# Patient Record
Sex: MALE | Race: WHITE | Employment: FULL TIME | URBAN - METROPOLITAN AREA
[De-identification: names, ages, dates, MRNs, and addresses within clinical notes are randomized per-mention and may not be internally consistent; named-entity substitution may affect disease eponyms.]

---

## 2022-07-27 ENCOUNTER — APPOINTMENT (OUTPATIENT)
Dept: CT IMAGING | Age: 32
DRG: 392 | End: 2022-07-27
Payer: COMMERCIAL

## 2022-07-27 ENCOUNTER — HOSPITAL ENCOUNTER (INPATIENT)
Age: 32
LOS: 5 days | Discharge: HOME | DRG: 392 | End: 2022-08-02
Attending: HOSPITALIST | Admitting: HOSPITALIST
Payer: COMMERCIAL

## 2022-07-27 DIAGNOSIS — K57.20 DIVERTICULITIS OF LARGE INTESTINE WITH PERFORATION WITHOUT BLEEDING: Primary | ICD-10-CM

## 2022-07-27 DIAGNOSIS — I10 ESSENTIAL HYPERTENSION: ICD-10-CM

## 2022-07-27 LAB
A/G RATIO: 1.2 (ref 1.1–2.2)
ALBUMIN SERPL-MCNC: 4.2 G/DL (ref 3.4–5)
ALP BLD-CCNC: 71 U/L (ref 40–129)
ALT SERPL-CCNC: 16 U/L (ref 10–40)
ANION GAP SERPL CALCULATED.3IONS-SCNC: 11 MMOL/L (ref 3–16)
AST SERPL-CCNC: 14 U/L (ref 15–37)
BACTERIA: ABNORMAL /HPF
BASOPHILS ABSOLUTE: 0.1 K/UL (ref 0–0.2)
BASOPHILS RELATIVE PERCENT: 0.5 %
BILIRUB SERPL-MCNC: 0.8 MG/DL (ref 0–1)
BILIRUBIN URINE: NEGATIVE
BLOOD, URINE: ABNORMAL
BUN BLDV-MCNC: 9 MG/DL (ref 7–20)
CALCIUM SERPL-MCNC: 9.5 MG/DL (ref 8.3–10.6)
CHLORIDE BLD-SCNC: 99 MMOL/L (ref 99–110)
CLARITY: ABNORMAL
CO2: 27 MMOL/L (ref 21–32)
COLOR: YELLOW
CREAT SERPL-MCNC: 0.8 MG/DL (ref 0.9–1.3)
EOSINOPHILS ABSOLUTE: 0 K/UL (ref 0–0.6)
EOSINOPHILS RELATIVE PERCENT: 0.1 %
EPITHELIAL CELLS, UA: 4 /HPF (ref 0–5)
GFR AFRICAN AMERICAN: >60
GFR NON-AFRICAN AMERICAN: >60
GLUCOSE BLD-MCNC: 116 MG/DL (ref 70–99)
GLUCOSE URINE: NEGATIVE MG/DL
HCT VFR BLD CALC: 43.8 % (ref 40.5–52.5)
HEMOGLOBIN: 14.6 G/DL (ref 13.5–17.5)
HYALINE CASTS: 1 /LPF (ref 0–8)
KETONES, URINE: NEGATIVE MG/DL
LEUKOCYTE ESTERASE, URINE: ABNORMAL
LIPASE: 13 U/L (ref 13–60)
LYMPHOCYTES ABSOLUTE: 1.6 K/UL (ref 1–5.1)
LYMPHOCYTES RELATIVE PERCENT: 8.3 %
MCH RBC QN AUTO: 31.5 PG (ref 26–34)
MCHC RBC AUTO-ENTMCNC: 33.3 G/DL (ref 31–36)
MCV RBC AUTO: 94.8 FL (ref 80–100)
MICROSCOPIC EXAMINATION: YES
MONOCYTES ABSOLUTE: 1.5 K/UL (ref 0–1.3)
MONOCYTES RELATIVE PERCENT: 8 %
MUCUS: PRESENT
NEUTROPHILS ABSOLUTE: 15.7 K/UL (ref 1.7–7.7)
NEUTROPHILS RELATIVE PERCENT: 83.1 %
NITRITE, URINE: NEGATIVE
PDW BLD-RTO: 14.2 % (ref 12.4–15.4)
PH UA: 6.5 (ref 5–8)
PLATELET # BLD: 239 K/UL (ref 135–450)
PMV BLD AUTO: 9.1 FL (ref 5–10.5)
POTASSIUM SERPL-SCNC: 4.4 MMOL/L (ref 3.5–5.1)
PROTEIN UA: ABNORMAL MG/DL
RBC # BLD: 4.62 M/UL (ref 4.2–5.9)
RBC UA: 14 /HPF (ref 0–4)
SODIUM BLD-SCNC: 137 MMOL/L (ref 136–145)
SPECIFIC GRAVITY UA: 1.02 (ref 1–1.03)
TOTAL PROTEIN: 7.8 G/DL (ref 6.4–8.2)
URINE REFLEX TO CULTURE: YES
URINE TYPE: ABNORMAL
UROBILINOGEN, URINE: 1 E.U./DL
WBC # BLD: 18.9 K/UL (ref 4–11)
WBC UA: 154 /HPF (ref 0–5)

## 2022-07-27 PROCEDURE — 87086 URINE CULTURE/COLONY COUNT: CPT

## 2022-07-27 PROCEDURE — 96375 TX/PRO/DX INJ NEW DRUG ADDON: CPT

## 2022-07-27 PROCEDURE — 83690 ASSAY OF LIPASE: CPT

## 2022-07-27 PROCEDURE — 96374 THER/PROPH/DIAG INJ IV PUSH: CPT

## 2022-07-27 PROCEDURE — 80053 COMPREHEN METABOLIC PANEL: CPT

## 2022-07-27 PROCEDURE — 6360000004 HC RX CONTRAST MEDICATION: Performed by: PHYSICIAN ASSISTANT

## 2022-07-27 PROCEDURE — 81001 URINALYSIS AUTO W/SCOPE: CPT

## 2022-07-27 PROCEDURE — 74177 CT ABD & PELVIS W/CONTRAST: CPT

## 2022-07-27 PROCEDURE — 99285 EMERGENCY DEPT VISIT HI MDM: CPT

## 2022-07-27 PROCEDURE — 36415 COLL VENOUS BLD VENIPUNCTURE: CPT

## 2022-07-27 PROCEDURE — 6360000002 HC RX W HCPCS: Performed by: PHYSICIAN ASSISTANT

## 2022-07-27 PROCEDURE — 85025 COMPLETE CBC W/AUTO DIFF WBC: CPT

## 2022-07-27 RX ORDER — MORPHINE SULFATE 4 MG/ML
4 INJECTION, SOLUTION INTRAMUSCULAR; INTRAVENOUS EVERY 30 MIN PRN
Status: DISCONTINUED | OUTPATIENT
Start: 2022-07-27 | End: 2022-07-28

## 2022-07-27 RX ORDER — 0.9 % SODIUM CHLORIDE 0.9 %
1000 INTRAVENOUS SOLUTION INTRAVENOUS ONCE
Status: COMPLETED | OUTPATIENT
Start: 2022-07-27 | End: 2022-07-28

## 2022-07-27 RX ORDER — ONDANSETRON 2 MG/ML
4 INJECTION INTRAMUSCULAR; INTRAVENOUS EVERY 6 HOURS PRN
Status: DISCONTINUED | OUTPATIENT
Start: 2022-07-27 | End: 2022-07-28

## 2022-07-27 RX ORDER — KETOROLAC TROMETHAMINE 30 MG/ML
30 INJECTION, SOLUTION INTRAMUSCULAR; INTRAVENOUS ONCE
Status: COMPLETED | OUTPATIENT
Start: 2022-07-27 | End: 2022-07-27

## 2022-07-27 RX ADMIN — KETOROLAC TROMETHAMINE 30 MG: 30 INJECTION, SOLUTION INTRAMUSCULAR at 22:39

## 2022-07-27 RX ADMIN — IOPAMIDOL 75 ML: 755 INJECTION, SOLUTION INTRAVENOUS at 23:46

## 2022-07-27 RX ADMIN — ONDANSETRON 4 MG: 2 INJECTION INTRAMUSCULAR; INTRAVENOUS at 22:39

## 2022-07-27 ASSESSMENT — ENCOUNTER SYMPTOMS
SHORTNESS OF BREATH: 0
COUGH: 0
ABDOMINAL PAIN: 1
DIARRHEA: 0
RHINORRHEA: 0
VOMITING: 0
NAUSEA: 1

## 2022-07-27 ASSESSMENT — PAIN SCALES - GENERAL: PAINLEVEL_OUTOF10: 7

## 2022-07-27 ASSESSMENT — PAIN - FUNCTIONAL ASSESSMENT: PAIN_FUNCTIONAL_ASSESSMENT: 0-10

## 2022-07-27 NOTE — LETTER
[facility] Emergency Department      [address] [facility] Stephanie Shweta, [address]            PROOF OF PRESENCE      To Whom It May Concern:    Malinda Garcai is present in the Emergency Department at [facility] currently since 7/27/2022. He will be admitted into the hospital under the Surgeon's care. Please excuse from work until date of return given by Surgeon.                                       Sincerely,      [facility] Emergency Dept

## 2022-07-28 PROBLEM — K57.92 DIVERTICULITIS: Status: ACTIVE | Noted: 2022-07-28

## 2022-07-28 PROBLEM — K57.20 DIVERTICULITIS OF LARGE INTESTINE WITH PERFORATION WITHOUT BLEEDING: Status: ACTIVE | Noted: 2022-07-28

## 2022-07-28 LAB
A/G RATIO: 1.2 (ref 1.1–2.2)
ALBUMIN SERPL-MCNC: 3.9 G/DL (ref 3.4–5)
ALP BLD-CCNC: 68 U/L (ref 40–129)
ALT SERPL-CCNC: 14 U/L (ref 10–40)
ANION GAP SERPL CALCULATED.3IONS-SCNC: 11 MMOL/L (ref 3–16)
APTT: 29.3 SEC (ref 23–34.3)
AST SERPL-CCNC: 12 U/L (ref 15–37)
ATYPICAL LYMPHOCYTE RELATIVE PERCENT: 1 % (ref 0–6)
BASOPHILS ABSOLUTE: 0 K/UL (ref 0–0.2)
BASOPHILS RELATIVE PERCENT: 0 %
BILIRUB SERPL-MCNC: 1.2 MG/DL (ref 0–1)
BUN BLDV-MCNC: 9 MG/DL (ref 7–20)
CALCIUM SERPL-MCNC: 8.8 MG/DL (ref 8.3–10.6)
CHLORIDE BLD-SCNC: 101 MMOL/L (ref 99–110)
CO2: 23 MMOL/L (ref 21–32)
CREAT SERPL-MCNC: 0.8 MG/DL (ref 0.9–1.3)
EOSINOPHILS ABSOLUTE: 0 K/UL (ref 0–0.6)
EOSINOPHILS RELATIVE PERCENT: 0 %
GFR AFRICAN AMERICAN: >60
GFR NON-AFRICAN AMERICAN: >60
GLUCOSE BLD-MCNC: 118 MG/DL (ref 70–99)
HCT VFR BLD CALC: 41.2 % (ref 40.5–52.5)
HEMOGLOBIN: 13.9 G/DL (ref 13.5–17.5)
INR BLD: 1.13 (ref 0.87–1.14)
LACTIC ACID, SEPSIS: 1.1 MMOL/L (ref 0.4–1.9)
LACTIC ACID: 1.3 MMOL/L (ref 0.4–2)
LYMPHOCYTES ABSOLUTE: 1.4 K/UL (ref 1–5.1)
LYMPHOCYTES RELATIVE PERCENT: 7 %
MAGNESIUM: 1.7 MG/DL (ref 1.8–2.4)
MCH RBC QN AUTO: 31.6 PG (ref 26–34)
MCHC RBC AUTO-ENTMCNC: 33.7 G/DL (ref 31–36)
MCV RBC AUTO: 93.6 FL (ref 80–100)
MONOCYTES ABSOLUTE: 1.1 K/UL (ref 0–1.3)
MONOCYTES RELATIVE PERCENT: 6 %
NEUTROPHILS ABSOLUTE: 15.2 K/UL (ref 1.7–7.7)
NEUTROPHILS RELATIVE PERCENT: 86 %
PDW BLD-RTO: 13.7 % (ref 12.4–15.4)
PHOSPHORUS: 2.8 MG/DL (ref 2.5–4.9)
PLATELET # BLD: 216 K/UL (ref 135–450)
PLATELET SLIDE REVIEW: ADEQUATE
PMV BLD AUTO: 8.8 FL (ref 5–10.5)
POTASSIUM SERPL-SCNC: 3.7 MMOL/L (ref 3.5–5.1)
PREALBUMIN: 13.4 MG/DL (ref 20–40)
PROTHROMBIN TIME: 14.5 SEC (ref 11.7–14.5)
RBC # BLD: 4.4 M/UL (ref 4.2–5.9)
RBC # BLD: NORMAL 10*6/UL
SLIDE REVIEW: ABNORMAL
SODIUM BLD-SCNC: 135 MMOL/L (ref 136–145)
TOTAL PROTEIN: 7.1 G/DL (ref 6.4–8.2)
TRANSFERRIN: 247 MG/DL (ref 200–360)
WBC # BLD: 17.7 K/UL (ref 4–11)

## 2022-07-28 PROCEDURE — 85730 THROMBOPLASTIN TIME PARTIAL: CPT

## 2022-07-28 PROCEDURE — 85025 COMPLETE CBC W/AUTO DIFF WBC: CPT

## 2022-07-28 PROCEDURE — 2580000003 HC RX 258: Performed by: PHYSICIAN ASSISTANT

## 2022-07-28 PROCEDURE — APPNB30 APP NON BILLABLE TIME 0-30 MINS: Performed by: NURSE PRACTITIONER

## 2022-07-28 PROCEDURE — 6370000000 HC RX 637 (ALT 250 FOR IP): Performed by: INTERNAL MEDICINE

## 2022-07-28 PROCEDURE — 87040 BLOOD CULTURE FOR BACTERIA: CPT

## 2022-07-28 PROCEDURE — 6360000002 HC RX W HCPCS: Performed by: HOSPITALIST

## 2022-07-28 PROCEDURE — 36415 COLL VENOUS BLD VENIPUNCTURE: CPT

## 2022-07-28 PROCEDURE — 6360000002 HC RX W HCPCS: Performed by: PHYSICIAN ASSISTANT

## 2022-07-28 PROCEDURE — 84100 ASSAY OF PHOSPHORUS: CPT

## 2022-07-28 PROCEDURE — 85610 PROTHROMBIN TIME: CPT

## 2022-07-28 PROCEDURE — 83605 ASSAY OF LACTIC ACID: CPT

## 2022-07-28 PROCEDURE — 2060000000 HC ICU INTERMEDIATE R&B

## 2022-07-28 PROCEDURE — 6370000000 HC RX 637 (ALT 250 FOR IP): Performed by: PHYSICIAN ASSISTANT

## 2022-07-28 PROCEDURE — APPSS60 APP SPLIT SHARED TIME 46-60 MINUTES: Performed by: NURSE PRACTITIONER

## 2022-07-28 PROCEDURE — 2500000003 HC RX 250 WO HCPCS: Performed by: HOSPITALIST

## 2022-07-28 PROCEDURE — 80053 COMPREHEN METABOLIC PANEL: CPT

## 2022-07-28 PROCEDURE — 2500000003 HC RX 250 WO HCPCS: Performed by: PHYSICIAN ASSISTANT

## 2022-07-28 PROCEDURE — 84134 ASSAY OF PREALBUMIN: CPT

## 2022-07-28 PROCEDURE — 83735 ASSAY OF MAGNESIUM: CPT

## 2022-07-28 PROCEDURE — 84466 ASSAY OF TRANSFERRIN: CPT

## 2022-07-28 PROCEDURE — 99222 1ST HOSP IP/OBS MODERATE 55: CPT | Performed by: SURGERY

## 2022-07-28 RX ORDER — KETOROLAC TROMETHAMINE 30 MG/ML
30 INJECTION, SOLUTION INTRAMUSCULAR; INTRAVENOUS EVERY 6 HOURS PRN
Status: DISPENSED | OUTPATIENT
Start: 2022-07-28 | End: 2022-08-02

## 2022-07-28 RX ORDER — LACTOBACILLUS RHAMNOSUS GG 10B CELL
1 CAPSULE ORAL 2 TIMES DAILY WITH MEALS
Status: DISCONTINUED | OUTPATIENT
Start: 2022-07-28 | End: 2022-08-02 | Stop reason: HOSPADM

## 2022-07-28 RX ORDER — LABETALOL HYDROCHLORIDE 5 MG/ML
5 INJECTION, SOLUTION INTRAVENOUS ONCE
Status: COMPLETED | OUTPATIENT
Start: 2022-07-28 | End: 2022-07-28

## 2022-07-28 RX ORDER — LABETALOL HYDROCHLORIDE 5 MG/ML
10 INJECTION, SOLUTION INTRAVENOUS ONCE
Status: COMPLETED | OUTPATIENT
Start: 2022-07-28 | End: 2022-07-28

## 2022-07-28 RX ORDER — CLONIDINE HYDROCHLORIDE 0.1 MG/1
0.1 TABLET ORAL ONCE
Status: COMPLETED | OUTPATIENT
Start: 2022-07-28 | End: 2022-07-28

## 2022-07-28 RX ORDER — ONDANSETRON 2 MG/ML
4 INJECTION INTRAMUSCULAR; INTRAVENOUS EVERY 6 HOURS PRN
Status: DISCONTINUED | OUTPATIENT
Start: 2022-07-28 | End: 2022-08-02 | Stop reason: HOSPADM

## 2022-07-28 RX ORDER — ENOXAPARIN SODIUM 100 MG/ML
30 INJECTION SUBCUTANEOUS 2 TIMES DAILY
Status: DISCONTINUED | OUTPATIENT
Start: 2022-07-28 | End: 2022-08-02 | Stop reason: HOSPADM

## 2022-07-28 RX ORDER — LABETALOL HYDROCHLORIDE 5 MG/ML
10 INJECTION, SOLUTION INTRAVENOUS EVERY 6 HOURS PRN
Status: DISCONTINUED | OUTPATIENT
Start: 2022-07-28 | End: 2022-08-02 | Stop reason: HOSPADM

## 2022-07-28 RX ORDER — LABETALOL HYDROCHLORIDE 5 MG/ML
10 INJECTION, SOLUTION INTRAVENOUS ONCE
Status: DISCONTINUED | OUTPATIENT
Start: 2022-07-28 | End: 2022-07-28

## 2022-07-28 RX ORDER — LISINOPRIL 20 MG/1
20 TABLET ORAL DAILY
Status: DISCONTINUED | OUTPATIENT
Start: 2022-07-28 | End: 2022-08-02 | Stop reason: HOSPADM

## 2022-07-28 RX ORDER — ACETAMINOPHEN 325 MG/1
650 TABLET ORAL EVERY 6 HOURS PRN
Status: DISCONTINUED | OUTPATIENT
Start: 2022-07-28 | End: 2022-08-02 | Stop reason: HOSPADM

## 2022-07-28 RX ORDER — HYDRALAZINE HYDROCHLORIDE 20 MG/ML
10 INJECTION INTRAMUSCULAR; INTRAVENOUS EVERY 6 HOURS PRN
Status: DISCONTINUED | OUTPATIENT
Start: 2022-07-28 | End: 2022-08-02 | Stop reason: HOSPADM

## 2022-07-28 RX ORDER — SODIUM CHLORIDE 0.9 % (FLUSH) 0.9 %
10 SYRINGE (ML) INJECTION PRN
Status: DISCONTINUED | OUTPATIENT
Start: 2022-07-28 | End: 2022-08-02 | Stop reason: HOSPADM

## 2022-07-28 RX ORDER — SODIUM CHLORIDE 9 MG/ML
INJECTION, SOLUTION INTRAVENOUS PRN
Status: DISCONTINUED | OUTPATIENT
Start: 2022-07-28 | End: 2022-08-02 | Stop reason: HOSPADM

## 2022-07-28 RX ORDER — SODIUM CHLORIDE 9 MG/ML
INJECTION, SOLUTION INTRAVENOUS CONTINUOUS
Status: DISCONTINUED | OUTPATIENT
Start: 2022-07-28 | End: 2022-07-29

## 2022-07-28 RX ORDER — MORPHINE SULFATE 2 MG/ML
2 INJECTION, SOLUTION INTRAMUSCULAR; INTRAVENOUS
Status: DISCONTINUED | OUTPATIENT
Start: 2022-07-28 | End: 2022-08-02 | Stop reason: HOSPADM

## 2022-07-28 RX ORDER — SODIUM CHLORIDE 0.9 % (FLUSH) 0.9 %
10 SYRINGE (ML) INJECTION EVERY 12 HOURS SCHEDULED
Status: DISCONTINUED | OUTPATIENT
Start: 2022-07-28 | End: 2022-08-02 | Stop reason: HOSPADM

## 2022-07-28 RX ORDER — METOPROLOL SUCCINATE 50 MG/1
50 TABLET, EXTENDED RELEASE ORAL DAILY
Status: DISCONTINUED | OUTPATIENT
Start: 2022-07-28 | End: 2022-07-30

## 2022-07-28 RX ORDER — ACETAMINOPHEN 650 MG/1
650 SUPPOSITORY RECTAL EVERY 6 HOURS PRN
Status: DISCONTINUED | OUTPATIENT
Start: 2022-07-28 | End: 2022-08-02 | Stop reason: HOSPADM

## 2022-07-28 RX ORDER — MORPHINE SULFATE 4 MG/ML
4 INJECTION, SOLUTION INTRAMUSCULAR; INTRAVENOUS
Status: DISCONTINUED | OUTPATIENT
Start: 2022-07-28 | End: 2022-08-02 | Stop reason: HOSPADM

## 2022-07-28 RX ADMIN — MORPHINE SULFATE 2 MG: 2 INJECTION, SOLUTION INTRAMUSCULAR; INTRAVENOUS at 08:04

## 2022-07-28 RX ADMIN — SODIUM CHLORIDE 1000 ML: 9 INJECTION, SOLUTION INTRAVENOUS at 00:06

## 2022-07-28 RX ADMIN — HYDRALAZINE HYDROCHLORIDE 10 MG: 20 INJECTION INTRAMUSCULAR; INTRAVENOUS at 03:12

## 2022-07-28 RX ADMIN — Medication 10 ML: at 08:04

## 2022-07-28 RX ADMIN — HYDRALAZINE HYDROCHLORIDE 10 MG: 20 INJECTION INTRAMUSCULAR; INTRAVENOUS at 14:39

## 2022-07-28 RX ADMIN — LABETALOL HYDROCHLORIDE 10 MG: 5 INJECTION INTRAVENOUS at 15:15

## 2022-07-28 RX ADMIN — SODIUM CHLORIDE: 9 INJECTION, SOLUTION INTRAVENOUS at 15:12

## 2022-07-28 RX ADMIN — Medication 1 CAPSULE: at 08:03

## 2022-07-28 RX ADMIN — PIPERACILLIN AND TAZOBACTAM 3375 MG: 3; .375 INJECTION, POWDER, FOR SOLUTION INTRAVENOUS at 11:38

## 2022-07-28 RX ADMIN — Medication 10 ML: at 19:24

## 2022-07-28 RX ADMIN — PIPERACILLIN AND TAZOBACTAM 3375 MG: 3; .375 INJECTION, POWDER, FOR SOLUTION INTRAVENOUS at 18:15

## 2022-07-28 RX ADMIN — ENOXAPARIN SODIUM 30 MG: 100 INJECTION SUBCUTANEOUS at 22:17

## 2022-07-28 RX ADMIN — CLONIDINE HYDROCHLORIDE 0.1 MG: 0.1 TABLET ORAL at 16:30

## 2022-07-28 RX ADMIN — SODIUM CHLORIDE: 9 INJECTION, SOLUTION INTRAVENOUS at 04:29

## 2022-07-28 RX ADMIN — ENOXAPARIN SODIUM 30 MG: 100 INJECTION SUBCUTANEOUS at 13:19

## 2022-07-28 RX ADMIN — MORPHINE SULFATE 4 MG: 4 INJECTION INTRAVENOUS at 03:12

## 2022-07-28 RX ADMIN — ACETAMINOPHEN 650 MG: 325 TABLET ORAL at 08:03

## 2022-07-28 RX ADMIN — LABETALOL HYDROCHLORIDE 10 MG: 5 INJECTION INTRAVENOUS at 04:21

## 2022-07-28 RX ADMIN — HYDRALAZINE HYDROCHLORIDE 10 MG: 20 INJECTION INTRAMUSCULAR; INTRAVENOUS at 23:37

## 2022-07-28 RX ADMIN — MORPHINE SULFATE 4 MG: 4 INJECTION INTRAVENOUS at 00:07

## 2022-07-28 RX ADMIN — METOPROLOL SUCCINATE 50 MG: 50 TABLET, EXTENDED RELEASE ORAL at 18:12

## 2022-07-28 RX ADMIN — PIPERACILLIN AND TAZOBACTAM 3375 MG: 3; .375 INJECTION, POWDER, FOR SOLUTION INTRAVENOUS at 03:15

## 2022-07-28 RX ADMIN — MORPHINE SULFATE 4 MG: 4 INJECTION, SOLUTION INTRAMUSCULAR; INTRAVENOUS at 13:18

## 2022-07-28 RX ADMIN — LISINOPRIL 20 MG: 20 TABLET ORAL at 16:31

## 2022-07-28 RX ADMIN — MORPHINE SULFATE 2 MG: 2 INJECTION, SOLUTION INTRAMUSCULAR; INTRAVENOUS at 19:24

## 2022-07-28 RX ADMIN — LABETALOL HYDROCHLORIDE 5 MG: 5 INJECTION INTRAVENOUS at 01:06

## 2022-07-28 RX ADMIN — Medication 1 CAPSULE: at 16:31

## 2022-07-28 ASSESSMENT — PAIN DESCRIPTION - LOCATION
LOCATION: ABDOMEN

## 2022-07-28 ASSESSMENT — PAIN SCALES - GENERAL
PAINLEVEL_OUTOF10: 8
PAINLEVEL_OUTOF10: 7
PAINLEVEL_OUTOF10: 6
PAINLEVEL_OUTOF10: 5
PAINLEVEL_OUTOF10: 5
PAINLEVEL_OUTOF10: 6
PAINLEVEL_OUTOF10: 4
PAINLEVEL_OUTOF10: 8
PAINLEVEL_OUTOF10: 7
PAINLEVEL_OUTOF10: 6
PAINLEVEL_OUTOF10: 5

## 2022-07-28 ASSESSMENT — PAIN DESCRIPTION - ORIENTATION: ORIENTATION: MID

## 2022-07-28 NOTE — PLAN OF CARE
[facility]  and Laparoscopic Surgery        Assessment & Plan of Care    History of Present Illness: Mr. Rivera Sosa is a 33 y.o. male who presented to the ED yesterday with lower abdominal pain that he initially thought was related to gas and tried OTC gas medications without relief. Pain began 2 days ago and has been constant and progressive to severe. He describes it as a bloating/cramping sensation that does not radiate. No alleviating factors; pain was worse with passing stool. Associated chills, sweats, and nausea. He denies fevers, vomiting, diarrhea, constipation, urinary symptoms, chest pain, SOB, or similar symptoms in the past.  Last BM was yesterday. Medical history includes hypertension and morbid obesity, BMI 42.22. No prior abdominal surgeries. No prior colonoscopy. No family history of colon cancer or IBD. No blood thinners. Physical Exam:  CONSTITUTIONAL:  alert, no apparent distress and morbidly obese, diaphoretic  NEUROLOGIC:  Mental Status Exam:  Level of Alertness:   awake  Orientation:   person, place, time  EYES:  sclera clear  ENT:  normocepalic, without obvious abnormality  NECK:  supple, symmetrical, trachea midline  LUNGS:  clear to auscultation  CARDIOVASCULAR:  regular rate and rhythm and no murmur noted  ABDOMEN: soft, non-distended, obese, tenderness noted in the lower abdomen but most focal in the left lower quadrant, voluntary guarding present, no masses palpated, normal bowel sounds,  no scars, and hernia absent  Extremities: no edema  SKIN:  no bruising or bleeding and normal skin color, texture, turgor    Assessment:  Acute sigmoid diverticulitis with contained perforation  Hypertension  Morbid obesity, BMI 42.22    Plan:  1. No plans for surgical intervention at this time; continued supportive care and monitoring  2. NPO; monitor bowel function  3. IV hydration; monitor and correct electrolytes  4. Antibiotics  5. Activity as tolerated  6.  PRN analgesics and antiemetics--minimizing narcotics as tolerated  7. DVT prophylaxis with  Lovenox  8. Management of medical comorbid etiologies per primary team and consulting services    EDUCATION:  Educated patient on plan of care and disease process--all questions answered. Plans discussed with patient and nursing. Reviewed and discussed with Dr. Sara Meek consult to follow.       Signed:  JESSICA Esposito - CNP  7/28/2022 8:27 AM

## 2022-07-28 NOTE — H&P
Hospital Medicine History & Physical      Patient Name: Jen Longoria    : 1990    PCP: No primary care provider on file. Date of Service:  Patient seen and examined on 2022     Chief Complaint:  Abdominal pain    History Of Present Illness:    Jen Longoria is a 33 y.o. male who presented to ED for evaluation of cramping, lower abdominal pain which began 2 days ago. Patient reports pain initially waxed and waned but has become more constant over the last day. He reports associated nausea but denies vomiting, diarrhea, constipation. He reports a normal bowel movement yesterday. He denies fever, chest pain, shortness of breath, cough, urinary symptoms. Patient's BP noted to be elevated. He reports he did previously take blood pressure medications but he has not taken anything for several years. He does not regularly follow with a PCP. Past Medical History:    Patient has a past medical history of HTN (hypertension). Past Surgical History:    Patient denies any significant past surgical history. Medications Prior to Admission:      Prior to Admission medications    None       Allergies:  Patient has no known allergies. Social History:      TOBACCO:   reports that he has been smoking cigarettes. He has been smoking an average of 1 pack per day. He has never used smokeless tobacco.  ETOH:   reports no history of alcohol use. DRUGS:  reports current drug use. Drug: Marijuana [place]ardo). Family History:      Reviewed in detail positive as follows:    History reviewed. No pertinent family history. REVIEW OF SYSTEMS:   Pertinent positives as noted in the HPI. All other systems reviewed and negative.     PHYSICAL EXAM PERFORMED:    BP (!) 173/111   Pulse 79   Temp 98.9 °F (37.2 °C)   Resp (!) 32   Ht 6' 2\" (1.88 m)   Wt 300 lb (136.1 kg)   SpO2 98%   BMI 38.52 kg/m²     General appearance:  Awake, alert, no apparent distress  HEENT:  Normocephalic, atraumatic without obvious deformity. PERRL. EOM intact. Conjunctivae/corneas clear. Neck: Supple, with full range of motion. No JVD. Trachea midline. Respiratory:  Clear to auscultation bilaterally without rales, wheezes, or rhonchi. Normal respiratory effort. Cardiovascular:  Regular rate and rhythm without murmurs, rubs or gallops. Abdomen: Lower abdomen tender to palpation. Soft, ND, without rebound or guarding. Normal bowel sounds. Extremities:  No clubbing, cyanosis, or edema bilaterally. Full range of motion without deformity. +2 palpable pulses, equal bilaterally. Capillary refill brisk,< 3 seconds   Skin: No rashes or lesions. Warm/dry. Neurologic:  Neurovascularly intact without any focal sensory/motor deficits. Cranial nerves: II-XII intact, grossly non-focal. Alert and oriented x 3. Normal speech. Psychiatric:  Thought content appropriate, normal insight. Labs:   CBC   Recent Labs     07/27/22 2235   WBC 18.9*   HGB 14.6   HCT 43.8           RENAL  Recent Labs     07/27/22 2235      K 4.4   CL 99   CO2 27   BUN 9   CREATININE 0.8*       LFTS  Recent Labs     07/27/22 2235   AST 14*   ALT 16   BILITOT 0.8   ALKPHOS 71       COAG  No results for input(s): INR in the last 72 hours. CARDIAC ENZYMES  No results for input(s): TROPONINI in the last 72 hours. LIPIDS  No results found for: CHOL, TRIG, HDL, LDLCALC      Radiology:     CT ABDOMEN PELVIS W IV CONTRAST Additional Contrast? None   Final Result   1. Acute sigmoid diverticulitis with small focus of contained perforation. No abscess, obstruction or free air in the upper abdomen. 2.  Normal appendix. ASSESSMENT/PLAN:    Acute sigmoid diverticulitis with perforation without abscess  CT notable for acute sigmoid diverticulitis with small focus of contained perforation; no abscess noted. General surgery consulted in ED and recommended antibiotics and will consult in the morning  Zosyn initiated in ED.   We will continue   NPO, IVF  Pain control    HTN  Patient was previously on lisinopril and atenolol but has not taken these for several years  Will start IV labetalol PRN and transition to oral medications once patient is no longer NPO    Obesity due to excess calories (Body mass index is 89.86 kg/m².)   Complicating assessment and treatment. Obesity places the patient at risk for multiple co-morbidities as well as early death and may be contributing to the patient's presentation. Supportive care. Encourage therapeutic lifestyle changes. DVT prophylaxis: Lovenox  Probiotic if on abx: Yes    Diet: Diet NPO Exceptions are: Sips of Water with Meds  Code Status: Full Code    Consults:  IP CONSULT TO GENERAL SURGERY    Disposition: Admit to Inpatient   ELOS: Greater than two midnights due to medical therapy     Viji Nielsen PA-C    Thank you for the opportunity to be involved in this patient's care. If you have any questions or concerns please feel free to contact me at [phone].

## 2022-07-28 NOTE — CONSULTS
[facility] [place] and Laparoscopic Surgery     Consult                                                     Patient Name: Ten Layton  MRN: [id number]  [place]: 1990  Admission date: 7/27/2022  9:50 PM   Date of evaluation: 7/28/2022  Primary Care Physician: No primary care provider on file. Reason for consult: Abdominal pain  History of Present Illness:    Mr. Ashlee Hadley is a 33 y.o. male who presents with onset cramping constant lower abdominal pain beginning 2 days ago to the emergency department yesterday. Initially thought was related to gas but without any relief with medication. The pain does not radiate and nothing makes it better but is worse with trying to pass stool. Associated with subjective fever chills sweats nausea but no vomiting and denies chest pain dyspnea emesis change in bowels otherwise dysuria or other complaints. Never happened before. Last bowel movement was yesterday. Medical history includes hypertension and morbid obesity with BMI of 42 this admission. Never had abdominal surgery. Never had colonoscopy. No personal family history of colon malignancy or inflammatory bowel disease. Work-up in emergency department shows acute microperforated sigmoid diverticulitis and admitted. Feels room is warm explaining his current diaphoresis    Past Medical History:        Diagnosis Date    HTN (hypertension)        Past Surgical History:    History reviewed. No pertinent surgical history.     Scheduled Meds:   sodium chloride flush  10 mL IntraVENous 2 times per day    enoxaparin  30 mg SubCUTAneous BID    lactobacillus  1 capsule Oral BID WC    piperacillin-tazobactam  3,375 mg IntraVENous Q8H    lisinopril  20 mg Oral Daily    metoprolol succinate  50 mg Oral Daily     Continuous Infusions:   sodium chloride      sodium chloride 100 mL/hr at 07/28/22 1512     PRN Meds:.sodium chloride flush, sodium chloride, magnesium hydroxide, acetaminophen **OR** acetaminophen, ketorolac, morphine **OR** morphine, ondansetron, labetalol, hydrALAZINE    Prior to Admission medications    Not on File        Allergies:  Patient has no known allergies. Social History:   Social History     Socioeconomic History    Marital status: Single     Spouse name: None    Number of children: None    Years of education: None    Highest education level: None   Tobacco Use    Smoking status: Every Day     Packs/day: 1.00     Types: Cigarettes    Smokeless tobacco: Never   Substance and Sexual Activity    Alcohol use: Never    Drug use: Yes     Types: Marijuana [place])       Family History:    History reviewed. No pertinent family history.     Review of Systems:  CONSTITUTIONAL:  Negative except as above  HEENT:  negative  RESPIRATORY:  negative  CARDIOVASCULAR:  negative  GASTROINTESTINAL:  negative except as above   GENITOURINARY:  negative  HEMATOLOGIC/LYMPHATIC:  negative  NEUROLOGICAL:  Negative      Vital Signs:  Patient Vitals for the past 24 hrs:   BP Temp Temp src Pulse Resp SpO2 Height Weight   07/28/22 1810 (!) 169/100 -- -- 79 -- 98 % -- --   07/28/22 1558 (!) 183/123 98.2 °F (36.8 °C) Oral 80 18 99 % -- --   07/28/22 1513 (!) 175/117 -- -- 82 -- -- -- --   07/28/22 1438 (!) 186/125 -- -- 70 -- -- -- --   07/28/22 1244 (!) 187/105 98.4 °F (36.9 °C) Oral 70 17 99 % -- --   07/28/22 0846 -- -- -- -- -- -- -- (!) 328 lb 12.8 oz (149.1 kg)   07/28/22 0711 (!) 171/104 98.7 °F (37.1 °C) Oral 88 18 96 % -- --   07/28/22 0430 (!) 164/113 -- -- -- -- -- -- --   07/28/22 0404 (!) 187/125 98.6 °F (37 °C) Oral 88 18 99 % -- --   07/28/22 0338 (!) 162/100 -- -- 81 (!) 35 100 % -- --   07/28/22 0305 (!) 165/98 -- -- 76 -- 98 % -- --   07/28/22 0202 -- -- -- 75 20 99 % -- --   07/28/22 0103 (!) 163/115 -- -- 75 21 99 % -- --   07/27/22 2344 (!) 173/111 -- -- -- -- -- -- --   07/27/22 2329 (!) 158/100 -- -- 79 (!) 32 98 % -- --   07/27/22 2314 (!) 180/109 -- -- -- -- -- -- --   07/27/22 2206 -- 98.9 °F (37.2 °C) -- -- -- -- 6' 2\" (1.88 m) 300 lb (136.1 kg)   22 2203 (!) 182/119 -- Oral 86 16 98 % -- --      TEMPERATURE HISTORY 24H: Temp (24hrs), Av.6 °F (37 °C), Min:98.2 °F (36.8 °C), Max:98.9 °F (37.2 °C)    BLOOD PRESSURE HISTORY: Systolic (52GEE), FTA:020 , Min:158 , TYM:609    Diastolic (84BGQ), LRH:389, Min:98, Max:125    Admission Weight: 300 lb (136.1 kg)     No intake or output data in the 24 hours ending 22 1846  Drain/tube Output:         Physical Exam:  Constitutional:  well-developed, well-nourished,   Neurologic: awake, Orientation:  Oriented to person, place, time, follows commands, clear speech, cranial nerves grossly intact  Psychiatric: normal affect, no hallucinations  Eyes:  sclera clear, no visible discharge  Head, ears, nose, mouth, throat: normocepalic, without obvious abnormality, supple, symmetrical, trachea midline, no JVD, mucous membranes moist  Cardiovascular: regular rate and rhythm   Respiratory: clear to auscultation  GI: soft, non-distended, moderate lower abdominal tenderness without peritonitis  Lymph: no palpable lymphadenopathy  Skin: no bruising or bleeding, normal skin color, texture, turgor, and no redness, warmth, or swelling    Labs:    CBC:    Recent Labs     22  041   WBC 18.9* 17.7*   HGB 14.6 13.9   HCT 43.8 41.2    216     BMP:    Recent Labs     22  0415    135*   K 4.4 3.7   CL 99 101   CO2 27 23   BUN 9 9   CREATININE 0.8* 0.8*   GLUCOSE 116* 118*     Hepatic:   Recent Labs     22  0415   AST 14* 12*   ALT 16 14   BILITOT 0.8 1.2*   ALKPHOS 71 68     Amylase: No results for input(s): AMYLASE in the last 72 hours.   Lipase:   Recent Labs     22   LIPASE 13.0     Mag:      Recent Labs     22   MG 1.70*      Phos:     Recent Labs     22   PHOS 2.8      Coags:   Recent Labs     22   INR 1.13   APTT 29.3       Imaging:  I have personally reviewed the following films:  CT ABDOMEN PELVIS W IV CONTRAST Additional Contrast? None    Result Date: 7/28/2022  EXAMINATION: CT OF THE ABDOMEN AND PELVIS WITH CONTRAST 7/27/2022 11:45 pm TECHNIQUE: CT of the abdomen and pelvis was performed with the administration of intravenous contrast. Multiplanar reformatted images are provided for review. Automated exposure control, iterative reconstruction, and/or weight based adjustment of the mA/kV was utilized to reduce the radiation dose to as low as reasonably achievable. COMPARISON: None. HISTORY: ORDERING SYSTEM PROVIDED HISTORY: RLQ pain TECHNOLOGIST PROVIDED HISTORY: Reason for exam:->RLQ pain Additional Contrast?->None Decision Support Exception - unselect if not a suspected or confirmed emergency medical condition->Emergency Medical Condition (MA) Reason for Exam: RLQ pain Relevant Medical/Surgical History: Abdominal Pain (Pt states started having sharp abdominal pain since yesterday, pt states no constipation or diarrhea. Pt states nausea no vomiting, chills on and off. Pt states only ate this morning hasn't eaten since. ) FINDINGS: Lower Chest: No acute findings. Organs: Findings compatible with hepatic steatosis. The gallbladder, pancreas, spleen, adrenals and kidneys reveal no acute findings. GI/Bowel: Wall thickening with inflammatory change involving the sigmoid colon is present with associated diverticula. Small focus of contained perforation along the posterior margin. No abscess or obstruction. Normal appendix. High-density intraluminal material within the distal small bowel may represent previously administered barium. Pelvis: No acute findings. Peritoneum/Retroperitoneum: No free air or free fluid. The aorta is normal in caliber. The visceral branches are patent. No lymphadenopathy. Bones/Soft Tissues: No abnormality identified. 1.  Acute sigmoid diverticulitis with small focus of contained perforation.  No abscess, obstruction or free air in the upper abdomen. 2.  Normal appendix. Cultures:  Relevant cultures documented under results section     Assessment:  Patient Active Problem List   Diagnosis    Diverticulitis    Diverticulitis of large intestine with perforation without bleeding     Acute sigmoid diverticulitis with microperforation  Morbid obesity, BMI 42.2    Plan:  1. Abdominal exam benign, vital signs stable, no signs of toxicity. Does not need surgical exploration unless condition deteriorates   2. Bowel rest for today   3. Monitor bowel function  4. IVF, monitor and replace electrolytes as needed  5. Antibiotics, will switch to PO at discharge  6. Pain medication and antiemetics as needed with caution as may mask worsening exam  7. Will need elective colonoscopy 6-8 weeks after discharge to screen for other etiologies that may be mimicking diverticulitis  8. Will discuss benefit of sigmoidectomy electively after discharge. If necessary, best chances of successful minimally invasive resection without need for temporary ostomy are with elective procedure. Doubtful will be needed  9. Defer management of remainder of other medical conditions to primary and consulting teams    This plan was discussed at length with the patient. He was understanding and in agreement with the plan  Thank you for the consult and allowing me to participate in the care of this patient. I look forward to following him this admission    Darell Davalos MD, FACS  7/28/2022  6:46 PM

## 2022-07-28 NOTE — PLAN OF CARE
Problem: Pain  Goal: Verbalizes/displays adequate comfort level or baseline comfort level  Flowsheets (Taken 7/28/2022 4706)  Verbalizes/displays adequate comfort level or baseline comfort level:   Encourage patient to monitor pain and request assistance   Assess pain using appropriate pain scale  Note: Pt reports pain being controlled with PRN pain medication.

## 2022-07-28 NOTE — ED PROVIDER NOTES
[address] [facility]        Pt Name: Jerod Rosales  MRN: [id number]  [place] 1990  Date of evaluation: 7/27/2022  Provider: Gilda Rolon PA-C  PCP: No primary care provider on file. Note Started: 11:00 PM EDT       JAMILA. I have evaluated this patient. My supervising physician was available for consultation. CHIEF COMPLAINT       Chief Complaint   Patient presents with    Abdominal Pain     Pt states started having sharp abdominal pain since yesterday, pt states no constipation or diarrhea. Pt states nausea no vomiting, chills on and off. Pt states only ate this morning hasn't eaten since. HISTORY OF PRESENT ILLNESS   (Location, Timing/Onset, Context/Setting, Quality, Duration, Modifying Factors, Severity, Associated Signs and Symptoms)  Note limiting factors. Chief Complaint: Abdominal pain    Jerod Rosales is a 33 y.o. male who presents to the emergency department today for evaluation for abdominal pain. The patient states that he started with abdominal pain yesterday, initially was waxing and waning, however has been more constant over the past 24 hours. He states that his pain is cramping, is to his lower abdomen only, otherwise does not radiate. Patient is currently rating his discomfort as a 7/10, he otherwise denies any known alleviating or aggravating factors. Patient is nauseous, although has not had any vomiting. No diarrhea. Reports actually had a normal bowel movement earlier today. Patient has not had any fever or chills. No cough or congestion. No chest pain or shortness of breath. He denies any dysuria hematuria. No testicular pain or swelling. He is not concerned for STDs. No past surgical history to the abdomen. No other complaints    Nursing Notes were all reviewed and agreed with or any disagreements were addressed in the HPI.     REVIEW OF SYSTEMS    (2-9 systems for level 4, 10 or more for level 5)     Review of Systems   Constitutional:  Negative for activity change, appetite change, chills and fever. HENT:  Negative for congestion and rhinorrhea. Respiratory:  Negative for cough and shortness of breath. Cardiovascular:  Negative for chest pain. Gastrointestinal:  Positive for abdominal pain and nausea. Negative for diarrhea and vomiting. Genitourinary:  Negative for difficulty urinating, dysuria and hematuria. Positives and Pertinent negatives as per HPI. Except as noted above in the ROS, all other systems were reviewed and negative. PAST MEDICAL HISTORY   History reviewed. No pertinent past medical history. SURGICAL HISTORY   History reviewed. No pertinent surgical history. CURRENTMEDICATIONS       Previous Medications    No medications on file         ALLERGIES     Patient has no known allergies. FAMILYHISTORY     History reviewed. No pertinent family history. SOCIAL HISTORY       Social History     Tobacco Use    Smoking status: Every Day     Packs/day: 1.00     Types: Cigarettes    Smokeless tobacco: Never   Substance Use Topics    Alcohol use: Never    Drug use: Yes     Types: Marijuana (Weed)       SCREENINGS    [place] Coma Scale  Eye Opening: Spontaneous  Best Verbal Response: Oriented  Best Motor Response: Obeys commands  [place] Coma Scale Score: 15        PHYSICAL EXAM    (up to 7 for level 4, 8 or more for level 5)     ED Triage Vitals   BP Temp Temp Source Heart Rate Resp SpO2 Height Weight   07/27/22 2203 07/27/22 2206 07/27/22 2203 07/27/22 2203 07/27/22 2203 07/27/22 2203 07/27/22 2206 07/27/22 2206   (!) 182/119 98.9 °F (37.2 °C) Oral 86 16 98 % 6' 2\" (1.88 m) 300 lb (136.1 kg)       Physical Exam  Vitals and nursing note reviewed. Constitutional:       Appearance: He is well-developed. He is not diaphoretic. HENT:      Head: Normocephalic and atraumatic.       Right Ear: External ear normal.      Left Ear: External ear normal.      Nose: Nose normal.   Eyes:      General:         Right eye: No discharge. Left eye: No discharge. Neck:      Trachea: No tracheal deviation. Cardiovascular:      Rate and Rhythm: Normal rate and regular rhythm. Heart sounds: No murmur heard. Pulmonary:      Effort: Pulmonary effort is normal. No respiratory distress. Breath sounds: Normal breath sounds. No wheezing or rales. Abdominal:      General: Bowel sounds are normal. There is no distension. Palpations: Abdomen is soft. Tenderness: There is abdominal tenderness in the right lower quadrant, periumbilical area and suprapubic area. There is no guarding or rebound. Musculoskeletal:         General: Normal range of motion. Cervical back: Normal range of motion and neck supple. Skin:     General: Skin is warm and dry. Neurological:      Mental Status: He is alert and oriented to person, place, and time.    Psychiatric:         Behavior: Behavior normal.       DIAGNOSTIC RESULTS   LABS:    Labs Reviewed   CBC WITH AUTO DIFFERENTIAL - Abnormal; Notable for the following components:       Result Value    WBC 18.9 (*)     Neutrophils Absolute 15.7 (*)     Monocytes Absolute 1.5 (*)     All other components within normal limits   COMPREHENSIVE METABOLIC PANEL - Abnormal; Notable for the following components:    Glucose 116 (*)     Creatinine 0.8 (*)     AST 14 (*)     All other components within normal limits   URINALYSIS WITH REFLEX TO CULTURE - Abnormal; Notable for the following components:    Clarity, UA CLOUDY (*)     Blood, Urine MODERATE (*)     Protein, UA TRACE (*)     Leukocyte Esterase, Urine MODERATE (*)     All other components within normal limits   MICROSCOPIC URINALYSIS - Abnormal; Notable for the following components:    Bacteria, UA 1+ (*)     WBC,  (*)     RBC, UA 14 (*)     Mucus, UA Present (*)     All other components within normal limits   CULTURE, URINE   CULTURE, BLOOD 1   CULTURE, BLOOD 2   LIPASE LACTATE, SEPSIS   LACTATE, SEPSIS       When ordered only abnormal lab results are displayed. All other labs were within normal range or not returned as of this dictation. EKG: When ordered, EKG's are interpreted by the Emergency Department Physician in the absence of a cardiologist.  Please see their note for interpretation of EKG. RADIOLOGY:   Non-plain film images such as CT, Ultrasound and MRI are read by the radiologist. Plain radiographic images are visualized and preliminarily interpreted by the ED Provider with the below findings:        Interpretation per the Radiologist below, if available at the time of this note:    CT ABDOMEN PELVIS W IV CONTRAST Additional Contrast? None   Final Result   1. Acute sigmoid diverticulitis with small focus of contained perforation. No abscess, obstruction or free air in the upper abdomen. 2.  Normal appendix. No results found.         PROCEDURES   Unless otherwise noted below, none     Procedures    CRITICAL CARE TIME       CONSULTS:  IP CONSULT TO GENERAL SURGERY      EMERGENCY DEPARTMENT COURSE and DIFFERENTIAL DIAGNOSIS/MDM:   Vitals:    Vitals:    07/27/22 2314 07/27/22 2329 07/27/22 2344 07/28/22 0103   BP: (!) 180/109 (!) 158/100 (!) 173/111 (!) 163/115   Pulse:  79  75   Resp:  (!) 32  21   Temp:       TempSrc:       SpO2:  98%  99%   Weight:       Height:           Patient was given the following medications:  Medications   ondansetron (ZOFRAN) injection 4 mg (4 mg IntraVENous Given 7/27/22 2239)   morphine injection 4 mg (4 mg IntraVENous Given 7/28/22 0007)   piperacillin-tazobactam (ZOSYN) 3,375 mg in dextrose 5 % 50 mL IVPB (mini-bag) (has no administration in time range)   ketorolac (TORADOL) injection 30 mg (30 mg IntraVENous Given 7/27/22 2239)   0.9 % sodium chloride bolus (1,000 mLs IntraVENous New Bag 7/28/22 0006)   iopamidol (ISOVUE-370) 76 % injection 75 mL (75 mLs IntraVENous Given 7/27/22 2346)   labetalol

## 2022-07-29 LAB
ANION GAP SERPL CALCULATED.3IONS-SCNC: 10 MMOL/L (ref 3–16)
BASOPHILS ABSOLUTE: 0 K/UL (ref 0–0.2)
BASOPHILS RELATIVE PERCENT: 0.3 %
BUN BLDV-MCNC: 8 MG/DL (ref 7–20)
CALCIUM SERPL-MCNC: 8.3 MG/DL (ref 8.3–10.6)
CHLORIDE BLD-SCNC: 104 MMOL/L (ref 99–110)
CO2: 23 MMOL/L (ref 21–32)
CREAT SERPL-MCNC: 0.7 MG/DL (ref 0.9–1.3)
EOSINOPHILS ABSOLUTE: 0 K/UL (ref 0–0.6)
EOSINOPHILS RELATIVE PERCENT: 0.4 %
GFR AFRICAN AMERICAN: >60
GFR NON-AFRICAN AMERICAN: >60
GLUCOSE BLD-MCNC: 136 MG/DL (ref 70–99)
HCT VFR BLD CALC: 38.4 % (ref 40.5–52.5)
HEMOGLOBIN: 12.7 G/DL (ref 13.5–17.5)
LYMPHOCYTES ABSOLUTE: 1.4 K/UL (ref 1–5.1)
LYMPHOCYTES RELATIVE PERCENT: 12.6 %
MCH RBC QN AUTO: 31.2 PG (ref 26–34)
MCHC RBC AUTO-ENTMCNC: 33.1 G/DL (ref 31–36)
MCV RBC AUTO: 94.1 FL (ref 80–100)
MONOCYTES ABSOLUTE: 1 K/UL (ref 0–1.3)
MONOCYTES RELATIVE PERCENT: 8.8 %
NEUTROPHILS ABSOLUTE: 9 K/UL (ref 1.7–7.7)
NEUTROPHILS RELATIVE PERCENT: 77.9 %
PDW BLD-RTO: 13.8 % (ref 12.4–15.4)
PLATELET # BLD: 188 K/UL (ref 135–450)
PMV BLD AUTO: 8.9 FL (ref 5–10.5)
POTASSIUM REFLEX MAGNESIUM: 3.7 MMOL/L (ref 3.5–5.1)
RBC # BLD: 4.08 M/UL (ref 4.2–5.9)
SODIUM BLD-SCNC: 137 MMOL/L (ref 136–145)
URINE CULTURE, ROUTINE: NORMAL
WBC # BLD: 11.5 K/UL (ref 4–11)

## 2022-07-29 PROCEDURE — 2580000003 HC RX 258: Performed by: PHYSICIAN ASSISTANT

## 2022-07-29 PROCEDURE — 6360000002 HC RX W HCPCS: Performed by: PHYSICIAN ASSISTANT

## 2022-07-29 PROCEDURE — 2060000000 HC ICU INTERMEDIATE R&B

## 2022-07-29 PROCEDURE — APPNB30 APP NON BILLABLE TIME 0-30 MINS: Performed by: NURSE PRACTITIONER

## 2022-07-29 PROCEDURE — 6360000002 HC RX W HCPCS: Performed by: HOSPITALIST

## 2022-07-29 PROCEDURE — 80048 BASIC METABOLIC PNL TOTAL CA: CPT

## 2022-07-29 PROCEDURE — APPSS15 APP SPLIT SHARED TIME 0-15 MINUTES: Performed by: NURSE PRACTITIONER

## 2022-07-29 PROCEDURE — 99232 SBSQ HOSP IP/OBS MODERATE 35: CPT | Performed by: SURGERY

## 2022-07-29 PROCEDURE — 6370000000 HC RX 637 (ALT 250 FOR IP): Performed by: PHYSICIAN ASSISTANT

## 2022-07-29 PROCEDURE — 36415 COLL VENOUS BLD VENIPUNCTURE: CPT

## 2022-07-29 PROCEDURE — 6370000000 HC RX 637 (ALT 250 FOR IP): Performed by: INTERNAL MEDICINE

## 2022-07-29 PROCEDURE — 85025 COMPLETE CBC W/AUTO DIFF WBC: CPT

## 2022-07-29 RX ADMIN — Medication 10 ML: at 20:08

## 2022-07-29 RX ADMIN — PIPERACILLIN AND TAZOBACTAM 3375 MG: 3; .375 INJECTION, POWDER, FOR SOLUTION INTRAVENOUS at 03:10

## 2022-07-29 RX ADMIN — PIPERACILLIN AND TAZOBACTAM 3375 MG: 3; .375 INJECTION, POWDER, FOR SOLUTION INTRAVENOUS at 12:27

## 2022-07-29 RX ADMIN — METOPROLOL SUCCINATE 50 MG: 50 TABLET, EXTENDED RELEASE ORAL at 10:17

## 2022-07-29 RX ADMIN — LISINOPRIL 20 MG: 20 TABLET ORAL at 10:17

## 2022-07-29 RX ADMIN — MORPHINE SULFATE 4 MG: 4 INJECTION, SOLUTION INTRAMUSCULAR; INTRAVENOUS at 10:17

## 2022-07-29 RX ADMIN — MORPHINE SULFATE 2 MG: 2 INJECTION, SOLUTION INTRAMUSCULAR; INTRAVENOUS at 01:28

## 2022-07-29 RX ADMIN — SODIUM CHLORIDE: 9 INJECTION, SOLUTION INTRAVENOUS at 01:30

## 2022-07-29 RX ADMIN — KETOROLAC TROMETHAMINE 30 MG: 30 INJECTION, SOLUTION INTRAMUSCULAR at 20:26

## 2022-07-29 RX ADMIN — SODIUM CHLORIDE: 9 INJECTION, SOLUTION INTRAVENOUS at 10:25

## 2022-07-29 RX ADMIN — ENOXAPARIN SODIUM 30 MG: 100 INJECTION SUBCUTANEOUS at 10:17

## 2022-07-29 RX ADMIN — Medication 1 CAPSULE: at 10:17

## 2022-07-29 RX ADMIN — ENOXAPARIN SODIUM 30 MG: 100 INJECTION SUBCUTANEOUS at 20:27

## 2022-07-29 RX ADMIN — HYDRALAZINE HYDROCHLORIDE 10 MG: 20 INJECTION INTRAMUSCULAR; INTRAVENOUS at 20:08

## 2022-07-29 RX ADMIN — Medication 1 CAPSULE: at 20:18

## 2022-07-29 RX ADMIN — Medication 10 ML: at 10:17

## 2022-07-29 RX ADMIN — PIPERACILLIN AND TAZOBACTAM 3375 MG: 3; .375 INJECTION, POWDER, FOR SOLUTION INTRAVENOUS at 20:16

## 2022-07-29 ASSESSMENT — PAIN DESCRIPTION - DESCRIPTORS: DESCRIPTORS: SHARP

## 2022-07-29 ASSESSMENT — PAIN DESCRIPTION - ORIENTATION: ORIENTATION: MID

## 2022-07-29 ASSESSMENT — PAIN SCALES - GENERAL
PAINLEVEL_OUTOF10: 0
PAINLEVEL_OUTOF10: 6
PAINLEVEL_OUTOF10: 5
PAINLEVEL_OUTOF10: 7

## 2022-07-29 ASSESSMENT — PAIN DESCRIPTION - LOCATION: LOCATION: ABDOMEN

## 2022-07-29 NOTE — PROGRESS NOTES
Hospitalist Progress Note      PCP: No primary care provider on file. Date of Admission: 7/27/2022    Chief Complaint: Abdominal pain    Hospital Course: 77-year-old male with a history of hypertension-has not been taking medications for years, obesity came in with abdominal pain. Diagnosed with diverticulitis. Started on IV Zosyn. Has contained perforation. General surgery consulted. conservative management advised this time. Subjective: Feeling better. Has mild left lower quadrant discomfort. Tolerating liquids okay. Medications:  Reviewed    Infusion Medications    sodium chloride       Scheduled Medications    sodium chloride flush  10 mL IntraVENous 2 times per day    enoxaparin  30 mg SubCUTAneous BID    lactobacillus  1 capsule Oral BID WC    piperacillin-tazobactam  3,375 mg IntraVENous Q8H    lisinopril  20 mg Oral Daily    metoprolol succinate  50 mg Oral Daily     PRN Meds: sodium chloride flush, sodium chloride, magnesium hydroxide, acetaminophen **OR** acetaminophen, ketorolac, morphine **OR** morphine, ondansetron, labetalol, hydrALAZINE    No intake or output data in the 24 hours ending 07/29/22 1550    Physical Exam Performed:    BP (!) 146/102   Pulse 74   Temp 98.7 °F (37.1 °C) (Oral)   Resp 16   Ht 6' 2\" (1.88 m)   Wt (!) 330 lb 8 oz (149.9 kg)   SpO2 97%   BMI 42.43 kg/m²     General appearance: No apparent distress, appears stated age and cooperative. HEENT: Pupils equal, round, and reactive to light. Conjunctivae/corneas clear. Neck: Supple, with full range of motion. No jugular venous distention. Trachea midline. Respiratory:  Normal respiratory effort. Clear to auscultation, bilaterally without Rales/Wheezes/Rhonchi. Cardiovascular: Regular rate and rhythm with normal S1/S2 without murmurs, rubs or gallops. Abdomen: Soft, non-tender, non-distended with normal bowel sounds. Musculoskeletal: No clubbing, cyanosis or edema bilaterally.   Full range of motion without deformity. Skin: Skin color, texture, turgor normal.  No rashes or lesions. Neurologic:  Neurovascularly intact without any focal sensory/motor deficits. Cranial nerves: II-XII intact, grossly non-focal.  Psychiatric: Alert and oriented, thought content appropriate, normal insight  Capillary Refill: Brisk,3 seconds, normal   Peripheral Pulses: +2 palpable, equal bilaterally       Labs:   Recent Labs     07/27/22 2235 07/28/22 0415 07/29/22  0547   WBC 18.9* 17.7* 11.5*   HGB 14.6 13.9 12.7*   HCT 43.8 41.2 38.4*    216 188     Recent Labs     07/27/22 2235 07/28/22 0415 07/29/22  0547    135* 137   K 4.4 3.7 3.7   CL 99 101 104   CO2 27 23 23   BUN 9 9 8   CREATININE 0.8* 0.8* 0.7*   CALCIUM 9.5 8.8 8.3   PHOS  --  2.8  --      Recent Labs     07/27/22 2235 07/28/22  0415   AST 14* 12*   ALT 16 14   BILITOT 0.8 1.2*   ALKPHOS 71 68     Recent Labs     07/28/22  0415   INR 1.13     No results for input(s): CKTOTAL, TROPONINI in the last 72 hours. Urinalysis:      Lab Results   Component Value Date/Time    NITRU Negative 07/27/2022 10:34 PM    WBCUA 154 07/27/2022 10:34 PM    BACTERIA 1+ 07/27/2022 10:34 PM    RBCUA 14 07/27/2022 10:34 PM    BLOODU MODERATE 07/27/2022 10:34 PM    SPECGRAV 1.020 07/27/2022 10:34 PM    GLUCOSEU Negative 07/27/2022 10:34 PM       Radiology:  CT ABDOMEN PELVIS W IV CONTRAST Additional Contrast? None   Final Result   1. Acute sigmoid diverticulitis with small focus of contained perforation. No abscess, obstruction or free air in the upper abdomen. 2.  Normal appendix. Assessment/Plan:    Active Hospital Problems    Diagnosis     Diverticulitis [K57.92]      Priority: Medium    Diverticulitis of large intestine with perforation without bleeding [K57.20]      Priority: Medium     Acute sigmoid colitis/contained perforation: General surgery consulted. Nonsurgical management at this time. Started on IV fluids and Zosyn.   Leukocytosis improving. Patient clinically improving. Stop IV fluids. Start on p.o. clears. Continue pain meds as needed. Needs colonoscopy in 8 weeks. Uncontrolled hypertension: Secondary to medication noncompliance. Started back on lisinopril atenolol. Stop IV fluids. IV as needed medications. Obesity: BMI 42.43    DVT Prophylaxis: Lovenox  Diet: ADULT DIET; Clear Liquid  Code Status: Full Code    PT/OT Eval Status: Not needed    Dispo -inpatient 1 more day.     Terrence Burnett MD

## 2022-07-29 NOTE — PROGRESS NOTES
[facility]        Progress Note    Patient Name: Kiara Arvizu  MRN: [id number]  [place]: 1990  Date of Evaluation: 2022    Chief Complaint: Abdominal pain    Subjective:  No acute events overnight  Pain improved  No nausea or vomiting  Passing flatus, no BM  Resting in bed at this time      Vital Signs:  Patient Vitals for the past 24 hrs:   BP Temp Temp src Pulse Resp SpO2 Weight   22 0757 -- -- -- -- -- -- (!) 330 lb 8 oz (149.9 kg)   22 0714 (!) 146/102 98.7 °F (37.1 °C) Oral 74 16 97 % --   22 0503 (!) 148/91 98.4 °F (36.9 °C) Oral 82 18 99 % --   22 2337 (!) 165/113 98 °F (36.7 °C) Oral 77 18 98 % --   22 1919 (!) 159/102 98.2 °F (36.8 °C) Oral 83 -- 98 % --   22 1810 (!) 169/100 -- -- 79 -- 98 % --   22 1558 (!) 183/123 98.2 °F (36.8 °C) Oral 80 18 99 % --   22 1513 (!) 175/117 -- -- 82 -- -- --   22 1438 (!) 186/125 -- -- 70 -- -- --      TEMPERATURE HISTORY 24H: Temp (24hrs), Av.3 °F (36.8 °C), Min:98 °F (36.7 °C), Max:98.7 °F (37.1 °C)    BLOOD PRESSURE HISTORY: Systolic (39HSV), WZI:179 , Min:146 , FK    Diastolic (49RKW), WNN:355, Min:91, Max:125      Intake/Output:  No intake/output data recorded. No intake/output data recorded.   Drain/tube Output:       Physical Exam:  General: awake, alert, oriented to  person, place, time  Cardiovascular:  regular rate and rhythm and no murmur noted  Lungs: clear to auscultation  Abdomen: soft, non-distended, obese, mild left lower quadrant tenderness only, bowel sounds present     Labs:  CBC:    Recent Labs     225 22  0547   WBC 18.9* 17.7* 11.5*   HGB 14.6 13.9 12.7*   HCT 43.8 41.2 38.4*    216 188     BMP:    Recent Labs     22  0415 22  0547    135* 137   K 4.4 3.7 3.7   CL 99 101 104   CO2 27 23 23   BUN 9 9 8   CREATININE 0.8* 0.8* 0.7*   GLUCOSE 116* 118* 136* Hepatic:    Recent Labs     07/27/22  2235 07/28/22  0415   AST 14* 12*   ALT 16 14   BILITOT 0.8 1.2*   ALKPHOS 71 68     Amylase:  No results found for: AMYLASE  Lipase:    Lab Results   Component Value Date/Time    LIPASE 13.0 07/27/2022 10:35 PM      Mag:    Lab Results   Component Value Date/Time    MG 1.70 07/28/2022 04:15 AM     Phos:     Lab Results   Component Value Date/Time    PHOS 2.8 07/28/2022 04:15 AM      Coags:   Lab Results   Component Value Date/Time    PROTIME 14.5 07/28/2022 04:15 AM    INR 1.13 07/28/2022 04:15 AM    APTT 29.3 07/28/2022 04:15 AM       Cultures:  Anaerobic culture  No results found for: LABANAE  Fungus stain  No results found for requested labs within last 30 days. Gram stain  No results found for requested labs within last 30 days. Organism  No results found for: [facility]  Surgical culture  No results found for: CXSURG  Blood culture 1  Results in Past 30 Days  Result Component Current Result Ref Range Previous Result Ref Range   Blood Culture, Routine No Growth to date. Any change in status will be called. (7/28/2022)  Not in Time Range      Blood culture 2  Results in Past 30 Days  Result Component Current Result Ref Range Previous Result Ref Range   Culture, Blood 2 No Growth to date. Any change in status will be called. (7/28/2022)  Not in Time Range      Fecal occult  No results found for requested labs within last 30 days. GI bacterial pathogens by PCR  No results found for requested labs within last 30 days. C. difficile  No results found for requested labs within last 30 days. Urine culture  Lab Results   Component Value Date/Time    LABURIN  07/27/2022 10:34 PM     >50,000 CFU/ml mixed skin/urogenital myriam.  No further workup       Pathology:  No relevant pathology     Imaging:  I have personally reviewed the following films:    CT ABDOMEN PELVIS W IV CONTRAST Additional Contrast? None    Result Date: 7/28/2022  EXAMINATION: CT OF THE ABDOMEN AND PELVIS WITH CONTRAST 7/27/2022 11:45 pm TECHNIQUE: CT of the abdomen and pelvis was performed with the administration of intravenous contrast. Multiplanar reformatted images are provided for review. Automated exposure control, iterative reconstruction, and/or weight based adjustment of the mA/kV was utilized to reduce the radiation dose to as low as reasonably achievable. COMPARISON: None. HISTORY: ORDERING SYSTEM PROVIDED HISTORY: RLQ pain TECHNOLOGIST PROVIDED HISTORY: Reason for exam:->RLQ pain Additional Contrast?->None Decision Support Exception - unselect if not a suspected or confirmed emergency medical condition->Emergency Medical Condition (MA) Reason for Exam: RLQ pain Relevant Medical/Surgical History: Abdominal Pain (Pt states started having sharp abdominal pain since yesterday, pt states no constipation or diarrhea. Pt states nausea no vomiting, chills on and off. Pt states only ate this morning hasn't eaten since. ) FINDINGS: Lower Chest: No acute findings. Organs: Findings compatible with hepatic steatosis. The gallbladder, pancreas, spleen, adrenals and kidneys reveal no acute findings. GI/Bowel: Wall thickening with inflammatory change involving the sigmoid colon is present with associated diverticula. Small focus of contained perforation along the posterior margin. No abscess or obstruction. Normal appendix. High-density intraluminal material within the distal small bowel may represent previously administered barium. Pelvis: No acute findings. Peritoneum/Retroperitoneum: No free air or free fluid. The aorta is normal in caliber. The visceral branches are patent. No lymphadenopathy. Bones/Soft Tissues: No abnormality identified. 1.  Acute sigmoid diverticulitis with small focus of contained perforation. No abscess, obstruction or free air in the upper abdomen. 2.  Normal appendix.        Scheduled Meds:   sodium chloride flush  10 mL IntraVENous 2 times per day    enoxaparin  30 mg future care as well as documentation in the EHR. This encompassed more than 50% of the total encounter time. In summary, my findings and plan are the following:   Mr. Flavia Reynoso is a 33 y.o. male who presents with   Acute sigmoid diverticulitis with microperforation  Morbid obesity, BMI 42.2     Plan:  1. Abdominal exam benign, vital signs stable, no signs of toxicity. Does not need surgical exploration unless condition deteriorates  2. Advance to clear liquids  3. Monitor bowel function, passing flatus but no stool yet  4. IVF, monitor and replace electrolytes as needed  5. Antibiotics, will switch to PO at discharge  6. Pain medication and antiemetics as needed with caution as may mask worsening exam  7. Will need elective colonoscopy 6-8 weeks after discharge to screen for other etiologies that may be mimicking diverticulitis  8. Will discuss benefit of sigmoidectomy electively after discharge. If necessary, best chances of successful minimally invasive resection without need for temporary ostomy are with elective procedure. Doubtful will be needed  9. Defer management of remainder of other medical conditions to primary and consulting teams    Darell Cortez MD, FACS  7/29/2022  2:07 PM

## 2022-07-30 LAB
ANION GAP SERPL CALCULATED.3IONS-SCNC: 13 MMOL/L (ref 3–16)
BASOPHILS ABSOLUTE: 0.1 K/UL (ref 0–0.2)
BASOPHILS RELATIVE PERCENT: 0.6 %
BUN BLDV-MCNC: 12 MG/DL (ref 7–20)
CALCIUM SERPL-MCNC: 9.6 MG/DL (ref 8.3–10.6)
CHLORIDE BLD-SCNC: 100 MMOL/L (ref 99–110)
CO2: 21 MMOL/L (ref 21–32)
CREAT SERPL-MCNC: 0.8 MG/DL (ref 0.9–1.3)
EOSINOPHILS ABSOLUTE: 0.1 K/UL (ref 0–0.6)
EOSINOPHILS RELATIVE PERCENT: 0.8 %
GFR AFRICAN AMERICAN: >60
GFR NON-AFRICAN AMERICAN: >60
GLUCOSE BLD-MCNC: 95 MG/DL (ref 70–99)
HCT VFR BLD CALC: 41.6 % (ref 40.5–52.5)
HEMOGLOBIN: 14.3 G/DL (ref 13.5–17.5)
LYMPHOCYTES ABSOLUTE: 1.4 K/UL (ref 1–5.1)
LYMPHOCYTES RELATIVE PERCENT: 13.9 %
MCH RBC QN AUTO: 32.2 PG (ref 26–34)
MCHC RBC AUTO-ENTMCNC: 34.4 G/DL (ref 31–36)
MCV RBC AUTO: 93.5 FL (ref 80–100)
MONOCYTES ABSOLUTE: 0.9 K/UL (ref 0–1.3)
MONOCYTES RELATIVE PERCENT: 8.3 %
NEUTROPHILS ABSOLUTE: 8 K/UL (ref 1.7–7.7)
NEUTROPHILS RELATIVE PERCENT: 76.4 %
PDW BLD-RTO: 13.5 % (ref 12.4–15.4)
PLATELET # BLD: 219 K/UL (ref 135–450)
PMV BLD AUTO: 9 FL (ref 5–10.5)
POTASSIUM REFLEX MAGNESIUM: 3.8 MMOL/L (ref 3.5–5.1)
RBC # BLD: 4.44 M/UL (ref 4.2–5.9)
SODIUM BLD-SCNC: 134 MMOL/L (ref 136–145)
WBC # BLD: 10.4 K/UL (ref 4–11)

## 2022-07-30 PROCEDURE — 85025 COMPLETE CBC W/AUTO DIFF WBC: CPT

## 2022-07-30 PROCEDURE — 6360000002 HC RX W HCPCS: Performed by: HOSPITALIST

## 2022-07-30 PROCEDURE — 6370000000 HC RX 637 (ALT 250 FOR IP): Performed by: INTERNAL MEDICINE

## 2022-07-30 PROCEDURE — 99232 SBSQ HOSP IP/OBS MODERATE 35: CPT | Performed by: SURGERY

## 2022-07-30 PROCEDURE — 2060000000 HC ICU INTERMEDIATE R&B

## 2022-07-30 PROCEDURE — 36415 COLL VENOUS BLD VENIPUNCTURE: CPT

## 2022-07-30 PROCEDURE — 6370000000 HC RX 637 (ALT 250 FOR IP): Performed by: PHYSICIAN ASSISTANT

## 2022-07-30 PROCEDURE — 80048 BASIC METABOLIC PNL TOTAL CA: CPT

## 2022-07-30 PROCEDURE — 2580000003 HC RX 258: Performed by: PHYSICIAN ASSISTANT

## 2022-07-30 PROCEDURE — 2500000003 HC RX 250 WO HCPCS: Performed by: PHYSICIAN ASSISTANT

## 2022-07-30 PROCEDURE — 6360000002 HC RX W HCPCS: Performed by: PHYSICIAN ASSISTANT

## 2022-07-30 RX ORDER — CARVEDILOL 6.25 MG/1
6.25 TABLET ORAL 2 TIMES DAILY WITH MEALS
Status: DISCONTINUED | OUTPATIENT
Start: 2022-07-30 | End: 2022-07-30

## 2022-07-30 RX ORDER — NICOTINE 21 MG/24HR
1 PATCH, TRANSDERMAL 24 HOURS TRANSDERMAL DAILY
Status: DISCONTINUED | OUTPATIENT
Start: 2022-07-30 | End: 2022-08-02 | Stop reason: HOSPADM

## 2022-07-30 RX ORDER — CARVEDILOL 6.25 MG/1
6.25 TABLET ORAL 2 TIMES DAILY WITH MEALS
Status: DISCONTINUED | OUTPATIENT
Start: 2022-07-30 | End: 2022-07-31

## 2022-07-30 RX ORDER — AMLODIPINE BESYLATE 5 MG/1
5 TABLET ORAL DAILY
Status: DISCONTINUED | OUTPATIENT
Start: 2022-07-30 | End: 2022-07-31

## 2022-07-30 RX ORDER — HYDROCHLOROTHIAZIDE 25 MG/1
25 TABLET ORAL DAILY
Status: DISCONTINUED | OUTPATIENT
Start: 2022-07-30 | End: 2022-08-02 | Stop reason: HOSPADM

## 2022-07-30 RX ADMIN — HYDRALAZINE HYDROCHLORIDE 10 MG: 20 INJECTION INTRAMUSCULAR; INTRAVENOUS at 03:28

## 2022-07-30 RX ADMIN — HYDROCHLOROTHIAZIDE 25 MG: 25 TABLET ORAL at 11:32

## 2022-07-30 RX ADMIN — LISINOPRIL 20 MG: 20 TABLET ORAL at 07:30

## 2022-07-30 RX ADMIN — Medication 10 ML: at 07:31

## 2022-07-30 RX ADMIN — ENOXAPARIN SODIUM 30 MG: 100 INJECTION SUBCUTANEOUS at 11:31

## 2022-07-30 RX ADMIN — PIPERACILLIN AND TAZOBACTAM 3375 MG: 3; .375 INJECTION, POWDER, FOR SOLUTION INTRAVENOUS at 11:47

## 2022-07-30 RX ADMIN — SODIUM CHLORIDE, PRESERVATIVE FREE 10 ML: 5 INJECTION INTRAVENOUS at 08:55

## 2022-07-30 RX ADMIN — CARVEDILOL 6.25 MG: 6.25 TABLET, FILM COATED ORAL at 18:58

## 2022-07-30 RX ADMIN — METOPROLOL SUCCINATE 50 MG: 50 TABLET, EXTENDED RELEASE ORAL at 07:30

## 2022-07-30 RX ADMIN — ACETAMINOPHEN 650 MG: 325 TABLET ORAL at 13:16

## 2022-07-30 RX ADMIN — HYDRALAZINE HYDROCHLORIDE 10 MG: 20 INJECTION INTRAMUSCULAR; INTRAVENOUS at 11:32

## 2022-07-30 RX ADMIN — Medication 10 ML: at 22:58

## 2022-07-30 RX ADMIN — KETOROLAC TROMETHAMINE 30 MG: 30 INJECTION, SOLUTION INTRAMUSCULAR at 19:46

## 2022-07-30 RX ADMIN — PIPERACILLIN AND TAZOBACTAM 3375 MG: 3; .375 INJECTION, POWDER, FOR SOLUTION INTRAVENOUS at 18:54

## 2022-07-30 RX ADMIN — Medication 1 CAPSULE: at 17:13

## 2022-07-30 RX ADMIN — PIPERACILLIN AND TAZOBACTAM 3375 MG: 3; .375 INJECTION, POWDER, FOR SOLUTION INTRAVENOUS at 03:24

## 2022-07-30 RX ADMIN — LABETALOL HYDROCHLORIDE 10 MG: 5 INJECTION INTRAVENOUS at 08:56

## 2022-07-30 RX ADMIN — Medication 1 CAPSULE: at 11:32

## 2022-07-30 RX ADMIN — ENOXAPARIN SODIUM 30 MG: 100 INJECTION SUBCUTANEOUS at 22:57

## 2022-07-30 RX ADMIN — KETOROLAC TROMETHAMINE 30 MG: 30 INJECTION, SOLUTION INTRAMUSCULAR at 07:31

## 2022-07-30 RX ADMIN — AMLODIPINE BESYLATE 5 MG: 5 TABLET ORAL at 17:13

## 2022-07-30 ASSESSMENT — PAIN SCALES - GENERAL
PAINLEVEL_OUTOF10: 8
PAINLEVEL_OUTOF10: 0
PAINLEVEL_OUTOF10: 3
PAINLEVEL_OUTOF10: 2
PAINLEVEL_OUTOF10: 0
PAINLEVEL_OUTOF10: 2
PAINLEVEL_OUTOF10: 0

## 2022-07-30 ASSESSMENT — PAIN DESCRIPTION - DESCRIPTORS
DESCRIPTORS: SHARP
DESCRIPTORS: DISCOMFORT

## 2022-07-30 ASSESSMENT — PAIN DESCRIPTION - LOCATION
LOCATION: ABDOMEN
LOCATION: HEAD

## 2022-07-30 ASSESSMENT — PAIN DESCRIPTION - ORIENTATION
ORIENTATION: MID
ORIENTATION: MID

## 2022-07-30 ASSESSMENT — PAIN - FUNCTIONAL ASSESSMENT: PAIN_FUNCTIONAL_ASSESSMENT: ACTIVITIES ARE NOT PREVENTED

## 2022-07-30 NOTE — PROGRESS NOTES
Malinda Garcia is a 33 y.o. male patient.     Chief complaint-lower abdominal pain    HPI-33year-old male seen in follow-up for acute sigmoid diverticulitis with microperforation  Current Facility-Administered Medications   Medication Dose Route Frequency Provider Last Rate Last Admin    hydroCHLOROthiazide (HYDRODIURIL) tablet 25 mg  25 mg Oral Daily Nusrat Tse PA-C   25 mg at 07/30/22 1132    sodium chloride flush 0.9 % injection 10 mL  10 mL IntraVENous 2 times per day [place] Le[place], PA-C   10 mL at 07/30/22 0731    sodium chloride flush 0.9 % injection 10 mL  10 mL IntraVENous PRN [place] Le[place], PA-C   10 mL at 07/30/22 0855    0.9 % sodium chloride infusion   IntraVENous PRN [place] Le[place], PA-C        enoxaparin Sodium (LOVENOX) injection 30 mg  30 mg SubCUTAneous BID [place], PA-C   30 mg at 07/30/22 1131    magnesium hydroxide (MILK OF MAGNESIA) 400 MG/5ML suspension 30 mL  30 mL Oral Daily PRN [place], PA-C        acetaminophen (TYLENOL) tablet 650 mg  650 mg Oral Q6H PRN [place], PA-C   650 mg at 07/28/22 7324    Or    acetaminophen (TYLENOL) suppository 650 mg  650 mg Rectal Q6H PRN [place], PA-C        lactobacillus (CULTURELLE) capsule 1 capsule  1 capsule Oral BID WC [place], PA-C   1 capsule at 07/30/22 1132    ketorolac (TORADOL) injection 30 mg  30 mg IntraVENous Q6H PRN [place], PA-C   30 mg at 07/30/22 0731    morphine (PF) injection 2 mg  2 mg IntraVENous Q3H PRN [place], PA-C   2 mg at 07/29/22 0128    Or    morphine injection 4 mg  4 mg IntraVENous Q3H PRN [place], PA-C   4 mg at 07/29/22 1017    ondansetron (ZOFRAN) injection 4 mg  4 mg IntraVENous Q6H PRN [place], PA-C        labetalol (NORMODYNE;TRANDATE) injection 10 mg  10 mg IntraVENous Q6H PRN [place], PA-C   10 mg at 07/30/22 0856    hydrALAZINE (APRESOLINE) injection 10 mg  10 mg IntraVENous Q6H CHARANJITN Amanda Piña MD 10 mg at 07/30/22 1132    piperacillin-tazobactam (ZOSYN) 3,375 mg in dextrose 5 % 50 mL IVPB (mini-bag)  3,375 mg IntraVENous Q8H Mirna Matthews PA-C   Stopped at 07/30/22 9896    lisinopril (PRINIVIL;ZESTRIL) tablet 20 mg  20 mg Oral Daily Shaylee Santizo MD   20 mg at 07/30/22 0730    metoprolol succinate (TOPROL XL) extended release tablet 50 mg  50 mg Oral Daily Shaylee Santizo MD   50 mg at 07/30/22 0730     No Known Allergies  Principal Problem:    Diverticulitis  Active Problems:    Diverticulitis of large intestine with perforation without bleeding  Resolved Problems:    * No resolved hospital problems. *    Blood pressure (!) 195/151, pulse 63, temperature 98.5 °F (36.9 °C), temperature source Oral, resp. rate 16, height 6' 2\" (1.88 m), weight (!) 322 lb 8 oz (146.3 kg), SpO2 99 %. Subjective:  Symptoms:  Improved. Diet:  Dietary issues: Tolerating clear liquid. Pain:  He complains of pain that is mild. Objective:  General Appearance:  Comfortable. Vital signs: (most recent): Blood pressure (!) 195/151, pulse 63, temperature 98.5 °F (36.9 °C), temperature source Oral, resp. rate 16, height 6' 2\" (1.88 m), weight (!) 322 lb 8 oz (146.3 kg), SpO2 99 %. Output: Producing urine and no stool output. Lungs:  Normal effort and normal respiratory rate. Abdomen: Abdomen is soft and non-distended. (Mild suprapubic abdominal tenderness). Neurological: Patient is alert. Skin:  Warm and dry. Assessment & Plan 39-year-old male who was admitted with acute sigmoid diverticulitis with microperforation. Doing better today. Less complaints of abdominal pain. There is only minimal suprapubic abdominal tenderness on physical examination. White blood count is down to normal.  No fevers. Will advance to low residue diet. Continue IV antibiotics. Aiming for discharge home on oral antibiotics tomorrow if doing well.     Nataliia Mackenzie MD  7/30/2022

## 2022-07-30 NOTE — PROGRESS NOTES
Spoke to Ronald Partida NP, aware of blood pressure trend, ordered norvasc, spoke to pharmacy, verified with Jasmyne Pharmacist, will move coreg to 1900.

## 2022-07-30 NOTE — PROGRESS NOTES
Hospitalist Progress Note      PCP: No primary care provider on file. Date of Admission: 7/27/2022    Chief Complaint: Abdominal pain    Hospital Course: 73-year-old male with a history of hypertension-has not been taking medications for years, obesity came in with abdominal pain. Diagnosed with diverticulitis. Started on IV Zosyn. Has contained perforation. General surgery consulted. conservative management advised this time. Subjective: Feeling better. Minimal abdominal pain. No nausea. No BM. Passing flatus. BP remains high. Has not seen PCP since 2014. Works as a manager at [employer]. Medications:  Reviewed    Infusion Medications    sodium chloride       Scheduled Medications    hydroCHLOROthiazide  25 mg Oral Daily    sodium chloride flush  10 mL IntraVENous 2 times per day    enoxaparin  30 mg SubCUTAneous BID    lactobacillus  1 capsule Oral BID WC    piperacillin-tazobactam  3,375 mg IntraVENous Q8H    lisinopril  20 mg Oral Daily    metoprolol succinate  50 mg Oral Daily     PRN Meds: sodium chloride flush, sodium chloride, magnesium hydroxide, acetaminophen **OR** acetaminophen, ketorolac, morphine **OR** morphine, ondansetron, labetalol, hydrALAZINE    No intake or output data in the 24 hours ending 07/30/22 0944    Physical Exam Performed:    BP (!) 176/129   Pulse 72   Temp 98.5 °F (36.9 °C) (Oral)   Resp 16   Ht 6' 2\" (1.88 m)   Wt (!) 322 lb 8 oz (146.3 kg)   SpO2 98%   BMI 41.41 kg/m²     General appearance: No apparent distress, appears stated age and cooperative. HEENT: Pupils equal, round, and reactive to light. Conjunctivae/corneas clear. Neck: Supple, with full range of motion. No jugular venous distention. Trachea midline. Respiratory:  Normal respiratory effort. Clear to auscultation, bilaterally without Rales/Wheezes/Rhonchi. Cardiovascular: Regular rate and rhythm with normal S1/S2 without murmurs, rubs or gallops.   Abdomen: Soft, non-tender, non-distended with normal bowel sounds. Musculoskeletal: No clubbing, cyanosis or edema bilaterally. Full range of motion without deformity. Skin: Skin color, texture, turgor normal.  No rashes or lesions. Neurologic:  Neurovascularly intact without any focal sensory/motor deficits. Cranial nerves: II-XII intact, grossly non-focal.  Psychiatric: Alert and oriented, thought content appropriate, normal insight  Capillary Refill: Brisk,3 seconds, normal   Peripheral Pulses: +2 palpable, equal bilaterally       Labs:   Recent Labs     07/28/22 0415 07/29/22 0547 07/30/22  0523   WBC 17.7* 11.5* 10.4   HGB 13.9 12.7* 14.3   HCT 41.2 38.4* 41.6    188 219       Recent Labs     07/28/22 0415 07/29/22 0547 07/30/22  0523   * 137 134*   K 3.7 3.7 3.8    104 100   CO2 23 23 21   BUN 9 8 12   CREATININE 0.8* 0.7* 0.8*   CALCIUM 8.8 8.3 9.6   PHOS 2.8  --   --        Recent Labs     07/27/22  2235 07/28/22  0415   AST 14* 12*   ALT 16 14   BILITOT 0.8 1.2*   ALKPHOS 71 68       Recent Labs     07/28/22  0415   INR 1.13       No results for input(s): CKTOTAL, TROPONINI in the last 72 hours. Urinalysis:      Lab Results   Component Value Date/Time    NITRU Negative 07/27/2022 10:34 PM    WBCUA 154 07/27/2022 10:34 PM    BACTERIA 1+ 07/27/2022 10:34 PM    RBCUA 14 07/27/2022 10:34 PM    BLOODU MODERATE 07/27/2022 10:34 PM    SPECGRAV 1.020 07/27/2022 10:34 PM    GLUCOSEU Negative 07/27/2022 10:34 PM       Radiology:  CT ABDOMEN PELVIS W IV CONTRAST Additional Contrast? None   Final Result   1. Acute sigmoid diverticulitis with small focus of contained perforation. No abscess, obstruction or free air in the upper abdomen. 2.  Normal appendix.                  Assessment/Plan:    Active Hospital Problems    Diagnosis     Diverticulitis [K57.92]      Priority: Medium    Diverticulitis of large intestine with perforation without bleeding [K57.20]      Priority: Medium     Acute sigmoid colitis/contained perforation: General surgery on board. Nonsurgical management at this time. On Zosyn. Leukocytosis resolved. Patient clinically improving. Start on p.o. clears. Continue pain meds as needed. Needs colonoscopy in 8 weeks. 2.  Uncontrolled hypertension: Secondary to medication noncompliance. Started back on lisinopril atenolol the other day. Add hctz. May change atenolol to coreg. 3. Obesity: BMI 42.43. Counseled    DVT Prophylaxis: Lovenox  Diet: ADULT DIET;  Clear Liquid  Code Status: Full Code    PT/OT Eval Status: Not needed    Otoniel Hawthorne PA-C

## 2022-07-31 LAB
ANION GAP SERPL CALCULATED.3IONS-SCNC: 13 MMOL/L (ref 3–16)
BASOPHILS ABSOLUTE: 0.1 K/UL (ref 0–0.2)
BASOPHILS RELATIVE PERCENT: 0.6 %
BUN BLDV-MCNC: 13 MG/DL (ref 7–20)
CALCIUM SERPL-MCNC: 9.7 MG/DL (ref 8.3–10.6)
CHLORIDE BLD-SCNC: 101 MMOL/L (ref 99–110)
CO2: 23 MMOL/L (ref 21–32)
CREAT SERPL-MCNC: 0.9 MG/DL (ref 0.9–1.3)
EOSINOPHILS ABSOLUTE: 0.1 K/UL (ref 0–0.6)
EOSINOPHILS RELATIVE PERCENT: 1.1 %
GFR AFRICAN AMERICAN: >60
GFR NON-AFRICAN AMERICAN: >60
GLUCOSE BLD-MCNC: 95 MG/DL (ref 70–99)
HCT VFR BLD CALC: 41.9 % (ref 40.5–52.5)
HEMOGLOBIN: 14.5 G/DL (ref 13.5–17.5)
LYMPHOCYTES ABSOLUTE: 1.9 K/UL (ref 1–5.1)
LYMPHOCYTES RELATIVE PERCENT: 19.2 %
MCH RBC QN AUTO: 32.2 PG (ref 26–34)
MCHC RBC AUTO-ENTMCNC: 34.5 G/DL (ref 31–36)
MCV RBC AUTO: 93.4 FL (ref 80–100)
MONOCYTES ABSOLUTE: 0.9 K/UL (ref 0–1.3)
MONOCYTES RELATIVE PERCENT: 8.6 %
NEUTROPHILS ABSOLUTE: 7.1 K/UL (ref 1.7–7.7)
NEUTROPHILS RELATIVE PERCENT: 70.5 %
PDW BLD-RTO: 13.6 % (ref 12.4–15.4)
PLATELET # BLD: 242 K/UL (ref 135–450)
PMV BLD AUTO: 8.7 FL (ref 5–10.5)
POTASSIUM REFLEX MAGNESIUM: 4 MMOL/L (ref 3.5–5.1)
RBC # BLD: 4.49 M/UL (ref 4.2–5.9)
SODIUM BLD-SCNC: 137 MMOL/L (ref 136–145)
WBC # BLD: 10.1 K/UL (ref 4–11)

## 2022-07-31 PROCEDURE — 99232 SBSQ HOSP IP/OBS MODERATE 35: CPT | Performed by: SURGERY

## 2022-07-31 PROCEDURE — 6360000002 HC RX W HCPCS: Performed by: HOSPITALIST

## 2022-07-31 PROCEDURE — 85025 COMPLETE CBC W/AUTO DIFF WBC: CPT

## 2022-07-31 PROCEDURE — 6370000000 HC RX 637 (ALT 250 FOR IP): Performed by: PHYSICIAN ASSISTANT

## 2022-07-31 PROCEDURE — 2060000000 HC ICU INTERMEDIATE R&B

## 2022-07-31 PROCEDURE — 2580000003 HC RX 258: Performed by: PHYSICIAN ASSISTANT

## 2022-07-31 PROCEDURE — 6370000000 HC RX 637 (ALT 250 FOR IP): Performed by: INTERNAL MEDICINE

## 2022-07-31 PROCEDURE — 6360000002 HC RX W HCPCS: Performed by: PHYSICIAN ASSISTANT

## 2022-07-31 PROCEDURE — 80048 BASIC METABOLIC PNL TOTAL CA: CPT

## 2022-07-31 PROCEDURE — 36415 COLL VENOUS BLD VENIPUNCTURE: CPT

## 2022-07-31 RX ORDER — CARVEDILOL 6.25 MG/1
12.5 TABLET ORAL 2 TIMES DAILY WITH MEALS
Status: DISCONTINUED | OUTPATIENT
Start: 2022-07-31 | End: 2022-07-31

## 2022-07-31 RX ORDER — CARVEDILOL 6.25 MG/1
6.25 TABLET ORAL ONCE
Status: COMPLETED | OUTPATIENT
Start: 2022-07-31 | End: 2022-07-31

## 2022-07-31 RX ORDER — CARVEDILOL 25 MG/1
25 TABLET ORAL 2 TIMES DAILY WITH MEALS
Status: DISCONTINUED | OUTPATIENT
Start: 2022-08-01 | End: 2022-08-02 | Stop reason: HOSPADM

## 2022-07-31 RX ORDER — AMLODIPINE BESYLATE 5 MG/1
5 TABLET ORAL ONCE
Status: COMPLETED | OUTPATIENT
Start: 2022-07-31 | End: 2022-07-31

## 2022-07-31 RX ORDER — AMLODIPINE BESYLATE 5 MG/1
10 TABLET ORAL DAILY
Status: DISCONTINUED | OUTPATIENT
Start: 2022-08-01 | End: 2022-08-01

## 2022-07-31 RX ADMIN — PIPERACILLIN AND TAZOBACTAM 3375 MG: 3; .375 INJECTION, POWDER, FOR SOLUTION INTRAVENOUS at 19:14

## 2022-07-31 RX ADMIN — Medication 10 ML: at 07:41

## 2022-07-31 RX ADMIN — HYDROCHLOROTHIAZIDE 25 MG: 25 TABLET ORAL at 07:48

## 2022-07-31 RX ADMIN — ENOXAPARIN SODIUM 30 MG: 100 INJECTION SUBCUTANEOUS at 07:49

## 2022-07-31 RX ADMIN — PIPERACILLIN AND TAZOBACTAM 3375 MG: 3; .375 INJECTION, POWDER, FOR SOLUTION INTRAVENOUS at 04:05

## 2022-07-31 RX ADMIN — CARVEDILOL 6.25 MG: 6.25 TABLET, FILM COATED ORAL at 11:44

## 2022-07-31 RX ADMIN — ENOXAPARIN SODIUM 30 MG: 100 INJECTION SUBCUTANEOUS at 21:41

## 2022-07-31 RX ADMIN — SODIUM CHLORIDE, PRESERVATIVE FREE 10 ML: 5 INJECTION INTRAVENOUS at 11:45

## 2022-07-31 RX ADMIN — Medication 10 ML: at 19:10

## 2022-07-31 RX ADMIN — Medication 1 CAPSULE: at 07:39

## 2022-07-31 RX ADMIN — HYDRALAZINE HYDROCHLORIDE 10 MG: 20 INJECTION INTRAMUSCULAR; INTRAVENOUS at 05:13

## 2022-07-31 RX ADMIN — LISINOPRIL 20 MG: 20 TABLET ORAL at 07:39

## 2022-07-31 RX ADMIN — KETOROLAC TROMETHAMINE 30 MG: 30 INJECTION, SOLUTION INTRAMUSCULAR at 07:41

## 2022-07-31 RX ADMIN — AMLODIPINE BESYLATE 5 MG: 5 TABLET ORAL at 07:39

## 2022-07-31 RX ADMIN — AMLODIPINE BESYLATE 5 MG: 5 TABLET ORAL at 11:45

## 2022-07-31 RX ADMIN — CARVEDILOL 12.5 MG: 6.25 TABLET, FILM COATED ORAL at 16:02

## 2022-07-31 RX ADMIN — PIPERACILLIN AND TAZOBACTAM 3375 MG: 3; .375 INJECTION, POWDER, FOR SOLUTION INTRAVENOUS at 11:47

## 2022-07-31 RX ADMIN — CARVEDILOL 6.25 MG: 6.25 TABLET, FILM COATED ORAL at 07:39

## 2022-07-31 RX ADMIN — Medication 1 CAPSULE: at 16:02

## 2022-07-31 ASSESSMENT — PAIN SCALES - GENERAL
PAINLEVEL_OUTOF10: 0
PAINLEVEL_OUTOF10: 3

## 2022-07-31 NOTE — DISCHARGE INSTRUCTIONS
Follow-up with Dr. Sergei Power in approximately 2 weeks. Call for appointment [phone]. Follow a low fiber diet.

## 2022-07-31 NOTE — PROGRESS NOTES
Hospitalist Progress Note      PCP: No primary care provider on file. Date of Admission: 7/27/2022    Chief Complaint: Abdominal pain    Hospital Course: 42-year-old male with a history of hypertension-has not been taking medications for years, obesity came in with abdominal pain. Diagnosed with diverticulitis. Started on IV Zosyn. Has contained perforation. General surgery consulted. conservative management advised this time. Subjective: Feeling better. No abdominal pain. Having loose stools. Medications:  Reviewed    Infusion Medications    sodium chloride       Scheduled Medications    carvedilol  12.5 mg Oral BID     [START ON 8/1/2022] amLODIPine  10 mg Oral Daily    hydroCHLOROthiazide  25 mg Oral Daily    nicotine  1 patch TransDERmal Daily    sodium chloride flush  10 mL IntraVENous 2 times per day    enoxaparin  30 mg SubCUTAneous BID    lactobacillus  1 capsule Oral BID     piperacillin-tazobactam  3,375 mg IntraVENous Q8H    lisinopril  20 mg Oral Daily     PRN Meds: perflutren lipid microspheres, sodium chloride flush, sodium chloride, magnesium hydroxide, acetaminophen **OR** acetaminophen, ketorolac, morphine **OR** morphine, ondansetron, labetalol, hydrALAZINE      Intake/Output Summary (Last 24 hours) at 7/31/2022 1317  Last data filed at 7/30/2022 1856  Gross per 24 hour   Intake 720 ml   Output --   Net 720 ml       Physical Exam Performed:    BP (!) 156/99   Pulse 77   Temp 98.1 °F (36.7 °C) (Oral)   Resp 16   Ht 6' 2\" (1.88 m)   Wt (!) 320 lb 9.6 oz (145.4 kg)   SpO2 99%   BMI 41.16 kg/m²     General appearance: No apparent distress, appears stated age and cooperative. HEENT: Pupils equal, round, and reactive to light. Conjunctivae/corneas clear. Neck: Supple, with full range of motion. No jugular venous distention. Trachea midline. Respiratory:  Normal respiratory effort. Clear to auscultation, bilaterally without Rales/Wheezes/Rhonchi.   Cardiovascular: Regular rate and rhythm with normal S1/S2 without murmurs, rubs or gallops. Abdomen: Soft, non-tender, non-distended with normal bowel sounds. Musculoskeletal: No clubbing, cyanosis or edema bilaterally. Full range of motion without deformity. Skin: Skin color, texture, turgor normal.  No rashes or lesions. Neurologic:  Neurovascularly intact without any focal sensory/motor deficits. Cranial nerves: II-XII intact, grossly non-focal.  Psychiatric: Alert and oriented, thought content appropriate, normal insight  Capillary Refill: Brisk,3 seconds, normal   Peripheral Pulses: +2 palpable, equal bilaterally       Labs:   Recent Labs     07/29/22  0547 07/30/22  0523 07/31/22  0553   WBC 11.5* 10.4 10.1   HGB 12.7* 14.3 14.5   HCT 38.4* 41.6 41.9    219 242       Recent Labs     07/29/22  0547 07/30/22  0523 07/31/22  0553    134* 137   K 3.7 3.8 4.0    100 101   CO2 23 21 23   BUN 8 12 13   CREATININE 0.7* 0.8* 0.9   CALCIUM 8.3 9.6 9.7       No results for input(s): AST, ALT, BILIDIR, BILITOT, ALKPHOS in the last 72 hours. No results for input(s): INR in the last 72 hours. No results for input(s): Curlie Lat in the last 72 hours. Urinalysis:      Lab Results   Component Value Date/Time    NITRU Negative 07/27/2022 10:34 PM    WBCUA 154 07/27/2022 10:34 PM    BACTERIA 1+ 07/27/2022 10:34 PM    RBCUA 14 07/27/2022 10:34 PM    BLOODU MODERATE 07/27/2022 10:34 PM    SPECGRAV 1.020 07/27/2022 10:34 PM    GLUCOSEU Negative 07/27/2022 10:34 PM       Radiology:  CT ABDOMEN PELVIS W IV CONTRAST Additional Contrast? None   Final Result   1. Acute sigmoid diverticulitis with small focus of contained perforation. No abscess, obstruction or free air in the upper abdomen. 2.  Normal appendix.                  Assessment/Plan:    Active Hospital Problems    Diagnosis     Diverticulitis [K57.92]      Priority: Medium    Diverticulitis of large intestine with perforation without bleeding [K57.20]      Priority: Medium     Acute sigmoid colitis/contained perforation: General surgery on board. Nonsurgical management at this time. On Zosyn. Leukocytosis resolved. Patient clinically improving. Start on p.o. clears. Continue pain meds as needed. Needs colonoscopy in 8 weeks. 2.  Uncontrolled hypertension: Secondary to medication noncompliance. Increase coreg and norvasc. HCTZ added yesterday. Monitor BP today. May need nephrology referral. Mother was on dialysis from hypertension and  In November. 3. Obesity: BMI 42.43. Counseled    DVT Prophylaxis: Lovenox  Diet: ADULT DIET;  Regular; Low Fiber  Code Status: Full Code    PT/OT Eval Status: Not needed    Britany Waddell PA-C

## 2022-07-31 NOTE — PROGRESS NOTES
Update on patients b/p meds and b/p sent to [address] [place] [address] per request.   No new orders at this time.

## 2022-07-31 NOTE — PROGRESS NOTES
Kieran Lamar is a 33 y.o. male patient.     Chief complaint-lower abdominal pain    HPI-33year-old male seen in follow-up for acute sigmoid diverticulitis with microperforation  Current Facility-Administered Medications   Medication Dose Route Frequency Provider Last Rate Last Admin    carvedilol (COREG) tablet 12.5 mg  12.5 mg Oral BID  David Davalos PA-C        [START ON 8/1/2022] amLODIPine (NORVASC) tablet 10 mg  10 mg Oral Daily Helga Thakkar PA-C        hydroCHLOROthiazide (HYDRODIURIL) tablet 25 mg  25 mg Oral Daily David Davalos PA-C   25 mg at 07/31/22 0748    nicotine (NICODERM CQ) 21 MG/24HR 1 patch  1 patch TransDERmal Daily Helga Thakkar PA-C        perflutren lipid microspheres (DEFINITY) injection 1.65 mg  1.5 mL IntraVENous ONCE PRN David Davalos PA-C        sodium chloride flush 0.9 % injection 10 mL  10 mL IntraVENous 2 times per day BENSON Salinas-C   10 mL at 07/31/22 0741    sodium chloride flush 0.9 % injection 10 mL  10 mL IntraVENous PRN Jazmine Kessler, PA-C   10 mL at 07/31/22 1145    0.9 % sodium chloride infusion   IntraVENous PRN Jazmine Kessler PA-C        enoxaparin Sodium (LOVENOX) injection 30 mg  30 mg SubCUTAneous BID Jazmine BENSON Kessler-C   30 mg at 07/31/22 0749    magnesium hydroxide (MILK OF MAGNESIA) 400 MG/5ML suspension 30 mL  30 mL Oral Daily PRN DORETHA SalinasC        acetaminophen (TYLENOL) tablet 650 mg  650 mg Oral Q6H PRN Jazmine Kessler, PA-C   650 mg at 07/30/22 1316    Or    acetaminophen (TYLENOL) suppository 650 mg  650 mg Rectal Q6H PRN Jazmine Kessler PA-C        lactobacillus (CULTURELLE) capsule 1 capsule  1 capsule Oral BID  BENSON Salinas-C   1 capsule at 07/31/22 0739    ketorolac (TORADOL) injection 30 mg  30 mg IntraVENous Q6H PRN Jazmine Pound, PA-C   30 mg at 07/31/22 0741    morphine (PF) injection 2 mg  2 mg IntraVENous Q3H PRN Jazmine Pound, PA-C   2 mg at 07/29/22 0128    Or    morphine injection 4 mg  4 mg IntraVENous Q3H PRN Moris Pozo PA-C   4 mg at 07/29/22 1017    ondansetron (ZOFRAN) injection 4 mg  4 mg IntraVENous Q6H PRN Moris Pozo PA-C        labetalol (NORMODYNE;TRANDATE) injection 10 mg  10 mg IntraVENous Q6H PRN Moris Pozo PA-C   10 mg at 07/30/22 0856    hydrALAZINE (APRESOLINE) injection 10 mg  10 mg IntraVENous Q6H PRN García Casper MD   10 mg at 07/31/22 0513    piperacillin-tazobactam (ZOSYN) 3,375 mg in dextrose 5 % 50 mL IVPB (mini-bag)  3,375 mg IntraVENous Q8H Moris Pozo PA-C 12.5 mL/hr at 07/31/22 1147 3,375 mg at 07/31/22 1147    lisinopril (PRINIVIL;ZESTRIL) tablet 20 mg  20 mg Oral Daily Robbie Muñoz MD   20 mg at 07/31/22 0739     No Known Allergies  Principal Problem:    Diverticulitis  Active Problems:    Diverticulitis of large intestine with perforation without bleeding  Resolved Problems:    * No resolved hospital problems. *    Blood pressure (!) 156/99, pulse 77, temperature 98.1 °F (36.7 °C), temperature source Oral, resp. rate 16, height 6' 2\" (1.88 m), weight (!) 320 lb 9.6 oz (145.4 kg), SpO2 99 %. Subjective:  Symptoms:  Improved. Diet:  Adequate intake. Activity level: Normal.    Pain:  He reports no pain. Objective:  General Appearance:  Comfortable. Vital signs: (most recent): Blood pressure (!) 156/99, pulse 77, temperature 98.1 °F (36.7 °C), temperature source Oral, resp. rate 16, height 6' 2\" (1.88 m), weight (!) 320 lb 9.6 oz (145.4 kg), SpO2 99 %. Output: Producing urine and producing stool. Lungs:  Normal effort and normal respiratory rate. Abdomen: Abdomen is soft and non-distended. There is no abdominal tenderness. Neurological: Patient is alert and oriented to person, place and time. Skin:  Warm and dry. Assessment & Plan 43-year-old male seen in follow-up for acute sigmoid diverticulitis with microperforation.   He reports no abdominal pain today and has no abdominal tenderness on physical examination. White blood count is normal.  No fevers or tachycardia. He is tolerating a general diet and having bowel function. Okay for discharge home on oral antibiotics. Follow-up with Dr. Jennyfer Balderrama in approximately 1 week.     Molly Samuel MD  7/31/2022

## 2022-08-01 PROBLEM — E66.01 CLASS 3 SEVERE OBESITY WITHOUT SERIOUS COMORBIDITY WITH BODY MASS INDEX (BMI) OF 40.0 TO 44.9 IN ADULT (HCC): Status: ACTIVE | Noted: 2022-08-01

## 2022-08-01 PROBLEM — E66.813 CLASS 3 SEVERE OBESITY WITHOUT SERIOUS COMORBIDITY WITH BODY MASS INDEX (BMI) OF 40.0 TO 44.9 IN ADULT: Status: ACTIVE | Noted: 2022-08-01

## 2022-08-01 PROBLEM — I16.0 HYPERTENSIVE URGENCY: Status: ACTIVE | Noted: 2022-08-01

## 2022-08-01 LAB
ANION GAP SERPL CALCULATED.3IONS-SCNC: 14 MMOL/L (ref 3–16)
BASOPHILS ABSOLUTE: 0.1 K/UL (ref 0–0.2)
BASOPHILS RELATIVE PERCENT: 1.1 %
BLOOD CULTURE, ROUTINE: NORMAL
BUN BLDV-MCNC: 18 MG/DL (ref 7–20)
CALCIUM SERPL-MCNC: 9.6 MG/DL (ref 8.3–10.6)
CHLORIDE BLD-SCNC: 99 MMOL/L (ref 99–110)
CO2: 22 MMOL/L (ref 21–32)
CREAT SERPL-MCNC: 1 MG/DL (ref 0.9–1.3)
CULTURE, BLOOD 2: NORMAL
EOSINOPHILS ABSOLUTE: 0.2 K/UL (ref 0–0.6)
EOSINOPHILS RELATIVE PERCENT: 1.7 %
GFR AFRICAN AMERICAN: >60
GFR NON-AFRICAN AMERICAN: >60
GLUCOSE BLD-MCNC: 98 MG/DL (ref 70–99)
HCT VFR BLD CALC: 43.4 % (ref 40.5–52.5)
HEMOGLOBIN: 14.6 G/DL (ref 13.5–17.5)
LV EF: 60 %
LVEF MODALITY: NORMAL
LYMPHOCYTES ABSOLUTE: 2.4 K/UL (ref 1–5.1)
LYMPHOCYTES RELATIVE PERCENT: 25.3 %
MCH RBC QN AUTO: 31.7 PG (ref 26–34)
MCHC RBC AUTO-ENTMCNC: 33.7 G/DL (ref 31–36)
MCV RBC AUTO: 93.8 FL (ref 80–100)
MONOCYTES ABSOLUTE: 0.9 K/UL (ref 0–1.3)
MONOCYTES RELATIVE PERCENT: 9.6 %
NEUTROPHILS ABSOLUTE: 5.9 K/UL (ref 1.7–7.7)
NEUTROPHILS RELATIVE PERCENT: 62.3 %
PDW BLD-RTO: 13.5 % (ref 12.4–15.4)
PLATELET # BLD: 271 K/UL (ref 135–450)
PMV BLD AUTO: 8.5 FL (ref 5–10.5)
POTASSIUM REFLEX MAGNESIUM: 3.6 MMOL/L (ref 3.5–5.1)
RBC # BLD: 4.62 M/UL (ref 4.2–5.9)
SODIUM BLD-SCNC: 135 MMOL/L (ref 136–145)
WBC # BLD: 9.5 K/UL (ref 4–11)

## 2022-08-01 PROCEDURE — 6370000000 HC RX 637 (ALT 250 FOR IP): Performed by: INTERNAL MEDICINE

## 2022-08-01 PROCEDURE — 6370000000 HC RX 637 (ALT 250 FOR IP): Performed by: PHYSICIAN ASSISTANT

## 2022-08-01 PROCEDURE — 83835 ASSAY OF METANEPHRINES: CPT

## 2022-08-01 PROCEDURE — APPNB30 APP NON BILLABLE TIME 0-30 MINS: Performed by: NURSE PRACTITIONER

## 2022-08-01 PROCEDURE — 80048 BASIC METABOLIC PNL TOTAL CA: CPT

## 2022-08-01 PROCEDURE — 99223 1ST HOSP IP/OBS HIGH 75: CPT | Performed by: INTERNAL MEDICINE

## 2022-08-01 PROCEDURE — 2060000000 HC ICU INTERMEDIATE R&B

## 2022-08-01 PROCEDURE — APPSS15 APP SPLIT SHARED TIME 0-15 MINUTES: Performed by: NURSE PRACTITIONER

## 2022-08-01 PROCEDURE — 99232 SBSQ HOSP IP/OBS MODERATE 35: CPT | Performed by: SURGERY

## 2022-08-01 PROCEDURE — 6360000002 HC RX W HCPCS: Performed by: PHYSICIAN ASSISTANT

## 2022-08-01 PROCEDURE — C8929 TTE W OR WO FOL WCON,DOPPLER: HCPCS

## 2022-08-01 PROCEDURE — 36415 COLL VENOUS BLD VENIPUNCTURE: CPT

## 2022-08-01 PROCEDURE — 84244 ASSAY OF RENIN: CPT

## 2022-08-01 PROCEDURE — 6360000004 HC RX CONTRAST MEDICATION: Performed by: PHYSICIAN ASSISTANT

## 2022-08-01 PROCEDURE — 82088 ASSAY OF ALDOSTERONE: CPT

## 2022-08-01 PROCEDURE — 85025 COMPLETE CBC W/AUTO DIFF WBC: CPT

## 2022-08-01 PROCEDURE — 2580000003 HC RX 258: Performed by: PHYSICIAN ASSISTANT

## 2022-08-01 RX ORDER — AMOXICILLIN AND CLAVULANATE POTASSIUM 875; 125 MG/1; MG/1
1 TABLET, FILM COATED ORAL EVERY 12 HOURS SCHEDULED
Status: DISCONTINUED | OUTPATIENT
Start: 2022-08-01 | End: 2022-08-02 | Stop reason: HOSPADM

## 2022-08-01 RX ORDER — SPIRONOLACTONE 25 MG/1
25 TABLET ORAL DAILY
Status: DISCONTINUED | OUTPATIENT
Start: 2022-08-01 | End: 2022-08-01

## 2022-08-01 RX ORDER — SPIRONOLACTONE 25 MG/1
25 TABLET ORAL 2 TIMES DAILY
Status: DISCONTINUED | OUTPATIENT
Start: 2022-08-01 | End: 2022-08-02 | Stop reason: HOSPADM

## 2022-08-01 RX ADMIN — ENOXAPARIN SODIUM 30 MG: 100 INJECTION SUBCUTANEOUS at 21:36

## 2022-08-01 RX ADMIN — Medication 1 CAPSULE: at 09:34

## 2022-08-01 RX ADMIN — PERFLUTREN 1.65 MG: 6.52 INJECTION, SUSPENSION INTRAVENOUS at 15:02

## 2022-08-01 RX ADMIN — HYDROCHLOROTHIAZIDE 25 MG: 25 TABLET ORAL at 09:36

## 2022-08-01 RX ADMIN — CARVEDILOL 25 MG: 25 TABLET, FILM COATED ORAL at 18:16

## 2022-08-01 RX ADMIN — ENOXAPARIN SODIUM 30 MG: 100 INJECTION SUBCUTANEOUS at 13:31

## 2022-08-01 RX ADMIN — Medication 1 CAPSULE: at 18:16

## 2022-08-01 RX ADMIN — Medication 10 ML: at 08:00

## 2022-08-01 RX ADMIN — LISINOPRIL 20 MG: 20 TABLET ORAL at 09:35

## 2022-08-01 RX ADMIN — Medication 10 ML: at 21:37

## 2022-08-01 RX ADMIN — SPIRONOLACTONE 25 MG: 25 TABLET ORAL at 18:16

## 2022-08-01 RX ADMIN — SPIRONOLACTONE 25 MG: 25 TABLET ORAL at 09:34

## 2022-08-01 RX ADMIN — AMOXICILLIN AND CLAVULANATE POTASSIUM 1 TABLET: 875; 125 TABLET, FILM COATED ORAL at 21:37

## 2022-08-01 RX ADMIN — CARVEDILOL 25 MG: 25 TABLET, FILM COATED ORAL at 09:34

## 2022-08-01 RX ADMIN — PIPERACILLIN AND TAZOBACTAM 3375 MG: 3; .375 INJECTION, POWDER, FOR SOLUTION INTRAVENOUS at 03:53

## 2022-08-01 ASSESSMENT — PAIN SCALES - GENERAL
PAINLEVEL_OUTOF10: 0
PAINLEVEL_OUTOF10: 0

## 2022-08-01 NOTE — PROGRESS NOTES
Pt was hoping to go home today but Nephrology wants to observe/treat hypertension overnight. Most likely discharge Tuesday. Pt agrees to plan of care.

## 2022-08-01 NOTE — PROGRESS NOTES
[facility]        Progress Note    Patient Name: Belen Machado  MRN: [id number]  [place]: 1990  Date of Evaluation: 2022    Chief Complaint: Abdominal pain    Subjective:  No acute events overnight  Pain resolved  No nausea or vomiting, tolerating low fiber diet  Passing flatus and BM  Resting in bed at this time      Vital Signs:  Patient Vitals for the past 24 hrs:   BP Temp Temp src Pulse Resp SpO2 Weight   22 0829 -- -- -- -- -- -- (!) 321 lb 8 oz (145.8 kg)   22 0715 (!) 162/105 98 °F (36.7 °C) Oral 67 16 -- --   22 0354 136/80 -- -- 63 -- 96 % --   22 0328 (!) 166/103 97.8 °F (36.6 °C) Oral 72 16 97 % --   22 2339 (!) 175/124 98 °F (36.7 °C) Oral 72 16 98 % --   22 2137 139/89 98.3 °F (36.8 °C) Oral 76 16 98 % --   22 1906 (!) 167/107 98.3 °F (36.8 °C) Oral 64 16 98 % --   22 1727 (!) 154/109 -- -- -- -- -- --   22 1600 (!) 152/99 98 °F (36.7 °C) Oral -- 16 99 % --   22 1300 (!) 156/99 -- -- -- -- -- --   22 1142 (!) 177/98 98.1 °F (36.7 °C) Oral 77 16 99 % --   22 1100 (!) 162/109 98.2 °F (36.8 °C) -- 74 -- 99 % --        TEMPERATURE HISTORY 24H: Temp (24hrs), Av.1 °F (36.7 °C), Min:97.8 °F (36.6 °C), Max:98.3 °F (36.8 °C)    BLOOD PRESSURE HISTORY: Systolic (50JFG), EIO:940 , Min:136 , LXY:202    Diastolic (87GFR), PES:706, Min:80, Max:124      Intake/Output:  No intake/output data recorded. No intake/output data recorded.   Drain/tube Output:       Physical Exam:  General: awake, alert, oriented to person, place, time  Cardiovascular:  regular rate and rhythm and no murmur noted  Lungs: clear to auscultation  Abdomen: soft, non-distended, obese, non-tender, bowel sounds present     Labs:  CBC:    Recent Labs     22  0523 22  0553 22  0515   WBC 10.4 10.1 9.5   HGB 14.3 14.5 14.6   HCT 41.6 41.9 43.4    242 271       BMP:    Recent Labs 07/30/22  0523 07/31/22  0553 08/01/22  0515   * 137 135*   K 3.8 4.0 3.6    101 99   CO2 21 23 22   BUN 12 13 18   CREATININE 0.8* 0.9 1.0   GLUCOSE 95 95 98       Hepatic:    No results for input(s): AST, ALT, ALB, BILITOT, ALKPHOS in the last 72 hours. Amylase:  No results found for: AMYLASE  Lipase:    Lab Results   Component Value Date/Time    LIPASE 13.0 07/27/2022 10:35 PM        Mag:    Lab Results   Component Value Date/Time    MG 1.70 07/28/2022 04:15 AM     Phos:     Lab Results   Component Value Date/Time    PHOS 2.8 07/28/2022 04:15 AM      Coags:   Lab Results   Component Value Date/Time    PROTIME 14.5 07/28/2022 04:15 AM    INR 1.13 07/28/2022 04:15 AM    APTT 29.3 07/28/2022 04:15 AM       Cultures:  Anaerobic culture  No results found for: LABANAE  Fungus stain  No results found for requested labs within last 30 days. Gram stain  No results found for requested labs within last 30 days. Organism  No results found for: [facility]  Surgical culture  No results found for: CXSURG  Blood culture 1  Results in Past 30 Days  Result Component Current Result Ref Range Previous Result Ref Range   Blood Culture, Routine No Growth after 4 days of incubation. (7/28/2022)  Not in Time Range      Blood culture 2  Results in Past 30 Days  Result Component Current Result Ref Range Previous Result Ref Range   Culture, Blood 2 No Growth after 4 days of incubation. (7/28/2022)  Not in Time Range      Fecal occult  No results found for requested labs within last 30 days. GI bacterial pathogens by PCR  No results found for requested labs within last 30 days. C. difficile  No results found for requested labs within last 30 days. Urine culture  Lab Results   Component Value Date/Time    LABURIN  07/27/2022 10:34 PM     >50,000 CFU/ml mixed skin/urogenital myriam.  No further workup       Pathology:  No relevant pathology     Imaging:  I have personally reviewed the following films:    No results found.    Scheduled Meds:   spironolactone  25 mg Oral Daily    carvedilol  25 mg Oral BID     hydroCHLOROthiazide  25 mg Oral Daily    nicotine  1 patch TransDERmal Daily    sodium chloride flush  10 mL IntraVENous 2 times per day    enoxaparin  30 mg SubCUTAneous BID    lactobacillus  1 capsule Oral BID     piperacillin-tazobactam  3,375 mg IntraVENous Q8H    lisinopril  20 mg Oral Daily     Continuous Infusions:   sodium chloride       PRN Meds:.perflutren lipid microspheres, perflutren lipid microspheres, sodium chloride flush, sodium chloride, magnesium hydroxide, acetaminophen **OR** acetaminophen, ketorolac, morphine **OR** morphine, ondansetron, labetalol, hydrALAZINE      Assessment:  33 y.o. male admitted with   1. Diverticulitis of large intestine with perforation without bleeding    2. Essential hypertension        Acute sigmoid diverticulitis with microperforation  Hypertension  Morbid obesity, BMI 42.2       Plan:  1. Pain resolved, vitas/labs--remains hypertensive, tolerating regular diet, passing stool; continued supportive care  2. Low fiber diet as tolerated; monitor bowel function  3. Monitor and correct electrolytes  4. Antibiotics, switch to PO at discharge  5. Activity as tolerated  6. PRN analgesics and antiemetics--minimizing narcotics as tolerated  7. Management of medical comorbid etiologies per primary team and consulting services  8. Disposition: Okay for discharge home from a surgical perspective when medically stable; follow up with Dr. John Escalona in 2 weeks    EDUCATION:  Educated patient on plan of care and disease process--all questions answered. Plans discussed with patient and nursing. Reviewed and discussed with Dr. John Escalona. Signed:  JESSICA Ballesteros CNP  8/1/2022 9:24 AM    I have personally performed a face to face diagnostic evaluation on this patient. I have interviewed and examined the patient and I agree with the assessment above.  Time was spent reviewing patient chart (including but not limited to notes, labs, imaging and other testing), interviewing and counseling patient and present family members, performing physical exam, documentation of my findings and subsequent follow up of ordered medication and testing, placing referrals and communication with patient care providers, coordinating future care as well as documentation in the EHR. This encompassed more than 50% of the total encounter time. In summary, my findings and plan are the following:   Mr. Giovany Mars is a 33 y.o. male who presents with   Acute sigmoid diverticulitis with microperforation  Morbid obesity, BMI 42.2     Plan:  1. Clinically improved, does not need surgical resection   2. Tolerating diet  3. Passing stool  4. Pain resolved  5. Antibiotics, will switch to PO at discharge  6. Will need elective colonoscopy 6-8 weeks after discharge to screen for other etiologies that may be mimicking diverticulitis  7. With first attack, doubt will need sigmoidectomy  8. Defer management of remainder of other medical conditions to primary and consulting teams  9. Discharge planning, may discharge today from surgical standpoint and follow in office several weeks    Darell Whalen MD, FACS  8/1/2022  11:14 AM

## 2022-08-01 NOTE — CONSULTS
Office : [phone]     Fax :[phone]       Nephrology Consult Note      Patient's Name: Kiara Arvizu  9:12 AM  8/1/2022    Reason for Consult:  HTN urgency         Chief Complaint:    Chief Complaint   Patient presents with    Abdominal Pain     Pt states started having sharp abdominal pain since yesterday, pt states no constipation or diarrhea. Pt states nausea no vomiting, chills on and off. Pt states only ate this morning hasn't eaten since. History of Present Ilness:    Kiara Arvizu is a 33 y.o. male with with h/o HTN but was not taking his BP meds for few years. Was diagnosed when he was in his 25s . Now BP elevated at 165/102. Also has BMI of 41. No intake/output data recorded. Past Medical History:   Diagnosis Date    HTN (hypertension)        History reviewed. No pertinent surgical history. History reviewed. No pertinent family history. reports that he has been smoking cigarettes. He has been smoking an average of 1 pack per day. He has never used smokeless tobacco. He reports current drug use. Drug: Marijuana Marito Kos). He reports that he does not drink alcohol. Allergies:  Patient has no known allergies.     Current Medications:    carvedilol (COREG) tablet 25 mg, BID WC  perflutren lipid microspheres (DEFINITY) injection 1.65 mg, ONCE PRN  hydroCHLOROthiazide (HYDRODIURIL) tablet 25 mg, Daily  nicotine (NICODERM CQ) 21 MG/24HR 1 patch, Daily  perflutren lipid microspheres (DEFINITY) injection 1.65 mg, ONCE PRN  sodium chloride flush 0.9 % injection 10 mL, 2 times per day  sodium chloride flush 0.9 % injection 10 mL, PRN  0.9 % sodium chloride infusion, PRN  enoxaparin Sodium (LOVENOX) injection 30 mg, BID  magnesium hydroxide (MILK OF MAGNESIA) 400 MG/5ML suspension 30 mL, Daily PRN  acetaminophen (TYLENOL) tablet 650 mg, Q6H PRN   Or  acetaminophen (TYLENOL) suppository 650 mg, Q6H PRN  lactobacillus (CULTURELLE) capsule 1 capsule, BID WC  ketorolac (TORADOL) injection 30 mg, Q6H PRN  morphine (PF) injection 2 mg, Q3H PRN   Or  morphine injection 4 mg, Q3H PRN  ondansetron (ZOFRAN) injection 4 mg, Q6H PRN  labetalol (NORMODYNE;TRANDATE) injection 10 mg, Q6H PRN  hydrALAZINE (APRESOLINE) injection 10 mg, Q6H PRN  piperacillin-tazobactam (ZOSYN) 3,375 mg in dextrose 5 % 50 mL IVPB (mini-bag), Q8H  lisinopril (PRINIVIL;ZESTRIL) tablet 20 mg, Daily        Review of Systems:   14 point ROS obtained but were negative except mentioned in HPI      Physical exam:     Vitals:  BP (!) 162/105   Pulse 67   Temp 98 °F (36.7 °C) (Oral)   Resp 16   Ht 6' 2\" (1.88 m)   Wt (!) 321 lb 8 oz (145.8 kg)   SpO2 96%   BMI 41.28 kg/m²   Constitutional:  OAA X3 NAD  Skin: no rash, turgor wnl  Heent:  eomi, mmm  Neck: no bruits or jvd noted  Cardiovascular:  S1, S2 without m/r/g  Respiratory: CTA B without w/r/r  Abdomen:  +bs, soft, nt, nd  Ext: no  lower extremity edema  Psychiatric: mood and affect appropriate  Musculoskeletal:  Rom, muscular strength intact    Labs:  CBC:   Recent Labs     07/30/22 0523 07/31/22  0553 08/01/22  0515   WBC 10.4 10.1 9.5   HGB 14.3 14.5 14.6    242 271     BMP:    Recent Labs     07/30/22 0523 07/31/22  0553 08/01/22  0515   * 137 135*   K 3.8 4.0 3.6    101 99   CO2 21 23 22   BUN 12 13 18   CREATININE 0.8* 0.9 1.0   GLUCOSE 95 95 98     Ca/Mg/Phos:   Recent Labs     07/30/22 0523 07/31/22  0553 08/01/22  0515   CALCIUM 9.6 9.7 9.6     Hepatic: No results for input(s): AST, ALT, ALB, BILITOT, ALKPHOS in the last 72 hours. Troponin: No results for input(s): TROPONINI in the last 72 hours. BNP: No results for input(s): BNP in the last 72 hours.   Lipids: No results for input(s): CHOL, TRIG, HDL, LDLCALC, LABVLDL in the last 72 hours. ABGs: No results for input(s): PHART, PO2ART, ADM8ROG in the last 72 hours. INR: No results for input(s): INR in the last 72 hours. UA:No results for input(s): Loraine Solum, GLUCOSEU, BILIRUBINUR, Alejo Safer, BLOODU, PHUR, PROTEINU, UROBILINOGEN, NITRU, LEUKOCYTESUR, LABMICR, URINETYPE in the last 72 hours. Urine Microscopic: No results for input(s): LABCAST, BACTERIA, COMU, HYALCAST, WBCUA, RBCUA, EPIU in the last 72 hours. Urine Culture: No results for input(s): LABURIN in the last 72 hours. Urine Chemistry: No results for input(s): Velna Overlie, PROTEINUR, NAUR in the last 72 hours. IMAGING:  CT ABDOMEN PELVIS W IV CONTRAST Additional Contrast? None   Final Result   1. Acute sigmoid diverticulitis with small focus of contained perforation. No abscess, obstruction or free air in the upper abdomen. 2.  Normal appendix. Assessment/Plan :      1. HTN urgency  Uncontrolled   Non compliance     Will stop amlodipine   ADD ALDACTONE 25 MG PO BID   Continue HCTZ, Lisinopril and coreg         Hypertension education given     ? Lose weight   ? Choose a diet low in fat and rich in fruits, vegetables, and low-fat dairy products  ? Reduce the amount of salt you eat, avoid sodas. ?Do something active for at least 30 minutes a day on most days of the week  ? Cut down on alcohol (if you drink more than 2 alcoholic drinks per day), if you smoke then try to quit. 2. Diverticulitis - improved   On abx     3.  Morbid obesity   Need to loose weight       Monitor BP             D/w primary team      Thank you for allowing us to participate in care of Alcira Hanks         Electronically signed by: Paula Leon MD, 8/1/2022, [address] [facility]      [facility] [address]  Office : [phone]  Fax :[phone]

## 2022-08-01 NOTE — PROGRESS NOTES
Hospitalist Progress Note      PCP: No primary care provider on file. Date of Admission: 7/27/2022    Chief Complaint: Abdominal pain    Hospital Course: 68-year-old male with a history of hypertension-has not been taking medications for years, obesity came in with abdominal pain. Diagnosed with diverticulitis. Started on IV Zosyn. Has contained perforation. General surgery consulted. conservative management advised this time. Subjective: Feeling better. No abdominal pain. Medications:  Reviewed    Infusion Medications    sodium chloride       Scheduled Medications    spironolactone  25 mg Oral BID    amoxicillin-clavulanate  1 tablet Oral 2 times per day    carvedilol  25 mg Oral BID WC    hydroCHLOROthiazide  25 mg Oral Daily    nicotine  1 patch TransDERmal Daily    sodium chloride flush  10 mL IntraVENous 2 times per day    enoxaparin  30 mg SubCUTAneous BID    lactobacillus  1 capsule Oral BID WC    lisinopril  20 mg Oral Daily     PRN Meds: perflutren lipid microspheres, perflutren lipid microspheres, sodium chloride flush, sodium chloride, magnesium hydroxide, acetaminophen **OR** acetaminophen, ketorolac, morphine **OR** morphine, ondansetron, labetalol, hydrALAZINE    No intake or output data in the 24 hours ending 08/01/22 1501      Physical Exam Performed:    BP (!) 137/94   Pulse 68   Temp 98.3 °F (36.8 °C) (Oral)   Resp 18   Ht 6' 2\" (1.88 m)   Wt (!) 321 lb 8 oz (145.8 kg)   SpO2 98%   BMI 41.28 kg/m²     General appearance: No apparent distress, appears stated age and cooperative. HEENT: Pupils equal, round, and reactive to light. Conjunctivae/corneas clear. Neck: Supple, with full range of motion. No jugular venous distention. Trachea midline. Respiratory:  Normal respiratory effort. Clear to auscultation, bilaterally without Rales/Wheezes/Rhonchi. Cardiovascular: Regular rate and rhythm with normal S1/S2 without murmurs, rubs or gallops.   Abdomen: Soft, non-tender, non-distended with normal bowel sounds. Musculoskeletal: No clubbing, cyanosis or edema bilaterally. Full range of motion without deformity. Skin: Skin color, texture, turgor normal.  No rashes or lesions. Neurologic:  Neurovascularly intact without any focal sensory/motor deficits. Cranial nerves: II-XII intact, grossly non-focal.  Psychiatric: Alert and oriented, thought content appropriate, normal insight  Capillary Refill: Brisk,3 seconds, normal   Peripheral Pulses: +2 palpable, equal bilaterally       Labs:   Recent Labs     07/30/22 0523 07/31/22 0553 08/01/22 0515   WBC 10.4 10.1 9.5   HGB 14.3 14.5 14.6   HCT 41.6 41.9 43.4    242 271       Recent Labs     07/30/22 0523 07/31/22 0553 08/01/22 0515   * 137 135*   K 3.8 4.0 3.6    101 99   CO2 21 23 22   BUN 12 13 18   CREATININE 0.8* 0.9 1.0   CALCIUM 9.6 9.7 9.6       No results for input(s): AST, ALT, BILIDIR, BILITOT, ALKPHOS in the last 72 hours. No results for input(s): INR in the last 72 hours. No results for input(s): Jazmín Height in the last 72 hours. Urinalysis:      Lab Results   Component Value Date/Time    NITRU Negative 07/27/2022 10:34 PM    WBCUA 154 07/27/2022 10:34 PM    BACTERIA 1+ 07/27/2022 10:34 PM    RBCUA 14 07/27/2022 10:34 PM    BLOODU MODERATE 07/27/2022 10:34 PM    SPECGRAV 1.020 07/27/2022 10:34 PM    GLUCOSEU Negative 07/27/2022 10:34 PM       Radiology:  CT ABDOMEN PELVIS W IV CONTRAST Additional Contrast? None   Final Result   1. Acute sigmoid diverticulitis with small focus of contained perforation. No abscess, obstruction or free air in the upper abdomen. 2.  Normal appendix.                  Assessment/Plan:    Active Hospital Problems    Diagnosis     Hypertensive urgency [I16.0]      Priority: Medium    Class 3 severe obesity without serious comorbidity with body mass index (BMI) of 40.0 to 44.9 in adult ([facility] [E66.01, Z68.41]      Priority: Medium Diverticulitis [K57.92]      Priority: Medium    Diverticulitis of large intestine with perforation without bleeding [K57.20]      Priority: Medium     Acute sigmoid colitis/contained perforation: General surgery on board. Nonsurgical management at this time. On Zosyn-will switch to augmentin. Tolerating general diet. Continue pain meds as needed. Needs colonoscopy in 8 weeks. 2.  Uncontrolled hypertension: BP remains elevated. Consult nephrology. Check ECHO. Currently on coreg, lisinopril, norvasc, and HCTZ. 3. Obesity: BMI 42.43. Counseled    DVT Prophylaxis: Lovenox  Diet: ADULT DIET;  Regular; Low Fiber  Code Status: Full Code    PT/OT Eval Status: Not needed    Brina Rose PA-C

## 2022-08-01 NOTE — PROGRESS NOTES
Pt resting in bed w/ eyes closed, easily awakens to voice. /80, parameters not met for PRN BP meds at this time.

## 2022-08-02 VITALS
DIASTOLIC BLOOD PRESSURE: 97 MMHG | WEIGHT: 315 LBS | RESPIRATION RATE: 16 BRPM | TEMPERATURE: 98.1 F | SYSTOLIC BLOOD PRESSURE: 161 MMHG | HEIGHT: 74 IN | OXYGEN SATURATION: 98 % | BODY MASS INDEX: 40.43 KG/M2 | HEART RATE: 71 BPM

## 2022-08-02 LAB
ANION GAP SERPL CALCULATED.3IONS-SCNC: 12 MMOL/L (ref 3–16)
BASOPHILS ABSOLUTE: 0.1 K/UL (ref 0–0.2)
BASOPHILS RELATIVE PERCENT: 0.8 %
BUN BLDV-MCNC: 20 MG/DL (ref 7–20)
CALCIUM SERPL-MCNC: 9.8 MG/DL (ref 8.3–10.6)
CHLORIDE BLD-SCNC: 97 MMOL/L (ref 99–110)
CO2: 23 MMOL/L (ref 21–32)
CREAT SERPL-MCNC: 1 MG/DL (ref 0.9–1.3)
EOSINOPHILS ABSOLUTE: 0.1 K/UL (ref 0–0.6)
EOSINOPHILS RELATIVE PERCENT: 1.2 %
GFR AFRICAN AMERICAN: >60
GFR NON-AFRICAN AMERICAN: >60
GLUCOSE BLD-MCNC: 93 MG/DL (ref 70–99)
HCT VFR BLD CALC: 44.4 % (ref 40.5–52.5)
HEMOGLOBIN: 14.9 G/DL (ref 13.5–17.5)
LYMPHOCYTES ABSOLUTE: 2.2 K/UL (ref 1–5.1)
LYMPHOCYTES RELATIVE PERCENT: 22.4 %
MCH RBC QN AUTO: 31.5 PG (ref 26–34)
MCHC RBC AUTO-ENTMCNC: 33.5 G/DL (ref 31–36)
MCV RBC AUTO: 94 FL (ref 80–100)
MONOCYTES ABSOLUTE: 0.8 K/UL (ref 0–1.3)
MONOCYTES RELATIVE PERCENT: 8.4 %
NEUTROPHILS ABSOLUTE: 6.5 K/UL (ref 1.7–7.7)
NEUTROPHILS RELATIVE PERCENT: 67.2 %
PDW BLD-RTO: 13.5 % (ref 12.4–15.4)
PLATELET # BLD: 288 K/UL (ref 135–450)
PMV BLD AUTO: 7.9 FL (ref 5–10.5)
POTASSIUM REFLEX MAGNESIUM: 4.1 MMOL/L (ref 3.5–5.1)
RBC # BLD: 4.72 M/UL (ref 4.2–5.9)
SODIUM BLD-SCNC: 132 MMOL/L (ref 136–145)
WBC # BLD: 9.7 K/UL (ref 4–11)

## 2022-08-02 PROCEDURE — 6370000000 HC RX 637 (ALT 250 FOR IP): Performed by: INTERNAL MEDICINE

## 2022-08-02 PROCEDURE — APPSS15 APP SPLIT SHARED TIME 0-15 MINUTES: Performed by: NURSE PRACTITIONER

## 2022-08-02 PROCEDURE — APPNB30 APP NON BILLABLE TIME 0-30 MINS: Performed by: NURSE PRACTITIONER

## 2022-08-02 PROCEDURE — 2580000003 HC RX 258: Performed by: PHYSICIAN ASSISTANT

## 2022-08-02 PROCEDURE — 6360000002 HC RX W HCPCS: Performed by: PHYSICIAN ASSISTANT

## 2022-08-02 PROCEDURE — 6370000000 HC RX 637 (ALT 250 FOR IP): Performed by: PHYSICIAN ASSISTANT

## 2022-08-02 PROCEDURE — 85025 COMPLETE CBC W/AUTO DIFF WBC: CPT

## 2022-08-02 PROCEDURE — 36415 COLL VENOUS BLD VENIPUNCTURE: CPT

## 2022-08-02 PROCEDURE — 99232 SBSQ HOSP IP/OBS MODERATE 35: CPT | Performed by: SURGERY

## 2022-08-02 PROCEDURE — 99233 SBSQ HOSP IP/OBS HIGH 50: CPT | Performed by: INTERNAL MEDICINE

## 2022-08-02 PROCEDURE — 80048 BASIC METABOLIC PNL TOTAL CA: CPT

## 2022-08-02 RX ORDER — LACTOBACILLUS RHAMNOSUS GG 10B CELL
1 CAPSULE ORAL 2 TIMES DAILY WITH MEALS
Qty: 20 CAPSULE | Refills: 0 | Status: SHIPPED | OUTPATIENT
Start: 2022-08-02 | End: 2022-08-19

## 2022-08-02 RX ORDER — LISINOPRIL 20 MG/1
20 TABLET ORAL DAILY
Qty: 30 TABLET | Refills: 3 | Status: SHIPPED | OUTPATIENT
Start: 2022-08-03 | End: 2022-08-23 | Stop reason: DRUGHIGH

## 2022-08-02 RX ORDER — SPIRONOLACTONE 25 MG/1
25 TABLET ORAL 2 TIMES DAILY
Qty: 60 TABLET | Refills: 3 | Status: SHIPPED | OUTPATIENT
Start: 2022-08-02 | End: 2022-08-23 | Stop reason: DRUGHIGH

## 2022-08-02 RX ORDER — AMOXICILLIN AND CLAVULANATE POTASSIUM 875; 125 MG/1; MG/1
1 TABLET, FILM COATED ORAL EVERY 12 HOURS SCHEDULED
Qty: 20 TABLET | Refills: 0 | Status: SHIPPED | OUTPATIENT
Start: 2022-08-02 | End: 2022-08-12

## 2022-08-02 RX ORDER — HYDROCHLOROTHIAZIDE 25 MG/1
25 TABLET ORAL DAILY
Qty: 30 TABLET | Refills: 3 | Status: SHIPPED | OUTPATIENT
Start: 2022-08-03 | End: 2022-08-23 | Stop reason: DRUGHIGH

## 2022-08-02 RX ORDER — CARVEDILOL 25 MG/1
25 TABLET ORAL 2 TIMES DAILY WITH MEALS
Qty: 60 TABLET | Refills: 3 | Status: SHIPPED | OUTPATIENT
Start: 2022-08-02 | End: 2022-08-23

## 2022-08-02 RX ADMIN — HYDROCHLOROTHIAZIDE 25 MG: 25 TABLET ORAL at 08:37

## 2022-08-02 RX ADMIN — LISINOPRIL 20 MG: 20 TABLET ORAL at 08:38

## 2022-08-02 RX ADMIN — ENOXAPARIN SODIUM 30 MG: 100 INJECTION SUBCUTANEOUS at 08:37

## 2022-08-02 RX ADMIN — Medication 10 ML: at 08:44

## 2022-08-02 RX ADMIN — SPIRONOLACTONE 25 MG: 25 TABLET ORAL at 08:38

## 2022-08-02 RX ADMIN — CARVEDILOL 25 MG: 25 TABLET, FILM COATED ORAL at 08:38

## 2022-08-02 RX ADMIN — AMOXICILLIN AND CLAVULANATE POTASSIUM 1 TABLET: 875; 125 TABLET, FILM COATED ORAL at 08:38

## 2022-08-02 RX ADMIN — Medication 1 CAPSULE: at 08:38

## 2022-08-02 NOTE — DISCHARGE SUMMARY
[facility]    Patient Demographics    Patient. Rajwinder Parikh  Date of Birth. 1990  MRN. [id number]     Primary care provider. No primary care provider on file. (Tel: None)    Admit date: 7/27/2022    Discharge date  8/2/2022  Note Date: 8/2/2022     Reason for Hospitalization. Chief Complaint   Patient presents with    Abdominal Pain     Pt states started having sharp abdominal pain since yesterday, pt states no constipation or diarrhea. Pt states nausea no vomiting, chills on and off. Pt states only ate this morning hasn't eaten since. Significant Findings  Principal Problem:    Diverticulitis  Active Problems:    Diverticulitis of large intestine with perforation without bleeding    Hypertensive urgency    Class 3 severe obesity without serious comorbidity with body mass index (BMI) of 40.0 to 44.9 in [facility])  Resolved Problems:    * No resolved hospital problems. *       Problems and results from this hospitalization that need follow up. Diverticulitis:  follow up with surgery in 1 - 2 weeks   HTN:  follow up with nephrology, Dr Bernard Diehl in 2 weeks   Morbid obesity:  work on weight reduction     Significant test results and incidental findings. Echo :  Summary   Technically difficult study due to body habitus. Definity was administered. Left ventricular cavity size is normal with mild concentric left ventricular   hypertrophy. Ejection fraction is visually estimated to be 60%. Indeterminate diastolic function. E/e' = 11. .0. CT abd 7/28/22:  Acute sigmoid diverticulitis with small focus of contained perforation. No abscess, obstruction or free air in the upper abdomen. 2.  Normal appendix. Invasive procedures and treatments. None     [facility] Course. The patient sought care due to abdominal pain.   He was admitted and followed by general surgery and nephrology. He was treated for the following:    Acute diverticulitis with contained perforation:  he was treated with bowel rest, IV hydration and IV zosyn. His pain markedly improved and he was d/c home on an additional 10 days of augmentin. He understands the importance of follow up and the need for follow up C scope in 8 weeks. He was tolerating a diet and walking in the halls. HTN:  he was not taking any meds prior to admission, despite previous hx of HTN. He was followed by nephrology and BP was improving at the time of d/c.   Scripts were sent to his [facility] pharmacy. He does understand the importance of medication compliance and the need for weight reduction. Consults. IP CONSULT TO GENERAL SURGERY  IP CONSULT TO NEPHROLOGY    Physical examination on discharge day. BP (!) 161/97   Pulse 71   Temp 98.1 °F (36.7 °C) (Oral)   Resp 16   Ht 6' 2\" (1.88 m)   Wt (!) 320 lb 3.2 oz (145.2 kg)   SpO2 98%   BMI 41.11 kg/m²   General appearance. Alert. Looks comfortable. Obese, alert and pleasant   HEENT. Sclera clear. Moist mucus membranes. Cardiovascular. Regular rate and rhythm, normal S1, S2. No murmur. Respiratory. Not using accessory muscles. Clear to auscultation bilaterally, no wheeze. Gastrointestinal. Abdomen soft, non-tender, not distended, normal bowel sounds  Neurology. Facial symmetry. No speech deficits. Moving all extremities equally. Extremities. No edema in lower extremities. Skin. Warm, dry, normal turgor    Condition at time of discharge stable    Medication instructions provided to patient at discharge. Medication List        START taking these medications      amoxicillin-clavulanate 875-125 MG per tablet  Commonly known as: AUGMENTIN  Take 1 tablet by mouth in the morning and 1 tablet before bedtime. Do all this for 10 days.   Notes to patient: Use: antibiotic for infection  Side effects: nausea, vomiting, headache, diarrhea, gas     carvedilol 25 MG tablet  Commonly known as: COREG  Take 1 tablet by mouth in the morning and 1 tablet in the evening. Take with meals. Notes to patient: Beta Blockers   Use: treat heart failure, prevent future heart attacks, lower blood pressure & heart rate, treat chest pain  Side Effects: dizziness, fatigue & diarrhea     hydroCHLOROthiazide 25 MG tablet  Commonly known as: HYDRODIURIL  Take 1 tablet by mouth in the morning. Start taking on: August 3, 2022  Notes to patient: Use: a thiazide diuretic (water pill) to prevent fluid retention  Side effects: abdominal discomfort, stomach upset, leg or back pain, tarry stools, bleeding gums     lactobacillus capsule  Take 1 capsule by mouth in the morning and 1 capsule in the evening. Take with meals. Notes to patient: Use: Keep the normal balance of bacteria in the GI tract. Side effects:  None     lisinopril 20 MG tablet  Commonly known as: PRINIVIL;ZESTRIL  Take 1 tablet by mouth in the morning. Start taking on: August 3, 2022  Notes to patient: Lisinopril is used to treat high blood pressure, help heart function after a heart attack, help a weak heart. Side effects: dizziness, headache, cough. spironolactone 25 MG tablet  Commonly known as: ALDACTONE  Take 1 tablet by mouth in the morning and 1 tablet in the evening.   Notes to patient: USE: \"water pill\" reduces swelling, fluid accumulation, blood pressure but holds potassium     Side Effects: lightheadedness, dehydration, lethargy               Where to Get Your Medications        These medications were sent to [facility] [id number] [facility], [address]      Phone: [phone]   amoxicillin-clavulanate 875-125 MG per tablet  carvedilol 25 MG tablet  hydroCHLOROthiazide 25 MG tablet  lactobacillus capsule  lisinopril 20 MG tablet  spironolactone 25 MG tablet         Discharge recommendations given to patient. Follow Up. in 1 week   Disposition. home  Activity. activity as tolerated  Diet: ADULT DIET; Regular; Low Fiber      Spent 35 minutes in discharge process.     Signed:  JESSICA Mayberry CNP     8/2/2022 12:02 PM

## 2022-08-02 NOTE — PROGRESS NOTES
Office : [phone]     Fax :[phone]       Nephrology  progress Note      Patient's Name: Jerod Rosales  10:37 AM  8/2/2022    Reason for Consult:  HTN urgency         Chief Complaint:    Chief Complaint   Patient presents with    Abdominal Pain     Pt states started having sharp abdominal pain since yesterday, pt states no constipation or diarrhea. Pt states nausea no vomiting, chills on and off. Pt states only ate this morning hasn't eaten since. History of Present Ilness:    Jerod Rosales is a 33 y.o. male with with h/o HTN but was not taking his BP meds for few years. Was diagnosed when he was in his 25s . Now BP elevated at 165/102. Also has BMI of 41. Interval hx     BP better controlled   No SOB   No edema         No intake/output data recorded.     Past Medical History:   Diagnosis Date    HTN (hypertension)            Current Medications:    spironolactone (ALDACTONE) tablet 25 mg, BID  amoxicillin-clavulanate (AUGMENTIN) 875-125 MG per tablet 1 tablet, 2 times per day  carvedilol (COREG) tablet 25 mg, BID WC  perflutren lipid microspheres (DEFINITY) injection 1.65 mg, ONCE PRN  hydroCHLOROthiazide (HYDRODIURIL) tablet 25 mg, Daily  nicotine (NICODERM CQ) 21 MG/24HR 1 patch, Daily  sodium chloride flush 0.9 % injection 10 mL, 2 times per day  sodium chloride flush 0.9 % injection 10 mL, PRN  0.9 % sodium chloride infusion, PRN  enoxaparin Sodium (LOVENOX) injection 30 mg, BID  magnesium hydroxide (MILK OF MAGNESIA) 400 MG/5ML suspension 30 mL, Daily PRN  acetaminophen (TYLENOL) tablet 650 mg, Q6H PRN   Or  acetaminophen (TYLENOL) suppository 650 mg, Q6H PRN  lactobacillus (CULTURELLE) capsule 1 capsule, BID WC  morphine (PF) injection 2 mg, Q3H PRN No results for input(s): CLUR, LABCREA, PROTEINUR, NAUR in the last 72 hours. IMAGING:  CT ABDOMEN PELVIS W IV CONTRAST Additional Contrast? None   Final Result   1. Acute sigmoid diverticulitis with small focus of contained perforation. No abscess, obstruction or free air in the upper abdomen. 2.  Normal appendix. Assessment/Plan :      1. HTN urgency  Uncontrolled   Non compliance     Will stop amlodipine   Continue  ALDACTONE 25 MG PO BID   Continue HCTZ, Lisinopril and coreg         Hypertension education given     ? Lose weight   ? Choose a diet low in fat and rich in fruits, vegetables, and low-fat dairy products  ? Reduce the amount of salt you eat, avoid sodas. ?Do something active for at least 30 minutes a day on most days of the week  ? Cut down on alcohol (if you drink more than 2 alcoholic drinks per day), if you smoke then try to quit. 2. Diverticulitis - improved   On abx     3.  Morbid obesity   Need to loose weight       F/u 8/23/2022 in office             D/w primary team      Thank you for allowing us to participate in care of Belen Carter         Electronically signed by: Mohini Crouch MD, 8/2/2022, 10:37 AM      [facility] [address]  Office : [phone]  Fax :[phone]

## 2022-08-02 NOTE — PROGRESS NOTES
[facility]        Progress Note    Patient Name: Kamilah Braun  MRN: [id number]  [place]: 1990  Date of Evaluation: 2022    Chief Complaint: Abdominal pain    Subjective:  No acute events overnight  Pain resolved  No nausea or vomiting, tolerating low fiber diet  Passing flatus and BM  Ambulates around room without difficulty      Vital Signs:  Patient Vitals for the past 24 hrs:   BP Temp Temp src Pulse Resp SpO2 Weight   22 0841 -- -- -- -- -- -- (!) 320 lb 3.2 oz (145.2 kg)   22 0838 (!) 161/97 -- -- 71 -- -- --   22 0800 (!) 161/97 98.1 °F (36.7 °C) Oral 71 16 98 % --   22 0418 125/80 97.9 °F (36.6 °C) Oral 62 16 98 % --   22 1916 (!) 144/96 97.9 °F (36.6 °C) Oral 60 16 97 % --   22 1615 (!) 137/97 97.9 °F (36.6 °C) Oral 59 16 99 % --   22 1132 (!) 137/94 98.3 °F (36.8 °C) Oral 68 18 98 % --        TEMPERATURE HISTORY 24H: Temp (24hrs), Av °F (36.7 °C), Min:97.9 °F (36.6 °C), Max:98.3 °F (36.8 °C)    BLOOD PRESSURE HISTORY: Systolic (34DGI), CV , Min:125 , EWX:780    Diastolic (09PWP), LCY:75, Min:80, Max:124      Intake/Output:  No intake/output data recorded. I/O this shift:  In: 250 [P.O.:240;  I.V.:10]  Out: -   Drain/tube Output:       Physical Exam:  General: awake, alert, oriented to person, place, time  Cardiovascular:  regular rate and rhythm and no murmur noted  Lungs: clear to auscultation  Abdomen: soft, non-distended, obese, non-tender, bowel sounds present     Labs:  CBC:    Recent Labs     22  0553 22  0515 22  0533   WBC 10.1 9.5 9.7   HGB 14.5 14.6 14.9   HCT 41.9 43.4 44.4    271 288       BMP:    Recent Labs     22  0553 22  0515 22  0533    135* 132*   K 4.0 3.6 4.1    99 97*   CO2 23 22 23   BUN 13 18 20   CREATININE 0.9 1.0 1.0   GLUCOSE 95 98 93       Hepatic:    No results for input(s): AST, ALT, ALB, BILITOT, ALKPHOS in the last 72 hours. Amylase:  No results found for: AMYLASE  Lipase:    Lab Results   Component Value Date/Time    LIPASE 13.0 07/27/2022 10:35 PM        Mag:    Lab Results   Component Value Date/Time    MG 1.70 07/28/2022 04:15 AM     Phos:     Lab Results   Component Value Date/Time    PHOS 2.8 07/28/2022 04:15 AM      Coags:   Lab Results   Component Value Date/Time    PROTIME 14.5 07/28/2022 04:15 AM    INR 1.13 07/28/2022 04:15 AM    APTT 29.3 07/28/2022 04:15 AM       Cultures:  Anaerobic culture  No results found for: LABANAE  Fungus stain  No results found for requested labs within last 30 days. Gram stain  No results found for requested labs within last 30 days. Organism  No results found for: [facility]  Surgical culture  No results found for: CXSURG  Blood culture 1  Results in Past 30 Days  Result Component Current Result Ref Range Previous Result Ref Range   Blood Culture, Routine No Growth after 4 days of incubation. (7/28/2022)  Not in Time Range      Blood culture 2  Results in Past 30 Days  Result Component Current Result Ref Range Previous Result Ref Range   Culture, Blood 2 No Growth after 4 days of incubation. (7/28/2022)  Not in Time Range      Fecal occult  No results found for requested labs within last 30 days. GI bacterial pathogens by PCR  No results found for requested labs within last 30 days. C. difficile  No results found for requested labs within last 30 days. Urine culture  Lab Results   Component Value Date/Time    LABURIN  07/27/2022 10:34 PM     >50,000 CFU/ml mixed skin/urogenital myriam. No further workup       Pathology:  No relevant pathology     Imaging:  I have personally reviewed the following films:    No results found.     Scheduled Meds:   spironolactone  25 mg Oral BID    amoxicillin-clavulanate  1 tablet Oral 2 times per day    carvedilol  25 mg Oral BID WC    hydroCHLOROthiazide  25 mg Oral Daily    nicotine  1 patch TransDERmal Daily    sodium chloride flush 10 mL IntraVENous 2 times per day    enoxaparin  30 mg SubCUTAneous BID    lactobacillus  1 capsule Oral BID WC    lisinopril  20 mg Oral Daily     Continuous Infusions:   sodium chloride       PRN Meds:.perflutren lipid microspheres, sodium chloride flush, sodium chloride, magnesium hydroxide, acetaminophen **OR** acetaminophen, morphine **OR** morphine, ondansetron, labetalol, hydrALAZINE      Assessment:  33 y.o. male admitted with   1. Diverticulitis of large intestine with perforation without bleeding    2. Essential hypertension        Acute sigmoid diverticulitis with microperforation  Hypertension  Morbid obesity, BMI 42.2       Plan:  1. Pain resolved, vitas/labs stable--hypertensive improved, tolerating regular diet, passing stool; continued supportive care  2. Low fiber diet as tolerated; monitor bowel function  3. Monitor and correct electrolytes  4. Antibiotics, switch to PO at discharge  5. Activity as tolerated  6. PRN analgesics and antiemetics--minimizing narcotics as tolerated  7. Management of medical comorbid etiologies per primary team and consulting services  8. Disposition: Okay for discharge home from a surgical perspective when medically stable; follow up with Dr. Scott Hugo in 2 weeks    EDUCATION:  Educated patient on plan of care and disease process--all questions answered. Plans discussed with patient and nursing. Reviewed and discussed with Dr. Scott Hugo. Signed:  JESSICA Duarte CNP  8/2/2022 10:27 AM    I have personally performed a face to face diagnostic evaluation on this patient. I have interviewed and examined the patient and I agree with the assessment above.  Time was spent reviewing patient chart (including but not limited to notes, labs, imaging and other testing), interviewing and counseling patient and present family members, performing physical exam, documentation of my findings and subsequent follow up of ordered medication and testing, placing referrals and

## 2022-08-03 LAB — ALDOSTERONE: <3 NG/DL

## 2022-08-04 LAB
METANEPH/PLASMA INTERP: ABNORMAL
METANEPHRINE FREE PLASMA: 0.18 NMOL/L (ref 0–0.49)
NORMETANEPHRINE FREE PLASMA: 2.4 NMOL/L (ref 0–0.89)
REASON FOR REJECTION: NORMAL
REJECTED TEST: NORMAL

## 2022-08-12 ENCOUNTER — OFFICE VISIT (OUTPATIENT)
Dept: SURGERY | Age: 32
End: 2022-08-12
Payer: COMMERCIAL

## 2022-08-12 VITALS
HEIGHT: 74 IN | DIASTOLIC BLOOD PRESSURE: 70 MMHG | BODY MASS INDEX: 40.43 KG/M2 | SYSTOLIC BLOOD PRESSURE: 124 MMHG | WEIGHT: 315 LBS

## 2022-08-12 DIAGNOSIS — K57.20 DIVERTICULITIS OF LARGE INTESTINE WITH PERFORATION WITHOUT BLEEDING: Primary | ICD-10-CM

## 2022-08-12 DIAGNOSIS — I16.0 HYPERTENSIVE URGENCY: ICD-10-CM

## 2022-08-12 PROCEDURE — 99213 OFFICE O/P EST LOW 20 MIN: CPT | Performed by: SURGERY

## 2022-08-12 NOTE — PATIENT INSTRUCTIONS
1.  Clinically improved, does not need additional antibiotics, continue current course  2. Diet as tolerated, may resume regular diet  3. Will refer to gastroenterology for colonoscopy  4. We will refer to primary care for establishment  5.   Return to office in several months for a follow-up, do not anticipate will need elective sigmoidectomy as this is his first attack

## 2022-08-19 ENCOUNTER — OFFICE VISIT (OUTPATIENT)
Dept: PRIMARY CARE CLINIC | Age: 32
End: 2022-08-19
Payer: COMMERCIAL

## 2022-08-19 VITALS
HEART RATE: 68 BPM | SYSTOLIC BLOOD PRESSURE: 124 MMHG | HEIGHT: 74 IN | WEIGHT: 315 LBS | DIASTOLIC BLOOD PRESSURE: 78 MMHG | BODY MASS INDEX: 40.43 KG/M2 | OXYGEN SATURATION: 98 %

## 2022-08-19 DIAGNOSIS — I10 PRIMARY HYPERTENSION: Primary | ICD-10-CM

## 2022-08-19 DIAGNOSIS — Z13.29 THYROID DISORDER SCREENING: ICD-10-CM

## 2022-08-19 DIAGNOSIS — K57.90 DIVERTICULOSIS: ICD-10-CM

## 2022-08-19 DIAGNOSIS — Z13.220 LIPID SCREENING: ICD-10-CM

## 2022-08-19 DIAGNOSIS — Z13.1 DIABETES MELLITUS SCREENING: ICD-10-CM

## 2022-08-19 DIAGNOSIS — Z13.21 ENCOUNTER FOR VITAMIN DEFICIENCY SCREENING: ICD-10-CM

## 2022-08-19 DIAGNOSIS — E66.01 CLASS 3 SEVERE OBESITY DUE TO EXCESS CALORIES WITH SERIOUS COMORBIDITY AND BODY MASS INDEX (BMI) OF 40.0 TO 44.9 IN ADULT (HCC): ICD-10-CM

## 2022-08-19 LAB
CHOLESTEROL, FASTING: 173 MG/DL (ref 0–199)
HDLC SERPL-MCNC: 47 MG/DL (ref 40–60)
LDL CHOLESTEROL CALCULATED: 112 MG/DL
TRIGLYCERIDE, FASTING: 71 MG/DL (ref 0–150)
TSH REFLEX FT4: 2.72 UIU/ML (ref 0.27–4.2)
VITAMIN D 25-HYDROXY: 31 NG/ML
VLDLC SERPL CALC-MCNC: 14 MG/DL

## 2022-08-19 PROCEDURE — 99204 OFFICE O/P NEW MOD 45 MIN: CPT | Performed by: NURSE PRACTITIONER

## 2022-08-19 RX ORDER — WEIGH SCALE
MISCELLANEOUS MISCELLANEOUS
Qty: 1 EACH | Refills: 0 | Status: SHIPPED | OUTPATIENT
Start: 2022-08-19

## 2022-08-19 ASSESSMENT — PATIENT HEALTH QUESTIONNAIRE - PHQ9
2. FEELING DOWN, DEPRESSED OR HOPELESS: 0
1. LITTLE INTEREST OR PLEASURE IN DOING THINGS: 0
SUM OF ALL RESPONSES TO PHQ QUESTIONS 1-9: 0
SUM OF ALL RESPONSES TO PHQ9 QUESTIONS 1 & 2: 0

## 2022-08-19 ASSESSMENT — ENCOUNTER SYMPTOMS
BLURRED VISION: 0
SHORTNESS OF BREATH: 0
ABDOMINAL DISTENTION: 0
ABDOMINAL PAIN: 0
COUGH: 0
ORTHOPNEA: 0
VOMITING: 0
CHEST TIGHTNESS: 0
CONSTIPATION: 0
DIARRHEA: 0
NAUSEA: 0

## 2022-08-19 NOTE — PROGRESS NOTES
8/19/2022    Chief Complaint   Patient presents with    New Patient     To establish    Hypertension     Diagnosed on 8/2/2022- Hospitalized for hypertension crisis       Tru Xiao is a 33 y.o. male, presents today to establish care and for follow up of hypertension. 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 Hypertension  This is a new problem. The problem is controlled (running 120s/80s at [facility]). Pertinent negatives include no anxiety, blurred vision, chest pain, headaches (resolved after blood pressure controlled), malaise/fatigue, neck pain, orthopnea, palpitations, peripheral edema, PND, shortness of breath or sweats. Risk factors for coronary artery disease include obesity, male gender, sedentary lifestyle and family history (Plans to start exercising in the near future). Treatments tried: Lisinopril 20 mg, Carvedilol 25 mg, HCTZ 25 mg, Sprinolactone. The current treatment provides significant improvement. Compliance problems include exercise and diet. There is no history of angina, kidney disease, CAD/MI, CVA, heart failure, left ventricular hypertrophy, PVD or retinopathy. Patient was hospitalized for 6 days for the treatment of hypertension crisis and diverticulitis (without perforation, no surgery). He was discharged on 8/2/22. Blood pressure the morning of discharge was 161/97 and after taking antihypertensives, blood pressure in the afternoon was 125/80 prior to discharge. Patient did not take antihypertensive medications today. We discussed blood pressure is well controlled today without medications this morning- he may not require 4 antihypertensive medications with diet changes, weight loss, and exercise. To start checking BP daily and bring log to next appointment in 1 month. Patient agreed to plan with verbal agreement.      Patient is scheduled with nephrologist from [facility],  Paco Austin MD on 8/23/22. Patient's Mother had history of kidney disease, CHF, DM (with leg amputation)- Hospitalist made Nephrology referral.       Component Ref Range & Units 8/2/22 0533 8/1/22 8221 7/31/22 9598 7/30/22 2983 7/29/22 0547 7/28/22 0415 7/27/22 2235   Sodium 136 - 145 mmol/L 132 Low   135 Low   137  134 Low   137  135 Low   137    Potassium reflex Magnesium 3.5 - 5.1 mmol/L 4.1  3.6  4.0  3.8  3.7  3.7  4.4    Chloride 99 - 110 mmol/L 97 Low   99  101  100  104  101  99    CO2 21 - 32 mmol/L 23  22  23  21  23  23  27    Anion Gap 3 - 16 12  14  13  13  10  11  11    Glucose 70 - 99 mg/dL 93  98  95  95  136 High   118 High   116 High     BUN 7 - 20 mg/dL 20  18  13  12  8  9  9    Creatinine 0.9 - 1.3 mg/dL 1.0  1.0  0.9  0.8 Low   0.7 Low   0.8 Low   0.8 Low     GFR Non- >60 >60  >60 CM  >60 CM  >60 CM  >60 CM  >60 CM  >60 CM    Comment: >60 mL/min/1.73m2 EGFR, calc. for ages 25 and older using the   MDRD formula (not corrected for weight), is valid for stable   renal function. GFR  >60 >60  >60 CM  >60 CM  >60 CM  >60 CM  >60 CM  >60 CM    Comment: Chronic Kidney Disease: less than 60 ml/min/1.73 sq.m. Kidney Failure: less than 15 ml/min/1.73 sq.m. Results valid for patients 18 years and older. Calcium 8.3 - 10.6 mg/dL 9.8  9.6  9.7  9.6  8.3  8.8  9.5      Obesity  Patient reports he has completely change his diet, and has stopped drinking soda and stopped smoking since discharge. He plans to start exercising in the near future. 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 He continue to struggle with diet, stating quitting smoking during hospitalization was easier than losing weight. Patient is motivated to lose weight and  is open to referral to [facility]. History of Diverticulitis  Hospitalized for 6 days. No surgery required.  Has GI appointment on 9/13/22 to establish prior to colonoscopy. Follow up scheduled for Diverticulitis with Dr. Jennyfer Balderrama (general surgery) on 22. Review of Systems   Constitutional:  Negative for activity change, appetite change, diaphoresis, fatigue, malaise/fatigue and unexpected weight change. Eyes:  Negative for blurred vision and visual disturbance. Respiratory:  Negative for cough, chest tightness and shortness of breath. Cardiovascular:  Negative for chest pain, palpitations, orthopnea, leg swelling and PND. Gastrointestinal:  Negative for abdominal distention, abdominal pain, constipation, diarrhea, nausea and vomiting. Musculoskeletal:  Negative for gait problem and neck pain. Neurological:  Negative for dizziness, syncope, weakness, light-headedness and headaches (resolved after blood pressure controlled). Psychiatric/Behavioral: Negative. Current Outpatient Medications on File Prior to Visit   Medication Sig Dispense Refill    Multiple Vitamin (MULTI-VITAMIN PO) Take by mouth daily      lisinopril (PRINIVIL;ZESTRIL) 20 MG tablet Take 1 tablet by mouth in the morning. 30 tablet 3    carvedilol (COREG) 25 MG tablet Take 1 tablet by mouth in the morning and 1 tablet in the evening. Take with meals. 60 tablet 3    hydroCHLOROthiazide (HYDRODIURIL) 25 MG tablet Take 1 tablet by mouth in the morning. 30 tablet 3    spironolactone (ALDACTONE) 25 MG tablet Take 1 tablet by mouth in the morning and 1 tablet in the evening. 60 tablet 3     No current facility-administered medications on file prior to visit. No Known Allergies  Past Medical History:   Diagnosis Date    HTN (hypertension)      History reviewed. No pertinent surgical history.    Social History     Tobacco Use    Smoking status: Former     Packs/day: 1.00     Types: Cigarettes     Quit date: 2022     Years since quittin.0    Smokeless tobacco: Never   Substance Use Topics    Alcohol use: Never      Family History   Problem Relation Age of Onset    Diabetes Mother with body mass index (BMI) of 40.0 to 44.9 in adult [facility])  - Active  - Weight: 323 lbs, BMI: 41.47  - [facility]    3. Diverticulosis  - Stable  - Follow up scheduled 9/12/ with Dr. Darleen Shah (gen surgery)  - GI appointment on 9/13/22 with Colonoscopy to follow at later date    3. Lipid screening  - Lipid, Fasting; Future    5. Diabetes mellitus screening  - Hemoglobin A1C; Future    6. Encounter for vitamin deficiency screening  - Vitamin D 25 Hydroxy; Future    7. Thyroid disorder screening  - TSH with Reflex to FT4; Future    Return in about 4 weeks (around 9/16/2022) for 1 month follow up of hypertension, obesity (bring BP log to appt). Discussed use, benefit, and side effects of prescribed medications. Patient's questions answered and concerns addressed. Patient agrees to plan of care. My scheduled days in the office reviewed with patient, and same day appointments available. Encouraged to use [employer] for communication as needed. Electronically signed by JESSICA Tipton CNP on 8/19/2022 at 12:48 PM       This dictation was generated by voice recognition computer software. Although all attempts are made to edit the dictation for accuracy, there may be errors in the transcription that are not intended.

## 2022-08-20 LAB
ESTIMATED AVERAGE GLUCOSE: 116.9 MG/DL
HBA1C MFR BLD: 5.7 %

## 2022-09-12 ENCOUNTER — OFFICE VISIT (OUTPATIENT)
Dept: SURGERY | Age: 32
End: 2022-09-12
Payer: COMMERCIAL

## 2022-09-12 VITALS — DIASTOLIC BLOOD PRESSURE: 62 MMHG | WEIGHT: 315 LBS | BODY MASS INDEX: 41.47 KG/M2 | SYSTOLIC BLOOD PRESSURE: 118 MMHG

## 2022-09-12 DIAGNOSIS — K57.20 DIVERTICULITIS OF LARGE INTESTINE WITH PERFORATION WITHOUT BLEEDING: Primary | ICD-10-CM

## 2022-09-12 PROCEDURE — 99213 OFFICE O/P EST LOW 20 MIN: CPT | Performed by: SURGERY

## 2022-09-12 NOTE — PROGRESS NOTES
[facility]  SUBJECTIVE:    Chief Complaint: abdominal pain    Kamilah Braun   1990   33 y.o. male presents for followup after recent hospital admission for acute sigmoid diverticulitis with microperforation. Denies abdominal pain. Bowels normal.  Tolerated diet. No complaints. Not yet followed with gastroenterology for colonoscopy. This is his first episode of diverticulitis. No personal or family history of colorectal malignancy or inflammatory bowel disease. Significant medical conditions include hypertension. Never had abdominal surgery     Past Medical History:   Diagnosis Date    HTN (hypertension)      No past surgical history on file. Social History     Socioeconomic History    Marital status: Single     Spouse name: Not on file    Number of children: Not on file    Years of education: Not on file    Highest education level: Not on file   Occupational History    Not on file   Tobacco Use    Smoking status: Former     Packs/day: 1.00     Types: Cigarettes     Quit date: 2022     Years since quittin.1    Smokeless tobacco: Never   Substance and Sexual Activity    Alcohol use: Never    Drug use: Yes     Types: Marijuana Candiss Littler)    Sexual activity: Not on file   Other Topics Concern    Not on file   Social History Narrative    Not on file     Social Determinants of Health     Financial Resource Strain: Not on file   Food Insecurity: Not on file   Transportation Needs: Not on file   Physical Activity: Not on file   Stress: Not on file   Social Connections: Not on file   Intimate Partner Violence: Not on file   Housing Stability: Not on file      Family History   Problem Relation Age of Onset    Diabetes Mother     Kidney Disease Mother      Current Outpatient Medications   Medication Sig Dispense Refill    spironolactone (ALDACTONE) 25 MG tablet Take 1 tablet by mouth in the morning and 1 tablet in the evening.  60 tablet 3 hydroCHLOROthiazide (HYDRODIURIL) 25 MG tablet Take 1 tablet by mouth daily 30 tablet 3    carvedilol (COREG) 25 MG tablet Take 1 tablet by mouth 2 times daily (with meals) 60 tablet 3    lisinopril (PRINIVIL;ZESTRIL) 20 MG tablet Take 1 tablet by mouth daily 30 tablet 3    Multiple Vitamin (MULTI-VITAMIN PO) Take by mouth daily      Blood Pressure Monitoring (BLOOD PRESSURE MONITOR/L CUFF) MISC Check blood pressure daily for hypertension 1 each 0     No current facility-administered medications for this visit. No Known Allergies     Review of Systems:  Review of systems performed and negative with the exception of the above findings    OBJECTIVE:  /62   Wt (!) 323 lb (146.5 kg)   BMI 41.47 kg/m²      Physical Exam:  General appearance: alert, appears stated age, cooperative, and no distress  Head: Normocephalic, without obvious abnormality, atraumatic  Lungs: clear to auscultation bilaterally  Heart: regular rate and rhythm, S1, S2 normal, no murmur, click, rub or gallop  Abdomen: soft, non-distended, non-tender    Orders Only on 08/19/2022   Component Date Value Ref Range Status    TSH Reflex FT4 08/19/2022 2.72  0.27 - 4.20 uIU/mL Final    Vit D, 25-Hydroxy 08/19/2022 31.0  >=30 ng/mL Final    Comment: <=20 ng/mL. ........... Algis Broaden Deficient  21-29 ng/mL. ......... Algis Broaden Insufficient  >=30 ng/mL. ........ Algis Broaden Sufficient      Cholesterol, Fasting 08/19/2022 173  0 - 199 mg/dL Final    Triglyceride, Fasting 08/19/2022 71  0 - 150 mg/dL Final    HDL 08/19/2022 47  40 - 60 mg/dL Final    LDL Calculated 08/19/2022 112 (A) <100 mg/dL Final    VLDL Cholesterol Calculated 08/19/2022 14  Not Established mg/dL Final    Hemoglobin A1C 08/19/2022 5.7  See comment % Final    Comment: Comment:  Diagnosis of Diabetes: > or = 6.5%  Increased risk of diabetes (Prediabetes): 5.7-6.4%  Glycemic Control: Nonpregnant Adults: <7.0%                    Pregnant: <6.0%        eAG 08/19/2022 116.9  mg/dL Final   No results displayed because visit has over 200 results. No results found. ASSESSMENT:  Acute sigmoid diverticulitis with microperforation  Morbid obesity, BMI 42.2     PLAN:  1. No acute symptoms, reassuring  2. Diet as tolerated, return to high fiber diet  3. Continue followup with GI for colonoscopy  4. Continue followup with PCP for medical comorbidities  5. Return to office in several months for a follow-up, do not anticipate will need elective sigmoidectomy as this is his first attack    Darell Rodrigues MD, FACS  9/12/2022  10:41 AM

## 2022-09-12 NOTE — PATIENT INSTRUCTIONS
1. No acute symptoms, reassuring  2. Diet as tolerated, return to high fiber diet  3. Continue followup with GI for colonoscopy  4. Continue followup with PCP for medical comorbidities  5.  Return to office in several months for a follow-up, do not anticipate will need elective sigmoidectomy as this is his first attack

## 2022-09-19 ENCOUNTER — OFFICE VISIT (OUTPATIENT)
Dept: PRIMARY CARE CLINIC | Age: 32
End: 2022-09-19
Payer: COMMERCIAL

## 2022-09-19 VITALS
HEIGHT: 74 IN | OXYGEN SATURATION: 97 % | BODY MASS INDEX: 40.43 KG/M2 | HEART RATE: 82 BPM | SYSTOLIC BLOOD PRESSURE: 124 MMHG | TEMPERATURE: 97.6 F | WEIGHT: 315 LBS | DIASTOLIC BLOOD PRESSURE: 80 MMHG

## 2022-09-19 DIAGNOSIS — R73.03 PREDIABETES: Primary | ICD-10-CM

## 2022-09-19 DIAGNOSIS — I10 PRIMARY HYPERTENSION: ICD-10-CM

## 2022-09-19 DIAGNOSIS — E66.01 CLASS 3 SEVERE OBESITY DUE TO EXCESS CALORIES WITH SERIOUS COMORBIDITY AND BODY MASS INDEX (BMI) OF 40.0 TO 44.9 IN ADULT (HCC): ICD-10-CM

## 2022-09-19 PROCEDURE — 99214 OFFICE O/P EST MOD 30 MIN: CPT | Performed by: NURSE PRACTITIONER

## 2022-09-19 ASSESSMENT — ENCOUNTER SYMPTOMS
COUGH: 0
CONSTIPATION: 0
ABDOMINAL PAIN: 0
SHORTNESS OF BREATH: 0
ORTHOPNEA: 0
CHEST TIGHTNESS: 0
DIARRHEA: 0
ABDOMINAL DISTENTION: 0
BLURRED VISION: 0
NAUSEA: 0
VOMITING: 0

## 2022-09-19 NOTE — PROGRESS NOTES
9/19/2022    Chief Complaint   Patient presents with    Follow-up     1 month for hypertension, obesity. Oriana Beauchamp is a 33 y.o. 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 He started exercising 150 minutes of cardio per week since his last visit 4 weeks ago. He has significantly decreased carbs (bread, pasta, rice, potatoes) and sweets/deserts from his diet. Hypertension  This is a new problem. The problem is controlled (running 120s/80s at [address]). Pertinent negatives include no anxiety, blurred vision, chest pain, headaches (resolved after blood pressure controlled), malaise/fatigue, neck pain, orthopnea, palpitations, peripheral edema, PND, shortness of breath or sweats. Risk factors for coronary artery disease include obesity, male gender, sedentary lifestyle and family history (Plans to start exercising in the near future). Treatments tried: Lisinopril 20 mg, Carvedilol 25 mg, HCTZ 25 mg, Sprinolactone. The current treatment provides significant improvement. There are no compliance problems. There is no history of angina, kidney disease, CAD/MI, CVA, heart failure, left ventricular hypertrophy, PVD or retinopathy. Patient was hospitalized for 6 days for the treatment of hypertension crisis and diverticulitis (without perforation, no surgery). He was discharged on 8/2/22. Blood pressure the morning of discharge was 161/97 and after taking antihypertensives, blood pressure has been well controlled since discharge. Patient established care with [facility], Dr. Aria Collins MD on 8/23/22.  Patient's Mother had history of kidney disease, CHF, DM (with leg amputation)- Hospitalist made Nephrology referral.       Component Ref Range & Units 8/2/22 0533 8/1/22 0515 7/31/22 5788 7/30/22 0523 7/27/22 2235   Sodium 136 - 145 mmol/L 132 Low   135 Low   137  134 Low   137    Potassium reflex Magnesium 3.5 - 5.1 mmol/L 4.1  3.6  4.0  3.8  4.4    Chloride 99 - 110 mmol/L 97 Low   99  101  100  99    CO2 21 - 32 mmol/L 23  22  23  21  27    Anion Gap 3 - 16 12  14  13  13  11    Glucose 70 - 99 mg/dL 93  98  95  95  116 High     BUN 7 - 20 mg/dL 20  18  13  12  9    Creatinine 0.9 - 1.3 mg/dL 1.0  1.0  0.9  0.8 Low   0.8 Low     GFR Non- >60 >60  >60 CM  >60 CM  >60 CM  >60 CM    Comment: >60 mL/min/1.73m2 EGFR, calc. for ages 25 and older using the   MDRD formula (not corrected for weight), is valid for stable   renal function. GFR  >60 >60  >60 CM  >60 CM  >60 CM  >60 CM    Comment: Chronic Kidney Disease: less than 60 ml/min/1.73 sq.m. Kidney Failure: less than 15 ml/min/1.73 sq.m. Results valid for patients 18 years and older. Calcium 8.3 - 10.6 mg/dL 9.8  9.6  9.7  9.6  9.5        Impaired glucose regulation  I had a lengthy discussion with the patient about prediabetes. We discussed progression of prediabetes in detail and increased risk for diabetes if blood sugar continues to be high Patient was advised that lifestyle modification, including weight loss, cardiovascular exercise 150 minutes week. We discussed carbohydrate restriction in detail and to avoid potatoes, rice, pasta, bread. Patient denied having any additional questions or concerns regarding new diagnosis at this time. Component Ref Range & Units 8/19/22 0947    Hemoglobin A1C See comment % 5.7    Comment: Comment:   Diagnosis of Diabetes: > or = 6.5%   Increased risk of diabetes (Prediabetes): 5.7-6.4%      Obesity  Patient reports he has completely change his diet, and has stopped drinking soda and stopped smoking since discharge. He plans to start exercising in the near future. Patient is active at work as a manager at [employer] (near the office).  He continue to struggle with diet, stating quitting smoking during hospitalization was easier than losing weight. Patient is motivated to lose weight. He has an appointment scheduled with [facility] in Oct 2022. He is interested calorie restricted diet for weight loss- Not interested in surgery at this time. History of Diverticulitis  Hospitalized for 6 days. No surgery required. Diverticulitis is managed by GI. Colonoscopy scheduled in Nov 2022. Review of Systems   Constitutional:  Negative for activity change, appetite change, diaphoresis, fatigue, malaise/fatigue and unexpected weight change. Eyes:  Negative for blurred vision and visual disturbance. Respiratory:  Negative for cough, chest tightness and shortness of breath. Cardiovascular:  Negative for chest pain, palpitations, orthopnea, leg swelling and PND. Gastrointestinal:  Negative for abdominal distention, abdominal pain, constipation, diarrhea, nausea and vomiting. Musculoskeletal:  Negative for gait problem and neck pain. Neurological:  Negative for dizziness, syncope, weakness, light-headedness and headaches (resolved after blood pressure controlled). Psychiatric/Behavioral: Negative. Current Outpatient Medications on File Prior to Visit   Medication Sig Dispense Refill    spironolactone (ALDACTONE) 25 MG tablet Take 1 tablet by mouth in the morning and 1 tablet in the evening. 60 tablet 3    hydroCHLOROthiazide (HYDRODIURIL) 25 MG tablet Take 1 tablet by mouth daily 30 tablet 3    carvedilol (COREG) 25 MG tablet Take 1 tablet by mouth 2 times daily (with meals) 60 tablet 3    lisinopril (PRINIVIL;ZESTRIL) 20 MG tablet Take 1 tablet by mouth daily 30 tablet 3    Multiple Vitamin (MULTI-VITAMIN PO) Take by mouth daily      Blood Pressure Monitoring (BLOOD PRESSURE MONITOR/L CUFF) MISC Check blood pressure daily for hypertension 1 each 0     No current facility-administered medications on file prior to visit.          No Known Allergies  Past Medical History: Diagnosis Date    HTN (hypertension)      History reviewed. No pertinent surgical history. Social History     Tobacco Use    Smoking status: Former     Packs/day: 1.00     Types: Cigarettes     Quit date: 2022     Years since quittin.1    Smokeless tobacco: Never   Substance Use Topics    Alcohol use: Never      Family History   Problem Relation Age of Onset    Diabetes Mother     Kidney Disease Mother         Vitals:    22 1330   BP: 124/80   Site: Left Upper Arm   Position: Sitting   Cuff Size: Large Adult   Pulse: 82   Temp: 97.6 °F (36.4 °C)   SpO2: 97%   Weight: (!) 321 lb 3.2 oz (145.7 kg)   Height: 6' 2\" (1.88 m)       Estimated body mass index is 41.24 kg/m² as calculated from the following:    Height as of this encounter: 6' 2\" (1.88 m). Weight as of this encounter: 321 lb 3.2 oz (145.7 kg). Physical Exam  Vitals and nursing note reviewed. Constitutional:       General: He is not in acute distress. Appearance: Normal appearance. He is obese. Neck:      Vascular: No carotid bruit. Cardiovascular:      Rate and Rhythm: Normal rate and regular rhythm. Pulses: Normal pulses. Heart sounds: Normal heart sounds, S1 normal and S2 normal.   Pulmonary:      Effort: Pulmonary effort is normal.      Breath sounds: Normal breath sounds. Musculoskeletal:         General: Normal range of motion. Cervical back: Normal range of motion and neck supple. Right lower leg: No edema. Left lower leg: No edema. Lymphadenopathy:      Cervical: No cervical adenopathy. Skin:     General: Skin is warm. Neurological:      General: No focal deficit present. Mental Status: He is alert and oriented to person, place, and time. Psychiatric:         Mood and Affect: Mood normal.         Behavior: Behavior normal.         Thought Content: Thought content normal.        ASSESSMENT/PLAN:  1.  Primary hypertension  - Controlled  - Continue Spironolactone 25 mg twice daily  - Continue HCTZ 25 mg daily  - Continue Lisinopril 20 mg daily  - Continue Carvedilol 25 mg twice daily. - Continue low-sodium diet    2. Prediabetes  - New diagnosis  - Discussed at length    3. Class 3 severe obesity due to excess calories with serious comorbidity and body mass index (BMI) of 40.0 to 44.9 in adult [facility])  - Active  - Appt scheduled 10/14/22 with [facility]. - Continue to work towards weight loss. - Continue regular exercise; 150 minutes + of cardio/week    Return in about 3 months (around 12/19/2022) for 3-month follow up of hypertension, obesity, prediabetes. Discussed use, benefit, and side effects of prescribed medications. Patient's questions answered and concerns addressed. Patient agrees to plan of care. My scheduled days in the office reviewed with patient, and same day appointments available. Encouraged to use [facility] for communication as needed. Electronically signed by JESSICA Begum CNP on 9/19/2022 at 2:34 PM       This dictation was generated by voice recognition computer software. Although all attempts are made to edit the dictation for accuracy, there may be errors in the transcription that are not intended.

## 2022-09-22 ENCOUNTER — TELEPHONE (OUTPATIENT)
Dept: SURGERY | Age: 32
End: 2022-09-22

## 2022-09-22 DIAGNOSIS — K57.20 DIVERTICULITIS OF LARGE INTESTINE WITH PERFORATION WITHOUT BLEEDING: Primary | ICD-10-CM

## 2022-09-22 RX ORDER — CIPROFLOXACIN 500 MG/1
500 TABLET, FILM COATED ORAL 2 TIMES DAILY
Qty: 20 TABLET | Refills: 0 | Status: SHIPPED | OUTPATIENT
Start: 2022-09-22 | End: 2022-10-02

## 2022-09-22 RX ORDER — METRONIDAZOLE 500 MG/1
500 TABLET ORAL 3 TIMES DAILY
Qty: 30 TABLET | Refills: 0 | Status: SHIPPED | OUTPATIENT
Start: 2022-09-22 | End: 2022-10-02

## 2022-09-22 NOTE — TELEPHONE ENCOUNTER
Pt talked to Dr Sean Hyatt last night with a Diverticulitis flare up. Dr Sean Hyatt instructed him to make an appointment for tomorrow (which I did schedule him for Friday) but Dr Sean Hyatt said he might call him in some antibiotics today.   Please Advise  Concepcion Mccormick   [phone]

## 2022-09-23 ENCOUNTER — OFFICE VISIT (OUTPATIENT)
Dept: SURGERY | Age: 32
End: 2022-09-23
Payer: COMMERCIAL

## 2022-09-23 VITALS — SYSTOLIC BLOOD PRESSURE: 100 MMHG | WEIGHT: 315 LBS | BODY MASS INDEX: 40.44 KG/M2 | DIASTOLIC BLOOD PRESSURE: 70 MMHG

## 2022-09-23 DIAGNOSIS — K57.20 DIVERTICULITIS OF LARGE INTESTINE WITH PERFORATION WITHOUT BLEEDING: Primary | ICD-10-CM

## 2022-09-23 PROCEDURE — 99213 OFFICE O/P EST LOW 20 MIN: CPT | Performed by: SURGERY

## 2022-09-23 NOTE — PROGRESS NOTES
[facility]  SUBJECTIVE:    Chief Complaint: abdominal pain    Ann Zaragoza   1990   33 y.o. male presents after acute onset sharp left lower quadrant pain for several days. Known to me previously after recent hospital admission for acute sigmoid diverticulitis with microperforation. Responded to medical management and was well for several weeks until recurrent but lesser sharp left lower quadrant pain. Started on oral antibiotics and feels better. Subjective fever and chills but this sensation also improved. Notices blood in stool earlier today. Pain much better. Denies fevers chills nausea vomiting or other symptoms. This entire episode has been the patient's first attack. No personal or family history of colorectal malignancy or inflammatory bowel disease. Significant medical conditions include hypertension. Never had abdominal surgery      Past Medical History:   Diagnosis Date    HTN (hypertension)      No past surgical history on file.   Social History     Socioeconomic History    Marital status: Single     Spouse name: Not on file    Number of children: Not on file    Years of education: Not on file    Highest education level: Not on file   Occupational History    Not on file   Tobacco Use    Smoking status: Former     Packs/day: 1.00     Types: Cigarettes     Quit date: 2022     Years since quittin.1    Smokeless tobacco: Never   Substance and Sexual Activity    Alcohol use: Never    Drug use: Yes     Types: Marijuana [place])    Sexual activity: Not on file   Other Topics Concern    Not on file   Social History Narrative    Not on file     Social Determinants of Health     Financial Resource Strain: Not on file   Food Insecurity: Not on file   Transportation Needs: Not on file   Physical Activity: Not on file   Stress: Not on file   Social Connections: Not on file   Intimate Partner Violence: Not on file   Housing Stability: Not on file Family History   Problem Relation Age of Onset    Diabetes Mother     Kidney Disease Mother      Current Outpatient Medications   Medication Sig Dispense Refill    ciprofloxacin (CIPRO) 500 MG tablet Take 1 tablet by mouth 2 times daily for 10 days 20 tablet 0    metroNIDAZOLE (FLAGYL) 500 MG tablet Take 1 tablet by mouth 3 times daily for 10 days 30 tablet 0    spironolactone (ALDACTONE) 25 MG tablet Take 1 tablet by mouth in the morning and 1 tablet in the evening. 60 tablet 3    hydroCHLOROthiazide (HYDRODIURIL) 25 MG tablet Take 1 tablet by mouth daily 30 tablet 3    carvedilol (COREG) 25 MG tablet Take 1 tablet by mouth 2 times daily (with meals) 60 tablet 3    lisinopril (PRINIVIL;ZESTRIL) 20 MG tablet Take 1 tablet by mouth daily 30 tablet 3    Multiple Vitamin (MULTI-VITAMIN PO) Take by mouth daily      Blood Pressure Monitoring (BLOOD PRESSURE MONITOR/L CUFF) MISC Check blood pressure daily for hypertension 1 each 0     No current facility-administered medications for this visit. No Known Allergies     Review of Systems:  Review of systems performed and negative with the exception of the above findings    OBJECTIVE:  /70   Wt (!) 315 lb (142.9 kg)   BMI 40.44 kg/m²      Physical Exam:  General appearance: alert, appears stated age, cooperative, and no distress  Head: Normocephalic, without obvious abnormality, atraumatic  Lungs: clear to auscultation bilaterally  Heart: regular rate and rhythm, S1, S2 normal, no murmur, click, rub or gallop  Abdomen: soft, non-distended, minimal left lower quadrant pain to deep palpation only    Orders Only on 08/19/2022   Component Date Value Ref Range Status    TSH Reflex FT4 08/19/2022 2.72  0.27 - 4.20 uIU/mL Final    Vit D, 25-Hydroxy 08/19/2022 31.0  >=30 ng/mL Final    Comment: <=20 ng/mL. ........... [place] Deficient  21-29 ng/mL. ......... [place] Insufficient  >=30 ng/mL. ........ [place] Sufficient      Cholesterol, Fasting 08/19/2022 173  0 - 199 mg/dL Final Triglyceride, Fasting 08/19/2022 71  0 - 150 mg/dL Final    HDL 08/19/2022 47  40 - 60 mg/dL Final    LDL Calculated 08/19/2022 112 (A) <100 mg/dL Final    VLDL Cholesterol Calculated 08/19/2022 14  Not Established mg/dL Final    Hemoglobin A1C 08/19/2022 5.7  See comment % Final    Comment: Comment:  Diagnosis of Diabetes: > or = 6.5%  Increased risk of diabetes (Prediabetes): 5.7-6.4%  Glycemic Control: Nonpregnant Adults: <7.0%                    Pregnant: <6.0%        eAG 08/19/2022 116.9  mg/dL Final       No results found. ASSESSMENT:  Acute sigmoid diverticulitis with microperforation  Morbid obesity, BMI 42.2    PLAN:  1. Clinically improving, does not need intervention or imaging  2. Continue current antibiotics  3. Follow-up with GI for colonoscopy as scheduled in November  4. Follow with primary care regarding other medical comorbidities  5. Return to office after colonoscopy or sooner if symptoms do not improve    Darell Viveros MD, FACS  9/23/2022  2:30 PM

## 2022-09-23 NOTE — PATIENT INSTRUCTIONS
1.  Clinically improving, does not need intervention or imaging  2. Continue current antibiotics  3. Follow-up with GI for colonoscopy as scheduled in November  4. Follow with primary care regarding other medical comorbidities  5.   Return to office after colonoscopy or sooner if symptoms do not improve

## 2022-11-16 ENCOUNTER — TELEPHONE (OUTPATIENT)
Dept: PRIMARY CARE CLINIC | Age: 32
End: 2022-11-16

## 2022-11-16 DIAGNOSIS — R73.03 PREDIABETES: Primary | ICD-10-CM

## 2022-11-16 DIAGNOSIS — I10 PRIMARY HYPERTENSION: ICD-10-CM

## 2022-11-16 NOTE — PROGRESS NOTES
1. Prediabetes  - Hemoglobin A1C; Future    2. Primary hypertension  - Comprehensive Metabolic Panel, Fasting; Future     - Needs to complete labs ordered by nephrologist as well.

## 2022-11-16 NOTE — TELEPHONE ENCOUNTER
----- Message from JESSICA Anna - CNP sent at 11/16/2022  8:49 AM EST -----  Regarding: Labs needed  Please call patient to notify I ordered labs that to be done in the next couple days. Also remind to have labs done that were ordered by nephrologist.     - He does not need to be fasting. Thank you.

## 2022-11-16 NOTE — TELEPHONE ENCOUNTER
Left a voice msg informing pt that Ney Reed his provider ordered labs and they are to be done in the next couple days also to have labs done that were ordered by nephrologist and that he does not need to be fasting.

## 2022-11-16 NOTE — TELEPHONE ENCOUNTER
Patient returning call. I relayed Pooja's message to him about the ordered labs and the labs that were ordered by the nephrologist. I also explained that he needs to be fasting and can come to our office and have the blood work done here at our lab.

## 2022-11-30 DIAGNOSIS — I10 PRIMARY HYPERTENSION: Primary | ICD-10-CM

## 2022-11-30 RX ORDER — SPIRONOLACTONE 25 MG/1
25 TABLET ORAL 2 TIMES DAILY
Qty: 180 TABLET | Refills: 0 | Status: SHIPPED | OUTPATIENT
Start: 2022-11-30 | End: 2023-02-28

## 2022-11-30 RX ORDER — LISINOPRIL 20 MG/1
20 TABLET ORAL DAILY
Qty: 90 TABLET | Refills: 0 | Status: SHIPPED | OUTPATIENT
Start: 2022-11-30 | End: 2023-02-28

## 2022-11-30 RX ORDER — HYDROCHLOROTHIAZIDE 25 MG/1
25 TABLET ORAL DAILY
Qty: 90 TABLET | Refills: 0 | Status: SHIPPED | OUTPATIENT
Start: 2022-11-30 | End: 2023-02-28

## 2022-11-30 RX ORDER — CARVEDILOL 25 MG/1
25 TABLET ORAL 2 TIMES DAILY WITH MEALS
Qty: 180 TABLET | Refills: 0 | Status: SHIPPED | OUTPATIENT
Start: 2022-11-30 | End: 2023-02-28

## 2022-11-30 NOTE — TELEPHONE ENCOUNTER
Patient needs refills on his lisinopril (PRINIVIL;ZESTRIL) 20 MG tablet, carvedilol (COREG) 25 MG tablet, hydroCHLOROthiazide (HYDRODIURIL) 25 MG tablet, and spironolactone (ALDACTONE) 25 MG tablet. He said his kidney doctor was the last one to fill these for him.

## 2022-11-30 NOTE — TELEPHONE ENCOUNTER
1. Primary hypertension  - spironolactone (ALDACTONE) 25 MG tablet; Take 1 tablet by mouth in the morning and 1 tablet in the evening. Dispense: 180 tablet; Refill: 0  - carvedilol (COREG) 25 MG tablet; Take 1 tablet by mouth 2 times daily (with meals)  Dispense: 180 tablet; Refill: 0  - hydroCHLOROthiazide (HYDRODIURIL) 25 MG tablet; Take 1 tablet by mouth daily  Dispense: 90 tablet; Refill: 0  - lisinopril (PRINIVIL;ZESTRIL) 20 MG tablet; Take 1 tablet by mouth daily  Dispense: 90 tablet;  Refill: 0

## 2022-11-30 NOTE — TELEPHONE ENCOUNTER
Requested Prescriptions     Pending Prescriptions Disp Refills    spironolactone (ALDACTONE) 25 MG tablet 60 tablet 3     Sig: Take 1 tablet by mouth in the morning and 1 tablet in the evening. carvedilol (COREG) 25 MG tablet 60 tablet 3     Sig: Take 1 tablet by mouth 2 times daily (with meals)    hydroCHLOROthiazide (HYDRODIURIL) 25 MG tablet 30 tablet 3     Sig: Take 1 tablet by mouth daily    lisinopril (PRINIVIL;ZESTRIL) 20 MG tablet 30 tablet 3     Sig: Take 1 tablet by mouth daily       Last OV - 9/19/22. Next OV - 8/19/22. Last refill - 8/23/22. Last labs - 8 /19/22.

## 2022-12-06 DIAGNOSIS — R73.03 PREDIABETES: ICD-10-CM

## 2022-12-06 DIAGNOSIS — I10 PRIMARY HYPERTENSION: ICD-10-CM

## 2022-12-07 LAB
A/G RATIO: 1.4 (ref 1.1–2.2)
ALBUMIN SERPL-MCNC: 4.3 G/DL (ref 3.4–5)
ALP BLD-CCNC: 55 U/L (ref 40–129)
ALT SERPL-CCNC: 23 U/L (ref 10–40)
ANION GAP SERPL CALCULATED.3IONS-SCNC: 14 MMOL/L (ref 3–16)
AST SERPL-CCNC: 16 U/L (ref 15–37)
BILIRUB SERPL-MCNC: <0.2 MG/DL (ref 0–1)
BUN BLDV-MCNC: 26 MG/DL (ref 7–20)
CALCIUM SERPL-MCNC: 10 MG/DL (ref 8.3–10.6)
CHLORIDE BLD-SCNC: 98 MMOL/L (ref 99–110)
CO2: 24 MMOL/L (ref 21–32)
CREAT SERPL-MCNC: 1.1 MG/DL (ref 0.9–1.3)
ESTIMATED AVERAGE GLUCOSE: 105.4 MG/DL
GFR SERPL CREATININE-BSD FRML MDRD: >60 ML/MIN/{1.73_M2}
GLUCOSE FASTING: 105 MG/DL (ref 70–99)
HBA1C MFR BLD: 5.3 %
POTASSIUM SERPL-SCNC: 5.1 MMOL/L (ref 3.5–5.1)
SODIUM BLD-SCNC: 136 MMOL/L (ref 136–145)
TOTAL PROTEIN: 7.4 G/DL (ref 6.4–8.2)

## 2022-12-19 ENCOUNTER — OFFICE VISIT (OUTPATIENT)
Dept: PRIMARY CARE CLINIC | Age: 32
End: 2022-12-19
Payer: COMMERCIAL

## 2022-12-19 VITALS
HEART RATE: 81 BPM | WEIGHT: 315 LBS | TEMPERATURE: 96.9 F | OXYGEN SATURATION: 96 % | SYSTOLIC BLOOD PRESSURE: 118 MMHG | BODY MASS INDEX: 40.43 KG/M2 | HEIGHT: 74 IN | DIASTOLIC BLOOD PRESSURE: 64 MMHG

## 2022-12-19 DIAGNOSIS — E66.01 CLASS 3 SEVERE OBESITY DUE TO EXCESS CALORIES WITH SERIOUS COMORBIDITY AND BODY MASS INDEX (BMI) OF 40.0 TO 44.9 IN ADULT (HCC): ICD-10-CM

## 2022-12-19 DIAGNOSIS — Z23 FLU VACCINE NEED: ICD-10-CM

## 2022-12-19 DIAGNOSIS — I10 PRIMARY HYPERTENSION: Primary | ICD-10-CM

## 2022-12-19 DIAGNOSIS — R73.09 IMPAIRED GLUCOSE REGULATION: ICD-10-CM

## 2022-12-19 PROCEDURE — 90674 CCIIV4 VAC NO PRSV 0.5 ML IM: CPT | Performed by: NURSE PRACTITIONER

## 2022-12-19 PROCEDURE — 99214 OFFICE O/P EST MOD 30 MIN: CPT | Performed by: NURSE PRACTITIONER

## 2022-12-19 PROCEDURE — 3078F DIAST BP <80 MM HG: CPT | Performed by: NURSE PRACTITIONER

## 2022-12-19 PROCEDURE — 3074F SYST BP LT 130 MM HG: CPT | Performed by: NURSE PRACTITIONER

## 2022-12-19 PROCEDURE — 90471 IMMUNIZATION ADMIN: CPT | Performed by: NURSE PRACTITIONER

## 2022-12-19 RX ORDER — CARVEDILOL 25 MG/1
25 TABLET ORAL 2 TIMES DAILY WITH MEALS
Qty: 180 TABLET | Refills: 0 | Status: SHIPPED | OUTPATIENT
Start: 2022-12-19 | End: 2023-03-19

## 2022-12-19 RX ORDER — LISINOPRIL 20 MG/1
20 TABLET ORAL DAILY
Qty: 90 TABLET | Refills: 0 | Status: SHIPPED | OUTPATIENT
Start: 2022-12-19 | End: 2023-03-19

## 2022-12-19 RX ORDER — HYDROCHLOROTHIAZIDE 25 MG/1
25 TABLET ORAL DAILY
Qty: 90 TABLET | Refills: 0 | Status: SHIPPED | OUTPATIENT
Start: 2022-12-19 | End: 2023-03-19

## 2022-12-19 RX ORDER — SPIRONOLACTONE 25 MG/1
25 TABLET ORAL 2 TIMES DAILY
Qty: 180 TABLET | Refills: 0 | Status: SHIPPED | OUTPATIENT
Start: 2022-12-19 | End: 2023-03-19

## 2022-12-19 ASSESSMENT — PATIENT HEALTH QUESTIONNAIRE - PHQ9
SUM OF ALL RESPONSES TO PHQ QUESTIONS 1-9: 0
SUM OF ALL RESPONSES TO PHQ9 QUESTIONS 1 & 2: 0
SUM OF ALL RESPONSES TO PHQ QUESTIONS 1-9: 0
2. FEELING DOWN, DEPRESSED OR HOPELESS: 0
1. LITTLE INTEREST OR PLEASURE IN DOING THINGS: 0

## 2022-12-19 ASSESSMENT — ENCOUNTER SYMPTOMS
COUGH: 0
CHEST TIGHTNESS: 0
CONSTIPATION: 0
SHORTNESS OF BREATH: 0
NAUSEA: 0
ORTHOPNEA: 0
DIARRHEA: 0
BLURRED VISION: 0
VOMITING: 0
ABDOMINAL PAIN: 0
ABDOMINAL DISTENTION: 0
APNEA: 0
WHEEZING: 0

## 2022-12-19 NOTE — PROGRESS NOTES
12/19/2022    Chief Complaint   Patient presents with    Follow-up     3 months for hypertension, obesity, prediabetes. Gene Alvarado is a 27 y.o. 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 He started exercising 150 minutes of cardio per week. He has significantly decreased carbs (bread, pasta, rice, potatoes) and sweets/deserts from his diet. Hypertension  This is a new problem. The problem is controlled (running low 120s/80s). Pertinent negatives include no anxiety, blurred vision, chest pain, headaches, malaise/fatigue, orthopnea, palpitations, peripheral edema, PND, shortness of breath or sweats. Risk factors for coronary artery disease include obesity, male gender, sedentary lifestyle and family history (Plans to start exercising in the near future). Treatments tried: Lisinopril 20 mg, Carvedilol 25 mg, HCTZ 25 mg, Sprinolactone. The current treatment provides significant improvement. Compliance problems include diet (Taking all medication as prescribed). There is no history of angina, kidney disease, CAD/MI, CVA, heart failure, left ventricular hypertrophy, PVD or retinopathy. Patient was hospitalized for 6 days for the treatment of hypertension crisis and diverticulitis (without perforation, no surgery). He was discharged on 8/2/22. Blood pressure the morning of discharge was 161/97 and after taking antihypertensives, blood pressure has been well controlled since discharge. Patient established care with [facility], Dr. Gayle Thornton MD on 8/23/22.  Patient's Mother had history of kidney disease, CHF, DM (with leg amputation)- Hospitalist made Nephrology referral. Patient would like to stop seeing Nephrologist due to out of pocket cost-- I instructed patient to do 1 more follow up and will we discuss in 3 months; patient agreed to plan with verbal agreement. The available lab results below were reviewed, analyzed with independent interpretation and explained to patient at length. Component Ref Range & Units 12/6/22 0941 8/2/22 0533 8/1/22 0515 7/31/22 0553 7/30/22 0523 7/29/22 0547 7/28/22 0415   Sodium 136 - 145 mmol/L 136  132 Low   135 Low   137  134 Low   137  135 Low     Potassium 3.5 - 5.1 mmol/L 5.1  4.1  3.6  4.0  3.8  3.7  3.7    Chloride 99 - 110 mmol/L 98 Low   97 Low   99  101  100  104  101    CO2 21 - 32 mmol/L 24  23  22  23  21  23  23    Anion Gap 3 - 16 14  12  14  13  13  10  11    Glucose, Fasting 70 - 99 mg/dL 105 High           BUN 7 - 20 mg/dL 26 High   20  18  13  12  8  9    Creatinine 0.9 - 1.3 mg/dL 1.1  1.0  1.0  0.9  0.8 Low   0.7 Low   0.8 Low     Est, Glom Filt Rate >60 >60          Comment: Pediatric calculator link   [email or web address].at. org/professionals/kdoqi/gfr_calculatorped   Effective Oct 3, 2022   These results are not intended for use in patients   <25years of age. eGFR results are calculated without   a race factor using the 2021 CKD-EPI equation. Careful   clinical correlation is recommended, particularly when   comparing to results calculated using previous equations. The CKD-EPI equation is less accurate in patients with   extremes of muscle mass, extra-renal metabolism of   creatinine, excessive creatinine ingestion, or following   therapy that affects renal tubular secretion. Calcium 8.3 - 10.6 mg/dL 10.0  9.8  9.6  9.7  9.6  8.3  8.8    Total Protein 6.4 - 8.2 g/dL 7.4       7.1    Albumin 3.4 - 5.0 g/dL 4.3       3.9    Albumin/Globulin Ratio 1.1 - 2.2 1.4       1.2    Total Bilirubin 0.0 - 1.0 mg/dL <0.2       1.2 High     Alkaline Phosphatase 40 - 129 U/L 55       68    ALT 10 - 40 U/L 23       14    AST 15 - 37 U/L 16       12 Low          History of Impaired glucose regulation  He limits potatoes, rice, pasta, bread and watches carb intake.  Patient is exercising 150 minutes week. The available lab results below were reviewed, analyzed with independent interpretation and explained to patient at length. Component Ref Range & Units 12/6/22 0941 8/19/22 0947   Hemoglobin A1C See comment % 5.3  5.7 CM        Obesity  He has gained 20 lbs since last visit 3 months ago. Patient reports he has completely change his diet, and has stopped drinking soda and stopped smoking since hospital discharge. Patient is active at work as a manager at [employer] (near the office). He continue to struggle with diet, stating quitting smoking during hospitalization was easier than losing weight. Patient is motivated to lose weight. He was unable to schedule with [facility] in Oct 2022-- requests the number to schedule. He is interested calorie restricted diet for weight loss- Not interested in surgery at this time. History of Diverticulitis  Hospitalized for 6 days. No surgery required. Diverticulitis is managed by GI. Colonoscopy done in Nov 2022- Negative polyp x 1 removed. He is to repeat colonoscopy in 5 years. Review of Systems   Constitutional:  Positive for unexpected weight change (20 lb gain in 3 months). Negative for activity change, appetite change, diaphoresis, fatigue and malaise/fatigue. Eyes:  Negative for blurred vision and visual disturbance. Respiratory:  Negative for apnea, cough, chest tightness, shortness of breath and wheezing. Cardiovascular:  Negative for chest pain, palpitations, orthopnea, leg swelling and PND. Gastrointestinal:  Negative for abdominal distention, abdominal pain, constipation, diarrhea, nausea and vomiting. Musculoskeletal:  Negative for arthralgias, gait problem and myalgias. Neurological:  Negative for dizziness, syncope, weakness, light-headedness and headaches. Psychiatric/Behavioral: Negative.        Current Outpatient Medications on File Prior to Visit   Medication Sig Dispense Refill    spironolactone (ALDACTONE) 25 MG tablet Take 1 tablet by mouth in the morning and 1 tablet in the evening. 60 tablet 3    hydroCHLOROthiazide (HYDRODIURIL) 25 MG tablet Take 1 tablet by mouth daily 30 tablet 3    carvedilol (COREG) 25 MG tablet Take 1 tablet by mouth 2 times daily (with meals) 60 tablet 3    lisinopril (PRINIVIL;ZESTRIL) 20 MG tablet Take 1 tablet by mouth daily 30 tablet 3    Multiple Vitamin (MULTI-VITAMIN PO) Take by mouth daily      Blood Pressure Monitoring (BLOOD PRESSURE MONITOR/L CUFF) MISC Check blood pressure daily for hypertension 1 each 0     No current facility-administered medications on file prior to visit. No Known Allergies  Past Medical History:   Diagnosis Date    HTN (hypertension)      History reviewed. No pertinent surgical history. Social History     Tobacco Use    Smoking status: Former     Packs/day: 1.00     Types: Cigarettes     Quit date: 2022     Years since quittin.3    Smokeless tobacco: Never   Substance Use Topics    Alcohol use: Never      Family History   Problem Relation Age of Onset    Diabetes Mother     Kidney Disease Mother         Vitals:    22 1443   BP: 118/64   Site: Left Upper Arm   Position: Sitting   Cuff Size: Large Adult   Pulse: 81   Temp: 96.9 °F (36.1 °C)   SpO2: 96%   Weight: (!) 335 lb (152 kg)   Height: 6' 2\" (1.88 m)       Estimated body mass index is 43.01 kg/m² as calculated from the following:    Height as of this encounter: 6' 2\" (1.88 m). Weight as of this encounter: 335 lb (152 kg). Physical Exam  Vitals and nursing note reviewed. Constitutional:       General: He is not in acute distress. Appearance: Normal appearance. He is obese. Neck:      Vascular: No carotid bruit. Cardiovascular:      Rate and Rhythm: Normal rate and regular rhythm. Pulses: Normal pulses.       Heart sounds: Normal heart sounds, S1 normal and S2 normal.   Pulmonary:      Effort: Pulmonary effort is normal.      Breath sounds: Normal breath sounds. Musculoskeletal:         General: Normal range of motion. Cervical back: Normal range of motion and neck supple. Right lower leg: No edema. Left lower leg: No edema. Lymphadenopathy:      Cervical: No cervical adenopathy. Skin:     General: Skin is warm. Neurological:      General: No focal deficit present. Mental Status: He is alert and oriented to person, place, and time. Psychiatric:         Mood and Affect: Mood normal.         Behavior: Behavior normal.         Thought Content: Thought content normal.     Immunizations Administered       Name Date Dose Route    Influenza, FLUCELVAX, (age 10 mo+), MDCK, PF, 0.5mL 12/19/2022 0.5 mL Intramuscular    Site: Deltoid- Left    Lot: [id number]    NDC: 18463-824-37           ASSESSMENT/PLAN:  1. Primary hypertension  - Controlled  - Continue carvedilol (COREG) 25 MG tablet; Take 1 tablet by mouth 2 times daily (with meals)  Dispense: 180 tablet; Refill: 0  - Continue hydroCHLOROthiazide (HYDRODIURIL) 25 MG tablet; Take 1 tablet by mouth daily  Dispense: 90 tablet; Refill: 0  - Continue lisinopril (PRINIVIL;ZESTRIL) 20 MG tablet; Take 1 tablet by mouth daily  Dispense: 90 tablet; Refill: 0  - Continue spironolactone (ALDACTONE) 25 MG tablet; Take 1 tablet by mouth in the morning and 1 tablet in the evening. Dispense: 180 tablet; Refill: 0  - Comprehensive Metabolic Panel, Fasting; Future    2. Impaired glucose regulation  - Improved  - A1C: 5.3 (down from 5.7 in Sept 2022)  - Continue to avoid concentrated sweets, and limit rice, potatoes, pasta, bread  - Work towards weight loss. - Hemoglobin A1C; Future    3. Class 3 severe obesity due to excess calories with serious comorbidity and body mass index (BMI) of 40.0 to 44.9 in adult (HCC)  - 20 lb gain in 3 months.    - Weight 335 lbs, BMI 43.01 (up from 315 lbs in Sept 2022)  - Strongly encouraged to scheduled with [facility]. - Continue to work towards weight loss. - Continue regular exercise; 150 minutes + of cardio/week    4. Flu vaccine need  - Influenza, FLUCELVAX, (age 10 mo+), IM, PF, 0.5 mL       Return in 3 months (on 3/19/2023) for 3 month follow up of hypertension, impaired glucose regulation and obesity. Discussed use, benefit, and side effects of prescribed medications. Patient's questions answered and concerns addressed. Patient agrees to plan of care. My scheduled days in the office reviewed with patient, and same day appointments available. Encouraged to use [employer] for communication as needed. Electronically signed by JESSICA Kern CNP on 12/19/2022 at 3:14 PM       This dictation was generated by voice recognition computer software. Although all attempts are made to edit the dictation for accuracy, there may be errors in the transcription that are not intended.

## 2022-12-19 NOTE — PATIENT INSTRUCTIONS
[facility]   [address] [place] [address] [address] [facility]

## 2023-01-28 ENCOUNTER — HOSPITAL ENCOUNTER (INPATIENT)
Age: 33
LOS: 3 days | Discharge: HOME | DRG: 378 | End: 2023-01-31
Attending: FAMILY MEDICINE | Admitting: FAMILY MEDICINE
Payer: COMMERCIAL

## 2023-01-28 ENCOUNTER — APPOINTMENT (OUTPATIENT)
Dept: CT IMAGING | Age: 33
DRG: 378 | End: 2023-01-28
Payer: COMMERCIAL

## 2023-01-28 DIAGNOSIS — K57.21 DIVERTICULITIS OF LARGE INTESTINE WITH PERFORATION AND ABSCESS WITH BLEEDING: Primary | ICD-10-CM

## 2023-01-28 LAB
A/G RATIO: 1.1 (ref 1.1–2.2)
ALBUMIN SERPL-MCNC: 4.2 G/DL (ref 3.4–5)
ALP BLD-CCNC: 61 U/L (ref 40–129)
ALT SERPL-CCNC: 10 U/L (ref 10–40)
ANION GAP SERPL CALCULATED.3IONS-SCNC: 16 MMOL/L (ref 3–16)
AST SERPL-CCNC: 13 U/L (ref 15–37)
BASOPHILS ABSOLUTE: 0.1 K/UL (ref 0–0.2)
BASOPHILS RELATIVE PERCENT: 0.4 %
BILIRUB SERPL-MCNC: 0.6 MG/DL (ref 0–1)
BUN BLDV-MCNC: 15 MG/DL (ref 7–20)
CALCIUM SERPL-MCNC: 9.9 MG/DL (ref 8.3–10.6)
CHLORIDE BLD-SCNC: 98 MMOL/L (ref 99–110)
CO2: 22 MMOL/L (ref 21–32)
CREAT SERPL-MCNC: 1 MG/DL (ref 0.9–1.3)
EOSINOPHILS ABSOLUTE: 0.1 K/UL (ref 0–0.6)
EOSINOPHILS RELATIVE PERCENT: 0.6 %
GFR SERPL CREATININE-BSD FRML MDRD: >60 ML/MIN/{1.73_M2}
GLUCOSE BLD-MCNC: 94 MG/DL (ref 70–99)
HCT VFR BLD CALC: 42.6 % (ref 40.5–52.5)
HEMOGLOBIN: 14 G/DL (ref 13.5–17.5)
LACTIC ACID, SEPSIS: 1 MMOL/L (ref 0.4–1.9)
LYMPHOCYTES ABSOLUTE: 2.7 K/UL (ref 1–5.1)
LYMPHOCYTES RELATIVE PERCENT: 20.3 %
MCH RBC QN AUTO: 31.1 PG (ref 26–34)
MCHC RBC AUTO-ENTMCNC: 32.9 G/DL (ref 31–36)
MCV RBC AUTO: 94.7 FL (ref 80–100)
MONOCYTES ABSOLUTE: 1.2 K/UL (ref 0–1.3)
MONOCYTES RELATIVE PERCENT: 8.7 %
NEUTROPHILS ABSOLUTE: 9.3 K/UL (ref 1.7–7.7)
NEUTROPHILS RELATIVE PERCENT: 70 %
PDW BLD-RTO: 13.1 % (ref 12.4–15.4)
PLATELET # BLD: 279 K/UL (ref 135–450)
PMV BLD AUTO: 7.6 FL (ref 5–10.5)
POTASSIUM SERPL-SCNC: 3.9 MMOL/L (ref 3.5–5.1)
RBC # BLD: 4.5 M/UL (ref 4.2–5.9)
SODIUM BLD-SCNC: 136 MMOL/L (ref 136–145)
TOTAL PROTEIN: 8.2 G/DL (ref 6.4–8.2)
WBC # BLD: 13.3 K/UL (ref 4–11)

## 2023-01-28 PROCEDURE — 83605 ASSAY OF LACTIC ACID: CPT

## 2023-01-28 PROCEDURE — 2580000003 HC RX 258: Performed by: FAMILY MEDICINE

## 2023-01-28 PROCEDURE — 80053 COMPREHEN METABOLIC PANEL: CPT

## 2023-01-28 PROCEDURE — 36415 COLL VENOUS BLD VENIPUNCTURE: CPT

## 2023-01-28 PROCEDURE — 2500000003 HC RX 250 WO HCPCS: Performed by: FAMILY MEDICINE

## 2023-01-28 PROCEDURE — 6360000002 HC RX W HCPCS: Performed by: FAMILY MEDICINE

## 2023-01-28 PROCEDURE — 6370000000 HC RX 637 (ALT 250 FOR IP): Performed by: FAMILY MEDICINE

## 2023-01-28 PROCEDURE — 85025 COMPLETE CBC W/AUTO DIFF WBC: CPT

## 2023-01-28 PROCEDURE — 1200000000 HC SEMI PRIVATE

## 2023-01-28 PROCEDURE — 99285 EMERGENCY DEPT VISIT HI MDM: CPT

## 2023-01-28 PROCEDURE — 74176 CT ABD & PELVIS W/O CONTRAST: CPT

## 2023-01-28 RX ORDER — LISINOPRIL 20 MG/1
20 TABLET ORAL DAILY
Status: DISCONTINUED | OUTPATIENT
Start: 2023-01-29 | End: 2023-01-31 | Stop reason: HOSPADM

## 2023-01-28 RX ORDER — ACETAMINOPHEN 650 MG/1
650 SUPPOSITORY RECTAL EVERY 6 HOURS PRN
Status: DISCONTINUED | OUTPATIENT
Start: 2023-01-28 | End: 2023-01-31 | Stop reason: HOSPADM

## 2023-01-28 RX ORDER — ONDANSETRON 4 MG/1
4 TABLET, ORALLY DISINTEGRATING ORAL EVERY 8 HOURS PRN
Status: DISCONTINUED | OUTPATIENT
Start: 2023-01-28 | End: 2023-01-31 | Stop reason: HOSPADM

## 2023-01-28 RX ORDER — MORPHINE SULFATE 2 MG/ML
2 INJECTION, SOLUTION INTRAMUSCULAR; INTRAVENOUS EVERY 4 HOURS PRN
Status: DISCONTINUED | OUTPATIENT
Start: 2023-01-28 | End: 2023-01-31 | Stop reason: HOSPADM

## 2023-01-28 RX ORDER — FENTANYL CITRATE 50 UG/ML
25 INJECTION, SOLUTION INTRAMUSCULAR; INTRAVENOUS ONCE
Status: DISCONTINUED | OUTPATIENT
Start: 2023-01-28 | End: 2023-01-28 | Stop reason: ALTCHOICE

## 2023-01-28 RX ORDER — CIPROFLOXACIN 2 MG/ML
400 INJECTION, SOLUTION INTRAVENOUS ONCE
Status: DISCONTINUED | OUTPATIENT
Start: 2023-01-28 | End: 2023-01-28 | Stop reason: ALTCHOICE

## 2023-01-28 RX ORDER — ACETAMINOPHEN 325 MG/1
650 TABLET ORAL EVERY 6 HOURS PRN
Status: DISCONTINUED | OUTPATIENT
Start: 2023-01-28 | End: 2023-01-31 | Stop reason: HOSPADM

## 2023-01-28 RX ORDER — SODIUM CHLORIDE 0.9 % (FLUSH) 0.9 %
5-40 SYRINGE (ML) INJECTION PRN
Status: DISCONTINUED | OUTPATIENT
Start: 2023-01-28 | End: 2023-01-31 | Stop reason: HOSPADM

## 2023-01-28 RX ORDER — SODIUM CHLORIDE 9 MG/ML
INJECTION, SOLUTION INTRAVENOUS CONTINUOUS
Status: DISCONTINUED | OUTPATIENT
Start: 2023-01-28 | End: 2023-01-31 | Stop reason: HOSPADM

## 2023-01-28 RX ORDER — 0.9 % SODIUM CHLORIDE 0.9 %
30 INTRAVENOUS SOLUTION INTRAVENOUS ONCE
Status: DISCONTINUED | OUTPATIENT
Start: 2023-01-28 | End: 2023-01-28 | Stop reason: ALTCHOICE

## 2023-01-28 RX ORDER — ONDANSETRON 2 MG/ML
4 INJECTION INTRAMUSCULAR; INTRAVENOUS EVERY 6 HOURS PRN
Status: DISCONTINUED | OUTPATIENT
Start: 2023-01-28 | End: 2023-01-31 | Stop reason: HOSPADM

## 2023-01-28 RX ORDER — CARVEDILOL 25 MG/1
25 TABLET ORAL 2 TIMES DAILY WITH MEALS
Status: DISCONTINUED | OUTPATIENT
Start: 2023-01-28 | End: 2023-01-31 | Stop reason: HOSPADM

## 2023-01-28 RX ORDER — METRONIDAZOLE 500 MG/100ML
500 INJECTION, SOLUTION INTRAVENOUS EVERY 8 HOURS
Status: DISCONTINUED | OUTPATIENT
Start: 2023-01-28 | End: 2023-01-31 | Stop reason: HOSPADM

## 2023-01-28 RX ORDER — SODIUM CHLORIDE 9 MG/ML
INJECTION, SOLUTION INTRAVENOUS PRN
Status: DISCONTINUED | OUTPATIENT
Start: 2023-01-28 | End: 2023-01-31 | Stop reason: HOSPADM

## 2023-01-28 RX ORDER — SODIUM CHLORIDE 0.9 % (FLUSH) 0.9 %
5-40 SYRINGE (ML) INJECTION EVERY 12 HOURS SCHEDULED
Status: DISCONTINUED | OUTPATIENT
Start: 2023-01-28 | End: 2023-01-31 | Stop reason: HOSPADM

## 2023-01-28 RX ORDER — CIPROFLOXACIN 2 MG/ML
400 INJECTION, SOLUTION INTRAVENOUS EVERY 12 HOURS
Status: DISCONTINUED | OUTPATIENT
Start: 2023-01-28 | End: 2023-01-31 | Stop reason: HOSPADM

## 2023-01-28 RX ORDER — METRONIDAZOLE 500 MG/100ML
500 INJECTION, SOLUTION INTRAVENOUS ONCE
Status: DISCONTINUED | OUTPATIENT
Start: 2023-01-28 | End: 2023-01-28 | Stop reason: ALTCHOICE

## 2023-01-28 RX ORDER — ENOXAPARIN SODIUM 100 MG/ML
30 INJECTION SUBCUTANEOUS 2 TIMES DAILY
Status: DISCONTINUED | OUTPATIENT
Start: 2023-01-28 | End: 2023-01-29

## 2023-01-28 RX ORDER — POLYETHYLENE GLYCOL 3350 17 G/17G
17 POWDER, FOR SOLUTION ORAL DAILY PRN
Status: DISCONTINUED | OUTPATIENT
Start: 2023-01-28 | End: 2023-01-31 | Stop reason: HOSPADM

## 2023-01-28 RX ADMIN — SODIUM CHLORIDE, PRESERVATIVE FREE 10 ML: 5 INJECTION INTRAVENOUS at 20:08

## 2023-01-28 RX ADMIN — ENOXAPARIN SODIUM 30 MG: 100 INJECTION SUBCUTANEOUS at 20:06

## 2023-01-28 RX ADMIN — CARVEDILOL 25 MG: 25 TABLET, FILM COATED ORAL at 20:06

## 2023-01-28 RX ADMIN — SODIUM CHLORIDE: 9 INJECTION, SOLUTION INTRAVENOUS at 19:49

## 2023-01-28 RX ADMIN — SODIUM CHLORIDE: 9 INJECTION, SOLUTION INTRAVENOUS at 19:52

## 2023-01-28 RX ADMIN — METRONIDAZOLE 500 MG: 500 INJECTION, SOLUTION INTRAVENOUS at 19:51

## 2023-01-28 RX ADMIN — MORPHINE SULFATE 2 MG: 2 INJECTION, SOLUTION INTRAMUSCULAR; INTRAVENOUS at 20:03

## 2023-01-28 RX ADMIN — CIPROFLOXACIN 400 MG: 2 INJECTION, SOLUTION INTRAVENOUS at 19:54

## 2023-01-28 ASSESSMENT — PAIN SCALES - GENERAL
PAINLEVEL_OUTOF10: 7
PAINLEVEL_OUTOF10: 7
PAINLEVEL_OUTOF10: 5

## 2023-01-28 ASSESSMENT — PAIN DESCRIPTION - ONSET: ONSET: ON-GOING

## 2023-01-28 ASSESSMENT — ENCOUNTER SYMPTOMS
BACK PAIN: 0
ABDOMINAL PAIN: 1
BLOOD IN STOOL: 1
NAUSEA: 0
DIARRHEA: 0
VOMITING: 0
RECTAL PAIN: 0
SHORTNESS OF BREATH: 0
CONSTIPATION: 0
COLOR CHANGE: 0

## 2023-01-28 ASSESSMENT — PAIN DESCRIPTION - FREQUENCY: FREQUENCY: INTERMITTENT

## 2023-01-28 ASSESSMENT — LIFESTYLE VARIABLES: HOW OFTEN DO YOU HAVE A DRINK CONTAINING ALCOHOL: NEVER

## 2023-01-28 ASSESSMENT — PAIN DESCRIPTION - LOCATION: LOCATION: ABDOMEN

## 2023-01-28 ASSESSMENT — PAIN - FUNCTIONAL ASSESSMENT
PAIN_FUNCTIONAL_ASSESSMENT: ACTIVITIES ARE NOT PREVENTED
PAIN_FUNCTIONAL_ASSESSMENT: NONE - DENIES PAIN
PAIN_FUNCTIONAL_ASSESSMENT: 0-10

## 2023-01-28 ASSESSMENT — PAIN DESCRIPTION - PAIN TYPE: TYPE: ACUTE PAIN

## 2023-01-28 ASSESSMENT — PAIN DESCRIPTION - ORIENTATION: ORIENTATION: LOWER

## 2023-01-28 ASSESSMENT — PAIN DESCRIPTION - DESCRIPTORS: DESCRIPTORS: DISCOMFORT;PRESSURE

## 2023-01-28 NOTE — ED NOTES
Gave report to NELL Barnes. RN agreed to come down and get patient, will get patient ready for transport.      Heidi Gunter RN  01/28/23 [id number]

## 2023-01-28 NOTE — PROGRESS NOTES
Patient transported to unit at this time in a wheelchair. Patient is A/O. Independent in room. Admission and shift assessment completed. Reports pain 7/10 to lower abd at this time. The care plan and education has been reviewed and mutually agreed upon with the patient. Standard safety measures in place.

## 2023-01-28 NOTE — ED PROVIDER NOTES
[facility]        Pt Name: Braydon Mckeon  MRN: [id number]  [place] 1990  Date of evaluation: 1/28/2023  Provider: Zehra Ennis PA-C  PCP: JESSICA Carl CNP  Note Started: 3:09 PM EST 1/28/23      JAMILA. I have evaluated this patient. My supervising physician was available for consultation. CHIEF COMPLAINT       Chief Complaint   Patient presents with    Rectal Bleeding     Pt reports HX diverticulitis, states today he started with blood in stool        HISTORY OF PRESENT ILLNESS: 1 or more Elements     History from : Patient    Limitations to history : None    Braydon Mckeon is a 27 y.o. male who presents to the emergency department complaining of low abdominal cramping and bright red blood per rectum starting this morning. He reports bright red blood mixed with stool. Denies rectal pain or burning. He does have history of hemorrhoids but he does not believe that he is bleeding from a hemorrhoid. He rates his pain to be a 5 out of 10. Denies radiation of symptoms, nausea, vomiting, diarrhea, constipation, fever or chills. Nursing Notes were all reviewed and agreed with or any disagreements were addressed in the HPI. REVIEW OF SYSTEMS :      Review of Systems   Constitutional:  Negative for chills and fever. HENT: Negative. Eyes:  Negative for visual disturbance. Respiratory:  Negative for shortness of breath. Cardiovascular:  Negative for chest pain. Gastrointestinal:  Positive for abdominal pain and blood in stool. Negative for constipation, diarrhea, nausea, rectal pain and vomiting. Genitourinary: Negative. Musculoskeletal:  Negative for back pain, neck pain and neck stiffness. Skin:  Negative for color change, pallor, rash and wound. Neurological: Negative. Psychiatric/Behavioral:  Negative for confusion. All other systems reviewed and are negative.     Positives and Pertinent negatives as per HPI. SURGICAL HISTORY   History reviewed. No pertinent surgical history. CURRENTMEDICATIONS       Previous Medications    CARVEDILOL (COREG) 25 MG TABLET    Take 1 tablet by mouth 2 times daily (with meals)    HYDROCHLOROTHIAZIDE (HYDRODIURIL) 25 MG TABLET    Take 1 tablet by mouth daily    LISINOPRIL (PRINIVIL;ZESTRIL) 20 MG TABLET    Take 1 tablet by mouth daily    MULTIPLE VITAMIN (MULTI-VITAMIN PO)    Take by mouth daily    SPIRONOLACTONE (ALDACTONE) 25 MG TABLET    Take 1 tablet by mouth in the morning and 1 tablet in the evening. ALLERGIES     Patient has no known allergies. FAMILYHISTORY       Family History   Problem Relation Age of Onset    Diabetes Mother     Kidney Disease Mother         SOCIAL HISTORY       Social History     Tobacco Use    Smoking status: Former     Packs/day: 1.00     Types: Cigarettes     Quit date: 2022     Years since quittin.4    Smokeless tobacco: Never   Substance Use Topics    Alcohol use: Never    Drug use: Yes     Types: Marijuana (Weed)       SCREENINGS        Aziza Coma Scale  Eye Opening: Spontaneous  Best Verbal Response: Oriented  Best Motor Response: Obeys commands  Buchanan Coma Scale Score: 15                CIWA Assessment  BP: (!) 137/91  Heart Rate: 67           PHYSICAL EXAM  1 or more Elements     ED Triage Vitals [23 1448]   BP Temp Temp src Heart Rate Resp SpO2 Height Weight   (!) 137/91 98.9 °F (37.2 °C) -- 67 19 99 % -- (!) 330 lb (149.7 kg)       Physical Exam  Vitals and nursing note reviewed. Constitutional:       Appearance: Normal appearance. He is well-developed. He is obese. He is not toxic-appearing or diaphoretic. HENT:      Head: Normocephalic and atraumatic. Right Ear: External ear normal.      Left Ear: External ear normal.      Nose: Nose normal.      Mouth/Throat:      Mouth: Mucous membranes are moist.      Pharynx: Oropharynx is clear.    Eyes:      General:         Right eye: No discharge. Left eye: No discharge. Cardiovascular:      Rate and Rhythm: Normal rate. Pulmonary:      Effort: Pulmonary effort is normal.      Breath sounds: Normal breath sounds. Abdominal:      Tenderness: There is abdominal tenderness in the suprapubic area. There is no right CVA tenderness, left CVA tenderness, guarding or rebound. Negative signs include Trejo's sign, Rovsing's sign, McBurney's sign, psoas sign and obturator sign. Musculoskeletal:         General: Normal range of motion. Cervical back: Normal range of motion. Skin:     General: Skin is warm and dry. Coloration: Skin is not jaundiced or pale. Findings: No bruising, erythema, lesion or rash. Neurological:      Mental Status: He is alert and oriented to person, place, and time. Psychiatric:         Behavior: Behavior normal.           DIAGNOSTIC RESULTS   LABS:    Labs Reviewed   CBC WITH AUTO DIFFERENTIAL - Abnormal; Notable for the following components:       Result Value    WBC 13.3 (*)     Neutrophils Absolute 9.3 (*)     All other components within normal limits   COMPREHENSIVE METABOLIC PANEL - Abnormal; Notable for the following components:    Chloride 98 (*)     AST 13 (*)     All other components within normal limits   LACTATE, SEPSIS   LACTATE, SEPSIS       When ordered only abnormal lab results are displayed. All other labs were within normal range or not returned as of this dictation. EKG: When ordered, EKG's are interpreted by the Emergency Department Physician in the absence of a cardiologist.  Please see their note for interpretation of EKG.     RADIOLOGY:   Non-plain film images such as CT, Ultrasound and MRI are read by the radiologist. Plain radiographic images are visualized and preliminarily interpreted by the ED Provider with the below findings:        Interpretation per the Radiologist below, if available at the time of this note:    CT ABDOMEN PELVIS WO CONTRAST Additional Contrast? None   Final Result   1. Acute diverticulitis involving the sigmoid colon with extraluminal locules   of air likely due to a contained perforation and phlegmonous changes. PROCEDURES   Unless otherwise noted below, none     Procedures    CRITICAL CARE TIME (.cctime)   There was a high probability of life-threatening clinical deterioration in the patient's condition requiring my urgent intervention. I personally saw the patient and independently provided 30 minutes of non-concurrent critical care out of the total shared critical care time provided, excluding separately reportable procedures. PAST MEDICAL HISTORY       Chronic Conditions affecting Care: history of hypertension and diverticulitis    EMERGENCY DEPARTMENT COURSE and DIFFERENTIAL DIAGNOSIS/MDM:   Vitals:    Vitals:    01/28/23 1448   BP: (!) 137/91   Pulse: 67   Resp: 19   Temp: 98.9 °F (37.2 °C)   SpO2: 99%   Weight: (!) 330 lb (149.7 kg)       Patient was given the following medications:  Medications   ciprofloxacin (CIPRO) IVPB 400 mg (has no administration in time range)   metronidazole (FLAGYL) 500 mg in 0.9% NaCl 100 mL IVPB premix (has no administration in time range)   0.9 % sodium chloride bolus (has no administration in time range)   fentaNYL (SUBLIMAZE) injection 25 mcg (has no administration in time range)             Is this patient to be included in the SEP-1 Core Measure due to severe sepsis or septic shock? No   Exclusion criteria - the patient is NOT to be included for SEP-1 Core Measure due to:  2+ SIRS criteria are not met    CONSULTS: (Who and What was discussed)  IP CONSULT TO GENERAL SURGERY  IP CONSULT TO HOSPITALIST  Discussion with Other Profesionals : [facility]  Dr Yandy Guerrero and I consulted at 1600. He agrees with antibiotic choices and will see patient during his hospitalization tomorrow morning. Social Determinants : marijuana use    Records Reviewed :  Outpatient Notes general surgery    CC/HPI Summary, DDx, ED Course, and Reassessment: This patient presents to the emergency department complaining of abdominal cramping and rectal bleeding. CT scan shows evidence of acute sigmoid diverticulitis with contained perforation and phlegmonous changes. Therefore, I do feel admission is warranted for IV antibiotics and general surgery consultation. Disposition Considerations (include 1 Tests not done, Shared Decision Making, Pt Expectation of Test or Tx.): patient understands and agrees with plan for admission. I am the Primary Clinician of Record. FINAL IMPRESSION      1. Diverticulitis of large intestine with perforation and abscess with bleeding          DISPOSITION/PLAN     DISPOSITION Decision To Admit 01/28/2023 03:50:01 PM      PATIENT REFERRED TO:  No follow-up provider specified.     DISCHARGE MEDICATIONS:  New Prescriptions    No medications on file       DISCONTINUED MEDICATIONS:  Discontinued Medications    No medications on file              (Please note that portions of this note were completed with a voice recognition program.  Efforts were made to edit the dictations but occasionally words are mis-transcribed.)    Bernie Lee PA-C (electronically signed)            Bernie Lee PA-C  01/28/23 [id number]

## 2023-01-29 LAB
A/G RATIO: 0.8 (ref 1.1–2.2)
ALBUMIN SERPL-MCNC: 3.3 G/DL (ref 3.4–5)
ALP BLD-CCNC: 59 U/L (ref 40–129)
ALT SERPL-CCNC: 11 U/L (ref 10–40)
ANION GAP SERPL CALCULATED.3IONS-SCNC: 11 MMOL/L (ref 3–16)
APTT: 33.9 SEC (ref 23–34.3)
AST SERPL-CCNC: 19 U/L (ref 15–37)
BASOPHILS ABSOLUTE: 0.1 K/UL (ref 0–0.2)
BASOPHILS RELATIVE PERCENT: 0.5 %
BILIRUB SERPL-MCNC: 0.7 MG/DL (ref 0–1)
BUN BLDV-MCNC: 14 MG/DL (ref 7–20)
CALCIUM SERPL-MCNC: 9.1 MG/DL (ref 8.3–10.6)
CHLORIDE BLD-SCNC: 100 MMOL/L (ref 99–110)
CO2: 25 MMOL/L (ref 21–32)
CREAT SERPL-MCNC: 0.8 MG/DL (ref 0.9–1.3)
EOSINOPHILS ABSOLUTE: 0.1 K/UL (ref 0–0.6)
EOSINOPHILS RELATIVE PERCENT: 0.6 %
GFR SERPL CREATININE-BSD FRML MDRD: >60 ML/MIN/{1.73_M2}
GLUCOSE BLD-MCNC: 124 MG/DL (ref 70–99)
HCT VFR BLD CALC: 36.4 % (ref 40.5–52.5)
HEMOGLOBIN: 12.5 G/DL (ref 13.5–17.5)
INR BLD: 1.13 (ref 0.87–1.14)
LACTIC ACID: 0.8 MMOL/L (ref 0.4–2)
LYMPHOCYTES ABSOLUTE: 1.6 K/UL (ref 1–5.1)
LYMPHOCYTES RELATIVE PERCENT: 16 %
MAGNESIUM: 1.8 MG/DL (ref 1.8–2.4)
MCH RBC QN AUTO: 32.2 PG (ref 26–34)
MCHC RBC AUTO-ENTMCNC: 34.4 G/DL (ref 31–36)
MCV RBC AUTO: 93.4 FL (ref 80–100)
MONOCYTES ABSOLUTE: 1 K/UL (ref 0–1.3)
MONOCYTES RELATIVE PERCENT: 9.9 %
NEUTROPHILS ABSOLUTE: 7.4 K/UL (ref 1.7–7.7)
NEUTROPHILS RELATIVE PERCENT: 73 %
PDW BLD-RTO: 13.1 % (ref 12.4–15.4)
PHOSPHORUS: 3.2 MG/DL (ref 2.5–4.9)
PLATELET # BLD: 271 K/UL (ref 135–450)
PMV BLD AUTO: 8.2 FL (ref 5–10.5)
POTASSIUM SERPL-SCNC: 4.2 MMOL/L (ref 3.5–5.1)
PREALBUMIN: 12.4 MG/DL (ref 20–40)
PROTHROMBIN TIME: 14.5 SEC (ref 11.7–14.5)
RBC # BLD: 3.9 M/UL (ref 4.2–5.9)
SODIUM BLD-SCNC: 136 MMOL/L (ref 136–145)
TOTAL PROTEIN: 7.2 G/DL (ref 6.4–8.2)
TRANSFERRIN: 228 MG/DL (ref 200–360)
WBC # BLD: 10.1 K/UL (ref 4–11)

## 2023-01-29 PROCEDURE — 1200000000 HC SEMI PRIVATE

## 2023-01-29 PROCEDURE — 6360000002 HC RX W HCPCS: Performed by: FAMILY MEDICINE

## 2023-01-29 PROCEDURE — 85025 COMPLETE CBC W/AUTO DIFF WBC: CPT

## 2023-01-29 PROCEDURE — 2500000003 HC RX 250 WO HCPCS: Performed by: FAMILY MEDICINE

## 2023-01-29 PROCEDURE — 84466 ASSAY OF TRANSFERRIN: CPT

## 2023-01-29 PROCEDURE — 84100 ASSAY OF PHOSPHORUS: CPT

## 2023-01-29 PROCEDURE — 83735 ASSAY OF MAGNESIUM: CPT

## 2023-01-29 PROCEDURE — 99222 1ST HOSP IP/OBS MODERATE 55: CPT | Performed by: SURGERY

## 2023-01-29 PROCEDURE — 85730 THROMBOPLASTIN TIME PARTIAL: CPT

## 2023-01-29 PROCEDURE — 85610 PROTHROMBIN TIME: CPT

## 2023-01-29 PROCEDURE — 2580000003 HC RX 258: Performed by: FAMILY MEDICINE

## 2023-01-29 PROCEDURE — 83605 ASSAY OF LACTIC ACID: CPT

## 2023-01-29 PROCEDURE — 84134 ASSAY OF PREALBUMIN: CPT

## 2023-01-29 PROCEDURE — 80053 COMPREHEN METABOLIC PANEL: CPT

## 2023-01-29 PROCEDURE — 36415 COLL VENOUS BLD VENIPUNCTURE: CPT

## 2023-01-29 RX ADMIN — METRONIDAZOLE 500 MG: 500 INJECTION, SOLUTION INTRAVENOUS at 01:43

## 2023-01-29 RX ADMIN — METRONIDAZOLE 500 MG: 500 INJECTION, SOLUTION INTRAVENOUS at 08:20

## 2023-01-29 RX ADMIN — METRONIDAZOLE 500 MG: 500 INJECTION, SOLUTION INTRAVENOUS at 18:09

## 2023-01-29 RX ADMIN — SODIUM CHLORIDE, PRESERVATIVE FREE 10 ML: 5 INJECTION INTRAVENOUS at 20:36

## 2023-01-29 RX ADMIN — CIPROFLOXACIN 400 MG: 2 INJECTION, SOLUTION INTRAVENOUS at 05:25

## 2023-01-29 RX ADMIN — MORPHINE SULFATE 2 MG: 2 INJECTION, SOLUTION INTRAMUSCULAR; INTRAVENOUS at 16:25

## 2023-01-29 RX ADMIN — CIPROFLOXACIN 400 MG: 2 INJECTION, SOLUTION INTRAVENOUS at 16:29

## 2023-01-29 RX ADMIN — SODIUM CHLORIDE: 9 INJECTION, SOLUTION INTRAVENOUS at 20:35

## 2023-01-29 RX ADMIN — MORPHINE SULFATE 2 MG: 2 INJECTION, SOLUTION INTRAMUSCULAR; INTRAVENOUS at 08:20

## 2023-01-29 ASSESSMENT — PAIN SCALES - GENERAL
PAINLEVEL_OUTOF10: 6
PAINLEVEL_OUTOF10: 7
PAINLEVEL_OUTOF10: 5
PAINLEVEL_OUTOF10: 7

## 2023-01-29 ASSESSMENT — PAIN DESCRIPTION - ORIENTATION
ORIENTATION: LOWER
ORIENTATION: LOWER

## 2023-01-29 ASSESSMENT — PAIN DESCRIPTION - LOCATION
LOCATION: ABDOMEN

## 2023-01-29 ASSESSMENT — PAIN DESCRIPTION - DESCRIPTORS
DESCRIPTORS: ACHING

## 2023-01-29 NOTE — PROGRESS NOTES
Pt resting awake in bed. C/o 7/10 pain in abdomen. Mdicated with 2 mg morphine IVP. Pt remains NPO. Pt is axox4 and able to answer questions and follow commands throughout assessment. Call light in reach will continue to monitor.

## 2023-01-29 NOTE — H&P
HOSPITALISTS HISTORY AND PHYSICAL    1/29/2023 4:24 PM    Patient Information:  Alcira Spain is a 27 y.o. male [id number]  PCP:  JESSICA Castanon CNP (Tel: [phone] )    Chief complaint:    Chief Complaint   Patient presents with    Rectal Bleeding     Pt reports HX diverticulitis, states today he started with blood in stool         History of Present Illness:  Bassam Holloway is a 27 y.o. male with h/o HTN , Diverticulitis presented with c/o abdominal pain and rectal bleeding. He has h/o diverticulitis. Underwent Colonoscopy in Nov, 22 that showed polyps and diverticular disease. CT abdomen and pelvis showed   . Acute diverticulitis involving the sigmoid colon with extraluminal locules   of air likely due to a contained perforation and phlegmonous changes     Labs showed leucocytosis 13.3 K   He has been started on IV antibiotics      REVIEW OF SYSTEMS:   Constitutional: Negative for fever,chills or night sweats  ENT: Negative for rhinorrhea, epistaxis, hoarseness, sore throat. Respiratory: Negative for shortness of breath,wheezing  Cardiovascular: Negative for chest pain, palpitations   Gastrointestinal: Negative for nausea, vomiting, diarrhea  Genitourinary: Negative for polyuria, dysuria   Hematologic/Lymphatic: Negative for bleeding tendency, easy bruising  Musculoskeletal: Negative for myalgias and arthralgias  Neurologic: Negative for confusion,dysarthria. Skin: Negative for itching,rash  Psychiatric: Negative for depression,anxiety, agitation. Endocrine: Negative for polydipsia,polyuria,heat /cold intolerance. Past Medical History:   has a past medical history of HTN (hypertension). Past Surgical History:   has no past surgical history on file. Medications:  No current facility-administered medications on file prior to encounter.      Current Outpatient Medications on File Prior to Encounter   Medication Sig Dispense Refill    carvedilol (COREG) 25 MG tablet Take 1 tablet by mouth 2 times daily (with meals) 180 tablet 0    hydroCHLOROthiazide (HYDRODIURIL) 25 MG tablet Take 1 tablet by mouth daily 90 tablet 0    lisinopril (PRINIVIL;ZESTRIL) 20 MG tablet Take 1 tablet by mouth daily 90 tablet 0    spironolactone (ALDACTONE) 25 MG tablet Take 1 tablet by mouth in the morning and 1 tablet in the evening. 180 tablet 0    Multiple Vitamin (MULTI-VITAMIN PO) Take by mouth daily         Allergies:  No Known Allergies     Social History:  Patient Lives    reports that he quit smoking about 5 months ago. His smoking use included cigarettes. He smoked an average of 1 pack per day. He has never used smokeless tobacco. He reports current drug use. Drug: Marijuana Joaquin Shi). He reports that he does not drink alcohol. Family History:  family history includes Diabetes in his mother; Kidney Disease in his mother. ,     Physical Exam:  /76   Pulse 63   Temp 97.7 °F (36.5 °C) (Oral)   Resp 18   Ht 6' 2\" (1.88 m)   Wt (!) 330 lb (149.7 kg)   SpO2 99%   BMI 42.37 kg/m²     General appearance:  Appears comfortable. Well nourished  Eyes: Sclera clear, pupils equal  ENT: Moist mucus membranes, no thrush. Trachea midline. Cardiovascular: Regular rhythm, normal S1, S2. No murmur, gallop, rub. No edema in lower extremities  Respiratory: Clear to auscultation bilaterally, no wheeze, good inspiratory effort  Gastrointestinal: Abdomen soft, non-tender, not distended, normal bowel sounds  Musculoskeletal: No cyanosis in digits, neck supple  Neurology: Cranial nerves grossly intact. Alert and oriented in time, place and person. No speech or motor deficits  Psychiatry: Appropriate affect.  Not agitated  Skin: Warm, dry, normal turgor, no rash  Brisk capillary refill, peripheral pulses palpable   Labs:  CBC:   Lab Results   Component Value Date/Time    WBC 10.1 01/29/2023 06:10 AM    RBC 3.90 01/29/2023 06:10 AM    HGB 12.5 01/29/2023 06:10 AM    HCT 36.4 01/29/2023 06:10 AM    MCV 93.4 01/29/2023 06:10 AM    MCH 32.2 01/29/2023 06:10 AM    MCHC 34.4 01/29/2023 06:10 AM    RDW 13.1 01/29/2023 06:10 AM     01/29/2023 06:10 AM    MPV 8.2 01/29/2023 06:10 AM     BMP:    Lab Results   Component Value Date/Time     01/29/2023 06:10 AM    K 4.2 01/29/2023 06:10 AM    K 4.1 08/02/2022 05:33 AM     01/29/2023 06:10 AM    CO2 25 01/29/2023 06:10 AM    BUN 14 01/29/2023 06:10 AM    CREATININE 0.8 01/29/2023 06:10 AM    CALCIUM 9.1 01/29/2023 06:10 AM    GFRAA >60 08/02/2022 05:33 AM    LABGLOM >60 01/29/2023 06:10 AM    GLUCOSE 124 01/29/2023 06:10 AM     CT ABDOMEN PELVIS WO CONTRAST Additional Contrast? None   Final Result   1. Acute diverticulitis involving the sigmoid colon with extraluminal locules   of air likely due to a contained perforation and phlegmonous changes. Chest Xray:   EKG:    I visualized CXR images and EKG strips    Discussed case  with     Problem List  Principal Problem:    Diverticulitis  Resolved Problems:    * No resolved hospital problems. *        Assessment/Plan:   Diverticulitis with microperf and hemorrhage   NPO  IV fluids  Cont IV antibiotics  Surgery following   Recommends conservative management  Hb is 12.5      Obese BMI 42    DVT prophylaxis contraindicated   Code status   Diet   IV access   Galvez Catheter    Admit as inpatient. I anticipate hospitalization spanning more than two midnights for investigation and treatment of the above medically necessary diagnoses. Please note that some part of this chart was generated using Dragon dictation software. Although every effort was made to ensure the accuracy of this automated transcription, some errors in transcription may have occurred inadvertently. If you may need any clarification, please do not hesitate to contact me through [facility].        Louanne Kayser, MD    1/29/2023 4:24 PM

## 2023-01-29 NOTE — CONSULTS
[address] and Laparoscopic Surgery     Consult                                                     Patient Name: Francis Juarez  MRN: [id number]  [place]: 1990  Admission date: 1/28/2023  2:40 PM   Date of evaluation: 1/29/2023  Primary Care Physician: JESSICA Durán CNP  Reason for consult: Abdominal pain  History of Present Illness:    Mr. Neelam Jaimes is a 27 y.o. male who presents with acute onset symptoms starting with bloody stool yesterday morning followed by sharp left lower quadrant pain similar to but less severe than last acute diverticulitis episode. July 2022 patient presented to emergency department and admitted for treatment of microperforated sigmoid diverticulitis. Smoldering symptoms for the next few months but it resolved. Current pain does not radiate, nothing made it better or worse. Admitted to subjective fevers and chills but denies chest pain dyspnea nausea vomiting jaundice dysuria change in appetite weight or other complaints. Medical conditions include hypertension and obesity and has never had abdominal surgery. Last evaluated by me in office last year and patient has had colonoscopy since which was negative except for polyp and diverticulosis as expected. Current work-up shows perforated diverticulitis which is likely contained due to his greatly improved symptoms. Past Medical History:        Diagnosis Date    HTN (hypertension)        Past Surgical History:    History reviewed. No pertinent surgical history.     Scheduled Meds:   ciprofloxacin  400 mg IntraVENous Q12H    metroNIDAZOLE  500 mg IntraVENous Q8H    lisinopril  20 mg Oral Daily    carvedilol  25 mg Oral BID WC    sodium chloride flush  5-40 mL IntraVENous 2 times per day     Continuous Infusions:   sodium chloride 100 mL/hr at 01/28/23 2259    sodium chloride 5 mL/hr at 01/28/23 2259     PRN Meds:.morphine, sodium chloride flush, sodium chloride, ondansetron **OR** ondansetron, polyethylene glycol, acetaminophen **OR** acetaminophen    Prior to Admission medications    Medication Sig Start Date End Date Taking? Authorizing Provider   carvedilol (COREG) 25 MG tablet Take 1 tablet by mouth 2 times daily (with meals) 12/19/22 3/19/23  JESSICA Pritchard CNP   hydroCHLOROthiazide (HYDRODIURIL) 25 MG tablet Take 1 tablet by mouth daily 12/19/22 3/19/23  JESSICA Pritchard CNP   lisinopril (PRINIVIL;ZESTRIL) 20 MG tablet Take 1 tablet by mouth daily 12/19/22 3/19/23  JESSICA Pritchard CNP   spironolactone (ALDACTONE) 25 MG tablet Take 1 tablet by mouth in the morning and 1 tablet in the evening. 12/19/22 3/19/23  JESSICA Pritchard CNP   Multiple Vitamin (MULTI-VITAMIN PO) Take by mouth daily    Historical Provider, MD        Allergies:  Patient has no known allergies.     Social History:   Social History     Socioeconomic History    Marital status: Single     Spouse name: None    Number of children: None    Years of education: None    Highest education level: None   Tobacco Use    Smoking status: Former     Packs/day: 1.00     Types: Cigarettes     Quit date: 2022     Years since quittin.4    Smokeless tobacco: Never   Substance and Sexual Activity    Alcohol use: Never    Drug use: Yes     Types: Marijuana Dietra Due)       Family History:    Family History   Problem Relation Age of Onset    Diabetes Mother     Kidney Disease Mother        Review of Systems:  CONSTITUTIONAL:  Negative except as above  HEENT:  negative  RESPIRATORY:  negative  CARDIOVASCULAR:  negative  GASTROINTESTINAL:  negative except as above   GENITOURINARY:  negative  HEMATOLOGIC/LYMPHATIC:  negative  NEUROLOGICAL:  Negative      Vital Signs:  Patient Vitals for the past 24 hrs:   BP Temp Temp src Pulse Resp SpO2 Height Weight   23 1142 119/76 97.7 °F (36.5 °C) Oral 63 18 99 % -- --   23 0800 122/74 97.6 °F (36.4 °C) Oral 56 16 98 % -- --   23 2321 92/60 98.1 °F (36.7 °C) Oral -- 16 96 % -- -- 23 134/86 97.8 °F (36.6 °C) Oral 67 16 98 % -- --   23 -- -- -- -- -- -- 6' 2\" (1.88 m) (!) 330 lb (149.7 kg)   23 125/75 97.9 °F (36.6 °C) Oral 62 18 97 % -- --   23 1448 (!) 137/91 98.9 °F (37.2 °C) -- 67 19 99 % -- (!) 330 lb (149.7 kg)      TEMPERATURE HISTORY 24H: Temp (24hrs), Av °F (36.7 °C), Min:97.6 °F (36.4 °C), Max:98.9 °F (37.2 °C)    BLOOD PRESSURE HISTORY: Systolic (55ROL), IBK:120 , Min:92 , OFI:604    Diastolic (36OEJ), OBS:42, Min:60, Max:91    Admission Weight: (!) 330 lb (149.7 kg)       Intake/Output Summary (Last 24 hours) at 2023 1438  Last data filed at 2023 2259  Gross per 24 hour   Intake 516.32 ml   Output --   Net 516.32 ml     Drain/tube Output:         Physical Exam:  Constitutional:  well-developed, well-nourished,   Neurologic: awake, Orientation:  Oriented to person, place, time, follows commands, clear speech, cranial nerves grossly intact  Psychiatric: normal affect, no hallucinations  Eyes:  sclera clear, no visible discharge  Head, ears, nose, mouth, throat: normocephalic, without obvious abnormality, supple, symmetrical, trachea midline, no JVD, mucous membranes moist  Cardiac: regular rate and rhythm   Pulmonary: clear to auscultation bilaterally   GI: soft, non-distended, moderate left lower quadrant tenderness without peritonitis  Lymph: no palpable lymphadenopathy  Skin: no bruising or bleeding, normal skin color, texture, turgor, and no redness, warmth, or swelling    Labs:    CBC:    Recent Labs     23  1509 23  0610   WBC 13.3* 10.1   HGB 14.0 12.5*   HCT 42.6 36.4*    271     BMP:    Recent Labs     23  1509 23  0610    136   K 3.9 4.2   CL 98* 100   CO2 22 25   BUN 15 14   CREATININE 1.0 0.8*   GLUCOSE 94 124*     Hepatic:   Recent Labs     23  1509 23  0610   AST 13* 19   ALT 10 11   BILITOT 0.6 0.7   ALKPHOS 61 59     Amylase: No results for input(s):  AMYLASE in the last 72 hours. Lipase: No results for input(s): LIPASE in the last 72 hours. Mag:      Recent Labs     01/29/23  0610   MG 1.80      Phos:     Recent Labs     01/29/23  0610   PHOS 3.2      Coags:   Recent Labs     01/29/23  0610   INR 1.13   APTT 33.9       Imaging:  I have personally reviewed the following films:  CT ABDOMEN PELVIS WO CONTRAST Additional Contrast? None    Result Date: 1/28/2023  EXAMINATION: CT OF THE ABDOMEN AND PELVIS WITHOUT CONTRAST 1/28/2023 3:02 pm TECHNIQUE: CT of the abdomen and pelvis was performed without the administration of intravenous contrast. Multiplanar reformatted images are provided for review. Automated exposure control, iterative reconstruction, and/or weight based adjustment of the mA/kV was utilized to reduce the radiation dose to as low as reasonably achievable. COMPARISON: 07/27/2022 HISTORY: ORDERING SYSTEM PROVIDED HISTORY: abd cramping/rectal bleeding TECHNOLOGIST PROVIDED HISTORY: Reason for exam:->abd cramping/rectal bleeding Additional Contrast?->None Decision Support Exception - unselect if not a suspected or confirmed emergency medical condition->Emergency Medical Condition (MA) Reason for Exam: abd cramping/rectal bleeding FINDINGS: Lower Chest: There is bibasilar scarring. Organs: The unenhanced liver, spleen, pancreas, adrenal glands and kidneys are unremarkable. There is no hydronephrosis or obstructive uropathy. GI/Bowel: There is no bowel obstruction. The appendix is within normal limits. Scattered diverticula involve the descending and sigmoid colon. However, there is moderate pericolonic inflammation and circumferential wall thickening involving the distal sigmoid colon. The inflammation extends inferiorly into the rectum and anteriorly involving an adjacent loop of mid sigmoid colon. Several locules of air are likely extraluminal within the deep pelvis without a drainable fluid collection. The lack of intravenous contrast limits evaluation.  Pelvis: The unenhanced pelvic viscera are within normal limits. Peritoneum/Retroperitoneum: There is no evidence of free fluid or adenopathy. Bones/Soft Tissues: Degenerative changes involve the thoracolumbar spine and bilateral sacroiliac joints. There is mild bilateral gynecomastia. 1. Acute diverticulitis involving the sigmoid colon with extraluminal locules of air likely due to a contained perforation and phlegmonous changes. Cultures:  Relevant cultures documented under results section     Assessment:  Patient Active Problem List   Diagnosis    Diverticulitis    Diverticulitis of large intestine with perforation without bleeding    Hypertensive urgency    Class 3 severe obesity due to excess calories with serious comorbidity and body mass index (BMI) of 40.0 to 44.9 in [facility])    Primary hypertension    Impaired glucose regulation     Acute sigmoid diverticulitis with perforation  Hypertension  Morbid obesity, BMI 42.37    Plan:  1. Abdominal exam benign, vital signs stable, no signs of toxicity. Does not need surgical exploration unless condition deteriorates   2. Bowel rest for today   3. Monitor bowel function  4. IVF, monitor and replace electrolytes as needed  5. Antibiotics, will switch to PO at discharge  6. Pain medication and antiemetics as needed with caution as may mask worsening exam  7. Would benefit from sigmoidectomy electively after discharge given recurrent symptoms and multiple hospital admissions within a year. If necessary, best chances of successful minimally invasive resection without need for temporary ostomy are with elective procedure  9. Defer management of remainder of other medical conditions to primary and consulting teams    This plan was discussed at length with the patient. He was understanding and in agreement with the plan  Thank you for the consult and allowing me to participate in the care of this patient. I look forward to following him this admission    Darell HERRERA Oren Childress MD, FACS  1/29/2023  2:38 PM

## 2023-01-29 NOTE — PROGRESS NOTES
Resting in bed visiting. Clarified with MD that pt is to remain NPO with sips of meds tonight. Discussed with pt. Pt verbalized understanding.

## 2023-01-30 LAB
ANION GAP SERPL CALCULATED.3IONS-SCNC: 10 MMOL/L (ref 3–16)
BASOPHILS ABSOLUTE: 0 K/UL (ref 0–0.2)
BASOPHILS RELATIVE PERCENT: 0.5 %
BUN BLDV-MCNC: 13 MG/DL (ref 7–20)
CALCIUM SERPL-MCNC: 9 MG/DL (ref 8.3–10.6)
CHLORIDE BLD-SCNC: 101 MMOL/L (ref 99–110)
CO2: 24 MMOL/L (ref 21–32)
CREAT SERPL-MCNC: 0.9 MG/DL (ref 0.9–1.3)
EOSINOPHILS ABSOLUTE: 0.1 K/UL (ref 0–0.6)
EOSINOPHILS RELATIVE PERCENT: 1 %
GFR SERPL CREATININE-BSD FRML MDRD: >60 ML/MIN/{1.73_M2}
GLUCOSE BLD-MCNC: 84 MG/DL (ref 70–99)
HCT VFR BLD CALC: 35.4 % (ref 40.5–52.5)
HEMOGLOBIN: 12.3 G/DL (ref 13.5–17.5)
LYMPHOCYTES ABSOLUTE: 1.9 K/UL (ref 1–5.1)
LYMPHOCYTES RELATIVE PERCENT: 20.8 %
MCH RBC QN AUTO: 32.3 PG (ref 26–34)
MCHC RBC AUTO-ENTMCNC: 34.8 G/DL (ref 31–36)
MCV RBC AUTO: 92.7 FL (ref 80–100)
MONOCYTES ABSOLUTE: 0.9 K/UL (ref 0–1.3)
MONOCYTES RELATIVE PERCENT: 10.4 %
NEUTROPHILS ABSOLUTE: 6.2 K/UL (ref 1.7–7.7)
NEUTROPHILS RELATIVE PERCENT: 67.3 %
PDW BLD-RTO: 12.8 % (ref 12.4–15.4)
PLATELET # BLD: 267 K/UL (ref 135–450)
PMV BLD AUTO: 8.1 FL (ref 5–10.5)
POTASSIUM SERPL-SCNC: 3.7 MMOL/L (ref 3.5–5.1)
RBC # BLD: 3.82 M/UL (ref 4.2–5.9)
SODIUM BLD-SCNC: 135 MMOL/L (ref 136–145)
WBC # BLD: 9.1 K/UL (ref 4–11)

## 2023-01-30 PROCEDURE — 99232 SBSQ HOSP IP/OBS MODERATE 35: CPT | Performed by: SURGERY

## 2023-01-30 PROCEDURE — 6370000000 HC RX 637 (ALT 250 FOR IP): Performed by: FAMILY MEDICINE

## 2023-01-30 PROCEDURE — 1200000000 HC SEMI PRIVATE

## 2023-01-30 PROCEDURE — 85025 COMPLETE CBC W/AUTO DIFF WBC: CPT

## 2023-01-30 PROCEDURE — 36415 COLL VENOUS BLD VENIPUNCTURE: CPT

## 2023-01-30 PROCEDURE — 80048 BASIC METABOLIC PNL TOTAL CA: CPT

## 2023-01-30 PROCEDURE — 6360000002 HC RX W HCPCS: Performed by: FAMILY MEDICINE

## 2023-01-30 PROCEDURE — 2500000003 HC RX 250 WO HCPCS: Performed by: FAMILY MEDICINE

## 2023-01-30 RX ADMIN — METRONIDAZOLE 500 MG: 500 INJECTION, SOLUTION INTRAVENOUS at 00:04

## 2023-01-30 RX ADMIN — METRONIDAZOLE 500 MG: 500 INJECTION, SOLUTION INTRAVENOUS at 17:22

## 2023-01-30 RX ADMIN — CIPROFLOXACIN 400 MG: 2 INJECTION, SOLUTION INTRAVENOUS at 17:23

## 2023-01-30 RX ADMIN — METRONIDAZOLE 500 MG: 500 INJECTION, SOLUTION INTRAVENOUS at 09:26

## 2023-01-30 RX ADMIN — MORPHINE SULFATE 2 MG: 2 INJECTION, SOLUTION INTRAMUSCULAR; INTRAVENOUS at 00:02

## 2023-01-30 RX ADMIN — LISINOPRIL 20 MG: 20 TABLET ORAL at 09:19

## 2023-01-30 RX ADMIN — CIPROFLOXACIN 400 MG: 2 INJECTION, SOLUTION INTRAVENOUS at 05:44

## 2023-01-30 RX ADMIN — CARVEDILOL 25 MG: 25 TABLET, FILM COATED ORAL at 09:19

## 2023-01-30 NOTE — PROGRESS NOTES
[facility]  Progress Note    Pt Name: Ray Green  MRN: [id number]  YOB: 1990  Date of evaluation: 2023    Chief Complaint: abdominal pain      Subjective:    No acute events overnight  Pain much improved  No nausea  Passing flatus, majority of pain is when straining for flatus    Vital Signs:  Patient Vitals for the past 24 hrs:   BP Temp Temp src Pulse Resp SpO2   23 0548 119/77 -- -- 58 16 96 %   23 0032 -- -- -- -- 16 --   23 2355 112/66 97.6 °F (36.4 °C) Oral 68 16 96 %   23 2037 114/75 98.2 °F (36.8 °C) Oral 62 16 96 %   23 1811 (!) 140/73 -- -- 58 -- --   23 1625 -- -- -- -- 16 --   23 1142 119/76 97.7 °F (36.5 °C) Oral 63 18 99 %      TEMPERATURE HISTORY 24H: Temp (24hrs), Av.8 °F (36.6 °C), Min:97.6 °F (36.4 °C), Max:98.2 °F (36.8 °C)    BLOOD PRESSURE HISTORY: Systolic (57QNZ), KEYANA:992 , Min:92 , YKO:742    Diastolic (11RZM), KHM:87, Min:60, Max:77      Intake/Output:    I/O last 3 completed shifts: In: 516.3 [I.V.:224.8; IV Piggyback:291.6]  Out: -   No intake/output data recorded. Drain/tube Output:           Physical Exam:  General: awake, alert, no acute distress  Cardiac: regular rate and rhythm   Pulmonary: clear to auscultation bilaterally   Abdomen: soft, non-distended, mild left lower quadrant tenderness without peritonitis    Labs:  CBC:    Recent Labs     23  1509 23  0610 23  0438   WBC 13.3* 10.1 9.1   HGB 14.0 12.5* 12.3*   HCT 42.6 36.4* 35.4*    271 267     BMP:    Recent Labs     23  1509 23  0610 23  0438    136 135*   K 3.9 4.2 3.7   CL 98* 100 101   CO2 22 25 24   BUN 15 14 13   CREATININE 1.0 0.8* 0.9   GLUCOSE 94 124* 84     Hepatic:   Recent Labs     23  1509 23  0610   AST 13* 19   ALT 10 11   BILITOT 0.6 0.7   ALKPHOS 61 59     Amylase: No results for input(s): AMYLASE in the last 72 hours.   Lipase: No results for input(s): LIPASE in the last 72 hours. Mag:      Recent Labs     01/29/23  0610   MG 1.80      Phos:     Recent Labs     01/29/23  0610   PHOS 3.2      Coags:   Recent Labs     01/29/23  0610   INR 1.13   APTT 33.9       Cultures:  Relevant cultures documented under results section     Pathology:   No relevant pathology     Imaging:  I have personally reviewed the following films:    CT ABDOMEN PELVIS WO CONTRAST Additional Contrast? None    Result Date: 1/28/2023  EXAMINATION: CT OF THE ABDOMEN AND PELVIS WITHOUT CONTRAST 1/28/2023 3:02 pm TECHNIQUE: CT of the abdomen and pelvis was performed without the administration of intravenous contrast. Multiplanar reformatted images are provided for review. Automated exposure control, iterative reconstruction, and/or weight based adjustment of the mA/kV was utilized to reduce the radiation dose to as low as reasonably achievable. COMPARISON: 07/27/2022 HISTORY: ORDERING SYSTEM PROVIDED HISTORY: abd cramping/rectal bleeding TECHNOLOGIST PROVIDED HISTORY: Reason for exam:->abd cramping/rectal bleeding Additional Contrast?->None Decision Support Exception - unselect if not a suspected or confirmed emergency medical condition->Emergency Medical Condition (MA) Reason for Exam: abd cramping/rectal bleeding FINDINGS: Lower Chest: There is bibasilar scarring. Organs: The unenhanced liver, spleen, pancreas, adrenal glands and kidneys are unremarkable. There is no hydronephrosis or obstructive uropathy. GI/Bowel: There is no bowel obstruction. The appendix is within normal limits. Scattered diverticula involve the descending and sigmoid colon. However, there is moderate pericolonic inflammation and circumferential wall thickening involving the distal sigmoid colon. The inflammation extends inferiorly into the rectum and anteriorly involving an adjacent loop of mid sigmoid colon.   Several locules of air are likely extraluminal within the deep pelvis without a drainable fluid collection. The lack of intravenous contrast limits evaluation. Pelvis: The unenhanced pelvic viscera are within normal limits. Peritoneum/Retroperitoneum: There is no evidence of free fluid or adenopathy. Bones/Soft Tissues: Degenerative changes involve the thoracolumbar spine and bilateral sacroiliac joints. There is mild bilateral gynecomastia. 1. Acute diverticulitis involving the sigmoid colon with extraluminal locules of air likely due to a contained perforation and phlegmonous changes. Scheduled Meds:   ciprofloxacin  400 mg IntraVENous Q12H    metroNIDAZOLE  500 mg IntraVENous Q8H    lisinopril  20 mg Oral Daily    carvedilol  25 mg Oral BID WC    sodium chloride flush  5-40 mL IntraVENous 2 times per day     Continuous Infusions:   sodium chloride 100 mL/hr at 01/29/23 2035    sodium chloride 5 mL/hr at 01/28/23 2259     PRN Meds:.morphine, sodium chloride flush, sodium chloride, ondansetron **OR** ondansetron, polyethylene glycol, acetaminophen **OR** acetaminophen      Assessment:  Patient Active Problem List   Diagnosis    Diverticulitis    Diverticulitis of large intestine with perforation without bleeding    Hypertensive urgency    Class 3 severe obesity due to excess calories with serious comorbidity and body mass index (BMI) of 40.0 to 44.9 in adult [facility])    Primary hypertension    Impaired glucose regulation     Acute sigmoid diverticulitis with perforation  Hypertension  Morbid obesity, BMI 42.37     Plan:  1. Abdominal exam benign, vital signs stable, no signs of toxicity. Does not need surgical exploration unless condition deteriorates   2. Advance to clear liquids  3. Monitor bowel function, passing flatus but no stool yet  4. IVF, monitor and replace electrolytes as needed  5. Antibiotics, will switch to PO at discharge  6. Pain medication and antiemetics as needed with caution as may mask worsening exam  7.  Would benefit from sigmoidectomy electively after discharge given recurrent symptoms and multiple hospital admissions within a year. If necessary, best chances of successful minimally invasive resection without need for temporary ostomy are with elective procedure  9. Defer management of remainder of other medical conditions to primary and consulting teams    Darell Redd MD, FACS  1/30/2023  9:14 AM

## 2023-01-30 NOTE — PROGRESS NOTES
[facility] PROGRESS NOTE    1/30/2023 9:50 AM        Name: Nerissa Ceron . Admitted: 1/28/2023  Primary Care Provider: JESSICA Nance CNP (Tel: [phone])                        Subjective:  . No acute events overnight. Resting well. Pain control. Diet ok. Labs reviewed  Denies any chest pain sob. Reviewed interval ancillary notes    Current Medications  ciprofloxacin (CIPRO) IVPB 400 mg, Q12H  metronidazole (FLAGYL) 500 mg in 0.9% NaCl 100 mL IVPB premix, Q8H  0.9 % sodium chloride infusion, Continuous  morphine (PF) injection 2 mg, Q4H PRN  lisinopril (PRINIVIL;ZESTRIL) tablet 20 mg, Daily  carvedilol (COREG) tablet 25 mg, BID WC  sodium chloride flush 0.9 % injection 5-40 mL, 2 times per day  sodium chloride flush 0.9 % injection 5-40 mL, PRN  0.9 % sodium chloride infusion, PRN  ondansetron (ZOFRAN-ODT) disintegrating tablet 4 mg, Q8H PRN   Or  ondansetron (ZOFRAN) injection 4 mg, Q6H PRN  polyethylene glycol (GLYCOLAX) packet 17 g, Daily PRN  acetaminophen (TYLENOL) tablet 650 mg, Q6H PRN   Or  acetaminophen (TYLENOL) suppository 650 mg, Q6H PRN        Objective:  /82   Pulse 57   Temp 97.2 °F (36.2 °C) (Oral)   Resp 16   Ht 6' 2\" (1.88 m)   Wt (!) 330 lb (149.7 kg)   SpO2 99%   BMI 42.37 kg/m²   No intake or output data in the 24 hours ending 01/30/23 0950   Wt Readings from Last 3 Encounters:   01/28/23 (!) 330 lb (149.7 kg)   12/19/22 (!) 335 lb (152 kg)   09/23/22 (!) 315 lb (142.9 kg)       General appearance:  Appears comfortable  Eyes: Sclera clear. Pupils equal.  ENT: Moist oral mucosa. Trachea midline, no adenopathy. Cardiovascular: Regular rhythm, normal S1, S2. No murmur. No edema in lower extremities  Respiratory: Not using accessory muscles. Good inspiratory effort. Clear to auscultation bilaterally, no wheeze or crackles. GI: Abdomen soft, no tenderness, not distended, normal bowel sounds  Musculoskeletal: No cyanosis in digits, neck supple  Neurology: CN 2-12 grossly intact. No speech or motor deficits  Psych: Normal affect. Alert and oriented in time, place and person  Skin: Warm, dry, normal turgor    Labs and Tests:  CBC:   Recent Labs     01/28/23  1509 01/29/23  0610 01/30/23  0438   WBC 13.3* 10.1 9.1   HGB 14.0 12.5* 12.3*    271 267     BMP:    Recent Labs     01/28/23  1509 01/29/23  0610 01/30/23  0438    136 135*   K 3.9 4.2 3.7   CL 98* 100 101   CO2 22 25 24   BUN 15 14 13   CREATININE 1.0 0.8* 0.9   GLUCOSE 94 124* 84     Hepatic:   Recent Labs     01/28/23  1509 01/29/23  0610   AST 13* 19   ALT 10 11   BILITOT 0.6 0.7   ALKPHOS 61 59       Discussed care with patient             Problem List  Principal Problem:    Diverticulitis  Resolved Problems:    * No resolved hospital problems. *       Assessment & Plan:   Diverticulitis with microperforation hemorrhage  -Improving slowly. Continue antibiotics leukocytosis improved  Plan for liquid today further recommendation to follow conservative management  Globin is stable      Diet: ADULT DIET;  Clear Liquid  Code:Full Code  DVT PPX lovenox       Jhonathan Barton MD   1/30/2023 9:50 AM

## 2023-01-31 ENCOUNTER — TELEPHONE (OUTPATIENT)
Dept: SURGERY | Age: 33
End: 2023-01-31

## 2023-01-31 VITALS
BODY MASS INDEX: 40.43 KG/M2 | DIASTOLIC BLOOD PRESSURE: 83 MMHG | WEIGHT: 315 LBS | SYSTOLIC BLOOD PRESSURE: 125 MMHG | RESPIRATION RATE: 16 BRPM | HEIGHT: 74 IN | HEART RATE: 58 BPM | OXYGEN SATURATION: 97 % | TEMPERATURE: 97.4 F

## 2023-01-31 PROBLEM — K57.21 DIVERTICULITIS OF LARGE INTESTINE WITH PERFORATION AND ABSCESS WITH BLEEDING: Status: ACTIVE | Noted: 2022-07-28

## 2023-01-31 LAB
ANION GAP SERPL CALCULATED.3IONS-SCNC: 11 MMOL/L (ref 3–16)
BASOPHILS ABSOLUTE: 0 K/UL (ref 0–0.2)
BASOPHILS RELATIVE PERCENT: 0.5 %
BUN BLDV-MCNC: 10 MG/DL (ref 7–20)
CALCIUM SERPL-MCNC: 9.2 MG/DL (ref 8.3–10.6)
CHLORIDE BLD-SCNC: 102 MMOL/L (ref 99–110)
CO2: 23 MMOL/L (ref 21–32)
CREAT SERPL-MCNC: 0.9 MG/DL (ref 0.9–1.3)
EOSINOPHILS ABSOLUTE: 0.1 K/UL (ref 0–0.6)
EOSINOPHILS RELATIVE PERCENT: 0.8 %
GFR SERPL CREATININE-BSD FRML MDRD: >60 ML/MIN/{1.73_M2}
GLUCOSE BLD-MCNC: 95 MG/DL (ref 70–99)
HCT VFR BLD CALC: 37 % (ref 40.5–52.5)
HEMOGLOBIN: 12.5 G/DL (ref 13.5–17.5)
LYMPHOCYTES ABSOLUTE: 1.7 K/UL (ref 1–5.1)
LYMPHOCYTES RELATIVE PERCENT: 19.8 %
MCH RBC QN AUTO: 31.4 PG (ref 26–34)
MCHC RBC AUTO-ENTMCNC: 33.8 G/DL (ref 31–36)
MCV RBC AUTO: 93 FL (ref 80–100)
MONOCYTES ABSOLUTE: 0.7 K/UL (ref 0–1.3)
MONOCYTES RELATIVE PERCENT: 8.2 %
NEUTROPHILS ABSOLUTE: 5.9 K/UL (ref 1.7–7.7)
NEUTROPHILS RELATIVE PERCENT: 70.7 %
PDW BLD-RTO: 12.5 % (ref 12.4–15.4)
PLATELET # BLD: 269 K/UL (ref 135–450)
PMV BLD AUTO: 7.9 FL (ref 5–10.5)
POTASSIUM SERPL-SCNC: 4 MMOL/L (ref 3.5–5.1)
RBC # BLD: 3.98 M/UL (ref 4.2–5.9)
SODIUM BLD-SCNC: 136 MMOL/L (ref 136–145)
WBC # BLD: 8.4 K/UL (ref 4–11)

## 2023-01-31 PROCEDURE — 85025 COMPLETE CBC W/AUTO DIFF WBC: CPT

## 2023-01-31 PROCEDURE — 99232 SBSQ HOSP IP/OBS MODERATE 35: CPT | Performed by: SURGERY

## 2023-01-31 PROCEDURE — 2580000003 HC RX 258: Performed by: FAMILY MEDICINE

## 2023-01-31 PROCEDURE — 80048 BASIC METABOLIC PNL TOTAL CA: CPT

## 2023-01-31 PROCEDURE — 2500000003 HC RX 250 WO HCPCS: Performed by: FAMILY MEDICINE

## 2023-01-31 PROCEDURE — 6360000002 HC RX W HCPCS: Performed by: FAMILY MEDICINE

## 2023-01-31 PROCEDURE — 36415 COLL VENOUS BLD VENIPUNCTURE: CPT

## 2023-01-31 PROCEDURE — 6370000000 HC RX 637 (ALT 250 FOR IP): Performed by: FAMILY MEDICINE

## 2023-01-31 PROCEDURE — APPNB30 APP NON BILLABLE TIME 0-30 MINS: Performed by: NURSE PRACTITIONER

## 2023-01-31 RX ORDER — CIPROFLOXACIN 500 MG/1
500 TABLET, FILM COATED ORAL 2 TIMES DAILY
Qty: 20 TABLET | Refills: 0 | Status: SHIPPED | OUTPATIENT
Start: 2023-01-31 | End: 2023-02-10

## 2023-01-31 RX ORDER — METRONIDAZOLE 500 MG/1
500 TABLET ORAL 3 TIMES DAILY
Qty: 30 TABLET | Refills: 0 | Status: SHIPPED | OUTPATIENT
Start: 2023-01-31 | End: 2023-02-10

## 2023-01-31 RX ADMIN — LISINOPRIL 20 MG: 20 TABLET ORAL at 08:26

## 2023-01-31 RX ADMIN — SODIUM CHLORIDE, PRESERVATIVE FREE 10 ML: 5 INJECTION INTRAVENOUS at 08:27

## 2023-01-31 RX ADMIN — SODIUM CHLORIDE: 9 INJECTION, SOLUTION INTRAVENOUS at 09:30

## 2023-01-31 RX ADMIN — METRONIDAZOLE 500 MG: 500 INJECTION, SOLUTION INTRAVENOUS at 03:09

## 2023-01-31 RX ADMIN — CIPROFLOXACIN 400 MG: 2 INJECTION, SOLUTION INTRAVENOUS at 06:37

## 2023-01-31 RX ADMIN — CARVEDILOL 25 MG: 25 TABLET, FILM COATED ORAL at 08:26

## 2023-01-31 NOTE — PLAN OF CARE
Shift assessment complete. VSS. Medications administered per order. Pt tolerating IV antibiotics overnight. Denied pain. Call light in reach. The care plan and education has been reviewed and mutually agreed upon with the patient. Patient remains free from falls. All fall precautions in place. Yellow bracelet on patient. SAFE sign on door. Bed and chair alarms being used. Bed in lowest position.  Will monitor.   -------------------------------------------  Problem: Discharge Planning  Goal: Discharge to home or other facility with appropriate resources  Outcome: Progressing     Problem: Pain  Goal: Verbalizes/displays adequate comfort level or baseline comfort level  Outcome: Progressing     Problem: ABCDS Injury Assessment  Goal: Absence of physical injury  Outcome: Progressing

## 2023-01-31 NOTE — PROGRESS NOTES
[facility] PROGRESS NOTE    1/31/2023 10:37 AM        Name: Maite Sandoval . Admitted: 1/28/2023  Primary Care Provider: JESSICA Petit CNP (Tel: [phone])                        Subjective:  . No acute events overnight. Resting well. Pain control. Diet ok. Labs reviewed  Denies any chest pain sob. Reviewed interval ancillary notes    Current Medications  ciprofloxacin (CIPRO) IVPB 400 mg, Q12H  metronidazole (FLAGYL) 500 mg in 0.9% NaCl 100 mL IVPB premix, Q8H  0.9 % sodium chloride infusion, Continuous  morphine (PF) injection 2 mg, Q4H PRN  lisinopril (PRINIVIL;ZESTRIL) tablet 20 mg, Daily  carvedilol (COREG) tablet 25 mg, BID WC  sodium chloride flush 0.9 % injection 5-40 mL, 2 times per day  sodium chloride flush 0.9 % injection 5-40 mL, PRN  0.9 % sodium chloride infusion, PRN  ondansetron (ZOFRAN-ODT) disintegrating tablet 4 mg, Q8H PRN   Or  ondansetron (ZOFRAN) injection 4 mg, Q6H PRN  polyethylene glycol (GLYCOLAX) packet 17 g, Daily PRN  acetaminophen (TYLENOL) tablet 650 mg, Q6H PRN   Or  acetaminophen (TYLENOL) suppository 650 mg, Q6H PRN      Objective:  /83   Pulse 58   Temp 97.4 °F (36.3 °C) (Oral)   Resp 16   Ht 6' 2\" (1.88 m)   Wt (!) 330 lb (149.7 kg)   SpO2 97%   BMI 42.37 kg/m²     Intake/Output Summary (Last 24 hours) at 1/31/2023 1037  Last data filed at 1/30/2023 1438  Gross per 24 hour   Intake 4034.56 ml   Output --   Net 4034.56 ml      Wt Readings from Last 3 Encounters:   01/28/23 (!) 330 lb (149.7 kg)   12/19/22 (!) 335 lb (152 kg)   09/23/22 (!) 315 lb (142.9 kg)       General appearance:  Appears comfortable  Eyes: Sclera clear. Pupils equal.  ENT: Moist oral mucosa. Trachea midline, no adenopathy. Cardiovascular: Regular rhythm, normal S1, S2. No murmur.  No edema in lower extremities  Respiratory: Not using accessory muscles. Good inspiratory effort. Clear to auscultation bilaterally, no wheeze or crackles. GI: Abdomen soft, no tenderness, not distended, normal bowel sounds  Musculoskeletal: No cyanosis in digits, neck supple  Neurology: CN 2-12 grossly intact. No speech or motor deficits  Psych: Normal affect. Alert and oriented in time, place and person  Skin: Warm, dry, normal turgor    Labs and Tests:  CBC:   Recent Labs     01/29/23  0610 01/30/23  0438 01/31/23  0502   WBC 10.1 9.1 8.4   HGB 12.5* 12.3* 12.5*    267 269     BMP:    Recent Labs     01/29/23  0610 01/30/23  0438 01/31/23  0502    135* 136   K 4.2 3.7 4.0    101 102   CO2 25 24 23   BUN 14 13 10   CREATININE 0.8* 0.9 0.9   GLUCOSE 124* 84 95     Hepatic:   Recent Labs     01/28/23  1509 01/29/23  0610   AST 13* 19   ALT 10 11   BILITOT 0.6 0.7   ALKPHOS 61 59       Discussed care with patient             Problem List  Principal Problem:    Diverticulitis  Resolved Problems:    * No resolved hospital problems. *       Assessment & Plan:   Diverticulitis with microperforation hemorrhage  -Improving no further events  WBC simproved  -tolerating diet   - dc once ok with gen surg       Diet: ADULT DIET;  Regular; Low Fiber  Code:Full Code  DVT PPX lovenox       Hai Manzanares MD   1/31/2023 10:37 AM

## 2023-01-31 NOTE — TELEPHONE ENCOUNTER
Pt called wanting a return to work note. I wasn't sure if we were responsible for that or if the other admitting Dr was. And I was also unclear about if there were restrictions.   Please Advice

## 2023-01-31 NOTE — PROGRESS NOTES
[facility]  Progress Note    Pt Name: Braydon Mckeon  MRN: [id number]  [place]: 1990  Date of evaluation: 2023    Chief Complaint: abdominal pain      Subjective:    No acute events overnight  Pain resolved  Tolerating liquids  Passing flatus and stool    Vital Signs:  Patient Vitals for the past 24 hrs:   BP Temp Temp src Pulse Resp SpO2   23 0815 125/83 97.4 °F (36.3 °C) Oral 58 16 97 %   23 0500 112/78 (!) 96.4 °F (35.8 °C) Axillary 54 16 98 %   23 0030 (!) 91/56 97 °F (36.1 °C) Axillary 55 16 98 %   23 2300 101/65 97.6 °F (36.4 °C) Oral 56 18 98 %   23 1729 112/73 -- -- 55 -- --   23 1701 119/79 97.6 °F (36.4 °C) Axillary 57 16 97 %   23 1245 103/64 97.5 °F (36.4 °C) Oral 57 16 99 %      TEMPERATURE HISTORY 24H: Temp (24hrs), Av.3 °F (36.3 °C), Min:96.4 °F (35.8 °C), Max:97.6 °F (36.4 °C)    BLOOD PRESSURE HISTORY: Systolic (97XYQ), BRITNI:644 , Min:91 , VNU:242    Diastolic (62IIF), ZYV:54, Min:56, Max:83      Intake/Output:    I/O last 3 completed shifts: In: 4034.6 [I.V.:3133.1; IV Piggyback:901.5]  Out: -   No intake/output data recorded. Drain/tube Output:           Physical Exam:  General: awake, alert, no acute distress  Cardiac: regular rate and rhythm   Pulmonary: clear to auscultation bilaterally   Abdomen: soft, non-distended, non-tender    Labs:  CBC:    Recent Labs     23  0610 23  0438 23  0502   WBC 10.1 9.1 8.4   HGB 12.5* 12.3* 12.5*   HCT 36.4* 35.4* 37.0*    267 269     BMP:    Recent Labs     23  0610 23  0438 23  0502    135* 136   K 4.2 3.7 4.0    101 102   CO2 25 24 23   BUN 14 13 10   CREATININE 0.8* 0.9 0.9   GLUCOSE 124* 84 95     Hepatic:   Recent Labs     23  1509 23  0610   AST 13* 19   ALT 10 11   BILITOT 0.6 0.7   ALKPHOS 61 59     Amylase: No results for input(s): AMYLASE in the last 72 hours.   Lipase: No results for input(s): LIPASE in the last 72 hours. Mag:      Recent Labs     01/29/23  0610   MG 1.80      Phos:     Recent Labs     01/29/23  0610   PHOS 3.2      Coags:   Recent Labs     01/29/23  0610   INR 1.13   APTT 33.9       Cultures:  Relevant cultures documented under results section     Pathology:   No relevant pathology     Imaging:  I have personally reviewed the following films:    CT ABDOMEN PELVIS WO CONTRAST Additional Contrast? None    Result Date: 1/28/2023  EXAMINATION: CT OF THE ABDOMEN AND PELVIS WITHOUT CONTRAST 1/28/2023 3:02 pm TECHNIQUE: CT of the abdomen and pelvis was performed without the administration of intravenous contrast. Multiplanar reformatted images are provided for review. Automated exposure control, iterative reconstruction, and/or weight based adjustment of the mA/kV was utilized to reduce the radiation dose to as low as reasonably achievable. COMPARISON: 07/27/2022 HISTORY: ORDERING SYSTEM PROVIDED HISTORY: abd cramping/rectal bleeding TECHNOLOGIST PROVIDED HISTORY: Reason for exam:->abd cramping/rectal bleeding Additional Contrast?->None Decision Support Exception - unselect if not a suspected or confirmed emergency medical condition->Emergency Medical Condition (MA) Reason for Exam: abd cramping/rectal bleeding FINDINGS: Lower Chest: There is bibasilar scarring. Organs: The unenhanced liver, spleen, pancreas, adrenal glands and kidneys are unremarkable. There is no hydronephrosis or obstructive uropathy. GI/Bowel: There is no bowel obstruction. The appendix is within normal limits. Scattered diverticula involve the descending and sigmoid colon. However, there is moderate pericolonic inflammation and circumferential wall thickening involving the distal sigmoid colon. The inflammation extends inferiorly into the rectum and anteriorly involving an adjacent loop of mid sigmoid colon.   Several locules of air are likely extraluminal within the deep pelvis without a drainable fluid collection. The lack of intravenous contrast limits evaluation. Pelvis: The unenhanced pelvic viscera are within normal limits. Peritoneum/Retroperitoneum: There is no evidence of free fluid or adenopathy. Bones/Soft Tissues: Degenerative changes involve the thoracolumbar spine and bilateral sacroiliac joints. There is mild bilateral gynecomastia. 1. Acute diverticulitis involving the sigmoid colon with extraluminal locules of air likely due to a contained perforation and phlegmonous changes. Scheduled Meds:   ciprofloxacin  400 mg IntraVENous Q12H    metroNIDAZOLE  500 mg IntraVENous Q8H    lisinopril  20 mg Oral Daily    carvedilol  25 mg Oral BID WC    sodium chloride flush  5-40 mL IntraVENous 2 times per day     Continuous Infusions:   sodium chloride 100 mL/hr at 01/31/23 0930    sodium chloride Stopped (01/29/23 0803)     PRN Meds:.morphine, sodium chloride flush, sodium chloride, ondansetron **OR** ondansetron, polyethylene glycol, acetaminophen **OR** acetaminophen      Assessment:  Patient Active Problem List   Diagnosis    Diverticulitis    Diverticulitis of large intestine with perforation without bleeding    Hypertensive urgency    Class 3 severe obesity due to excess calories with serious comorbidity and body mass index (BMI) of 40.0 to 44.9 in adult [facility])    Primary hypertension    Impaired glucose regulation     Acute sigmoid diverticulitis with perforation  Hypertension  Morbid obesity, BMI 42.37     Plan:  1. Abdominal exam benign, vital signs stable, no signs of toxicity. Does not need emergent/urgent surgical exploration  2. Advance to low residue diet  3. Passing stool  4. Antibiotics, will switch to PO at discharge  5. Pain medication and antiemetics as needed with caution as may mask worsening exam, pain resolved  6. Would benefit from sigmoidectomy electively after discharge given recurrent symptoms and multiple hospital admissions within a year.  If necessary, best chances of successful minimally invasive resection without need for temporary ostomy are with elective procedure  7. Defer management of remainder of other medical conditions to primary and consulting teams  8. Discharge planning, may discharge from surgical standpoint if tolerates diet, follow in office 2 weeks    Darell John MD, FACS  1/31/2023  10:15 AM

## 2023-01-31 NOTE — PROGRESS NOTES
Pt discharged as per physician order. Pt's belonging packed and taken with pt while transferring. Peripheral iv removed. All questions and queries answered. Discharge instruction reviewed. Pt stable at this time. Pt being transported by family. Pt dropped to the car via wheelchair.

## 2023-02-01 NOTE — DISCHARGE SUMMARY
[facility] DISCHARGE SUMMARY    Patient Demographics    Patient. Carmel Cruz  Date of Birth. 1990  MRN. [id number]     Primary care provider. JESSICA Henry CNP  (Tel: [phone])    Admit date: 1/28/2023    Discharge date (blank if same as Note Date): 1/31/2023  Note Date: 2/1/2023     Reason for Hospitalization. Chief Complaint   Patient presents with    Rectal Bleeding     Pt reports HX diverticulitis, states today he started with blood in stool            [facility] Course. Diverticulitis with microperforation hemorrhage  -Improving. Conservatively no surgery needed. WBC simproved  -tolerating diet patient discharged on antibiotics outpatient follow-up with general surgery at some point will need elective surgery    Consults. IP CONSULT TO GENERAL SURGERY  IP CONSULT TO HOSPITALIST    Physical examination on discharge day. /83   Pulse 58   Temp 97.4 °F (36.3 °C) (Oral)   Resp 16   Ht 6' 2\" (1.88 m)   Wt (!) 330 lb (149.7 kg)   SpO2 97%   BMI 42.37 kg/m²   General appearance. Alert. Looks comfortable. HEENT. Sclera clear. Moist mucus membranes. Cardiovascular. Regular rate and rhythm, normal S1, S2. No murmur. Respiratory. Not using accessory muscles. Clear to auscultation bilaterally, no wheeze. Gastrointestinal. Abdomen soft, non-tender, not distended, normal bowel sounds  Neurology. Facial symmetry. No speech deficits. Moving all extremities equally. Extremities. No edema in lower extremities. Skin. Warm, dry, normal turgor    Condition at time of discharge stable     Medication instructions provided to patient at discharge.      Medication List        START taking these medications      ciprofloxacin 500 MG tablet  Commonly known as: Cipro  Take 1 tablet by mouth 2 times daily for 10 days     metroNIDAZOLE 500 MG tablet  Commonly known as: Flagyl  Take 1 tablet by mouth 3 times daily for 10 days            CONTINUE taking these medications      carvedilol 25 MG tablet  Commonly known as: COREG  Take 1 tablet by mouth 2 times daily (with meals)     hydroCHLOROthiazide 25 MG tablet  Commonly known as: HYDRODIURIL  Take 1 tablet by mouth daily     lisinopril 20 MG tablet  Commonly known as: PRINIVIL;ZESTRIL  Take 1 tablet by mouth daily     MULTI-VITAMIN PO     spironolactone 25 MG tablet  Commonly known as: ALDACTONE  Take 1 tablet by mouth in the morning and 1 tablet in the evening. Where to Get Your Medications        You can get these medications from any pharmacy    Bring a paper prescription for each of these medications  ciprofloxacin 500 MG tablet  metroNIDAZOLE 500 MG tablet         Discharge recommendations given to patient. Follow Up. pcp in 1 week   Disposition. home  Activity. activity as tolerated  Diet: No diet orders on file      Spent 45  minutes in discharge process.     Signed:  Justin Spear MD     2/1/2023 11:49 AM

## 2023-02-15 ENCOUNTER — OFFICE VISIT (OUTPATIENT)
Dept: SURGERY | Age: 33
End: 2023-02-15
Payer: COMMERCIAL

## 2023-02-15 VITALS — BODY MASS INDEX: 43.14 KG/M2 | DIASTOLIC BLOOD PRESSURE: 76 MMHG | WEIGHT: 315 LBS | SYSTOLIC BLOOD PRESSURE: 118 MMHG

## 2023-02-15 DIAGNOSIS — K57.20 DIVERTICULITIS OF LARGE INTESTINE WITH PERFORATION WITHOUT BLEEDING: Primary | ICD-10-CM

## 2023-02-15 PROCEDURE — 3074F SYST BP LT 130 MM HG: CPT | Performed by: SURGERY

## 2023-02-15 PROCEDURE — 99213 OFFICE O/P EST LOW 20 MIN: CPT | Performed by: SURGERY

## 2023-02-15 PROCEDURE — 3078F DIAST BP <80 MM HG: CPT | Performed by: SURGERY

## 2023-02-15 NOTE — PATIENT INSTRUCTIONS
Continue low residue diet  Activity as tolerated  Does not need additional antibiotics  Would benefit from elective sigmoidectomy given recurrent symptoms. Ideally wait 2 to 3 months after recent episode.   Will return to office in 1 month to discuss progress and likely schedule elective sigmoidectomy

## 2023-02-15 NOTE — PROGRESS NOTES
[facility]  SUBJECTIVE:    Chief Complaint: abdominal pain    Kiara Arvizu   1990   27 y.o. male presents for follow-up after recent hospital admission for acute microperforated diverticulitis of the sigmoid colon. Symptoms have completely resolved. Tolerated diet, low residue diet. Pain resolved. Bowels normal.  No complaints. This episode was 2023 and also had another episode 2022 both resolved with conservative management. Has had recent colonoscopy which was negative except for diverticulosis as expected and a benign polyp. No other significant medical conditions other than hypertension and obesity. Never had abdominal surgery    Past Medical History:   Diagnosis Date    HTN (hypertension)      No past surgical history on file.   Social History     Socioeconomic History    Marital status: Single     Spouse name: Not on file    Number of children: Not on file    Years of education: Not on file    Highest education level: Not on file   Occupational History    Not on file   Tobacco Use    Smoking status: Former     Packs/day: 1.00     Types: Cigarettes     Quit date: 2022     Years since quittin.5    Smokeless tobacco: Never   Substance and Sexual Activity    Alcohol use: Never    Drug use: Yes     Types: Marijuana Marito Kos)    Sexual activity: Not on file   Other Topics Concern    Not on file   Social History Narrative    Not on file     Social Determinants of Health     Financial Resource Strain: Not on file   Food Insecurity: Not on file   Transportation Needs: Not on file   Physical Activity: Not on file   Stress: Not on file   Social Connections: Not on file   Intimate Partner Violence: Not on file   Housing Stability: Not on file      Family History   Problem Relation Age of Onset    Diabetes Mother     Kidney Disease Mother      Current Outpatient Medications   Medication Sig Dispense Refill    carvedilol (COREG) 25 MG tablet Take 1 tablet by mouth 2 times daily (with meals) 180 tablet 0    hydroCHLOROthiazide (HYDRODIURIL) 25 MG tablet Take 1 tablet by mouth daily 90 tablet 0    lisinopril (PRINIVIL;ZESTRIL) 20 MG tablet Take 1 tablet by mouth daily 90 tablet 0    spironolactone (ALDACTONE) 25 MG tablet Take 1 tablet by mouth in the morning and 1 tablet in the evening. 180 tablet 0    Multiple Vitamin (MULTI-VITAMIN PO) Take by mouth daily       No current facility-administered medications for this visit. No Known Allergies     Review of Systems:  Review of systems performed and negative with the exception of the above findings    OBJECTIVE:  /76   Wt (!) 336 lb (152.4 kg)   BMI 43.14 kg/m²      Physical Exam:  General appearance: alert, appears stated age, cooperative, and no distress  Head: Normocephalic, without obvious abnormality, atraumatic  Lungs: clear to auscultation bilaterally  Heart: regular rate and rhythm, S1, S2 normal, no murmur, click, rub or gallop  Abdomen: soft, non-distended, non-tender    Admission on 01/28/2023, Discharged on 01/31/2023   Component Date Value Ref Range Status    WBC 01/28/2023 13.3 (A)  4.0 - 11.0 K/uL Final    RBC 01/28/2023 4.50  4. 20 - 5.90 M/uL Final    Hemoglobin 01/28/2023 14.0  13.5 - 17.5 g/dL Final    Hematocrit 01/28/2023 42.6  40.5 - 52.5 % Final    MCV 01/28/2023 94.7  80.0 - 100.0 fL Final    MCH 01/28/2023 31.1  26.0 - 34.0 pg Final    MCHC 01/28/2023 32.9  31.0 - 36.0 g/dL Final    RDW 01/28/2023 13.1  12.4 - 15.4 % Final    Platelets 25/61/7786 279  135 - 450 K/uL Final    MPV 01/28/2023 7.6  5.0 - 10.5 fL Final    Neutrophils % 01/28/2023 70.0  % Final    Lymphocytes % 01/28/2023 20.3  % Final    Monocytes % 01/28/2023 8.7  % Final    Eosinophils % 01/28/2023 0.6  % Final    Basophils % 01/28/2023 0.4  % Final    Neutrophils Absolute 01/28/2023 9.3 (A)  1.7 - 7.7 K/uL Final    Lymphocytes Absolute 01/28/2023 2.7  1.0 - 5.1 K/uL Final    Monocytes Absolute 01/28/2023 1.2  0.0 - 1.3 K/uL Final    Eosinophils Absolute 01/28/2023 0.1  0.0 - 0.6 K/uL Final    Basophils Absolute 01/28/2023 0.1  0.0 - 0.2 K/uL Final    Sodium 01/28/2023 136  136 - 145 mmol/L Final    Potassium 01/28/2023 3.9  3.5 - 5.1 mmol/L Final    Chloride 01/28/2023 98 (A)  99 - 110 mmol/L Final    CO2 01/28/2023 22  21 - 32 mmol/L Final    Anion Gap 01/28/2023 16  3 - 16 Final    Glucose 01/28/2023 94  70 - 99 mg/dL Final    BUN 01/28/2023 15  7 - 20 mg/dL Final    Creatinine 01/28/2023 1.0  0.9 - 1.3 mg/dL Final    Est, Glom Filt Rate 01/28/2023 >60  >60 Final    Comment: Pediatric calculator link  [email or web address]_[email or web address]  Effective Oct 3, 2022  These results are not intended for use in patients  <25years of age. eGFR results are calculated without  a race factor using the 2021 CKD-EPI equation. Careful  clinical correlation is recommended, particularly when  comparing to results calculated using previous equations. The CKD-EPI equation is less accurate in patients with  extremes of muscle mass, extra-renal metabolism of  creatinine, excessive creatinine ingestion, or following  therapy that affects renal tubular secretion.       Calcium 01/28/2023 9.9  8.3 - 10.6 mg/dL Final    Total Protein 01/28/2023 8.2  6.4 - 8.2 g/dL Final    Albumin 01/28/2023 4.2  3.4 - 5.0 g/dL Final    Albumin/Globulin Ratio 01/28/2023 1.1  1.1 - 2.2 Final    Total Bilirubin 01/28/2023 0.6  0.0 - 1.0 mg/dL Final    Alkaline Phosphatase 01/28/2023 61  40 - 129 U/L Final    ALT 01/28/2023 10  10 - 40 U/L Final    AST 01/28/2023 13 (A)  15 - 37 U/L Final    Lactic Acid, Sepsis 01/28/2023 1.0  0.4 - 1.9 mmol/L Final    WBC 01/29/2023 10.1  4.0 - 11.0 K/uL Final    RBC 01/29/2023 3.90 (A)  4.20 - 5.90 M/uL Final    Hemoglobin 01/29/2023 12.5 (A)  13.5 - 17.5 g/dL Final    Hematocrit 01/29/2023 36.4 (A)  40.5 - 52.5 % Final    MCV 01/29/2023 93.4  80.0 - 100.0 fL Final    [facility] 01/29/2023 32.2  26.0 - 34.0 pg Final    MCHC 01/29/2023 34.4  31.0 - 36.0 g/dL Final    RDW 01/29/2023 13.1  12.4 - 15.4 % Final    Platelets 22/26/7372 271  135 - 450 K/uL Final    MPV 01/29/2023 8.2  5.0 - 10.5 fL Final    Neutrophils % 01/29/2023 73.0  % Final    Lymphocytes % 01/29/2023 16.0  % Final    Monocytes % 01/29/2023 9.9  % Final    Eosinophils % 01/29/2023 0.6  % Final    Basophils % 01/29/2023 0.5  % Final    Neutrophils Absolute 01/29/2023 7.4  1.7 - 7.7 K/uL Final    Lymphocytes Absolute 01/29/2023 1.6  1.0 - 5.1 K/uL Final    Monocytes Absolute 01/29/2023 1.0  0.0 - 1.3 K/uL Final    Eosinophils Absolute 01/29/2023 0.1  0.0 - 0.6 K/uL Final    Basophils Absolute 01/29/2023 0.1  0.0 - 0.2 K/uL Final    Sodium 01/29/2023 136  136 - 145 mmol/L Final    Potassium 01/29/2023 4.2  3.5 - 5.1 mmol/L Final    Chloride 01/29/2023 100  99 - 110 mmol/L Final    CO2 01/29/2023 25  21 - 32 mmol/L Final    Anion Gap 01/29/2023 11  3 - 16 Final    Glucose 01/29/2023 124 (A)  70 - 99 mg/dL Final    BUN 01/29/2023 14  7 - 20 mg/dL Final    Creatinine 01/29/2023 0.8 (A)  0.9 - 1.3 mg/dL Final    Est, Glom Filt Rate 01/29/2023 >60  >60 Final    Comment: Pediatric calculator link  [email or web address]_[email or web address]  Effective Oct 3, 2022  These results are not intended for use in patients  <25years of age. eGFR results are calculated without  a race factor using the 2021 CKD-EPI equation. Careful  clinical correlation is recommended, particularly when  comparing to results calculated using previous equations. The CKD-EPI equation is less accurate in patients with  extremes of muscle mass, extra-renal metabolism of  creatinine, excessive creatinine ingestion, or following  therapy that affects renal tubular secretion.       Calcium 01/29/2023 9.1  8.3 - 10.6 mg/dL Final    Total Protein 01/29/2023 7.2  6.4 - 8.2 g/dL Final    Albumin 01/29/2023 3.3 (A)  3.4 - 5.0 g/dL Final    Albumin/Globulin Ratio 01/29/2023 0.8 (A)  1.1 - 2.2 Final    Total Bilirubin 01/29/2023 0.7  0.0 - 1.0 mg/dL Final    Alkaline Phosphatase 01/29/2023 59  40 - 129 U/L Final    ALT 01/29/2023 11  10 - 40 U/L Final    Comment: Specimen hemolysis has exceeded the interference as defined by Roche. Result may be affected. Suggest recollection if clinically indicated. AST 01/29/2023 19  15 - 37 U/L Final    Comment: Specimen hemolysis has exceeded the interference as defined by Roche. Value may be falsely increased. Suggest recollection if clinically  indicated. Protime 01/29/2023 14.5  11.7 - 14.5 sec Final    Comment: Effective 5-25-22 09:00am EST  Please note reference ranges have  changed for PT and INR Testing. INR 01/29/2023 1.13  0.87 - 1.14 Final    Comment: Effective 5/25/22 at 09:00am EST    Normal: 0.87 - 1.14  Therapeutic: 2.0 - 3.0  Pros. Valve: 2.5 - 3.5  AMI: 2.0 - 3.0      Lactic Acid 01/29/2023 0.8  0.4 - 2.0 mmol/L Final    Magnesium 01/29/2023 1.80  1.80 - 2.40 mg/dL Final    Phosphorus 01/29/2023 3.2  2.5 - 4.9 mg/dL Final    Prealbumin 01/29/2023 12.4 (A)  20.0 - 40.0 mg/dL Final    aPTT 01/29/2023 33.9  23.0 - 34.3 sec Final    Comment: Therapeutic range: 73.0 - 102.0 sec    Effective 5-25-22 9:00am EST  Please note reference ranges have  changed for PTT Testing.       Transferrin 01/29/2023 228.0  200.0 - 360.0 mg/dL Final    WBC 01/30/2023 9.1  4.0 - 11.0 K/uL Final    RBC 01/30/2023 3.82 (A)  4.20 - 5.90 M/uL Final    Hemoglobin 01/30/2023 12.3 (A)  13.5 - 17.5 g/dL Final    Hematocrit 01/30/2023 35.4 (A)  40.5 - 52.5 % Final    MCV 01/30/2023 92.7  80.0 - 100.0 fL Final    MCH 01/30/2023 32.3  26.0 - 34.0 pg Final    MCHC 01/30/2023 34.8  31.0 - 36.0 g/dL Final    RDW 01/30/2023 12.8  12.4 - 15.4 % Final    Platelets 66/06/3586 267  135 - 450 K/uL Final    MPV 01/30/2023 8.1  5.0 - 10.5 fL Final    Neutrophils % 01/30/2023 67.3  % Final    Lymphocytes % 01/30/2023 20.8  % Final    Monocytes % 01/30/2023 10.4  % Final    Eosinophils % 01/30/2023 1.0  % Final    Basophils % 01/30/2023 0.5  % Final    Neutrophils Absolute 01/30/2023 6.2  1.7 - 7.7 K/uL Final    Lymphocytes Absolute 01/30/2023 1.9  1.0 - 5.1 K/uL Final    Monocytes Absolute 01/30/2023 0.9  0.0 - 1.3 K/uL Final    Eosinophils Absolute 01/30/2023 0.1  0.0 - 0.6 K/uL Final    Basophils Absolute 01/30/2023 0.0  0.0 - 0.2 K/uL Final    Sodium 01/30/2023 135 (A)  136 - 145 mmol/L Final    Potassium 01/30/2023 3.7  3.5 - 5.1 mmol/L Final    Chloride 01/30/2023 101  99 - 110 mmol/L Final    CO2 01/30/2023 24  21 - 32 mmol/L Final    Anion Gap 01/30/2023 10  3 - 16 Final    Glucose 01/30/2023 84  70 - 99 mg/dL Final    BUN 01/30/2023 13  7 - 20 mg/dL Final    Creatinine 01/30/2023 0.9  0.9 - 1.3 mg/dL Final    Est, Glom Filt Rate 01/30/2023 >60  >60 Final    Comment: Pediatric calculator link  [email or web address]_[email or web address]  Effective Oct 3, 2022  These results are not intended for use in patients  <25years of age. eGFR results are calculated without  a race factor using the 2021 CKD-EPI equation. Careful  clinical correlation is recommended, particularly when  comparing to results calculated using previous equations. The CKD-EPI equation is less accurate in patients with  extremes of muscle mass, extra-renal metabolism of  creatinine, excessive creatinine ingestion, or following  therapy that affects renal tubular secretion.       Calcium 01/30/2023 9.0  8.3 - 10.6 mg/dL Final    WBC 01/31/2023 8.4  4.0 - 11.0 K/uL Final    RBC 01/31/2023 3.98 (A)  4.20 - 5.90 M/uL Final    Hemoglobin 01/31/2023 12.5 (A)  13.5 - 17.5 g/dL Final    Hematocrit 01/31/2023 37.0 (A)  40.5 - 52.5 % Final    MCV 01/31/2023 93.0  80.0 - 100.0 fL Final    MCH 01/31/2023 31.4  26.0 - 34.0 pg Final    MCHC 01/31/2023 33.8  31.0 - 36.0 g/dL Final    RDW 01/31/2023 12.5  12.4 - 15.4 % Final    Platelets 44/64/4463 269  135 - 450 K/uL Final    MPV 01/31/2023 7.9  5.0 - 10.5 fL Final    Neutrophils % 01/31/2023 70.7  % Final    Lymphocytes % 01/31/2023 19.8  % Final    Monocytes % 01/31/2023 8.2  % Final    Eosinophils % 01/31/2023 0.8  % Final    Basophils % 01/31/2023 0.5  % Final    Neutrophils Absolute 01/31/2023 5.9  1.7 - 7.7 K/uL Final    Lymphocytes Absolute 01/31/2023 1.7  1.0 - 5.1 K/uL Final    Monocytes Absolute 01/31/2023 0.7  0.0 - 1.3 K/uL Final    Eosinophils Absolute 01/31/2023 0.1  0.0 - 0.6 K/uL Final    Basophils Absolute 01/31/2023 0.0  0.0 - 0.2 K/uL Final    Sodium 01/31/2023 136  136 - 145 mmol/L Final    Potassium 01/31/2023 4.0  3.5 - 5.1 mmol/L Final    Chloride 01/31/2023 102  99 - 110 mmol/L Final    CO2 01/31/2023 23  21 - 32 mmol/L Final    Anion Gap 01/31/2023 11  3 - 16 Final    Glucose 01/31/2023 95  70 - 99 mg/dL Final    BUN 01/31/2023 10  7 - 20 mg/dL Final    Creatinine 01/31/2023 0.9  0.9 - 1.3 mg/dL Final    Est, Glom Filt Rate 01/31/2023 >60  >60 Final    Comment: Pediatric calculator link  [email or web address]_[email or web address]  Effective Oct 3, 2022  These results are not intended for use in patients  <25years of age. eGFR results are calculated without  a race factor using the 2021 CKD-EPI equation. Careful  clinical correlation is recommended, particularly when  comparing to results calculated using previous equations. The CKD-EPI equation is less accurate in patients with  extremes of muscle mass, extra-renal metabolism of  creatinine, excessive creatinine ingestion, or following  therapy that affects renal tubular secretion.       Calcium 01/31/2023 9.2  8.3 - 10.6 mg/dL Final       CT ABDOMEN PELVIS WO CONTRAST Additional Contrast? None    Result Date: 1/28/2023  EXAMINATION: CT OF THE ABDOMEN AND PELVIS WITHOUT CONTRAST 1/28/2023 3:02 pm TECHNIQUE: CT of the abdomen and pelvis was performed without the administration of intravenous contrast. Multiplanar reformatted images are provided for review. Automated exposure control, iterative reconstruction, and/or weight based adjustment of the mA/kV was utilized to reduce the radiation dose to as low as reasonably achievable. COMPARISON: 07/27/2022 HISTORY: ORDERING SYSTEM PROVIDED HISTORY: abd cramping/rectal bleeding TECHNOLOGIST PROVIDED HISTORY: Reason for exam:->abd cramping/rectal bleeding Additional Contrast?->None Decision Support Exception - unselect if not a suspected or confirmed emergency medical condition->Emergency Medical Condition (MA) Reason for Exam: abd cramping/rectal bleeding FINDINGS: Lower Chest: There is bibasilar scarring. Organs: The unenhanced liver, spleen, pancreas, adrenal glands and kidneys are unremarkable. There is no hydronephrosis or obstructive uropathy. GI/Bowel: There is no bowel obstruction. The appendix is within normal limits. Scattered diverticula involve the descending and sigmoid colon. However, there is moderate pericolonic inflammation and circumferential wall thickening involving the distal sigmoid colon. The inflammation extends inferiorly into the rectum and anteriorly involving an adjacent loop of mid sigmoid colon. Several locules of air are likely extraluminal within the deep pelvis without a drainable fluid collection. The lack of intravenous contrast limits evaluation. Pelvis: The unenhanced pelvic viscera are within normal limits. Peritoneum/Retroperitoneum: There is no evidence of free fluid or adenopathy. Bones/Soft Tissues: Degenerative changes involve the thoracolumbar spine and bilateral sacroiliac joints. There is mild bilateral gynecomastia. 1. Acute diverticulitis involving the sigmoid colon with extraluminal locules of air likely due to a contained perforation and phlegmonous changes.        ASSESSMENT:  Recurrent microperforated sigmoid diverticulitis  HTN  Obesity, BMI 43.14    PLAN:  Continue low residue diet  Activity as tolerated  Does not need additional antibiotics  Would benefit from elective sigmoidectomy given recurrent symptoms. Ideally wait 2 to 3 months after recent episode. Will return to office in 1 month to discuss progress and likely schedule elective sigmoidectomy    Darell Parekh MD, FACS  2/15/2023  1:32 PM

## 2023-03-14 DIAGNOSIS — I10 PRIMARY HYPERTENSION: ICD-10-CM

## 2023-03-14 DIAGNOSIS — R73.09 IMPAIRED GLUCOSE REGULATION: ICD-10-CM

## 2023-03-14 LAB
ALBUMIN SERPL-MCNC: 4.2 G/DL (ref 3.4–5)
ALBUMIN/GLOB SERPL: 1.4 {RATIO} (ref 1.1–2.2)
ALP SERPL-CCNC: 55 U/L (ref 40–129)
ALT SERPL-CCNC: 20 U/L (ref 10–40)
ANION GAP SERPL CALCULATED.3IONS-SCNC: 15 MMOL/L (ref 3–16)
AST SERPL-CCNC: 16 U/L (ref 15–37)
BILIRUB SERPL-MCNC: <0.2 MG/DL (ref 0–1)
BUN SERPL-MCNC: 16 MG/DL (ref 7–20)
CALCIUM SERPL-MCNC: 9.7 MG/DL (ref 8.3–10.6)
CHLORIDE SERPL-SCNC: 106 MMOL/L (ref 99–110)
CO2 SERPL-SCNC: 20 MMOL/L (ref 21–32)
CREAT SERPL-MCNC: 0.9 MG/DL (ref 0.9–1.3)
GFR SERPLBLD CREATININE-BSD FMLA CKD-EPI: >60 ML/MIN/{1.73_M2}
GLUCOSE P FAST SERPL-MCNC: 126 MG/DL (ref 70–99)
POTASSIUM SERPL-SCNC: 4.5 MMOL/L (ref 3.5–5.1)
PROT SERPL-MCNC: 7.3 G/DL (ref 6.4–8.2)
SODIUM SERPL-SCNC: 141 MMOL/L (ref 136–145)

## 2023-03-15 ENCOUNTER — OFFICE VISIT (OUTPATIENT)
Dept: SURGERY | Age: 33
End: 2023-03-15
Payer: COMMERCIAL

## 2023-03-15 VITALS — DIASTOLIC BLOOD PRESSURE: 90 MMHG | WEIGHT: 315 LBS | SYSTOLIC BLOOD PRESSURE: 126 MMHG | BODY MASS INDEX: 43.65 KG/M2

## 2023-03-15 DIAGNOSIS — K57.20 DIVERTICULITIS OF LARGE INTESTINE WITH PERFORATION WITHOUT BLEEDING: Primary | ICD-10-CM

## 2023-03-15 LAB
EST. AVERAGE GLUCOSE BLD GHB EST-MCNC: 108.3 MG/DL
HBA1C MFR BLD: 5.4 %

## 2023-03-15 PROCEDURE — 99213 OFFICE O/P EST LOW 20 MIN: CPT | Performed by: SURGERY

## 2023-03-15 PROCEDURE — 3080F DIAST BP >= 90 MM HG: CPT | Performed by: SURGERY

## 2023-03-15 PROCEDURE — 3074F SYST BP LT 130 MM HG: CPT | Performed by: SURGERY

## 2023-03-15 RX ORDER — ERYTHROMYCIN 500 MG/1
1000 TABLET, COATED ORAL 3 TIMES DAILY
Qty: 6 TABLET | Refills: 0 | Status: SHIPPED | OUTPATIENT
Start: 2023-03-15 | End: 2023-03-16

## 2023-03-15 RX ORDER — NEOMYCIN SULFATE 500 MG/1
1000 TABLET ORAL 3 TIMES DAILY
Qty: 6 TABLET | Refills: 0 | Status: SHIPPED | OUTPATIENT
Start: 2023-03-15 | End: 2023-03-16

## 2023-03-15 RX ORDER — SODIUM CHLORIDE 0.9 % (FLUSH) 0.9 %
5-40 SYRINGE (ML) INJECTION PRN
OUTPATIENT
Start: 2023-03-15

## 2023-03-15 RX ORDER — SODIUM CHLORIDE 0.9 % (FLUSH) 0.9 %
5-40 SYRINGE (ML) INJECTION EVERY 12 HOURS SCHEDULED
OUTPATIENT
Start: 2023-03-15

## 2023-03-15 RX ORDER — SODIUM CHLORIDE 9 MG/ML
INJECTION, SOLUTION INTRAVENOUS PRN
OUTPATIENT
Start: 2023-03-15

## 2023-03-15 NOTE — PATIENT INSTRUCTIONS
1. We discussed the risks of bowel resection being bleeding, infection, damage to surrounding structures, anastomotic leak, possible requirement of an ostomy, possible development of an incisional hernia, possible requirement of additional procedures as well as the perioperative risks related to general anesthesia. The risk is approximately 5%. Increased risk of complication is associated with smoking, diabetes, obesity as well as heavy lifting over 20lbs sooner than 6 weeks after the surgery. Benefits include resolution of abdominal symptoms and definitive tissue diagnosis. The details of the procedure were discussed and all questions were answered. The patient wishes to proceed with resection  2. The importance of strength and nutrition was discussed. The patient will exercise to the best of their ability and optimize their nutrition as well. This will likely include 1-3 protein shakes up to a week preoperatively  3. Will schedule for robot assisted laparoscopic sigmoidectomy with flexible sigmoidoscopy, possible laparotomy, possible ostomy . Will also consult urology for intra-operative placement of ureteral stents for ICG injections  4.  Will need bowel prep with instructions below     Bowel Prep Information (Miralax Prep with Antibiotics)  You will need to purchase (over the counter)  Miralax 238gm/8.3oz, 1 bottle  Dulcolax Laxative Tablets (not Dulcolax stool softener suppository), 5 tablets  Gatorade 32oz, 2 bottles (not red)  You will need the following medication (need prescription)  Neomycin sulfate, 1gm, 3 doses  Erythromycin base, 1gm, 3 doses    Diet Instructions For Day Before Procedure  Normal breakfast  May eat light meal prior to noon  Clear liquids until midnight  Drink a minimum of 1 gallon of clear liquids from lunch to midnight  Nothing to eat or drink after midnight except for medication    Prep Instructions  At 1pm, take 5 Dulcolax laxative tablets with 8oz of clear liquid  At 4pm, mix bottle of 32oz Gatorade with ½ bottle (119gm) Miralax and drink within 2 hours  At 6pm, mix bottle of 32oz Gatorade with ½ bottle (119gm) Miralax and drink within 2 hours    Antibiotic Instructions  Neomycin sulfate, 1gm, take by mouth at 1pm, 2pm, and 10pm the day prior to procedure as directed  Erythromycin base, 1gm, take by mouth at 1pm, 2pm, and 10pm the day prior to procedure as directed    Clear Liquids List  Water, lemonade, strained fruit juices without pulp (apple, white cranberry, orange, white grape, etc.), clear broth or bouillon, coffee or tea without milk or nondairy creamer, Gatorade, soft drinks, Dwayne-Aid, plain Jell-O and popsicles. Do not drink anything colored red    Light Meal List  One boiled or poached egg or small portion of skinless chicken/turkey or fish, plain white toast-no butter, one 8oz can of Ensure.  Do not eat vegetables, fruits, nuts, butter, milk, cheese, beef, pork, lamb, or whole grain cereals

## 2023-03-15 NOTE — PROGRESS NOTES
[facility]  SUBJECTIVE:    Chief Complaint: recurrent diverticulitis    Jaylen Adhikari   1990   32 y.o. male presents to discuss management of recurrent diverticulitis. Last admitted for acute microperforated diverticulitis of the sigmoid colon.  Symptoms have completely resolved.  Tolerating diet, Pain resolved.  Bowels normal.  No complaints.  This episode was 2023 and also had another episode 2022 both resolved with conservative management.  Has had recent colonoscopy which was negative except for diverticulosis as expected and a benign polyp.  No other significant medical conditions other than hypertension and obesity.  Never had abdominal surgery     Past Medical History:   Diagnosis Date    HTN (hypertension)      No past surgical history on file.  Social History     Socioeconomic History    Marital status: Single     Spouse name: Not on file    Number of children: Not on file    Years of education: Not on file    Highest education level: Not on file   Occupational History    Not on file   Tobacco Use    Smoking status: Former     Packs/day: 1.00     Types: Cigarettes     Quit date: 2022     Years since quittin.6    Smokeless tobacco: Never   Substance and Sexual Activity    Alcohol use: Never    Drug use: Yes     Types: Marijuana (Weed)    Sexual activity: Not on file   Other Topics Concern    Not on file   Social History Narrative    Not on file     Social Determinants of Health     Financial Resource Strain: Not on file   Food Insecurity: Not on file   Transportation Needs: Not on file   Physical Activity: Not on file   Stress: Not on file   Social Connections: Not on file   Intimate Partner Violence: Not on file   Housing Stability: Not on file      Family History   Problem Relation Age of Onset    Diabetes Mother     Kidney Disease Mother      Current Outpatient Medications   Medication Sig Dispense Refill    carvedilol  (COREG) 25 MG tablet Take 1 tablet by mouth 2 times daily (with meals) 180 tablet 0    hydroCHLOROthiazide (HYDRODIURIL) 25 MG tablet Take 1 tablet by mouth daily 90 tablet 0    lisinopril (PRINIVIL;ZESTRIL) 20 MG tablet Take 1 tablet by mouth daily 90 tablet 0    spironolactone (ALDACTONE) 25 MG tablet Take 1 tablet by mouth in the morning and 1 tablet in the evening. 180 tablet 0    Multiple Vitamin (MULTI-VITAMIN PO) Take by mouth daily       No current facility-administered medications for this visit. No Known Allergies     Review of Systems:  Review of systems performed and negative with the exception of the above findings    OBJECTIVE:  BP (!) 126/90   Wt (!) 340 lb (154.2 kg)   BMI 43.65 kg/m²      Physical Exam:  General appearance: alert, appears stated age, cooperative, and no distress  Head: Normocephalic, without obvious abnormality, atraumatic  Lungs: clear to auscultation bilaterally  Heart: regular rate and rhythm, S1, S2 normal, no murmur, click, rub or gallop  Abdomen: soft, non-distended, non-tender    Orders Only on 03/14/2023   Component Date Value Ref Range Status    Hemoglobin A1C 03/14/2023 5.4  See comment % Final    Comment: Comment:  Diagnosis of Diabetes: > or = 6.5%  Increased risk of diabetes (Prediabetes): 5.7-6.4%  Glycemic Control: Nonpregnant Adults: <7.0%                    Pregnant: <6.0%        eAG 03/14/2023 108.3  mg/dL Final    Sodium 03/14/2023 141  136 - 145 mmol/L Final    Potassium 03/14/2023 4.5  3.5 - 5.1 mmol/L Final    Chloride 03/14/2023 106  99 - 110 mmol/L Final    CO2 03/14/2023 20 (A)  21 - 32 mmol/L Final    Anion Gap 03/14/2023 15  3 - 16 Final    Glucose, Fasting 03/14/2023 126 (A)  70 - 99 mg/dL Final    BUN 03/14/2023 16  7 - 20 mg/dL Final    Creatinine 03/14/2023 0.9  0.9 - 1.3 mg/dL Final    Est, Glom Filt Rate 03/14/2023 >60  >60 Final    Comment: Pediatric calculator link  [email or web address]_[email or web address]  Effective Oct 3, 2022  These results are not intended for use in patients  <25years of age. eGFR results are calculated without  a race factor using the 2021 CKD-EPI equation. Careful  clinical correlation is recommended, particularly when  comparing to results calculated using previous equations. The CKD-EPI equation is less accurate in patients with  extremes of muscle mass, extra-renal metabolism of  creatinine, excessive creatinine ingestion, or following  therapy that affects renal tubular secretion. Calcium 03/14/2023 9.7  8.3 - 10.6 mg/dL Final    Total Protein 03/14/2023 7.3  6.4 - 8.2 g/dL Final    Albumin 03/14/2023 4.2  3.4 - 5.0 g/dL Final    Albumin/Globulin Ratio 03/14/2023 1.4  1.1 - 2.2 Final    Total Bilirubin 03/14/2023 <0.2  0.0 - 1.0 mg/dL Final    Alkaline Phosphatase 03/14/2023 55  40 - 129 U/L Final    ALT 03/14/2023 20  10 - 40 U/L Final    AST 03/14/2023 16  15 - 37 U/L Final       No results found. ASSESSMENT:  Recurrent microperforated sigmoid diverticulitis  Hypertension  Obesity, BMI 43.65    PLAN:  1. We discussed the risks of bowel resection being bleeding, infection, damage to surrounding structures, anastomotic leak, possible requirement of an ostomy, possible development of an incisional hernia, possible requirement of additional procedures as well as the perioperative risks related to general anesthesia. The risk is approximately 5%. Increased risk of complication is associated with smoking, diabetes, obesity as well as heavy lifting over 20lbs sooner than 6 weeks after the surgery. Benefits include resolution of abdominal symptoms and definitive tissue diagnosis. The details of the procedure were discussed and all questions were answered. The patient wishes to proceed with resection  2. The importance of strength and nutrition was discussed. The patient will exercise to the best of their ability and optimize their nutrition as well.  This will likely include 1-3 protein shakes up to a week preoperatively  3. Will schedule for robot assisted laparoscopic sigmoidectomy with flexible sigmoidoscopy, possible laparotomy, possible ostomy (44123). Will also consult urology for intra-operative placement of ureteral stents for ICG injections  4. Will need bowel prep with instructions below     Douglas B. Beryle Opal MD, FACS  3/15/2023  1:21 PM

## 2023-03-20 ENCOUNTER — OFFICE VISIT (OUTPATIENT)
Dept: PRIMARY CARE CLINIC | Age: 33
End: 2023-03-20
Payer: COMMERCIAL

## 2023-03-20 VITALS
TEMPERATURE: 97.5 F | WEIGHT: 315 LBS | DIASTOLIC BLOOD PRESSURE: 78 MMHG | SYSTOLIC BLOOD PRESSURE: 114 MMHG | HEIGHT: 74 IN | HEART RATE: 64 BPM | OXYGEN SATURATION: 97 % | RESPIRATION RATE: 18 BRPM | BODY MASS INDEX: 40.43 KG/M2

## 2023-03-20 DIAGNOSIS — Z13.21 ENCOUNTER FOR VITAMIN DEFICIENCY SCREENING: ICD-10-CM

## 2023-03-20 DIAGNOSIS — Z87.898 HISTORY OF PREDIABETES: ICD-10-CM

## 2023-03-20 DIAGNOSIS — E66.01 CLASS 3 SEVERE OBESITY DUE TO EXCESS CALORIES WITH SERIOUS COMORBIDITY AND BODY MASS INDEX (BMI) OF 40.0 TO 44.9 IN ADULT (HCC): ICD-10-CM

## 2023-03-20 DIAGNOSIS — Z13.220 LIPID SCREENING: ICD-10-CM

## 2023-03-20 DIAGNOSIS — I10 PRIMARY HYPERTENSION: Primary | ICD-10-CM

## 2023-03-20 PROCEDURE — 3074F SYST BP LT 130 MM HG: CPT | Performed by: NURSE PRACTITIONER

## 2023-03-20 PROCEDURE — 3078F DIAST BP <80 MM HG: CPT | Performed by: NURSE PRACTITIONER

## 2023-03-20 PROCEDURE — 99214 OFFICE O/P EST MOD 30 MIN: CPT | Performed by: NURSE PRACTITIONER

## 2023-03-20 RX ORDER — LISINOPRIL 20 MG/1
20 TABLET ORAL DAILY
Qty: 90 TABLET | Refills: 0 | Status: CANCELLED | OUTPATIENT
Start: 2023-03-20 | End: 2023-06-18

## 2023-03-20 RX ORDER — HYDROCHLOROTHIAZIDE 25 MG/1
25 TABLET ORAL DAILY
Qty: 90 TABLET | Refills: 0 | Status: CANCELLED | OUTPATIENT
Start: 2023-03-20 | End: 2023-06-18

## 2023-03-20 RX ORDER — SPIRONOLACTONE 25 MG/1
25 TABLET ORAL 2 TIMES DAILY
Qty: 180 TABLET | Refills: 0 | Status: SHIPPED | OUTPATIENT
Start: 2023-03-20 | End: 2023-06-18

## 2023-03-20 RX ORDER — CARVEDILOL 25 MG/1
25 TABLET ORAL 2 TIMES DAILY WITH MEALS
Qty: 180 TABLET | Refills: 0 | Status: SHIPPED | OUTPATIENT
Start: 2023-03-20 | End: 2023-06-18

## 2023-03-20 RX ORDER — LISINOPRIL AND HYDROCHLOROTHIAZIDE 25; 20 MG/1; MG/1
1 TABLET ORAL EVERY MORNING
Qty: 90 TABLET | Refills: 0 | Status: SHIPPED | OUTPATIENT
Start: 2023-03-20 | End: 2023-06-18

## 2023-03-20 SDOH — ECONOMIC STABILITY: FOOD INSECURITY: WITHIN THE PAST 12 MONTHS, THE FOOD YOU BOUGHT JUST DIDN'T LAST AND YOU DIDN'T HAVE MONEY TO GET MORE.: NEVER TRUE

## 2023-03-20 SDOH — ECONOMIC STABILITY: FOOD INSECURITY: WITHIN THE PAST 12 MONTHS, YOU WORRIED THAT YOUR FOOD WOULD RUN OUT BEFORE YOU GOT MONEY TO BUY MORE.: NEVER TRUE

## 2023-03-20 SDOH — ECONOMIC STABILITY: INCOME INSECURITY: HOW HARD IS IT FOR YOU TO PAY FOR THE VERY BASICS LIKE FOOD, HOUSING, MEDICAL CARE, AND HEATING?: NOT HARD AT ALL

## 2023-03-20 SDOH — ECONOMIC STABILITY: HOUSING INSECURITY
IN THE LAST 12 MONTHS, WAS THERE A TIME WHEN YOU DID NOT HAVE A STEADY PLACE TO SLEEP OR SLEPT IN A SHELTER (INCLUDING NOW)?: NO

## 2023-03-20 ASSESSMENT — ENCOUNTER SYMPTOMS
CHEST TIGHTNESS: 0
ABDOMINAL DISTENTION: 0
COUGH: 0
ORTHOPNEA: 0
APNEA: 0
VOMITING: 0
DIARRHEA: 0
CONSTIPATION: 0
NAUSEA: 0
BLURRED VISION: 0
WHEEZING: 0
SHORTNESS OF BREATH: 0
ABDOMINAL PAIN: 0

## 2023-03-20 ASSESSMENT — PATIENT HEALTH QUESTIONNAIRE - PHQ9
SUM OF ALL RESPONSES TO PHQ QUESTIONS 1-9: 0
SUM OF ALL RESPONSES TO PHQ QUESTIONS 1-9: 0
2. FEELING DOWN, DEPRESSED OR HOPELESS: 0
SUM OF ALL RESPONSES TO PHQ QUESTIONS 1-9: 0
SUM OF ALL RESPONSES TO PHQ QUESTIONS 1-9: 0
SUM OF ALL RESPONSES TO PHQ9 QUESTIONS 1 & 2: 0
1. LITTLE INTEREST OR PLEASURE IN DOING THINGS: 0

## 2023-03-20 NOTE — PROGRESS NOTES
97.5 °F (36.4 °C)   SpO2: 97%   Weight: (!) 339 lb (153.8 kg)   Height: 6' 2\" (1.88 m)       Estimated body mass index is 43.53 kg/m² as calculated from the following:    Height as of this encounter: 6' 2\" (1.88 m). Weight as of this encounter: 339 lb (153.8 kg). Physical Exam  Vitals and nursing note reviewed. Constitutional:       General: He is not in acute distress. Appearance: Normal appearance. He is obese. Neck:      Vascular: No carotid bruit. Cardiovascular:      Rate and Rhythm: Normal rate and regular rhythm. Pulses: Normal pulses. Heart sounds: Normal heart sounds, S1 normal and S2 normal.   Pulmonary:      Effort: Pulmonary effort is normal.      Breath sounds: Normal breath sounds. Musculoskeletal:         General: Normal range of motion. Cervical back: Normal range of motion and neck supple. Right lower leg: No edema. Left lower leg: No edema. Lymphadenopathy:      Cervical: No cervical adenopathy. Skin:     General: Skin is warm. Neurological:      General: No focal deficit present. Mental Status: He is alert and oriented to person, place, and time. Psychiatric:         Mood and Affect: Mood normal.         Behavior: Behavior normal.         Thought Content: Thought content normal.          ASSESSMENT/PLAN:  1. Primary hypertension  - Controlled  - /78 in office today  - Comprehensive Metabolic Panel, Fasting; Future  - Continue carvedilol (COREG) 25 MG tablet; Take 1 tablet by mouth 2 times daily (with meals)  Dispense: 180 tablet; Refill: 0  - Continue spironolactone (ALDACTONE) 25 MG tablet; Take 1 tablet by mouth in the morning and 1 tablet in the evening. Dispense: 180 tablet; Refill: 0  - Continue lisinopril-hydroCHLOROthiazide (PRINZIDE;ZESTORETIC) 20-25 MG per tablet; Take 1 tablet by mouth every morning  Dispense: 90 tablet; Refill: 0    2.  History of prediabetes  - A1c decreasing  - A1C: 5.4% (5.3%, 5.7% in Sept 2022)  -

## 2023-04-25 ENCOUNTER — ANESTHESIA (OUTPATIENT)
Dept: OPERATING ROOM | Age: 33
End: 2023-04-25
Payer: COMMERCIAL

## 2023-04-25 ENCOUNTER — ANESTHESIA EVENT (OUTPATIENT)
Dept: OPERATING ROOM | Age: 33
End: 2023-04-25
Payer: COMMERCIAL

## 2023-04-25 ENCOUNTER — HOSPITAL ENCOUNTER (INPATIENT)
Age: 33
LOS: 16 days | Discharge: HOME | DRG: 329 | End: 2023-05-11
Attending: SURGERY | Admitting: SURGERY
Payer: COMMERCIAL

## 2023-04-25 DIAGNOSIS — Z01.818 PREOP TESTING: Primary | ICD-10-CM

## 2023-04-25 DIAGNOSIS — K57.20 DIVERTICULITIS OF COLON WITH PERFORATION: ICD-10-CM

## 2023-04-25 LAB
ABO + RH BLD: NORMAL
ANION GAP SERPL CALCULATED.3IONS-SCNC: 14 MMOL/L (ref 3–16)
BLD GP AB SCN SERPL QL: NORMAL
BUN SERPL-MCNC: 18 MG/DL (ref 7–20)
CALCIUM SERPL-MCNC: 8.3 MG/DL (ref 8.3–10.6)
CHLORIDE SERPL-SCNC: 100 MMOL/L (ref 99–110)
CO2 SERPL-SCNC: 19 MMOL/L (ref 21–32)
CREAT SERPL-MCNC: 1.2 MG/DL (ref 0.9–1.3)
DEPRECATED RDW RBC AUTO: 13.4 % (ref 12.4–15.4)
GFR SERPLBLD CREATININE-BSD FMLA CKD-EPI: >60 ML/MIN/{1.73_M2}
GLUCOSE SERPL-MCNC: 209 MG/DL (ref 70–99)
HCT VFR BLD AUTO: 39.7 % (ref 40.5–52.5)
HGB BLD-MCNC: 13.4 G/DL (ref 13.5–17.5)
MCH RBC QN AUTO: 31.9 PG (ref 26–34)
MCHC RBC AUTO-ENTMCNC: 33.7 G/DL (ref 31–36)
MCV RBC AUTO: 94.7 FL (ref 80–100)
PLATELET # BLD AUTO: 301 K/UL (ref 135–450)
PMV BLD AUTO: 8.3 FL (ref 5–10.5)
POTASSIUM SERPL-SCNC: 4 MMOL/L (ref 3.5–5.1)
RBC # BLD AUTO: 4.19 M/UL (ref 4.2–5.9)
SODIUM SERPL-SCNC: 133 MMOL/L (ref 136–145)
WBC # BLD AUTO: 13.9 K/UL (ref 4–11)

## 2023-04-25 PROCEDURE — 2500000003 HC RX 250 WO HCPCS: Performed by: UROLOGY

## 2023-04-25 PROCEDURE — 7100000000 HC PACU RECOVERY - FIRST 15 MIN: Performed by: SURGERY

## 2023-04-25 PROCEDURE — 0D1B4Z4 BYPASS ILEUM TO CUTANEOUS, PERCUTANEOUS ENDOSCOPIC APPROACH: ICD-10-PCS | Performed by: SURGERY

## 2023-04-25 PROCEDURE — 6360000002 HC RX W HCPCS: Performed by: NURSE ANESTHETIST, CERTIFIED REGISTERED

## 2023-04-25 PROCEDURE — 2709999900 HC NON-CHARGEABLE SUPPLY: Performed by: SURGERY

## 2023-04-25 PROCEDURE — 6360000002 HC RX W HCPCS: Performed by: SURGERY

## 2023-04-25 PROCEDURE — C9290 INJ, BUPIVACAINE LIPOSOME: HCPCS | Performed by: SURGERY

## 2023-04-25 PROCEDURE — 3700000000 HC ANESTHESIA ATTENDED CARE: Performed by: SURGERY

## 2023-04-25 PROCEDURE — 0T788DZ DILATION OF BILATERAL URETERS WITH INTRALUMINAL DEVICE, VIA NATURAL OR ARTIFICIAL OPENING ENDOSCOPIC: ICD-10-PCS | Performed by: UROLOGY

## 2023-04-25 PROCEDURE — 7100000001 HC PACU RECOVERY - ADDTL 15 MIN: Performed by: SURGERY

## 2023-04-25 PROCEDURE — 2720000010 HC SURG SUPPLY STERILE: Performed by: SURGERY

## 2023-04-25 PROCEDURE — 44207 L COLECTOMY/COLOPROCTOSTOMY: CPT | Performed by: SURGERY

## 2023-04-25 PROCEDURE — C1769 GUIDE WIRE: HCPCS | Performed by: SURGERY

## 2023-04-25 PROCEDURE — 2580000003 HC RX 258: Performed by: SURGERY

## 2023-04-25 PROCEDURE — 0T788ZZ DILATION OF BILATERAL URETERS, VIA NATURAL OR ARTIFICIAL OPENING ENDOSCOPIC: ICD-10-PCS | Performed by: UROLOGY

## 2023-04-25 PROCEDURE — S2900 ROBOTIC SURGICAL SYSTEM: HCPCS | Performed by: SURGERY

## 2023-04-25 PROCEDURE — 86901 BLOOD TYPING SEROLOGIC RH(D): CPT

## 2023-04-25 PROCEDURE — 6370000000 HC RX 637 (ALT 250 FOR IP): Performed by: SURGERY

## 2023-04-25 PROCEDURE — 2500000003 HC RX 250 WO HCPCS: Performed by: NURSE ANESTHETIST, CERTIFIED REGISTERED

## 2023-04-25 PROCEDURE — 88307 TISSUE EXAM BY PATHOLOGIST: CPT

## 2023-04-25 PROCEDURE — C1758 CATHETER, URETERAL: HCPCS | Performed by: SURGERY

## 2023-04-25 PROCEDURE — 2580000003 HC RX 258: Performed by: UROLOGY

## 2023-04-25 PROCEDURE — 2500000003 HC RX 250 WO HCPCS

## 2023-04-25 PROCEDURE — 6360000002 HC RX W HCPCS: Performed by: ANESTHESIOLOGY

## 2023-04-25 PROCEDURE — 0DTN4ZZ RESECTION OF SIGMOID COLON, PERCUTANEOUS ENDOSCOPIC APPROACH: ICD-10-PCS | Performed by: SURGERY

## 2023-04-25 PROCEDURE — 2500000003 HC RX 250 WO HCPCS: Performed by: SURGERY

## 2023-04-25 PROCEDURE — 3600000019 HC SURGERY ROBOT ADDTL 15MIN: Performed by: SURGERY

## 2023-04-25 PROCEDURE — 3700000001 HC ADD 15 MINUTES (ANESTHESIA): Performed by: SURGERY

## 2023-04-25 PROCEDURE — 6360000002 HC RX W HCPCS: Performed by: UROLOGY

## 2023-04-25 PROCEDURE — 85027 COMPLETE CBC AUTOMATED: CPT

## 2023-04-25 PROCEDURE — 80048 BASIC METABOLIC PNL TOTAL CA: CPT

## 2023-04-25 PROCEDURE — 86900 BLOOD TYPING SEROLOGIC ABO: CPT

## 2023-04-25 PROCEDURE — 8E0W4CZ ROBOTIC ASSISTED PROCEDURE OF TRUNK REGION, PERCUTANEOUS ENDOSCOPIC APPROACH: ICD-10-PCS | Performed by: SURGERY

## 2023-04-25 PROCEDURE — 86850 RBC ANTIBODY SCREEN: CPT

## 2023-04-25 PROCEDURE — 2580000003 HC RX 258: Performed by: NURSE ANESTHETIST, CERTIFIED REGISTERED

## 2023-04-25 PROCEDURE — 36415 COLL VENOUS BLD VENIPUNCTURE: CPT

## 2023-04-25 PROCEDURE — 3600000009 HC SURGERY ROBOT BASE: Performed by: SURGERY

## 2023-04-25 PROCEDURE — 1200000000 HC SEMI PRIVATE

## 2023-04-25 RX ORDER — LISINOPRIL AND HYDROCHLOROTHIAZIDE 25; 20 MG/1; MG/1
1 TABLET ORAL EVERY MORNING
Status: DISCONTINUED | OUTPATIENT
Start: 2023-04-26 | End: 2023-04-25 | Stop reason: RX

## 2023-04-25 RX ORDER — ACETAMINOPHEN 325 MG/1
650 TABLET ORAL EVERY 6 HOURS
Status: DISCONTINUED | OUTPATIENT
Start: 2023-04-25 | End: 2023-04-30

## 2023-04-25 RX ORDER — SODIUM CHLORIDE 0.9 % (FLUSH) 0.9 %
5-40 SYRINGE (ML) INJECTION PRN
Status: DISCONTINUED | OUTPATIENT
Start: 2023-04-25 | End: 2023-05-11 | Stop reason: HOSPADM

## 2023-04-25 RX ORDER — OXYCODONE HYDROCHLORIDE 5 MG/1
5 TABLET ORAL EVERY 4 HOURS PRN
Status: DISCONTINUED | OUTPATIENT
Start: 2023-04-25 | End: 2023-04-29

## 2023-04-25 RX ORDER — ONDANSETRON 2 MG/ML
4 INJECTION INTRAMUSCULAR; INTRAVENOUS
Status: DISCONTINUED | OUTPATIENT
Start: 2023-04-25 | End: 2023-04-25 | Stop reason: HOSPADM

## 2023-04-25 RX ORDER — LABETALOL HYDROCHLORIDE 5 MG/ML
INJECTION, SOLUTION INTRAVENOUS
Status: COMPLETED
Start: 2023-04-25 | End: 2023-04-25

## 2023-04-25 RX ORDER — LEVOFLOXACIN 5 MG/ML
500 INJECTION, SOLUTION INTRAVENOUS
Status: COMPLETED | OUTPATIENT
Start: 2023-04-25 | End: 2023-04-25

## 2023-04-25 RX ORDER — SODIUM CHLORIDE 9 MG/ML
INJECTION, SOLUTION INTRAVENOUS CONTINUOUS PRN
Status: DISCONTINUED | OUTPATIENT
Start: 2023-04-25 | End: 2023-04-25 | Stop reason: SDUPTHER

## 2023-04-25 RX ORDER — MORPHINE SULFATE 2 MG/ML
2 INJECTION, SOLUTION INTRAMUSCULAR; INTRAVENOUS
Status: DISCONTINUED | OUTPATIENT
Start: 2023-04-25 | End: 2023-05-05

## 2023-04-25 RX ORDER — HYDROCHLOROTHIAZIDE 25 MG/1
25 TABLET ORAL EVERY MORNING
Status: DISCONTINUED | OUTPATIENT
Start: 2023-04-26 | End: 2023-04-29

## 2023-04-25 RX ORDER — HYDROMORPHONE HCL 110MG/55ML
PATIENT CONTROLLED ANALGESIA SYRINGE INTRAVENOUS PRN
Status: DISCONTINUED | OUTPATIENT
Start: 2023-04-25 | End: 2023-04-25 | Stop reason: SDUPTHER

## 2023-04-25 RX ORDER — PROPOFOL 10 MG/ML
INJECTION, EMULSION INTRAVENOUS PRN
Status: DISCONTINUED | OUTPATIENT
Start: 2023-04-25 | End: 2023-04-25 | Stop reason: SDUPTHER

## 2023-04-25 RX ORDER — CARVEDILOL 25 MG/1
25 TABLET ORAL 2 TIMES DAILY WITH MEALS
Status: DISCONTINUED | OUTPATIENT
Start: 2023-04-25 | End: 2023-05-11 | Stop reason: HOSPADM

## 2023-04-25 RX ORDER — DEXMEDETOMIDINE HYDROCHLORIDE 100 UG/ML
INJECTION, SOLUTION INTRAVENOUS PRN
Status: DISCONTINUED | OUTPATIENT
Start: 2023-04-25 | End: 2023-04-25 | Stop reason: SDUPTHER

## 2023-04-25 RX ORDER — HYDRALAZINE HYDROCHLORIDE 20 MG/ML
10 INJECTION INTRAMUSCULAR; INTRAVENOUS EVERY 6 HOURS PRN
Status: DISCONTINUED | OUTPATIENT
Start: 2023-04-25 | End: 2023-05-11 | Stop reason: HOSPADM

## 2023-04-25 RX ORDER — SODIUM CHLORIDE 9 MG/ML
INJECTION, SOLUTION INTRAVENOUS PRN
Status: DISCONTINUED | OUTPATIENT
Start: 2023-04-25 | End: 2023-05-11 | Stop reason: HOSPADM

## 2023-04-25 RX ORDER — METRONIDAZOLE 500 MG/100ML
500 INJECTION, SOLUTION INTRAVENOUS EVERY 8 HOURS
Status: COMPLETED | OUTPATIENT
Start: 2023-04-25 | End: 2023-04-26

## 2023-04-25 RX ORDER — SODIUM CHLORIDE 0.9 % (FLUSH) 0.9 %
5-40 SYRINGE (ML) INJECTION PRN
Status: DISCONTINUED | OUTPATIENT
Start: 2023-04-25 | End: 2023-04-25 | Stop reason: HOSPADM

## 2023-04-25 RX ORDER — ENOXAPARIN SODIUM 100 MG/ML
30 INJECTION SUBCUTANEOUS 2 TIMES DAILY
Status: DISCONTINUED | OUTPATIENT
Start: 2023-04-25 | End: 2023-05-11 | Stop reason: HOSPADM

## 2023-04-25 RX ORDER — LISINOPRIL 20 MG/1
20 TABLET ORAL EVERY MORNING
Status: DISCONTINUED | OUTPATIENT
Start: 2023-04-26 | End: 2023-04-29

## 2023-04-25 RX ORDER — DEXAMETHASONE SODIUM PHOSPHATE 4 MG/ML
INJECTION, SOLUTION INTRA-ARTICULAR; INTRALESIONAL; INTRAMUSCULAR; INTRAVENOUS; SOFT TISSUE PRN
Status: DISCONTINUED | OUTPATIENT
Start: 2023-04-25 | End: 2023-04-25 | Stop reason: SDUPTHER

## 2023-04-25 RX ORDER — FAMOTIDINE 20 MG/1
20 TABLET, FILM COATED ORAL 2 TIMES DAILY
Status: DISCONTINUED | OUTPATIENT
Start: 2023-04-25 | End: 2023-05-11 | Stop reason: HOSPADM

## 2023-04-25 RX ORDER — BUPIVACAINE HYDROCHLORIDE 5 MG/ML
INJECTION, SOLUTION EPIDURAL; INTRACAUDAL
Status: COMPLETED | OUTPATIENT
Start: 2023-04-25 | End: 2023-04-25

## 2023-04-25 RX ORDER — KETAMINE HCL IN NACL, ISO-OSM 100MG/10ML
SYRINGE (ML) INJECTION PRN
Status: DISCONTINUED | OUTPATIENT
Start: 2023-04-25 | End: 2023-04-25 | Stop reason: SDUPTHER

## 2023-04-25 RX ORDER — INDOCYANINE GREEN AND WATER 25 MG
KIT INJECTION PRN
Status: DISCONTINUED | OUTPATIENT
Start: 2023-04-25 | End: 2023-04-25 | Stop reason: SDUPTHER

## 2023-04-25 RX ORDER — VECURONIUM BROMIDE 1 MG/ML
INJECTION, POWDER, LYOPHILIZED, FOR SOLUTION INTRAVENOUS PRN
Status: DISCONTINUED | OUTPATIENT
Start: 2023-04-25 | End: 2023-04-25 | Stop reason: SDUPTHER

## 2023-04-25 RX ORDER — SODIUM CHLORIDE 0.9 % (FLUSH) 0.9 %
5-40 SYRINGE (ML) INJECTION EVERY 12 HOURS SCHEDULED
Status: DISCONTINUED | OUTPATIENT
Start: 2023-04-25 | End: 2023-05-11 | Stop reason: HOSPADM

## 2023-04-25 RX ORDER — SPIRONOLACTONE 25 MG/1
25 TABLET ORAL 2 TIMES DAILY
Status: DISCONTINUED | OUTPATIENT
Start: 2023-04-25 | End: 2023-04-29

## 2023-04-25 RX ORDER — SUCCINYLCHOLINE/SOD CL,ISO/PF 200MG/10ML
SYRINGE (ML) INTRAVENOUS PRN
Status: DISCONTINUED | OUTPATIENT
Start: 2023-04-25 | End: 2023-04-25 | Stop reason: SDUPTHER

## 2023-04-25 RX ORDER — SODIUM CHLORIDE 0.9 % (FLUSH) 0.9 %
5-40 SYRINGE (ML) INJECTION EVERY 12 HOURS SCHEDULED
Status: DISCONTINUED | OUTPATIENT
Start: 2023-04-25 | End: 2023-04-25 | Stop reason: HOSPADM

## 2023-04-25 RX ORDER — SODIUM CHLORIDE 9 MG/ML
INJECTION, SOLUTION INTRAVENOUS CONTINUOUS
Status: DISCONTINUED | OUTPATIENT
Start: 2023-04-25 | End: 2023-05-02

## 2023-04-25 RX ORDER — HYDROMORPHONE HCL 110MG/55ML
0.25 PATIENT CONTROLLED ANALGESIA SYRINGE INTRAVENOUS EVERY 5 MIN PRN
Status: DISCONTINUED | OUTPATIENT
Start: 2023-04-25 | End: 2023-04-25 | Stop reason: HOSPADM

## 2023-04-25 RX ORDER — POLYETHYLENE GLYCOL 3350 17 G/17G
17 POWDER, FOR SOLUTION ORAL DAILY
Status: DISCONTINUED | OUTPATIENT
Start: 2023-04-25 | End: 2023-04-29

## 2023-04-25 RX ORDER — SODIUM CHLORIDE 9 MG/ML
INJECTION, SOLUTION INTRAVENOUS PRN
Status: DISCONTINUED | OUTPATIENT
Start: 2023-04-25 | End: 2023-04-25 | Stop reason: HOSPADM

## 2023-04-25 RX ORDER — MAGNESIUM HYDROXIDE 1200 MG/15ML
LIQUID ORAL CONTINUOUS PRN
Status: COMPLETED | OUTPATIENT
Start: 2023-04-25 | End: 2023-04-25

## 2023-04-25 RX ORDER — LIDOCAINE HYDROCHLORIDE 20 MG/ML
INJECTION, SOLUTION EPIDURAL; INFILTRATION; INTRACAUDAL; PERINEURAL PRN
Status: DISCONTINUED | OUTPATIENT
Start: 2023-04-25 | End: 2023-04-25 | Stop reason: SDUPTHER

## 2023-04-25 RX ORDER — CIPROFLOXACIN 2 MG/ML
400 INJECTION, SOLUTION INTRAVENOUS
Status: DISCONTINUED | OUTPATIENT
Start: 2023-04-25 | End: 2023-04-25

## 2023-04-25 RX ORDER — LABETALOL HYDROCHLORIDE 5 MG/ML
10 INJECTION, SOLUTION INTRAVENOUS ONCE
Status: COMPLETED | OUTPATIENT
Start: 2023-04-25 | End: 2023-04-25

## 2023-04-25 RX ORDER — METRONIDAZOLE 500 MG/100ML
500 INJECTION, SOLUTION INTRAVENOUS
Status: COMPLETED | OUTPATIENT
Start: 2023-04-25 | End: 2023-04-25

## 2023-04-25 RX ORDER — ONDANSETRON 2 MG/ML
4 INJECTION INTRAMUSCULAR; INTRAVENOUS EVERY 6 HOURS PRN
Status: DISCONTINUED | OUTPATIENT
Start: 2023-04-25 | End: 2023-05-11 | Stop reason: HOSPADM

## 2023-04-25 RX ORDER — MAGNESIUM HYDROXIDE 1200 MG/15ML
LIQUID ORAL
Status: COMPLETED | OUTPATIENT
Start: 2023-04-25 | End: 2023-04-25

## 2023-04-25 RX ORDER — FENTANYL CITRATE 50 UG/ML
INJECTION, SOLUTION INTRAMUSCULAR; INTRAVENOUS PRN
Status: DISCONTINUED | OUTPATIENT
Start: 2023-04-25 | End: 2023-04-25 | Stop reason: SDUPTHER

## 2023-04-25 RX ORDER — ONDANSETRON 2 MG/ML
INJECTION INTRAMUSCULAR; INTRAVENOUS PRN
Status: DISCONTINUED | OUTPATIENT
Start: 2023-04-25 | End: 2023-04-25 | Stop reason: SDUPTHER

## 2023-04-25 RX ORDER — MORPHINE SULFATE 4 MG/ML
4 INJECTION, SOLUTION INTRAMUSCULAR; INTRAVENOUS
Status: DISCONTINUED | OUTPATIENT
Start: 2023-04-25 | End: 2023-05-05

## 2023-04-25 RX ORDER — MAGNESIUM SULFATE HEPTAHYDRATE 500 MG/ML
INJECTION, SOLUTION INTRAMUSCULAR; INTRAVENOUS PRN
Status: DISCONTINUED | OUTPATIENT
Start: 2023-04-25 | End: 2023-04-25 | Stop reason: SDUPTHER

## 2023-04-25 RX ORDER — HYDROMORPHONE HCL 110MG/55ML
0.5 PATIENT CONTROLLED ANALGESIA SYRINGE INTRAVENOUS EVERY 5 MIN PRN
Status: DISCONTINUED | OUTPATIENT
Start: 2023-04-25 | End: 2023-04-25 | Stop reason: HOSPADM

## 2023-04-25 RX ORDER — INDOCYANINE GREEN AND WATER 25 MG
KIT INJECTION
Status: COMPLETED | OUTPATIENT
Start: 2023-04-25 | End: 2023-04-25

## 2023-04-25 RX ORDER — DOCUSATE SODIUM 100 MG/1
100 CAPSULE, LIQUID FILLED ORAL 2 TIMES DAILY
Status: DISCONTINUED | OUTPATIENT
Start: 2023-04-25 | End: 2023-05-11 | Stop reason: HOSPADM

## 2023-04-25 RX ORDER — ONDANSETRON 4 MG/1
4 TABLET, ORALLY DISINTEGRATING ORAL EVERY 8 HOURS PRN
Status: DISCONTINUED | OUTPATIENT
Start: 2023-04-25 | End: 2023-05-11 | Stop reason: HOSPADM

## 2023-04-25 RX ORDER — OXYCODONE HYDROCHLORIDE 5 MG/1
10 TABLET ORAL EVERY 4 HOURS PRN
Status: DISCONTINUED | OUTPATIENT
Start: 2023-04-25 | End: 2023-04-29

## 2023-04-25 RX ADMIN — ENOXAPARIN SODIUM 30 MG: 100 INJECTION SUBCUTANEOUS at 20:42

## 2023-04-25 RX ADMIN — DEXMEDETOMIDINE HYDROCHLORIDE 10 MCG: 100 INJECTION, SOLUTION INTRAVENOUS at 08:59

## 2023-04-25 RX ADMIN — SODIUM CHLORIDE: 9 INJECTION, SOLUTION INTRAVENOUS at 16:32

## 2023-04-25 RX ADMIN — DOCUSATE SODIUM 100 MG: 100 CAPSULE, LIQUID FILLED ORAL at 20:42

## 2023-04-25 RX ADMIN — PROPOFOL 50 MG: 10 INJECTION, EMULSION INTRAVENOUS at 11:51

## 2023-04-25 RX ADMIN — MAGNESIUM SULFATE HEPTAHYDRATE 1 G: 500 INJECTION, SOLUTION INTRAMUSCULAR; INTRAVENOUS at 07:55

## 2023-04-25 RX ADMIN — HYDROMORPHONE HYDROCHLORIDE 0.5 MG: 2 INJECTION, SOLUTION INTRAMUSCULAR; INTRAVENOUS; SUBCUTANEOUS at 12:08

## 2023-04-25 RX ADMIN — Medication 20 MG: at 08:00

## 2023-04-25 RX ADMIN — HYDROMORPHONE HYDROCHLORIDE 0.5 MG: 2 INJECTION, SOLUTION INTRAMUSCULAR; INTRAVENOUS; SUBCUTANEOUS at 14:13

## 2023-04-25 RX ADMIN — FENTANYL CITRATE 50 MCG: 50 INJECTION, SOLUTION INTRAMUSCULAR; INTRAVENOUS at 07:45

## 2023-04-25 RX ADMIN — POLYETHYLENE GLYCOL 3350 17 G: 17 POWDER, FOR SOLUTION ORAL at 16:30

## 2023-04-25 RX ADMIN — ONDANSETRON 4 MG: 2 INJECTION INTRAMUSCULAR; INTRAVENOUS at 19:24

## 2023-04-25 RX ADMIN — INDOCYANINE GREEN AND WATER 10 MG: KIT at 09:47

## 2023-04-25 RX ADMIN — Medication 20 MG: at 09:58

## 2023-04-25 RX ADMIN — SODIUM CHLORIDE, PRESERVATIVE FREE 20 MG: 5 INJECTION INTRAVENOUS at 20:42

## 2023-04-25 RX ADMIN — PROPOFOL 50 MG: 10 INJECTION, EMULSION INTRAVENOUS at 07:50

## 2023-04-25 RX ADMIN — LABETALOL HYDROCHLORIDE 10 MG: 5 INJECTION INTRAVENOUS at 14:14

## 2023-04-25 RX ADMIN — DEXMEDETOMIDINE HYDROCHLORIDE 10 MCG: 100 INJECTION, SOLUTION INTRAVENOUS at 07:55

## 2023-04-25 RX ADMIN — LABETALOL HYDROCHLORIDE 10 MG: 5 INJECTION, SOLUTION INTRAVENOUS at 14:14

## 2023-04-25 RX ADMIN — Medication 10 MG: at 09:20

## 2023-04-25 RX ADMIN — VECURONIUM BROMIDE 5 MG: 1 INJECTION, POWDER, LYOPHILIZED, FOR SOLUTION INTRAVENOUS at 09:58

## 2023-04-25 RX ADMIN — VECURONIUM BROMIDE 5 MG: 1 INJECTION, POWDER, LYOPHILIZED, FOR SOLUTION INTRAVENOUS at 09:02

## 2023-04-25 RX ADMIN — DEXAMETHASONE SODIUM PHOSPHATE 8 MG: 4 INJECTION, SOLUTION INTRAMUSCULAR; INTRAVENOUS at 07:55

## 2023-04-25 RX ADMIN — HYDROMORPHONE HYDROCHLORIDE 0.5 MG: 2 INJECTION, SOLUTION INTRAMUSCULAR; INTRAVENOUS; SUBCUTANEOUS at 13:35

## 2023-04-25 RX ADMIN — Medication 200 MG: at 07:45

## 2023-04-25 RX ADMIN — CEFAZOLIN 3000 MG: 10 INJECTION, POWDER, FOR SOLUTION INTRAVENOUS at 18:21

## 2023-04-25 RX ADMIN — LEVOFLOXACIN 500 MG: 5 INJECTION, SOLUTION INTRAVENOUS at 07:56

## 2023-04-25 RX ADMIN — MORPHINE SULFATE 4 MG: 4 INJECTION, SOLUTION INTRAMUSCULAR; INTRAVENOUS at 20:42

## 2023-04-25 RX ADMIN — ONDANSETRON 4 MG: 2 INJECTION INTRAMUSCULAR; INTRAVENOUS at 07:55

## 2023-04-25 RX ADMIN — ACETAMINOPHEN 650 MG: 325 TABLET ORAL at 16:31

## 2023-04-25 RX ADMIN — VECURONIUM BROMIDE 10 MG: 1 INJECTION, POWDER, LYOPHILIZED, FOR SOLUTION INTRAVENOUS at 07:55

## 2023-04-25 RX ADMIN — SODIUM CHLORIDE: 9 INJECTION, SOLUTION INTRAVENOUS at 07:37

## 2023-04-25 RX ADMIN — PROPOFOL 250 MG: 10 INJECTION, EMULSION INTRAVENOUS at 07:45

## 2023-04-25 RX ADMIN — Medication 3000 MG: at 11:24

## 2023-04-25 RX ADMIN — Medication 3000 MG: at 07:33

## 2023-04-25 RX ADMIN — METRONIDAZOLE 500 MG: 500 INJECTION, SOLUTION INTRAVENOUS at 07:42

## 2023-04-25 RX ADMIN — SUGAMMADEX 200 MG: 100 INJECTION, SOLUTION INTRAVENOUS at 12:04

## 2023-04-25 RX ADMIN — SPIRONOLACTONE 25 MG: 25 TABLET ORAL at 16:31

## 2023-04-25 RX ADMIN — NALOXEGOL OXALATE 25 MG: 25 TABLET, FILM COATED ORAL at 07:13

## 2023-04-25 RX ADMIN — FENTANYL CITRATE 50 MCG: 50 INJECTION, SOLUTION INTRAMUSCULAR; INTRAVENOUS at 11:56

## 2023-04-25 RX ADMIN — SODIUM CHLORIDE: 9 INJECTION, SOLUTION INTRAVENOUS at 09:06

## 2023-04-25 RX ADMIN — METRONIDAZOLE 500 MG: 500 INJECTION, SOLUTION INTRAVENOUS at 16:35

## 2023-04-25 RX ADMIN — INDOCYANINE GREEN AND WATER 10 MG: KIT at 11:17

## 2023-04-25 RX ADMIN — HYDROMORPHONE HYDROCHLORIDE 0.5 MG: 2 INJECTION, SOLUTION INTRAMUSCULAR; INTRAVENOUS; SUBCUTANEOUS at 12:13

## 2023-04-25 RX ADMIN — CARVEDILOL 25 MG: 25 TABLET, FILM COATED ORAL at 16:31

## 2023-04-25 RX ADMIN — VECURONIUM BROMIDE 2 MG: 1 INJECTION, POWDER, LYOPHILIZED, FOR SOLUTION INTRAVENOUS at 11:33

## 2023-04-25 RX ADMIN — LIDOCAINE HYDROCHLORIDE 100 MG: 20 INJECTION, SOLUTION EPIDURAL; INFILTRATION; INTRACAUDAL; PERINEURAL at 07:45

## 2023-04-25 RX ADMIN — OXYCODONE 10 MG: 5 TABLET ORAL at 18:24

## 2023-04-25 ASSESSMENT — PAIN - FUNCTIONAL ASSESSMENT
PAIN_FUNCTIONAL_ASSESSMENT: ACTIVITIES ARE NOT PREVENTED
PAIN_FUNCTIONAL_ASSESSMENT: ACTIVITIES ARE NOT PREVENTED
PAIN_FUNCTIONAL_ASSESSMENT: 0-10

## 2023-04-25 ASSESSMENT — PAIN SCALES - GENERAL
PAINLEVEL_OUTOF10: 7
PAINLEVEL_OUTOF10: 8
PAINLEVEL_OUTOF10: 6
PAINLEVEL_OUTOF10: 8
PAINLEVEL_OUTOF10: 8

## 2023-04-25 ASSESSMENT — PAIN DESCRIPTION - LOCATION
LOCATION: ABDOMEN

## 2023-04-25 ASSESSMENT — PAIN DESCRIPTION - DESCRIPTORS
DESCRIPTORS: ACHING
DESCRIPTORS: ACHING;SORE

## 2023-04-25 ASSESSMENT — PAIN DESCRIPTION - ORIENTATION
ORIENTATION: RIGHT;LEFT
ORIENTATION: RIGHT;LEFT

## 2023-04-25 NOTE — ANESTHESIA POSTPROCEDURE EVALUATION
Department of Anesthesiology  Postprocedure Note    Patient: Nita Wong  MRN: [id number]  YOB: 1990  Date of evaluation: 4/25/2023      Procedure Summary     Date: 04/25/23 Room / Location: [facility] OR [address] [place] [address]    Anesthesia Start: 1031 Anesthesia Stop:     Procedures:       ROBOT ASSISTED LAPAROSCOPIC SIGMOIDECTOMY (Abdomen)      FLEXIBLE SIGMOIDOSCOPY (Anus)      CYSTOSCOPY WITH BILATERAL URETERAL STENT PLACEMENT, VAZQUEZ CATHETER INSERTION (Bilateral: Urethra) Diagnosis:       Diverticulitis of colon with perforation      (K57.20 RECURRENT MICROPERFORATED DIVERTICULITIS)    Surgeons: Ana Paula Singh MD; Kerline Salgado MD Responsible Provider: Samul Phalen, MD    Anesthesia Type: general ASA Status: 3          Anesthesia Type: No value filed.     Florence Phase I: Florence Score: 10    Florence Phase II:        Anesthesia Post Evaluation    Patient location during evaluation: PACU  Patient participation: complete - patient participated  Level of consciousness: awake and alert  Pain score: 2  Airway patency: patent  Nausea & Vomiting: no vomiting  Complications: no  Cardiovascular status: blood pressure returned to baseline  Respiratory status: acceptable  Hydration status: euvolemic  Multimodal analgesia pain management approach

## 2023-04-25 NOTE — ANESTHESIA PRE PROCEDURE
Date/Time    WBC 8.5 04/12/2023 10:00 AM    RBC 4.32 04/12/2023 10:00 AM    HGB 14.2 04/12/2023 10:00 AM    HCT 41.2 04/12/2023 10:00 AM    MCV 95.5 04/12/2023 10:00 AM    RDW 13.8 04/12/2023 10:00 AM     04/12/2023 10:00 AM       CMP:   Lab Results   Component Value Date/Time     04/12/2023 10:00 AM    K 4.5 04/12/2023 10:00 AM    K 4.1 08/02/2022 05:33 AM     04/12/2023 10:00 AM    CO2 23 04/12/2023 10:00 AM    BUN 14 04/12/2023 10:00 AM    CREATININE 0.8 04/12/2023 10:00 AM    GFRAA >60 08/02/2022 05:33 AM    AGRATIO 1.4 03/14/2023 08:17 AM    LABGLOM >60 04/12/2023 10:00 AM    GLUCOSE 127 04/12/2023 10:00 AM    PROT 7.3 03/14/2023 08:17 AM    CALCIUM 9.6 04/12/2023 10:00 AM    BILITOT <0.2 03/14/2023 08:17 AM    ALKPHOS 55 03/14/2023 08:17 AM    AST 16 03/14/2023 08:17 AM    ALT 20 03/14/2023 08:17 AM       POC Tests: No results for input(s): POCGLU, POCNA, POCK, POCCL, POCBUN, POCHEMO, POCHCT in the last 72 hours. Coags:   Lab Results   Component Value Date/Time    PROTIME 14.5 01/29/2023 06:10 AM    INR 1.13 01/29/2023 06:10 AM    APTT 33.9 01/29/2023 06:10 AM       HCG (If Applicable): No results found for: PREGTESTUR, PREGSERUM, HCG, HCGQUANT     ABGs: No results found for: PHART, PO2ART, RJI8FWL, MJE7NHQ, BEART, O2VYKTOB     Type & Screen (If Applicable):  No results found for: LABABO, LABRH    Drug/Infectious Status (If Applicable):  No results found for: HIV, HEPCAB    COVID-19 Screening (If Applicable): No results found for: COVID19        Anesthesia Evaluation  Patient summary reviewed and Nursing notes reviewed  Airway: Mallampati: II  TM distance: >3 FB   Neck ROM: full  Mouth opening: > = 3 FB   Dental: normal exam         Pulmonary:Negative Pulmonary ROS                              Cardiovascular:  Exercise tolerance: good (>4 METS),   (+) hypertension:,       ECG reviewed      Echocardiogram reviewed               ROS comment: Technically difficult study due to body habitus.

## 2023-04-26 ENCOUNTER — APPOINTMENT (OUTPATIENT)
Dept: GENERAL RADIOLOGY | Age: 33
DRG: 329 | End: 2023-04-26
Attending: SURGERY
Payer: COMMERCIAL

## 2023-04-26 ENCOUNTER — APPOINTMENT (OUTPATIENT)
Dept: CT IMAGING | Age: 33
DRG: 329 | End: 2023-04-26
Attending: SURGERY
Payer: COMMERCIAL

## 2023-04-26 LAB
ALBUMIN SERPL-MCNC: 3.7 G/DL (ref 3.4–5)
ALBUMIN/GLOB SERPL: 1.1 {RATIO} (ref 1.1–2.2)
ALP SERPL-CCNC: 48 U/L (ref 40–129)
ALT SERPL-CCNC: 15 U/L (ref 10–40)
ANION GAP SERPL CALCULATED.3IONS-SCNC: 11 MMOL/L (ref 3–16)
ANION GAP SERPL CALCULATED.3IONS-SCNC: 9 MMOL/L (ref 3–16)
AST SERPL-CCNC: 22 U/L (ref 15–37)
BASE EXCESS BLDA CALC-SCNC: 2.8 MMOL/L (ref -3–3)
BASOPHILS # BLD: 0 K/UL (ref 0–0.2)
BASOPHILS # BLD: 0.1 K/UL (ref 0–0.2)
BASOPHILS NFR BLD: 0.1 %
BASOPHILS NFR BLD: 0.3 %
BILIRUB SERPL-MCNC: 0.6 MG/DL (ref 0–1)
BUN SERPL-MCNC: 11 MG/DL (ref 7–20)
BUN SERPL-MCNC: 14 MG/DL (ref 7–20)
CALCIUM SERPL-MCNC: 8.6 MG/DL (ref 8.3–10.6)
CALCIUM SERPL-MCNC: 9.1 MG/DL (ref 8.3–10.6)
CHLORIDE SERPL-SCNC: 103 MMOL/L (ref 99–110)
CHLORIDE SERPL-SCNC: 99 MMOL/L (ref 99–110)
CO2 BLDA-SCNC: 60.3 MMOL/L
CO2 SERPL-SCNC: 23 MMOL/L (ref 21–32)
CO2 SERPL-SCNC: 23 MMOL/L (ref 21–32)
COHGB MFR BLDA: 1.6 % (ref 0–1.5)
CREAT SERPL-MCNC: 0.6 MG/DL (ref 0.9–1.3)
CREAT SERPL-MCNC: 0.9 MG/DL (ref 0.9–1.3)
CRP SERPL-MCNC: 30.3 MG/L (ref 0–5.1)
DEPRECATED RDW RBC AUTO: 13.5 % (ref 12.4–15.4)
DEPRECATED RDW RBC AUTO: 13.6 % (ref 12.4–15.4)
EOSINOPHIL # BLD: 0 K/UL (ref 0–0.6)
EOSINOPHIL # BLD: 0 K/UL (ref 0–0.6)
EOSINOPHIL NFR BLD: 0 %
EOSINOPHIL NFR BLD: 0.2 %
GFR SERPLBLD CREATININE-BSD FMLA CKD-EPI: >60 ML/MIN/{1.73_M2}
GFR SERPLBLD CREATININE-BSD FMLA CKD-EPI: >60 ML/MIN/{1.73_M2}
GLUCOSE SERPL-MCNC: 139 MG/DL (ref 70–99)
GLUCOSE SERPL-MCNC: 147 MG/DL (ref 70–99)
HCO3 BLDA-SCNC: 25.9 MMOL/L (ref 21–29)
HCT VFR BLD AUTO: 36.8 % (ref 40.5–52.5)
HCT VFR BLD AUTO: 38.8 % (ref 40.5–52.5)
HGB BLD-MCNC: 12.6 G/DL (ref 13.5–17.5)
HGB BLD-MCNC: 13.2 G/DL (ref 13.5–17.5)
HGB BLDA-MCNC: 13.3 G/DL (ref 13.5–17.5)
LACTATE BLDV-SCNC: 1.6 MMOL/L (ref 0.4–2)
LYMPHOCYTES # BLD: 0.9 K/UL (ref 1–5.1)
LYMPHOCYTES # BLD: 1.8 K/UL (ref 1–5.1)
LYMPHOCYTES NFR BLD: 10.9 %
LYMPHOCYTES NFR BLD: 6.3 %
MAGNESIUM SERPL-MCNC: 2.1 MG/DL (ref 1.8–2.4)
MAGNESIUM SERPL-MCNC: 2.2 MG/DL (ref 1.8–2.4)
MCH RBC QN AUTO: 32.1 PG (ref 26–34)
MCH RBC QN AUTO: 32.1 PG (ref 26–34)
MCHC RBC AUTO-ENTMCNC: 33.9 G/DL (ref 31–36)
MCHC RBC AUTO-ENTMCNC: 34.2 G/DL (ref 31–36)
MCV RBC AUTO: 94 FL (ref 80–100)
MCV RBC AUTO: 94.6 FL (ref 80–100)
METHGB MFR BLDA: 0 %
MONOCYTES # BLD: 1.3 K/UL (ref 0–1.3)
MONOCYTES # BLD: 1.3 K/UL (ref 0–1.3)
MONOCYTES NFR BLD: 8 %
MONOCYTES NFR BLD: 8.5 %
NEUTROPHILS # BLD: 12.9 K/UL (ref 1.7–7.7)
NEUTROPHILS # BLD: 13.3 K/UL (ref 1.7–7.7)
NEUTROPHILS NFR BLD: 80.6 %
NEUTROPHILS NFR BLD: 85.1 %
O2 THERAPY: ABNORMAL
PCO2 BLDA: 34.1 MMHG (ref 35–45)
PH BLDA: 7.49 [PH] (ref 7.35–7.45)
PHOSPHATE SERPL-MCNC: 2.4 MG/DL (ref 2.5–4.9)
PLATELET # BLD AUTO: 293 K/UL (ref 135–450)
PLATELET # BLD AUTO: 299 K/UL (ref 135–450)
PMV BLD AUTO: 8 FL (ref 5–10.5)
PMV BLD AUTO: 8.2 FL (ref 5–10.5)
PO2 BLDA: 107 MMHG (ref 75–108)
POTASSIUM SERPL-SCNC: 3.9 MMOL/L (ref 3.5–5.1)
POTASSIUM SERPL-SCNC: 4.1 MMOL/L (ref 3.5–5.1)
PROT SERPL-MCNC: 7.2 G/DL (ref 6.4–8.2)
RBC # BLD AUTO: 3.92 M/UL (ref 4.2–5.9)
RBC # BLD AUTO: 4.1 M/UL (ref 4.2–5.9)
SAO2 % BLDA: 99.1 %
SODIUM SERPL-SCNC: 133 MMOL/L (ref 136–145)
SODIUM SERPL-SCNC: 135 MMOL/L (ref 136–145)
TROPONIN, HIGH SENSITIVITY: <6 NG/L (ref 0–22)
WBC # BLD AUTO: 15.1 K/UL (ref 4–11)
WBC # BLD AUTO: 16.5 K/UL (ref 4–11)

## 2023-04-26 PROCEDURE — 1200000000 HC SEMI PRIVATE

## 2023-04-26 PROCEDURE — 6360000002 HC RX W HCPCS: Performed by: NURSE PRACTITIONER

## 2023-04-26 PROCEDURE — 83605 ASSAY OF LACTIC ACID: CPT

## 2023-04-26 PROCEDURE — 6370000000 HC RX 637 (ALT 250 FOR IP): Performed by: SURGERY

## 2023-04-26 PROCEDURE — 80048 BASIC METABOLIC PNL TOTAL CA: CPT

## 2023-04-26 PROCEDURE — 2700000000 HC OXYGEN THERAPY PER DAY

## 2023-04-26 PROCEDURE — 71045 X-RAY EXAM CHEST 1 VIEW: CPT

## 2023-04-26 PROCEDURE — 84100 ASSAY OF PHOSPHORUS: CPT

## 2023-04-26 PROCEDURE — 85025 COMPLETE CBC W/AUTO DIFF WBC: CPT

## 2023-04-26 PROCEDURE — 36600 WITHDRAWAL OF ARTERIAL BLOOD: CPT

## 2023-04-26 PROCEDURE — 2580000003 HC RX 258: Performed by: NURSE PRACTITIONER

## 2023-04-26 PROCEDURE — 84484 ASSAY OF TROPONIN QUANT: CPT

## 2023-04-26 PROCEDURE — 82803 BLOOD GASES ANY COMBINATION: CPT

## 2023-04-26 PROCEDURE — 6360000004 HC RX CONTRAST MEDICATION: Performed by: NURSE PRACTITIONER

## 2023-04-26 PROCEDURE — 97535 SELF CARE MNGMENT TRAINING: CPT

## 2023-04-26 PROCEDURE — 99024 POSTOP FOLLOW-UP VISIT: CPT | Performed by: SURGERY

## 2023-04-26 PROCEDURE — 6360000002 HC RX W HCPCS: Performed by: SURGERY

## 2023-04-26 PROCEDURE — 2500000003 HC RX 250 WO HCPCS: Performed by: SURGERY

## 2023-04-26 PROCEDURE — 94761 N-INVAS EAR/PLS OXIMETRY MLT: CPT

## 2023-04-26 PROCEDURE — 6360000002 HC RX W HCPCS: Performed by: HOSPITALIST

## 2023-04-26 PROCEDURE — 36415 COLL VENOUS BLD VENIPUNCTURE: CPT

## 2023-04-26 PROCEDURE — 93005 ELECTROCARDIOGRAM TRACING: CPT | Performed by: HOSPITALIST

## 2023-04-26 PROCEDURE — 2580000003 HC RX 258: Performed by: SURGERY

## 2023-04-26 PROCEDURE — APPNB30 APP NON BILLABLE TIME 0-30 MINS: Performed by: NURSE PRACTITIONER

## 2023-04-26 PROCEDURE — 83735 ASSAY OF MAGNESIUM: CPT

## 2023-04-26 PROCEDURE — APPSS15 APP SPLIT SHARED TIME 0-15 MINUTES: Performed by: NURSE PRACTITIONER

## 2023-04-26 PROCEDURE — 94640 AIRWAY INHALATION TREATMENT: CPT

## 2023-04-26 PROCEDURE — 97165 OT EVAL LOW COMPLEX 30 MIN: CPT

## 2023-04-26 PROCEDURE — 71260 CT THORAX DX C+: CPT

## 2023-04-26 PROCEDURE — 97530 THERAPEUTIC ACTIVITIES: CPT

## 2023-04-26 PROCEDURE — 80053 COMPREHEN METABOLIC PANEL: CPT

## 2023-04-26 PROCEDURE — 2500000003 HC RX 250 WO HCPCS: Performed by: NURSE PRACTITIONER

## 2023-04-26 PROCEDURE — 97161 PT EVAL LOW COMPLEX 20 MIN: CPT

## 2023-04-26 PROCEDURE — 86140 C-REACTIVE PROTEIN: CPT

## 2023-04-26 RX ORDER — LABETALOL HYDROCHLORIDE 5 MG/ML
10 INJECTION, SOLUTION INTRAVENOUS EVERY 6 HOURS PRN
Status: DISCONTINUED | OUTPATIENT
Start: 2023-04-26 | End: 2023-05-11 | Stop reason: HOSPADM

## 2023-04-26 RX ORDER — KETOROLAC TROMETHAMINE 15 MG/ML
15 INJECTION, SOLUTION INTRAMUSCULAR; INTRAVENOUS EVERY 6 HOURS PRN
Status: DISCONTINUED | OUTPATIENT
Start: 2023-04-26 | End: 2023-04-29

## 2023-04-26 RX ORDER — IPRATROPIUM BROMIDE AND ALBUTEROL SULFATE 2.5; .5 MG/3ML; MG/3ML
1 SOLUTION RESPIRATORY (INHALATION)
Status: DISCONTINUED | OUTPATIENT
Start: 2023-04-27 | End: 2023-04-27

## 2023-04-26 RX ORDER — IPRATROPIUM BROMIDE AND ALBUTEROL SULFATE 2.5; .5 MG/3ML; MG/3ML
1 SOLUTION RESPIRATORY (INHALATION) EVERY 4 HOURS PRN
Status: DISCONTINUED | OUTPATIENT
Start: 2023-04-26 | End: 2023-05-11 | Stop reason: HOSPADM

## 2023-04-26 RX ADMIN — DOCUSATE SODIUM 100 MG: 100 CAPSULE, LIQUID FILLED ORAL at 07:47

## 2023-04-26 RX ADMIN — ONDANSETRON 4 MG: 2 INJECTION INTRAMUSCULAR; INTRAVENOUS at 01:23

## 2023-04-26 RX ADMIN — OXYCODONE 10 MG: 5 TABLET ORAL at 18:50

## 2023-04-26 RX ADMIN — MORPHINE SULFATE 4 MG: 4 INJECTION, SOLUTION INTRAMUSCULAR; INTRAVENOUS at 12:40

## 2023-04-26 RX ADMIN — MORPHINE SULFATE 4 MG: 4 INJECTION, SOLUTION INTRAMUSCULAR; INTRAVENOUS at 00:14

## 2023-04-26 RX ADMIN — CARVEDILOL 25 MG: 25 TABLET, FILM COATED ORAL at 07:47

## 2023-04-26 RX ADMIN — SODIUM PHOSPHATE, MONOBASIC, MONOHYDRATE AND SODIUM PHOSPHATE, DIBASIC, ANHYDROUS 10 MMOL: 142; 276 INJECTION, SOLUTION INTRAVENOUS at 10:42

## 2023-04-26 RX ADMIN — MORPHINE SULFATE 4 MG: 4 INJECTION, SOLUTION INTRAMUSCULAR; INTRAVENOUS at 10:49

## 2023-04-26 RX ADMIN — SPIRONOLACTONE 25 MG: 25 TABLET ORAL at 07:47

## 2023-04-26 RX ADMIN — MORPHINE SULFATE 4 MG: 4 INJECTION, SOLUTION INTRAMUSCULAR; INTRAVENOUS at 04:28

## 2023-04-26 RX ADMIN — OXYCODONE 10 MG: 5 TABLET ORAL at 14:48

## 2023-04-26 RX ADMIN — SPIRONOLACTONE 25 MG: 25 TABLET ORAL at 18:51

## 2023-04-26 RX ADMIN — METRONIDAZOLE 500 MG: 500 INJECTION, SOLUTION INTRAVENOUS at 00:17

## 2023-04-26 RX ADMIN — POLYETHYLENE GLYCOL 3350 17 G: 17 POWDER, FOR SOLUTION ORAL at 07:48

## 2023-04-26 RX ADMIN — MORPHINE SULFATE 4 MG: 4 INJECTION, SOLUTION INTRAMUSCULAR; INTRAVENOUS at 20:00

## 2023-04-26 RX ADMIN — OXYCODONE 10 MG: 5 TABLET ORAL at 08:41

## 2023-04-26 RX ADMIN — HYDRALAZINE HYDROCHLORIDE 10 MG: 20 INJECTION INTRAMUSCULAR; INTRAVENOUS at 18:50

## 2023-04-26 RX ADMIN — ONDANSETRON 4 MG: 2 INJECTION INTRAMUSCULAR; INTRAVENOUS at 22:26

## 2023-04-26 RX ADMIN — Medication 10 ML: at 07:51

## 2023-04-26 RX ADMIN — IPRATROPIUM BROMIDE AND ALBUTEROL SULFATE 1 AMPULE: .5; 3 SOLUTION RESPIRATORY (INHALATION) at 21:57

## 2023-04-26 RX ADMIN — ACETAMINOPHEN 650 MG: 325 TABLET ORAL at 14:49

## 2023-04-26 RX ADMIN — OXYCODONE 10 MG: 5 TABLET ORAL at 01:23

## 2023-04-26 RX ADMIN — IOPAMIDOL 75 ML: 755 INJECTION, SOLUTION INTRAVENOUS at 21:56

## 2023-04-26 RX ADMIN — ACETAMINOPHEN 650 MG: 325 TABLET ORAL at 07:47

## 2023-04-26 RX ADMIN — CARVEDILOL 25 MG: 25 TABLET, FILM COATED ORAL at 18:51

## 2023-04-26 RX ADMIN — CEFAZOLIN 3000 MG: 10 INJECTION, POWDER, FOR SOLUTION INTRAVENOUS at 04:31

## 2023-04-26 RX ADMIN — HYDROCHLOROTHIAZIDE 25 MG: 25 TABLET ORAL at 07:47

## 2023-04-26 RX ADMIN — KETOROLAC TROMETHAMINE 15 MG: 15 INJECTION, SOLUTION INTRAMUSCULAR; INTRAVENOUS at 20:00

## 2023-04-26 RX ADMIN — LISINOPRIL 20 MG: 20 TABLET ORAL at 07:46

## 2023-04-26 RX ADMIN — ENOXAPARIN SODIUM 30 MG: 100 INJECTION SUBCUTANEOUS at 07:48

## 2023-04-26 RX ADMIN — MORPHINE SULFATE 4 MG: 4 INJECTION, SOLUTION INTRAMUSCULAR; INTRAVENOUS at 22:26

## 2023-04-26 RX ADMIN — FAMOTIDINE 20 MG: 20 TABLET ORAL at 07:47

## 2023-04-26 RX ADMIN — MORPHINE SULFATE 4 MG: 4 INJECTION, SOLUTION INTRAMUSCULAR; INTRAVENOUS at 07:22

## 2023-04-26 ASSESSMENT — PAIN DESCRIPTION - FREQUENCY
FREQUENCY: CONTINUOUS
FREQUENCY: CONTINUOUS

## 2023-04-26 ASSESSMENT — PAIN DESCRIPTION - LOCATION
LOCATION: ABDOMEN
LOCATION: ABDOMEN;CHEST
LOCATION: ABDOMEN

## 2023-04-26 ASSESSMENT — PAIN DESCRIPTION - ONSET
ONSET: ON-GOING
ONSET: ON-GOING

## 2023-04-26 ASSESSMENT — PAIN DESCRIPTION - DESCRIPTORS
DESCRIPTORS: ACHING
DESCRIPTORS: ACHING;DISCOMFORT
DESCRIPTORS: ACHING
DESCRIPTORS: ACHING

## 2023-04-26 ASSESSMENT — PAIN SCALES - GENERAL
PAINLEVEL_OUTOF10: 7
PAINLEVEL_OUTOF10: 8
PAINLEVEL_OUTOF10: 8
PAINLEVEL_OUTOF10: 10
PAINLEVEL_OUTOF10: 8
PAINLEVEL_OUTOF10: 10
PAINLEVEL_OUTOF10: 5
PAINLEVEL_OUTOF10: 8
PAINLEVEL_OUTOF10: 6
PAINLEVEL_OUTOF10: 6
PAINLEVEL_OUTOF10: 10
PAINLEVEL_OUTOF10: 10
PAINLEVEL_OUTOF10: 8
PAINLEVEL_OUTOF10: 10
PAINLEVEL_OUTOF10: 8
PAINLEVEL_OUTOF10: 6
PAINLEVEL_OUTOF10: 7
PAINLEVEL_OUTOF10: 8

## 2023-04-26 ASSESSMENT — PAIN DESCRIPTION - ORIENTATION
ORIENTATION: MID

## 2023-04-26 ASSESSMENT — PAIN DESCRIPTION - PAIN TYPE
TYPE: SURGICAL PAIN
TYPE: SURGICAL PAIN

## 2023-04-27 LAB
ANION GAP SERPL CALCULATED.3IONS-SCNC: 9 MMOL/L (ref 3–16)
BASOPHILS # BLD: 0 K/UL (ref 0–0.2)
BASOPHILS NFR BLD: 0.2 %
BUN SERPL-MCNC: 12 MG/DL (ref 7–20)
CALCIUM SERPL-MCNC: 9.2 MG/DL (ref 8.3–10.6)
CHLORIDE SERPL-SCNC: 101 MMOL/L (ref 99–110)
CO2 SERPL-SCNC: 25 MMOL/L (ref 21–32)
CREAT SERPL-MCNC: 0.8 MG/DL (ref 0.9–1.3)
CRP SERPL-MCNC: 128.4 MG/L (ref 0–5.1)
DEPRECATED RDW RBC AUTO: 13.9 % (ref 12.4–15.4)
EKG ATRIAL RATE: 100 BPM
EKG DIAGNOSIS: NORMAL
EKG P AXIS: 76 DEGREES
EKG P-R INTERVAL: 168 MS
EKG Q-T INTERVAL: 330 MS
EKG QRS DURATION: 88 MS
EKG QTC CALCULATION (BAZETT): 425 MS
EKG R AXIS: -2 DEGREES
EKG T AXIS: 70 DEGREES
EKG VENTRICULAR RATE: 100 BPM
EOSINOPHIL # BLD: 0 K/UL (ref 0–0.6)
EOSINOPHIL NFR BLD: 0 %
GFR SERPLBLD CREATININE-BSD FMLA CKD-EPI: >60 ML/MIN/{1.73_M2}
GLUCOSE SERPL-MCNC: 126 MG/DL (ref 70–99)
HCT VFR BLD AUTO: 35.8 % (ref 40.5–52.5)
HGB BLD-MCNC: 11.9 G/DL (ref 13.5–17.5)
LACTATE BLDV-SCNC: 1.1 MMOL/L (ref 0.4–2)
LYMPHOCYTES # BLD: 2.2 K/UL (ref 1–5.1)
LYMPHOCYTES NFR BLD: 16.5 %
MAGNESIUM SERPL-MCNC: 2.2 MG/DL (ref 1.8–2.4)
MCH RBC QN AUTO: 31.7 PG (ref 26–34)
MCHC RBC AUTO-ENTMCNC: 33.4 G/DL (ref 31–36)
MCV RBC AUTO: 94.9 FL (ref 80–100)
MONOCYTES # BLD: 1.4 K/UL (ref 0–1.3)
MONOCYTES NFR BLD: 10.6 %
NEUTROPHILS # BLD: 9.5 K/UL (ref 1.7–7.7)
NEUTROPHILS NFR BLD: 72.7 %
PHOSPHATE SERPL-MCNC: 1.9 MG/DL (ref 2.5–4.9)
PLATELET # BLD AUTO: 276 K/UL (ref 135–450)
PMV BLD AUTO: 8 FL (ref 5–10.5)
POTASSIUM SERPL-SCNC: 3.5 MMOL/L (ref 3.5–5.1)
RBC # BLD AUTO: 3.77 M/UL (ref 4.2–5.9)
SODIUM SERPL-SCNC: 135 MMOL/L (ref 136–145)
WBC # BLD AUTO: 13.1 K/UL (ref 4–11)

## 2023-04-27 PROCEDURE — 6370000000 HC RX 637 (ALT 250 FOR IP): Performed by: SURGERY

## 2023-04-27 PROCEDURE — 83735 ASSAY OF MAGNESIUM: CPT

## 2023-04-27 PROCEDURE — 86140 C-REACTIVE PROTEIN: CPT

## 2023-04-27 PROCEDURE — 83605 ASSAY OF LACTIC ACID: CPT

## 2023-04-27 PROCEDURE — 84100 ASSAY OF PHOSPHORUS: CPT

## 2023-04-27 PROCEDURE — 94761 N-INVAS EAR/PLS OXIMETRY MLT: CPT

## 2023-04-27 PROCEDURE — 80048 BASIC METABOLIC PNL TOTAL CA: CPT

## 2023-04-27 PROCEDURE — 2580000003 HC RX 258: Performed by: SURGERY

## 2023-04-27 PROCEDURE — 2580000003 HC RX 258: Performed by: HOSPITALIST

## 2023-04-27 PROCEDURE — 6370000000 HC RX 637 (ALT 250 FOR IP): Performed by: NURSE PRACTITIONER

## 2023-04-27 PROCEDURE — 85025 COMPLETE CBC W/AUTO DIFF WBC: CPT

## 2023-04-27 PROCEDURE — 94640 AIRWAY INHALATION TREATMENT: CPT

## 2023-04-27 PROCEDURE — 6360000002 HC RX W HCPCS: Performed by: NURSE PRACTITIONER

## 2023-04-27 PROCEDURE — 6360000002 HC RX W HCPCS: Performed by: SURGERY

## 2023-04-27 PROCEDURE — 2500000003 HC RX 250 WO HCPCS: Performed by: HOSPITALIST

## 2023-04-27 PROCEDURE — 93010 ELECTROCARDIOGRAM REPORT: CPT | Performed by: INTERNAL MEDICINE

## 2023-04-27 PROCEDURE — 97116 GAIT TRAINING THERAPY: CPT

## 2023-04-27 PROCEDURE — 97110 THERAPEUTIC EXERCISES: CPT

## 2023-04-27 PROCEDURE — 36415 COLL VENOUS BLD VENIPUNCTURE: CPT

## 2023-04-27 PROCEDURE — 99024 POSTOP FOLLOW-UP VISIT: CPT | Performed by: SURGERY

## 2023-04-27 PROCEDURE — 2700000000 HC OXYGEN THERAPY PER DAY

## 2023-04-27 PROCEDURE — 1200000000 HC SEMI PRIVATE

## 2023-04-27 RX ORDER — IPRATROPIUM BROMIDE AND ALBUTEROL SULFATE 2.5; .5 MG/3ML; MG/3ML
1 SOLUTION RESPIRATORY (INHALATION) 3 TIMES DAILY
Status: DISCONTINUED | OUTPATIENT
Start: 2023-04-28 | End: 2023-05-03

## 2023-04-27 RX ADMIN — Medication 5 ML: at 20:30

## 2023-04-27 RX ADMIN — IPRATROPIUM BROMIDE AND ALBUTEROL SULFATE 1 AMPULE: .5; 3 SOLUTION RESPIRATORY (INHALATION) at 09:28

## 2023-04-27 RX ADMIN — POLYETHYLENE GLYCOL 3350 17 G: 17 POWDER, FOR SOLUTION ORAL at 11:48

## 2023-04-27 RX ADMIN — LISINOPRIL 20 MG: 20 TABLET ORAL at 09:26

## 2023-04-27 RX ADMIN — SODIUM PHOSPHATE, MONOBASIC, MONOHYDRATE AND SODIUM PHOSPHATE, DIBASIC, ANHYDROUS 20 MMOL: 276; 142 INJECTION, SOLUTION INTRAVENOUS at 14:44

## 2023-04-27 RX ADMIN — ACETAMINOPHEN 650 MG: 325 TABLET ORAL at 11:45

## 2023-04-27 RX ADMIN — ENOXAPARIN SODIUM 30 MG: 100 INJECTION SUBCUTANEOUS at 11:51

## 2023-04-27 RX ADMIN — FAMOTIDINE 20 MG: 20 TABLET ORAL at 20:22

## 2023-04-27 RX ADMIN — KETOROLAC TROMETHAMINE 15 MG: 15 INJECTION, SOLUTION INTRAMUSCULAR; INTRAVENOUS at 05:06

## 2023-04-27 RX ADMIN — IPRATROPIUM BROMIDE AND ALBUTEROL SULFATE 1 AMPULE: .5; 3 SOLUTION RESPIRATORY (INHALATION) at 22:23

## 2023-04-27 RX ADMIN — ENOXAPARIN SODIUM 30 MG: 100 INJECTION SUBCUTANEOUS at 20:22

## 2023-04-27 RX ADMIN — SPIRONOLACTONE 25 MG: 25 TABLET ORAL at 17:37

## 2023-04-27 RX ADMIN — DOCUSATE SODIUM 100 MG: 100 CAPSULE, LIQUID FILLED ORAL at 11:47

## 2023-04-27 RX ADMIN — MORPHINE SULFATE 4 MG: 4 INJECTION, SOLUTION INTRAMUSCULAR; INTRAVENOUS at 05:06

## 2023-04-27 RX ADMIN — ACETAMINOPHEN 650 MG: 325 TABLET ORAL at 20:22

## 2023-04-27 RX ADMIN — OXYCODONE 10 MG: 5 TABLET ORAL at 23:51

## 2023-04-27 RX ADMIN — SODIUM CHLORIDE: 9 INJECTION, SOLUTION INTRAVENOUS at 14:41

## 2023-04-27 RX ADMIN — IPRATROPIUM BROMIDE AND ALBUTEROL SULFATE 1 AMPULE: .5; 3 SOLUTION RESPIRATORY (INHALATION) at 12:51

## 2023-04-27 RX ADMIN — CARVEDILOL 25 MG: 25 TABLET, FILM COATED ORAL at 09:26

## 2023-04-27 RX ADMIN — OXYCODONE 10 MG: 5 TABLET ORAL at 12:39

## 2023-04-27 RX ADMIN — MORPHINE SULFATE 4 MG: 4 INJECTION, SOLUTION INTRAMUSCULAR; INTRAVENOUS at 09:23

## 2023-04-27 RX ADMIN — IPRATROPIUM BROMIDE AND ALBUTEROL SULFATE 1 AMPULE: .5; 3 SOLUTION RESPIRATORY (INHALATION) at 16:46

## 2023-04-27 RX ADMIN — DOCUSATE SODIUM 100 MG: 100 CAPSULE, LIQUID FILLED ORAL at 20:22

## 2023-04-27 RX ADMIN — OXYCODONE 10 MG: 5 TABLET ORAL at 17:37

## 2023-04-27 RX ADMIN — SPIRONOLACTONE 25 MG: 25 TABLET ORAL at 09:26

## 2023-04-27 RX ADMIN — IPRATROPIUM BROMIDE AND ALBUTEROL SULFATE 1 AMPULE: .5; 3 SOLUTION RESPIRATORY (INHALATION) at 04:55

## 2023-04-27 RX ADMIN — HYDROCHLOROTHIAZIDE 25 MG: 25 TABLET ORAL at 09:26

## 2023-04-27 RX ADMIN — CARVEDILOL 25 MG: 25 TABLET, FILM COATED ORAL at 17:37

## 2023-04-27 RX ADMIN — FAMOTIDINE 20 MG: 20 TABLET ORAL at 09:26

## 2023-04-27 RX ADMIN — MORPHINE SULFATE 4 MG: 4 INJECTION, SOLUTION INTRAMUSCULAR; INTRAVENOUS at 01:12

## 2023-04-27 ASSESSMENT — PAIN DESCRIPTION - ORIENTATION
ORIENTATION: RIGHT

## 2023-04-27 ASSESSMENT — PAIN SCALES - GENERAL
PAINLEVEL_OUTOF10: 3
PAINLEVEL_OUTOF10: 7
PAINLEVEL_OUTOF10: 7
PAINLEVEL_OUTOF10: 4
PAINLEVEL_OUTOF10: 2
PAINLEVEL_OUTOF10: 6
PAINLEVEL_OUTOF10: 7
PAINLEVEL_OUTOF10: 6
PAINLEVEL_OUTOF10: 3
PAINLEVEL_OUTOF10: 7
PAINLEVEL_OUTOF10: 0
PAINLEVEL_OUTOF10: 3
PAINLEVEL_OUTOF10: 6

## 2023-04-27 ASSESSMENT — PAIN DESCRIPTION - DESCRIPTORS
DESCRIPTORS: ACHING
DESCRIPTORS: SHARP
DESCRIPTORS: ACHING
DESCRIPTORS: ACHING

## 2023-04-27 ASSESSMENT — PAIN DESCRIPTION - LOCATION
LOCATION: ABDOMEN

## 2023-04-27 ASSESSMENT — PAIN DESCRIPTION - ONSET
ONSET: ON-GOING
ONSET: ON-GOING

## 2023-04-27 ASSESSMENT — PAIN DESCRIPTION - PAIN TYPE
TYPE: SURGICAL PAIN

## 2023-04-27 ASSESSMENT — PAIN - FUNCTIONAL ASSESSMENT
PAIN_FUNCTIONAL_ASSESSMENT: ACTIVITIES ARE NOT PREVENTED
PAIN_FUNCTIONAL_ASSESSMENT: ACTIVITIES ARE NOT PREVENTED

## 2023-04-27 ASSESSMENT — PAIN DESCRIPTION - FREQUENCY
FREQUENCY: INTERMITTENT
FREQUENCY: CONTINUOUS
FREQUENCY: INTERMITTENT
FREQUENCY: CONTINUOUS

## 2023-04-28 ENCOUNTER — APPOINTMENT (OUTPATIENT)
Dept: GENERAL RADIOLOGY | Age: 33
DRG: 329 | End: 2023-04-28
Attending: SURGERY
Payer: COMMERCIAL

## 2023-04-28 ENCOUNTER — APPOINTMENT (OUTPATIENT)
Dept: CT IMAGING | Age: 33
DRG: 329 | End: 2023-04-28
Attending: SURGERY
Payer: COMMERCIAL

## 2023-04-28 LAB
ANION GAP SERPL CALCULATED.3IONS-SCNC: 13 MMOL/L (ref 3–16)
ANION GAP SERPL CALCULATED.3IONS-SCNC: 14 MMOL/L (ref 3–16)
BASOPHILS # BLD: 0 K/UL (ref 0–0.2)
BASOPHILS # BLD: 0.1 K/UL (ref 0–0.2)
BASOPHILS NFR BLD: 0.3 %
BASOPHILS NFR BLD: 0.3 %
BUN SERPL-MCNC: 13 MG/DL (ref 7–20)
BUN SERPL-MCNC: 17 MG/DL (ref 7–20)
CALCIUM SERPL-MCNC: 8.7 MG/DL (ref 8.3–10.6)
CALCIUM SERPL-MCNC: 9.2 MG/DL (ref 8.3–10.6)
CHLORIDE SERPL-SCNC: 100 MMOL/L (ref 99–110)
CHLORIDE SERPL-SCNC: 100 MMOL/L (ref 99–110)
CO2 SERPL-SCNC: 22 MMOL/L (ref 21–32)
CO2 SERPL-SCNC: 24 MMOL/L (ref 21–32)
CREAT SERPL-MCNC: 0.7 MG/DL (ref 0.9–1.3)
CREAT SERPL-MCNC: 1.1 MG/DL (ref 0.9–1.3)
CRP SERPL-MCNC: 183.4 MG/L (ref 0–5.1)
CRP SERPL-MCNC: 184.5 MG/L (ref 0–5.1)
DEPRECATED RDW RBC AUTO: 13.3 % (ref 12.4–15.4)
DEPRECATED RDW RBC AUTO: 13.6 % (ref 12.4–15.4)
EOSINOPHIL # BLD: 0.1 K/UL (ref 0–0.6)
EOSINOPHIL # BLD: 0.1 K/UL (ref 0–0.6)
EOSINOPHIL NFR BLD: 0.3 %
EOSINOPHIL NFR BLD: 0.8 %
GFR SERPLBLD CREATININE-BSD FMLA CKD-EPI: >60 ML/MIN/{1.73_M2}
GFR SERPLBLD CREATININE-BSD FMLA CKD-EPI: >60 ML/MIN/{1.73_M2}
GLUCOSE BLD-MCNC: 189 MG/DL (ref 70–99)
GLUCOSE SERPL-MCNC: 115 MG/DL (ref 70–99)
GLUCOSE SERPL-MCNC: 181 MG/DL (ref 70–99)
HCT VFR BLD AUTO: 35.1 % (ref 40.5–52.5)
HCT VFR BLD AUTO: 40.1 % (ref 40.5–52.5)
HGB BLD-MCNC: 11.9 G/DL (ref 13.5–17.5)
HGB BLD-MCNC: 13.2 G/DL (ref 13.5–17.5)
LACTATE BLDV-SCNC: 1.5 MMOL/L (ref 0.4–2)
LACTATE BLDV-SCNC: 1.7 MMOL/L (ref 0.4–2)
LYMPHOCYTES # BLD: 1.3 K/UL (ref 1–5.1)
LYMPHOCYTES # BLD: 1.8 K/UL (ref 1–5.1)
LYMPHOCYTES NFR BLD: 13.1 %
LYMPHOCYTES NFR BLD: 5.5 %
MAGNESIUM SERPL-MCNC: 1.6 MG/DL (ref 1.8–2.4)
MAGNESIUM SERPL-MCNC: 2.1 MG/DL (ref 1.8–2.4)
MCH RBC QN AUTO: 31.3 PG (ref 26–34)
MCH RBC QN AUTO: 31.9 PG (ref 26–34)
MCHC RBC AUTO-ENTMCNC: 32.9 G/DL (ref 31–36)
MCHC RBC AUTO-ENTMCNC: 33.8 G/DL (ref 31–36)
MCV RBC AUTO: 94.4 FL (ref 80–100)
MCV RBC AUTO: 95.1 FL (ref 80–100)
MONOCYTES # BLD: 1.2 K/UL (ref 0–1.3)
MONOCYTES # BLD: 1.5 K/UL (ref 0–1.3)
MONOCYTES NFR BLD: 6.1 %
MONOCYTES NFR BLD: 8.4 %
NEUTROPHILS # BLD: 10.8 K/UL (ref 1.7–7.7)
NEUTROPHILS # BLD: 21.1 K/UL (ref 1.7–7.7)
NEUTROPHILS NFR BLD: 77.4 %
NEUTROPHILS NFR BLD: 87.8 %
PERFORMED ON: ABNORMAL
PHOSPHATE SERPL-MCNC: 3 MG/DL (ref 2.5–4.9)
PLATELET # BLD AUTO: 243 K/UL (ref 135–450)
PLATELET # BLD AUTO: 293 K/UL (ref 135–450)
PMV BLD AUTO: 8 FL (ref 5–10.5)
PMV BLD AUTO: 8.1 FL (ref 5–10.5)
POTASSIUM SERPL-SCNC: 3.5 MMOL/L (ref 3.5–5.1)
POTASSIUM SERPL-SCNC: 3.6 MMOL/L (ref 3.5–5.1)
PROCALCITONIN SERPL IA-MCNC: 0.25 NG/ML (ref 0–0.15)
RBC # BLD AUTO: 3.72 M/UL (ref 4.2–5.9)
RBC # BLD AUTO: 4.22 M/UL (ref 4.2–5.9)
SODIUM SERPL-SCNC: 136 MMOL/L (ref 136–145)
SODIUM SERPL-SCNC: 137 MMOL/L (ref 136–145)
TROPONIN, HIGH SENSITIVITY: 9 NG/L (ref 0–22)
WBC # BLD AUTO: 13.9 K/UL (ref 4–11)
WBC # BLD AUTO: 24.1 K/UL (ref 4–11)

## 2023-04-28 PROCEDURE — 36415 COLL VENOUS BLD VENIPUNCTURE: CPT

## 2023-04-28 PROCEDURE — APPNB30 APP NON BILLABLE TIME 0-30 MINS: Performed by: NURSE PRACTITIONER

## 2023-04-28 PROCEDURE — 2580000003 HC RX 258: Performed by: HOSPITALIST

## 2023-04-28 PROCEDURE — 2580000003 HC RX 258: Performed by: INTERNAL MEDICINE

## 2023-04-28 PROCEDURE — 6360000002 HC RX W HCPCS: Performed by: SURGERY

## 2023-04-28 PROCEDURE — 6370000000 HC RX 637 (ALT 250 FOR IP): Performed by: SURGERY

## 2023-04-28 PROCEDURE — 97530 THERAPEUTIC ACTIVITIES: CPT

## 2023-04-28 PROCEDURE — 83735 ASSAY OF MAGNESIUM: CPT

## 2023-04-28 PROCEDURE — 94640 AIRWAY INHALATION TREATMENT: CPT

## 2023-04-28 PROCEDURE — 85025 COMPLETE CBC W/AUTO DIFF WBC: CPT

## 2023-04-28 PROCEDURE — 83605 ASSAY OF LACTIC ACID: CPT

## 2023-04-28 PROCEDURE — 71260 CT THORAX DX C+: CPT

## 2023-04-28 PROCEDURE — 2700000000 HC OXYGEN THERAPY PER DAY

## 2023-04-28 PROCEDURE — 97116 GAIT TRAINING THERAPY: CPT

## 2023-04-28 PROCEDURE — 6370000000 HC RX 637 (ALT 250 FOR IP): Performed by: HOSPITALIST

## 2023-04-28 PROCEDURE — 84145 PROCALCITONIN (PCT): CPT

## 2023-04-28 PROCEDURE — 99024 POSTOP FOLLOW-UP VISIT: CPT | Performed by: SURGERY

## 2023-04-28 PROCEDURE — 6360000002 HC RX W HCPCS: Performed by: NURSE PRACTITIONER

## 2023-04-28 PROCEDURE — 97535 SELF CARE MNGMENT TRAINING: CPT

## 2023-04-28 PROCEDURE — 71045 X-RAY EXAM CHEST 1 VIEW: CPT

## 2023-04-28 PROCEDURE — 1200000000 HC SEMI PRIVATE

## 2023-04-28 PROCEDURE — 2580000003 HC RX 258: Performed by: SURGERY

## 2023-04-28 PROCEDURE — 6360000002 HC RX W HCPCS: Performed by: HOSPITALIST

## 2023-04-28 PROCEDURE — 86140 C-REACTIVE PROTEIN: CPT

## 2023-04-28 PROCEDURE — 6360000004 HC RX CONTRAST MEDICATION: Performed by: SURGERY

## 2023-04-28 PROCEDURE — 80048 BASIC METABOLIC PNL TOTAL CA: CPT

## 2023-04-28 PROCEDURE — 94761 N-INVAS EAR/PLS OXIMETRY MLT: CPT

## 2023-04-28 PROCEDURE — 84100 ASSAY OF PHOSPHORUS: CPT

## 2023-04-28 PROCEDURE — 84484 ASSAY OF TROPONIN QUANT: CPT

## 2023-04-28 RX ORDER — 0.9 % SODIUM CHLORIDE 0.9 %
1000 INTRAVENOUS SOLUTION INTRAVENOUS ONCE
Status: COMPLETED | OUTPATIENT
Start: 2023-04-28 | End: 2023-04-28

## 2023-04-28 RX ORDER — CODEINE PHOSPHATE AND GUAIFENESIN 10; 100 MG/5ML; MG/5ML
5 SOLUTION ORAL EVERY 4 HOURS PRN
Status: DISCONTINUED | OUTPATIENT
Start: 2023-04-28 | End: 2023-05-11 | Stop reason: HOSPADM

## 2023-04-28 RX ADMIN — SODIUM CHLORIDE 1000 ML: 9 INJECTION, SOLUTION INTRAVENOUS at 19:30

## 2023-04-28 RX ADMIN — SPIRONOLACTONE 25 MG: 25 TABLET ORAL at 10:17

## 2023-04-28 RX ADMIN — NALOXEGOL OXALATE 25 MG: 25 TABLET, FILM COATED ORAL at 06:15

## 2023-04-28 RX ADMIN — DOCUSATE SODIUM 100 MG: 100 CAPSULE, LIQUID FILLED ORAL at 20:52

## 2023-04-28 RX ADMIN — PIPERACILLIN AND TAZOBACTAM 3375 MG: 3; .375 INJECTION, POWDER, LYOPHILIZED, FOR SOLUTION INTRAVENOUS at 17:30

## 2023-04-28 RX ADMIN — IOPAMIDOL 75 ML: 755 INJECTION, SOLUTION INTRAVENOUS at 22:07

## 2023-04-28 RX ADMIN — LISINOPRIL 20 MG: 20 TABLET ORAL at 10:28

## 2023-04-28 RX ADMIN — MORPHINE SULFATE 4 MG: 4 INJECTION, SOLUTION INTRAMUSCULAR; INTRAVENOUS at 20:15

## 2023-04-28 RX ADMIN — HYDROCHLOROTHIAZIDE 25 MG: 25 TABLET ORAL at 10:17

## 2023-04-28 RX ADMIN — KETOROLAC TROMETHAMINE 15 MG: 15 INJECTION, SOLUTION INTRAMUSCULAR; INTRAVENOUS at 10:14

## 2023-04-28 RX ADMIN — DOCUSATE SODIUM 100 MG: 100 CAPSULE, LIQUID FILLED ORAL at 10:17

## 2023-04-28 RX ADMIN — CARVEDILOL 25 MG: 25 TABLET, FILM COATED ORAL at 17:29

## 2023-04-28 RX ADMIN — ACETAMINOPHEN 650 MG: 325 TABLET ORAL at 10:17

## 2023-04-28 RX ADMIN — ENOXAPARIN SODIUM 30 MG: 100 INJECTION SUBCUTANEOUS at 20:52

## 2023-04-28 RX ADMIN — POLYETHYLENE GLYCOL 3350 17 G: 17 POWDER, FOR SOLUTION ORAL at 10:14

## 2023-04-28 RX ADMIN — DIATRIZOATE MEGLUMINE AND DIATRIZOATE SODIUM 20 ML: 660; 100 LIQUID ORAL; RECTAL at 22:08

## 2023-04-28 RX ADMIN — IPRATROPIUM BROMIDE AND ALBUTEROL SULFATE 1 AMPULE: .5; 3 SOLUTION RESPIRATORY (INHALATION) at 09:40

## 2023-04-28 RX ADMIN — ACETAMINOPHEN 650 MG: 325 TABLET ORAL at 20:52

## 2023-04-28 RX ADMIN — IPRATROPIUM BROMIDE AND ALBUTEROL SULFATE 1 AMPULE: .5; 3 SOLUTION RESPIRATORY (INHALATION) at 14:41

## 2023-04-28 RX ADMIN — ENOXAPARIN SODIUM 30 MG: 100 INJECTION SUBCUTANEOUS at 10:21

## 2023-04-28 RX ADMIN — FAMOTIDINE 20 MG: 20 TABLET ORAL at 10:18

## 2023-04-28 RX ADMIN — ACETAMINOPHEN 650 MG: 325 TABLET ORAL at 03:25

## 2023-04-28 RX ADMIN — SPIRONOLACTONE 25 MG: 25 TABLET ORAL at 17:29

## 2023-04-28 RX ADMIN — CARVEDILOL 25 MG: 25 TABLET, FILM COATED ORAL at 10:17

## 2023-04-28 RX ADMIN — OXYCODONE 10 MG: 5 TABLET ORAL at 13:43

## 2023-04-28 RX ADMIN — SODIUM CHLORIDE: 9 INJECTION, SOLUTION INTRAVENOUS at 23:49

## 2023-04-28 RX ADMIN — FAMOTIDINE 20 MG: 20 TABLET ORAL at 20:52

## 2023-04-28 RX ADMIN — PIPERACILLIN AND TAZOBACTAM 3375 MG: 3; .375 INJECTION, POWDER, LYOPHILIZED, FOR SOLUTION INTRAVENOUS at 10:21

## 2023-04-28 RX ADMIN — ACETAMINOPHEN 650 MG: 325 TABLET ORAL at 16:05

## 2023-04-28 RX ADMIN — SODIUM CHLORIDE: 9 INJECTION, SOLUTION INTRAVENOUS at 06:08

## 2023-04-28 RX ADMIN — MORPHINE SULFATE 4 MG: 4 INJECTION, SOLUTION INTRAMUSCULAR; INTRAVENOUS at 10:10

## 2023-04-28 ASSESSMENT — PAIN DESCRIPTION - FREQUENCY
FREQUENCY: INTERMITTENT
FREQUENCY: INTERMITTENT

## 2023-04-28 ASSESSMENT — PAIN DESCRIPTION - PAIN TYPE
TYPE: SURGICAL PAIN

## 2023-04-28 ASSESSMENT — PAIN DESCRIPTION - LOCATION
LOCATION: ABDOMEN

## 2023-04-28 ASSESSMENT — PAIN SCALES - GENERAL
PAINLEVEL_OUTOF10: 3
PAINLEVEL_OUTOF10: 2
PAINLEVEL_OUTOF10: 10
PAINLEVEL_OUTOF10: 0
PAINLEVEL_OUTOF10: 7
PAINLEVEL_OUTOF10: 3
PAINLEVEL_OUTOF10: 7
PAINLEVEL_OUTOF10: 3

## 2023-04-28 ASSESSMENT — PAIN DESCRIPTION - DESCRIPTORS
DESCRIPTORS: DISCOMFORT;ACHING
DESCRIPTORS: ACHING
DESCRIPTORS: DISCOMFORT
DESCRIPTORS: ACHING

## 2023-04-28 ASSESSMENT — PAIN DESCRIPTION - ORIENTATION
ORIENTATION: RIGHT
ORIENTATION: MID
ORIENTATION: MID
ORIENTATION: RIGHT

## 2023-04-29 ENCOUNTER — ANESTHESIA (OUTPATIENT)
Dept: OPERATING ROOM | Age: 33
End: 2023-04-29
Payer: COMMERCIAL

## 2023-04-29 ENCOUNTER — APPOINTMENT (OUTPATIENT)
Dept: GENERAL RADIOLOGY | Age: 33
DRG: 329 | End: 2023-04-29
Attending: SURGERY
Payer: COMMERCIAL

## 2023-04-29 ENCOUNTER — ANESTHESIA EVENT (OUTPATIENT)
Dept: OPERATING ROOM | Age: 33
End: 2023-04-29
Payer: COMMERCIAL

## 2023-04-29 PROBLEM — K91.89 ANASTOMOTIC LEAK OF INTESTINE: Status: ACTIVE | Noted: 2023-04-29

## 2023-04-29 PROBLEM — K63.1 PERFORATED SIGMOID COLON (HCC): Status: ACTIVE | Noted: 2023-04-29

## 2023-04-29 LAB
ANION GAP SERPL CALCULATED.3IONS-SCNC: 10 MMOL/L (ref 3–16)
BASOPHILS # BLD: 0.1 K/UL (ref 0–0.2)
BASOPHILS NFR BLD: 0.3 %
BUN SERPL-MCNC: 17 MG/DL (ref 7–20)
CALCIUM SERPL-MCNC: 8.8 MG/DL (ref 8.3–10.6)
CHLORIDE SERPL-SCNC: 100 MMOL/L (ref 99–110)
CO2 SERPL-SCNC: 25 MMOL/L (ref 21–32)
CREAT SERPL-MCNC: 1 MG/DL (ref 0.9–1.3)
CRP SERPL-MCNC: 245.8 MG/L (ref 0–5.1)
DEPRECATED RDW RBC AUTO: 13.6 % (ref 12.4–15.4)
EOSINOPHIL # BLD: 0.2 K/UL (ref 0–0.6)
EOSINOPHIL NFR BLD: 1 %
GFR SERPLBLD CREATININE-BSD FMLA CKD-EPI: >60 ML/MIN/{1.73_M2}
GLUCOSE SERPL-MCNC: 142 MG/DL (ref 70–99)
HCT VFR BLD AUTO: 34 % (ref 40.5–52.5)
HGB BLD-MCNC: 11.3 G/DL (ref 13.5–17.5)
LACTATE BLDV-SCNC: 1.1 MMOL/L (ref 0.4–2)
LYMPHOCYTES # BLD: 1.6 K/UL (ref 1–5.1)
LYMPHOCYTES NFR BLD: 7.5 %
MCH RBC QN AUTO: 31.5 PG (ref 26–34)
MCHC RBC AUTO-ENTMCNC: 33.4 G/DL (ref 31–36)
MCV RBC AUTO: 94.3 FL (ref 80–100)
MONOCYTES # BLD: 1.5 K/UL (ref 0–1.3)
MONOCYTES NFR BLD: 7.3 %
NEUTROPHILS # BLD: 17.6 K/UL (ref 1.7–7.7)
NEUTROPHILS NFR BLD: 83.9 %
PLATELET # BLD AUTO: 254 K/UL (ref 135–450)
PMV BLD AUTO: 7.8 FL (ref 5–10.5)
POTASSIUM SERPL-SCNC: 3.5 MMOL/L (ref 3.5–5.1)
RBC # BLD AUTO: 3.61 M/UL (ref 4.2–5.9)
SODIUM SERPL-SCNC: 135 MMOL/L (ref 136–145)
TROPONIN, HIGH SENSITIVITY: 8 NG/L (ref 0–22)
WBC # BLD AUTO: 21 K/UL (ref 4–11)

## 2023-04-29 PROCEDURE — 2000000000 HC ICU R&B

## 2023-04-29 PROCEDURE — 6360000002 HC RX W HCPCS: Performed by: HOSPITALIST

## 2023-04-29 PROCEDURE — 86140 C-REACTIVE PROTEIN: CPT

## 2023-04-29 PROCEDURE — 6360000002 HC RX W HCPCS: Performed by: ANESTHESIOLOGY

## 2023-04-29 PROCEDURE — 3600000005 HC SURGERY LEVEL 5 BASE: Performed by: SURGERY

## 2023-04-29 PROCEDURE — 2500000003 HC RX 250 WO HCPCS: Performed by: SURGERY

## 2023-04-29 PROCEDURE — 83605 ASSAY OF LACTIC ACID: CPT

## 2023-04-29 PROCEDURE — 6370000000 HC RX 637 (ALT 250 FOR IP): Performed by: SURGERY

## 2023-04-29 PROCEDURE — 3600000015 HC SURGERY LEVEL 5 ADDTL 15MIN: Performed by: SURGERY

## 2023-04-29 PROCEDURE — 3700000001 HC ADD 15 MINUTES (ANESTHESIA): Performed by: SURGERY

## 2023-04-29 PROCEDURE — 2580000003 HC RX 258: Performed by: SURGERY

## 2023-04-29 PROCEDURE — 87070 CULTURE OTHR SPECIMN AEROBIC: CPT

## 2023-04-29 PROCEDURE — 87075 CULTR BACTERIA EXCEPT BLOOD: CPT

## 2023-04-29 PROCEDURE — 44143 PARTIAL REMOVAL OF COLON: CPT | Performed by: SURGERY

## 2023-04-29 PROCEDURE — 87015 SPECIMEN INFECT AGNT CONCNTJ: CPT

## 2023-04-29 PROCEDURE — 0DBN0ZZ EXCISION OF SIGMOID COLON, OPEN APPROACH: ICD-10-PCS | Performed by: SURGERY

## 2023-04-29 PROCEDURE — 87206 SMEAR FLUORESCENT/ACID STAI: CPT

## 2023-04-29 PROCEDURE — 2580000003 HC RX 258: Performed by: HOSPITALIST

## 2023-04-29 PROCEDURE — 84484 ASSAY OF TROPONIN QUANT: CPT

## 2023-04-29 PROCEDURE — 87205 SMEAR GRAM STAIN: CPT

## 2023-04-29 PROCEDURE — 6360000002 HC RX W HCPCS: Performed by: SURGERY

## 2023-04-29 PROCEDURE — 0D1N0Z4 BYPASS SIGMOID COLON TO CUTANEOUS, OPEN APPROACH: ICD-10-PCS | Performed by: SURGERY

## 2023-04-29 PROCEDURE — 88307 TISSUE EXAM BY PATHOLOGIST: CPT

## 2023-04-29 PROCEDURE — 2700000000 HC OXYGEN THERAPY PER DAY

## 2023-04-29 PROCEDURE — 85025 COMPLETE CBC W/AUTO DIFF WBC: CPT

## 2023-04-29 PROCEDURE — 94761 N-INVAS EAR/PLS OXIMETRY MLT: CPT

## 2023-04-29 PROCEDURE — 87158 CULTURE TYPING ADDED METHOD: CPT

## 2023-04-29 PROCEDURE — 2500000003 HC RX 250 WO HCPCS: Performed by: ANESTHESIOLOGY

## 2023-04-29 PROCEDURE — 87186 SC STD MICRODIL/AGAR DIL: CPT

## 2023-04-29 PROCEDURE — C9290 INJ, BUPIVACAINE LIPOSOME: HCPCS | Performed by: SURGERY

## 2023-04-29 PROCEDURE — 71046 X-RAY EXAM CHEST 2 VIEWS: CPT

## 2023-04-29 PROCEDURE — 2720000010 HC SURG SUPPLY STERILE: Performed by: SURGERY

## 2023-04-29 PROCEDURE — 87102 FUNGUS ISOLATION CULTURE: CPT

## 2023-04-29 PROCEDURE — 94640 AIRWAY INHALATION TREATMENT: CPT

## 2023-04-29 PROCEDURE — P9045 ALBUMIN (HUMAN), 5%, 250 ML: HCPCS | Performed by: ANESTHESIOLOGY

## 2023-04-29 PROCEDURE — 0DBP0ZZ EXCISION OF RECTUM, OPEN APPROACH: ICD-10-PCS | Performed by: SURGERY

## 2023-04-29 PROCEDURE — 87116 MYCOBACTERIA CULTURE: CPT

## 2023-04-29 PROCEDURE — 7100000000 HC PACU RECOVERY - FIRST 15 MIN: Performed by: SURGERY

## 2023-04-29 PROCEDURE — 7100000001 HC PACU RECOVERY - ADDTL 15 MIN: Performed by: SURGERY

## 2023-04-29 PROCEDURE — 6370000000 HC RX 637 (ALT 250 FOR IP): Performed by: HOSPITALIST

## 2023-04-29 PROCEDURE — 3700000000 HC ANESTHESIA ATTENDED CARE: Performed by: SURGERY

## 2023-04-29 PROCEDURE — 36415 COLL VENOUS BLD VENIPUNCTURE: CPT

## 2023-04-29 PROCEDURE — 87077 CULTURE AEROBIC IDENTIFY: CPT

## 2023-04-29 PROCEDURE — 80048 BASIC METABOLIC PNL TOTAL CA: CPT

## 2023-04-29 PROCEDURE — A4216 STERILE WATER/SALINE, 10 ML: HCPCS | Performed by: SURGERY

## 2023-04-29 PROCEDURE — 2709999900 HC NON-CHARGEABLE SUPPLY: Performed by: SURGERY

## 2023-04-29 PROCEDURE — 6360000002 HC RX W HCPCS: Performed by: NURSE PRACTITIONER

## 2023-04-29 RX ORDER — FENTANYL CITRATE 50 UG/ML
INJECTION, SOLUTION INTRAMUSCULAR; INTRAVENOUS PRN
Status: DISCONTINUED | OUTPATIENT
Start: 2023-04-29 | End: 2023-04-29 | Stop reason: SDUPTHER

## 2023-04-29 RX ORDER — LIDOCAINE HYDROCHLORIDE 20 MG/ML
INJECTION, SOLUTION INFILTRATION; PERINEURAL PRN
Status: DISCONTINUED | OUTPATIENT
Start: 2023-04-29 | End: 2023-04-29 | Stop reason: SDUPTHER

## 2023-04-29 RX ORDER — ALBUMIN, HUMAN INJ 5% 5 %
SOLUTION INTRAVENOUS
Status: COMPLETED
Start: 2023-04-29 | End: 2023-04-29

## 2023-04-29 RX ORDER — FENTANYL CITRATE 50 UG/ML
25 INJECTION, SOLUTION INTRAMUSCULAR; INTRAVENOUS EVERY 5 MIN PRN
Status: COMPLETED | OUTPATIENT
Start: 2023-04-29 | End: 2023-04-29

## 2023-04-29 RX ORDER — PROPOFOL 10 MG/ML
INJECTION, EMULSION INTRAVENOUS PRN
Status: DISCONTINUED | OUTPATIENT
Start: 2023-04-29 | End: 2023-04-29 | Stop reason: SDUPTHER

## 2023-04-29 RX ORDER — HALOPERIDOL 5 MG/ML
1 INJECTION INTRAMUSCULAR
Status: DISCONTINUED | OUTPATIENT
Start: 2023-04-29 | End: 2023-04-29 | Stop reason: HOSPADM

## 2023-04-29 RX ORDER — METOCLOPRAMIDE HYDROCHLORIDE 5 MG/ML
10 INJECTION INTRAMUSCULAR; INTRAVENOUS EVERY 6 HOURS
Status: COMPLETED | OUTPATIENT
Start: 2023-04-29 | End: 2023-05-01

## 2023-04-29 RX ORDER — KETAMINE HCL IN NACL, ISO-OSM 100MG/10ML
SYRINGE (ML) INJECTION PRN
Status: DISCONTINUED | OUTPATIENT
Start: 2023-04-29 | End: 2023-04-29 | Stop reason: SDUPTHER

## 2023-04-29 RX ORDER — DEXAMETHASONE SODIUM PHOSPHATE 4 MG/ML
INJECTION, SOLUTION INTRA-ARTICULAR; INTRALESIONAL; INTRAMUSCULAR; INTRAVENOUS; SOFT TISSUE PRN
Status: DISCONTINUED | OUTPATIENT
Start: 2023-04-29 | End: 2023-04-29 | Stop reason: SDUPTHER

## 2023-04-29 RX ORDER — OXYCODONE HYDROCHLORIDE 5 MG/1
5 TABLET ORAL
Status: DISCONTINUED | OUTPATIENT
Start: 2023-04-29 | End: 2023-04-29 | Stop reason: HOSPADM

## 2023-04-29 RX ORDER — ONDANSETRON 2 MG/ML
4 INJECTION INTRAMUSCULAR; INTRAVENOUS
Status: COMPLETED | OUTPATIENT
Start: 2023-04-29 | End: 2023-04-29

## 2023-04-29 RX ORDER — LABETALOL HYDROCHLORIDE 5 MG/ML
10 INJECTION, SOLUTION INTRAVENOUS
Status: DISCONTINUED | OUTPATIENT
Start: 2023-04-29 | End: 2023-04-29 | Stop reason: HOSPADM

## 2023-04-29 RX ORDER — MORPHINE SULFATE 2 MG/ML
2 INJECTION, SOLUTION INTRAMUSCULAR; INTRAVENOUS
Status: DISCONTINUED | OUTPATIENT
Start: 2023-04-29 | End: 2023-04-29 | Stop reason: SDUPTHER

## 2023-04-29 RX ORDER — SODIUM CHLORIDE 9 MG/ML
INJECTION, SOLUTION INTRAVENOUS
Status: DISPENSED
Start: 2023-04-29 | End: 2023-04-30

## 2023-04-29 RX ORDER — PIPERACILLIN SODIUM, TAZOBACTAM SODIUM 3; .375 G/15ML; G/15ML
INJECTION, POWDER, LYOPHILIZED, FOR SOLUTION INTRAVENOUS
Status: DISPENSED
Start: 2023-04-29 | End: 2023-04-30

## 2023-04-29 RX ORDER — ROCURONIUM BROMIDE 10 MG/ML
INJECTION, SOLUTION INTRAVENOUS PRN
Status: DISCONTINUED | OUTPATIENT
Start: 2023-04-29 | End: 2023-04-29 | Stop reason: SDUPTHER

## 2023-04-29 RX ORDER — SODIUM CHLORIDE 9 MG/ML
25 INJECTION, SOLUTION INTRAVENOUS PRN
Status: DISCONTINUED | OUTPATIENT
Start: 2023-04-29 | End: 2023-04-29 | Stop reason: HOSPADM

## 2023-04-29 RX ORDER — KETOROLAC TROMETHAMINE 15 MG/ML
15 INJECTION, SOLUTION INTRAMUSCULAR; INTRAVENOUS EVERY 6 HOURS
Status: COMPLETED | OUTPATIENT
Start: 2023-04-29 | End: 2023-05-01

## 2023-04-29 RX ORDER — MAGNESIUM HYDROXIDE 1200 MG/15ML
LIQUID ORAL CONTINUOUS PRN
Status: COMPLETED | OUTPATIENT
Start: 2023-04-29 | End: 2023-04-29

## 2023-04-29 RX ORDER — MORPHINE SULFATE 4 MG/ML
4 INJECTION, SOLUTION INTRAMUSCULAR; INTRAVENOUS
Status: DISCONTINUED | OUTPATIENT
Start: 2023-04-29 | End: 2023-04-29 | Stop reason: SDUPTHER

## 2023-04-29 RX ORDER — HYDRALAZINE HYDROCHLORIDE 20 MG/ML
10 INJECTION INTRAMUSCULAR; INTRAVENOUS
Status: DISCONTINUED | OUTPATIENT
Start: 2023-04-29 | End: 2023-04-29 | Stop reason: HOSPADM

## 2023-04-29 RX ORDER — MIDAZOLAM HYDROCHLORIDE 1 MG/ML
INJECTION INTRAMUSCULAR; INTRAVENOUS PRN
Status: DISCONTINUED | OUTPATIENT
Start: 2023-04-29 | End: 2023-04-29 | Stop reason: SDUPTHER

## 2023-04-29 RX ORDER — SODIUM CHLORIDE 0.9 % (FLUSH) 0.9 %
5-40 SYRINGE (ML) INJECTION EVERY 12 HOURS SCHEDULED
Status: DISCONTINUED | OUTPATIENT
Start: 2023-04-29 | End: 2023-04-29 | Stop reason: HOSPADM

## 2023-04-29 RX ORDER — SUCCINYLCHOLINE CHLORIDE 20 MG/ML
INJECTION INTRAMUSCULAR; INTRAVENOUS PRN
Status: DISCONTINUED | OUTPATIENT
Start: 2023-04-29 | End: 2023-04-29 | Stop reason: SDUPTHER

## 2023-04-29 RX ORDER — SODIUM CHLORIDE, SODIUM LACTATE, POTASSIUM CHLORIDE, AND CALCIUM CHLORIDE .6; .31; .03; .02 G/100ML; G/100ML; G/100ML; G/100ML
IRRIGANT IRRIGATION
Status: COMPLETED | OUTPATIENT
Start: 2023-04-29 | End: 2023-04-29

## 2023-04-29 RX ORDER — ONDANSETRON 2 MG/ML
INJECTION INTRAMUSCULAR; INTRAVENOUS PRN
Status: DISCONTINUED | OUTPATIENT
Start: 2023-04-29 | End: 2023-04-29 | Stop reason: SDUPTHER

## 2023-04-29 RX ORDER — DIPHENHYDRAMINE HYDROCHLORIDE 50 MG/ML
12.5 INJECTION INTRAMUSCULAR; INTRAVENOUS
Status: DISCONTINUED | OUTPATIENT
Start: 2023-04-29 | End: 2023-04-29 | Stop reason: HOSPADM

## 2023-04-29 RX ORDER — SODIUM CHLORIDE 0.9 % (FLUSH) 0.9 %
5-40 SYRINGE (ML) INJECTION PRN
Status: DISCONTINUED | OUTPATIENT
Start: 2023-04-29 | End: 2023-04-29 | Stop reason: HOSPADM

## 2023-04-29 RX ORDER — LIDOCAINE HYDROCHLORIDE 20 MG/ML
1 SOLUTION OROPHARYNGEAL
Status: DISCONTINUED | OUTPATIENT
Start: 2023-04-29 | End: 2023-05-11 | Stop reason: HOSPADM

## 2023-04-29 RX ORDER — BUPIVACAINE HYDROCHLORIDE AND EPINEPHRINE 5; 5 MG/ML; UG/ML
INJECTION, SOLUTION EPIDURAL; INTRACAUDAL; PERINEURAL
Status: COMPLETED | OUTPATIENT
Start: 2023-04-29 | End: 2023-04-29

## 2023-04-29 RX ORDER — ACETAMINOPHEN 500 MG
1000 TABLET ORAL EVERY 8 HOURS SCHEDULED
Status: COMPLETED | OUTPATIENT
Start: 2023-04-29 | End: 2023-05-02

## 2023-04-29 RX ORDER — ALBUMIN, HUMAN INJ 5% 5 %
SOLUTION INTRAVENOUS PRN
Status: DISCONTINUED | OUTPATIENT
Start: 2023-04-29 | End: 2023-04-29 | Stop reason: SDUPTHER

## 2023-04-29 RX ORDER — MEPERIDINE HYDROCHLORIDE 25 MG/ML
12.5 INJECTION INTRAMUSCULAR; INTRAVENOUS; SUBCUTANEOUS
Status: DISCONTINUED | OUTPATIENT
Start: 2023-04-29 | End: 2023-04-29 | Stop reason: HOSPADM

## 2023-04-29 RX ORDER — HYDROMORPHONE HCL 110MG/55ML
0.5 PATIENT CONTROLLED ANALGESIA SYRINGE INTRAVENOUS EVERY 5 MIN PRN
Status: COMPLETED | OUTPATIENT
Start: 2023-04-29 | End: 2023-04-29

## 2023-04-29 RX ADMIN — HYDROMORPHONE HYDROCHLORIDE 0.5 MG: 2 INJECTION, SOLUTION INTRAMUSCULAR; INTRAVENOUS; SUBCUTANEOUS at 20:02

## 2023-04-29 RX ADMIN — MIDAZOLAM 2 MG: 1 INJECTION INTRAMUSCULAR; INTRAVENOUS at 16:01

## 2023-04-29 RX ADMIN — IPRATROPIUM BROMIDE AND ALBUTEROL SULFATE 1 AMPULE: .5; 3 SOLUTION RESPIRATORY (INHALATION) at 09:12

## 2023-04-29 RX ADMIN — IPRATROPIUM BROMIDE AND ALBUTEROL SULFATE 1 AMPULE: .5; 3 SOLUTION RESPIRATORY (INHALATION) at 23:30

## 2023-04-29 RX ADMIN — FENTANYL CITRATE 25 MCG: 50 INJECTION INTRAMUSCULAR; INTRAVENOUS at 20:29

## 2023-04-29 RX ADMIN — KETOROLAC TROMETHAMINE 15 MG: 15 INJECTION, SOLUTION INTRAMUSCULAR; INTRAVENOUS at 21:59

## 2023-04-29 RX ADMIN — ROCURONIUM BROMIDE 50 MG: 10 INJECTION, SOLUTION INTRAVENOUS at 16:14

## 2023-04-29 RX ADMIN — SODIUM CHLORIDE, PRESERVATIVE FREE 20 MG: 5 INJECTION INTRAVENOUS at 21:59

## 2023-04-29 RX ADMIN — CARVEDILOL 25 MG: 25 TABLET, FILM COATED ORAL at 10:29

## 2023-04-29 RX ADMIN — HYDROMORPHONE HYDROCHLORIDE 0.5 MG: 2 INJECTION, SOLUTION INTRAMUSCULAR; INTRAVENOUS; SUBCUTANEOUS at 19:37

## 2023-04-29 RX ADMIN — MORPHINE SULFATE 4 MG: 4 INJECTION, SOLUTION INTRAMUSCULAR; INTRAVENOUS at 22:34

## 2023-04-29 RX ADMIN — MORPHINE SULFATE 4 MG: 4 INJECTION, SOLUTION INTRAMUSCULAR; INTRAVENOUS at 10:29

## 2023-04-29 RX ADMIN — ONDANSETRON 4 MG: 2 INJECTION INTRAMUSCULAR; INTRAVENOUS at 19:33

## 2023-04-29 RX ADMIN — PIPERACILLIN AND TAZOBACTAM 3375 MG: 3; .375 INJECTION, POWDER, LYOPHILIZED, FOR SOLUTION INTRAVENOUS at 18:00

## 2023-04-29 RX ADMIN — FENTANYL CITRATE 50 MCG: 50 INJECTION, SOLUTION INTRAMUSCULAR; INTRAVENOUS at 18:28

## 2023-04-29 RX ADMIN — ALBUMIN (HUMAN) 12.5 G: 12.5 INJECTION, SOLUTION INTRAVENOUS at 18:08

## 2023-04-29 RX ADMIN — ROCURONIUM BROMIDE 30 MG: 10 INJECTION, SOLUTION INTRAVENOUS at 17:25

## 2023-04-29 RX ADMIN — KETOROLAC TROMETHAMINE 15 MG: 15 INJECTION, SOLUTION INTRAMUSCULAR; INTRAVENOUS at 09:50

## 2023-04-29 RX ADMIN — ACETAMINOPHEN 1000 MG: 325 TABLET ORAL at 21:58

## 2023-04-29 RX ADMIN — SODIUM CHLORIDE: 9 INJECTION, SOLUTION INTRAVENOUS at 18:51

## 2023-04-29 RX ADMIN — PROPOFOL 200 MG: 10 INJECTION, EMULSION INTRAVENOUS at 16:01

## 2023-04-29 RX ADMIN — LIDOCAINE HYDROCHLORIDE 100 MG: 20 INJECTION, SOLUTION INFILTRATION; PERINEURAL at 16:01

## 2023-04-29 RX ADMIN — DEXAMETHASONE SODIUM PHOSPHATE 10 MG: 4 INJECTION, SOLUTION INTRAMUSCULAR; INTRAVENOUS at 16:06

## 2023-04-29 RX ADMIN — ROCURONIUM BROMIDE 20 MG: 10 INJECTION, SOLUTION INTRAVENOUS at 16:38

## 2023-04-29 RX ADMIN — PHENYLEPHRINE HYDROCHLORIDE 200 MCG: 10 INJECTION INTRAVENOUS at 16:25

## 2023-04-29 RX ADMIN — HYDROMORPHONE HYDROCHLORIDE 0.5 MG: 2 INJECTION, SOLUTION INTRAMUSCULAR; INTRAVENOUS; SUBCUTANEOUS at 19:49

## 2023-04-29 RX ADMIN — PIPERACILLIN AND TAZOBACTAM 3375 MG: 3; .375 INJECTION, POWDER, LYOPHILIZED, FOR SOLUTION INTRAVENOUS at 09:52

## 2023-04-29 RX ADMIN — FENTANYL CITRATE 50 MCG: 50 INJECTION, SOLUTION INTRAMUSCULAR; INTRAVENOUS at 16:01

## 2023-04-29 RX ADMIN — Medication 10 ML: at 22:00

## 2023-04-29 RX ADMIN — FENTANYL CITRATE 50 MCG: 50 INJECTION, SOLUTION INTRAMUSCULAR; INTRAVENOUS at 16:14

## 2023-04-29 RX ADMIN — CARVEDILOL 25 MG: 25 TABLET, FILM COATED ORAL at 22:17

## 2023-04-29 RX ADMIN — SODIUM CHLORIDE: 9 INJECTION, SOLUTION INTRAVENOUS at 23:20

## 2023-04-29 RX ADMIN — SODIUM CHLORIDE, PRESERVATIVE FREE 20 MG: 5 INJECTION INTRAVENOUS at 09:50

## 2023-04-29 RX ADMIN — SODIUM CHLORIDE: 9 INJECTION, SOLUTION INTRAVENOUS at 16:22

## 2023-04-29 RX ADMIN — ROCURONIUM BROMIDE 10 MG: 10 INJECTION, SOLUTION INTRAVENOUS at 17:57

## 2023-04-29 RX ADMIN — Medication 30 MG: at 17:10

## 2023-04-29 RX ADMIN — HYDROMORPHONE HYDROCHLORIDE 0.5 MG: 2 INJECTION, SOLUTION INTRAMUSCULAR; INTRAVENOUS; SUBCUTANEOUS at 19:23

## 2023-04-29 RX ADMIN — ROCURONIUM BROMIDE 10 MG: 10 INJECTION, SOLUTION INTRAVENOUS at 18:21

## 2023-04-29 RX ADMIN — METOCLOPRAMIDE 10 MG: 5 INJECTION, SOLUTION INTRAMUSCULAR; INTRAVENOUS at 21:59

## 2023-04-29 RX ADMIN — Medication 20 MG: at 16:19

## 2023-04-29 RX ADMIN — FENTANYL CITRATE 25 MCG: 50 INJECTION INTRAMUSCULAR; INTRAVENOUS at 20:18

## 2023-04-29 RX ADMIN — SUCCINYLCHOLINE CHLORIDE 160 MG: 20 INJECTION, SOLUTION INTRAMUSCULAR; INTRAVENOUS at 16:01

## 2023-04-29 RX ADMIN — PIPERACILLIN AND TAZOBACTAM 3375 MG: 3; .375 INJECTION, POWDER, LYOPHILIZED, FOR SOLUTION INTRAVENOUS at 03:29

## 2023-04-29 RX ADMIN — FENTANYL CITRATE 25 MCG: 50 INJECTION INTRAMUSCULAR; INTRAVENOUS at 20:34

## 2023-04-29 RX ADMIN — ONDANSETRON 4 MG: 2 INJECTION INTRAMUSCULAR; INTRAVENOUS at 19:10

## 2023-04-29 RX ADMIN — FENTANYL CITRATE 25 MCG: 50 INJECTION INTRAMUSCULAR; INTRAVENOUS at 20:13

## 2023-04-29 RX ADMIN — SUGAMMADEX 200 MG: 100 INJECTION, SOLUTION INTRAVENOUS at 18:59

## 2023-04-29 ASSESSMENT — PAIN SCALES - GENERAL
PAINLEVEL_OUTOF10: 5
PAINLEVEL_OUTOF10: 7
PAINLEVEL_OUTOF10: 8
PAINLEVEL_OUTOF10: 10
PAINLEVEL_OUTOF10: 10
PAINLEVEL_OUTOF10: 8
PAINLEVEL_OUTOF10: 2
PAINLEVEL_OUTOF10: 10
PAINLEVEL_OUTOF10: 9
PAINLEVEL_OUTOF10: 9
PAINLEVEL_OUTOF10: 8
PAINLEVEL_OUTOF10: 4
PAINLEVEL_OUTOF10: 9
PAINLEVEL_OUTOF10: 7

## 2023-04-29 ASSESSMENT — PAIN DESCRIPTION - LOCATION
LOCATION: ABDOMEN

## 2023-04-29 ASSESSMENT — PAIN DESCRIPTION - DESCRIPTORS
DESCRIPTORS: SORE
DESCRIPTORS: SORE

## 2023-04-29 ASSESSMENT — PAIN DESCRIPTION - PAIN TYPE: TYPE: SURGICAL PAIN

## 2023-04-29 ASSESSMENT — PAIN SCALES - WONG BAKER: WONGBAKER_NUMERICALRESPONSE: 2

## 2023-04-29 NOTE — ANESTHESIA POSTPROCEDURE EVALUATION
Department of Anesthesiology  Postprocedure Note    Patient: Jean Disla  MRN: [id number]  YOB: 1990  Date of evaluation: 4/29/2023      Procedure Summary     Date: 04/29/23 Room / Location: [address]    Anesthesia Start: 3759 Anesthesia Stop: 1922    Procedure: EXPLORATORY LAPAROTOMY WITH RECTOSIGMOID RESECTION & COLOSTOMY CREATION (Abdomen) Diagnosis:       Peritoneal free air      (PERITONEAL FREE AIR)    Surgeons: Oz Cortez MD Responsible Provider: Kathy Rodriguez MD    Anesthesia Type: general ASA Status: 3          Anesthesia Type: No value filed.     Florence Phase I: Florence Score: 9    Florence Phase II:        Anesthesia Post Evaluation    Patient location during evaluation: PACU  Patient participation: complete - patient participated  Level of consciousness: awake and alert  Airway patency: patent  Nausea & Vomiting: no nausea and no vomiting  Complications: no  Cardiovascular status: blood pressure returned to baseline  Respiratory status: acceptable  Hydration status: euvolemic  Multimodal analgesia pain management approach

## 2023-04-29 NOTE — ANESTHESIA PRE PROCEDURE
results found for: HIV, HEPCAB    COVID-19 Screening (If Applicable): No results found for: COVID19        Anesthesia Evaluation  Patient summary reviewed and Nursing notes reviewed no history of anesthetic complications:   Airway: Mallampati: II  TM distance: >3 FB   Neck ROM: full  Mouth opening: > = 3 FB   Dental: normal exam         Pulmonary:Negative Pulmonary ROS                              Cardiovascular:  Exercise tolerance: good (>4 METS),   (+) hypertension:,       ECG reviewed      Echocardiogram reviewed               ROS comment: Technically difficult study due to body habitus. Definity was administered. Left ventricular cavity size is normal with mild concentric left ventricular hypertrophy. Ejection fraction is visually estimated to be 60%. Indeterminate diastolic function. E/e' = 11. .0. Neuro/Psych:   Negative Neuro/Psych ROS              GI/Hepatic/Renal: Neg GI/Hepatic/Renal ROS  (+) morbid obesity          Endo/Other:    (+) Diabetes, . Abdominal:   (+) obese,           Vascular: negative vascular ROS. Other Findings:             Anesthesia Plan      general     ASA 3     (S/p surgery a few days ago and now with free air.)  Induction: intravenous. MIPS: Postoperative opioids intended and Prophylactic antiemetics administered. Anesthetic plan and risks discussed with patient.         Attending anesthesiologist reviewed and agrees with Tea Neff MD   4/29/2023

## 2023-04-30 LAB
ANION GAP SERPL CALCULATED.3IONS-SCNC: 10 MMOL/L (ref 3–16)
BASOPHILS # BLD: 0 K/UL (ref 0–0.2)
BASOPHILS NFR BLD: 0.1 %
BUN SERPL-MCNC: 20 MG/DL (ref 7–20)
CALCIUM SERPL-MCNC: 8.4 MG/DL (ref 8.3–10.6)
CHLORIDE SERPL-SCNC: 102 MMOL/L (ref 99–110)
CO2 SERPL-SCNC: 26 MMOL/L (ref 21–32)
CREAT SERPL-MCNC: 0.8 MG/DL (ref 0.9–1.3)
CRP SERPL-MCNC: 236.6 MG/L (ref 0–5.1)
DEPRECATED RDW RBC AUTO: 13.3 % (ref 12.4–15.4)
EOSINOPHIL # BLD: 0 K/UL (ref 0–0.6)
EOSINOPHIL NFR BLD: 0.1 %
GFR SERPLBLD CREATININE-BSD FMLA CKD-EPI: >60 ML/MIN/{1.73_M2}
GLUCOSE SERPL-MCNC: 156 MG/DL (ref 70–99)
HCT VFR BLD AUTO: 30 % (ref 40.5–52.5)
HGB BLD-MCNC: 10.3 G/DL (ref 13.5–17.5)
LACTATE BLDV-SCNC: 1 MMOL/L (ref 0.4–2)
LYMPHOCYTES # BLD: 0.8 K/UL (ref 1–5.1)
LYMPHOCYTES NFR BLD: 5.1 %
MCH RBC QN AUTO: 32.2 PG (ref 26–34)
MCHC RBC AUTO-ENTMCNC: 34.2 G/DL (ref 31–36)
MCV RBC AUTO: 94.3 FL (ref 80–100)
MONOCYTES # BLD: 0.9 K/UL (ref 0–1.3)
MONOCYTES NFR BLD: 5.7 %
NEUTROPHILS # BLD: 13.7 K/UL (ref 1.7–7.7)
NEUTROPHILS NFR BLD: 89 %
PLATELET # BLD AUTO: 280 K/UL (ref 135–450)
PMV BLD AUTO: 8.1 FL (ref 5–10.5)
POTASSIUM SERPL-SCNC: 3.8 MMOL/L (ref 3.5–5.1)
RBC # BLD AUTO: 3.18 M/UL (ref 4.2–5.9)
SODIUM SERPL-SCNC: 138 MMOL/L (ref 136–145)
WBC # BLD AUTO: 15.4 K/UL (ref 4–11)

## 2023-04-30 PROCEDURE — 2500000003 HC RX 250 WO HCPCS: Performed by: SURGERY

## 2023-04-30 PROCEDURE — 2700000000 HC OXYGEN THERAPY PER DAY

## 2023-04-30 PROCEDURE — 36415 COLL VENOUS BLD VENIPUNCTURE: CPT

## 2023-04-30 PROCEDURE — 6370000000 HC RX 637 (ALT 250 FOR IP): Performed by: SURGERY

## 2023-04-30 PROCEDURE — 85025 COMPLETE CBC W/AUTO DIFF WBC: CPT

## 2023-04-30 PROCEDURE — A4216 STERILE WATER/SALINE, 10 ML: HCPCS | Performed by: SURGERY

## 2023-04-30 PROCEDURE — 6360000002 HC RX W HCPCS: Performed by: SURGERY

## 2023-04-30 PROCEDURE — 2580000003 HC RX 258: Performed by: SURGERY

## 2023-04-30 PROCEDURE — 94761 N-INVAS EAR/PLS OXIMETRY MLT: CPT

## 2023-04-30 PROCEDURE — 86140 C-REACTIVE PROTEIN: CPT

## 2023-04-30 PROCEDURE — 2000000000 HC ICU R&B

## 2023-04-30 PROCEDURE — 99024 POSTOP FOLLOW-UP VISIT: CPT | Performed by: SURGERY

## 2023-04-30 PROCEDURE — 94640 AIRWAY INHALATION TREATMENT: CPT

## 2023-04-30 PROCEDURE — 80048 BASIC METABOLIC PNL TOTAL CA: CPT

## 2023-04-30 PROCEDURE — 83605 ASSAY OF LACTIC ACID: CPT

## 2023-04-30 RX ADMIN — MORPHINE SULFATE 4 MG: 4 INJECTION, SOLUTION INTRAMUSCULAR; INTRAVENOUS at 14:19

## 2023-04-30 RX ADMIN — SODIUM CHLORIDE: 9 INJECTION, SOLUTION INTRAVENOUS at 04:19

## 2023-04-30 RX ADMIN — METOCLOPRAMIDE 10 MG: 5 INJECTION, SOLUTION INTRAMUSCULAR; INTRAVENOUS at 09:28

## 2023-04-30 RX ADMIN — MORPHINE SULFATE 4 MG: 4 INJECTION, SOLUTION INTRAMUSCULAR; INTRAVENOUS at 00:29

## 2023-04-30 RX ADMIN — KETOROLAC TROMETHAMINE 15 MG: 15 INJECTION, SOLUTION INTRAMUSCULAR; INTRAVENOUS at 04:19

## 2023-04-30 RX ADMIN — IPRATROPIUM BROMIDE AND ALBUTEROL SULFATE 1 AMPULE: .5; 3 SOLUTION RESPIRATORY (INHALATION) at 09:40

## 2023-04-30 RX ADMIN — PIPERACILLIN AND TAZOBACTAM 3375 MG: 3; .375 INJECTION, POWDER, LYOPHILIZED, FOR SOLUTION INTRAVENOUS at 17:30

## 2023-04-30 RX ADMIN — PIPERACILLIN AND TAZOBACTAM 3375 MG: 3; .375 INJECTION, POWDER, LYOPHILIZED, FOR SOLUTION INTRAVENOUS at 09:29

## 2023-04-30 RX ADMIN — METOCLOPRAMIDE 10 MG: 5 INJECTION, SOLUTION INTRAMUSCULAR; INTRAVENOUS at 16:39

## 2023-04-30 RX ADMIN — ACETAMINOPHEN 1000 MG: 325 TABLET ORAL at 21:16

## 2023-04-30 RX ADMIN — CARVEDILOL 25 MG: 25 TABLET, FILM COATED ORAL at 09:30

## 2023-04-30 RX ADMIN — Medication 10 ML: at 21:24

## 2023-04-30 RX ADMIN — MORPHINE SULFATE 4 MG: 4 INJECTION, SOLUTION INTRAMUSCULAR; INTRAVENOUS at 02:32

## 2023-04-30 RX ADMIN — MORPHINE SULFATE 4 MG: 4 INJECTION, SOLUTION INTRAMUSCULAR; INTRAVENOUS at 11:48

## 2023-04-30 RX ADMIN — CARVEDILOL 25 MG: 25 TABLET, FILM COATED ORAL at 16:53

## 2023-04-30 RX ADMIN — MORPHINE SULFATE 4 MG: 4 INJECTION, SOLUTION INTRAMUSCULAR; INTRAVENOUS at 19:06

## 2023-04-30 RX ADMIN — SODIUM CHLORIDE: 9 INJECTION, SOLUTION INTRAVENOUS at 11:53

## 2023-04-30 RX ADMIN — PIPERACILLIN AND TAZOBACTAM 3375 MG: 3; .375 INJECTION, POWDER, LYOPHILIZED, FOR SOLUTION INTRAVENOUS at 02:10

## 2023-04-30 RX ADMIN — MORPHINE SULFATE 4 MG: 4 INJECTION, SOLUTION INTRAMUSCULAR; INTRAVENOUS at 22:20

## 2023-04-30 RX ADMIN — KETOROLAC TROMETHAMINE 15 MG: 15 INJECTION, SOLUTION INTRAMUSCULAR; INTRAVENOUS at 16:39

## 2023-04-30 RX ADMIN — IPRATROPIUM BROMIDE AND ALBUTEROL SULFATE 1 AMPULE: .5; 3 SOLUTION RESPIRATORY (INHALATION) at 19:43

## 2023-04-30 RX ADMIN — METOCLOPRAMIDE 10 MG: 5 INJECTION, SOLUTION INTRAMUSCULAR; INTRAVENOUS at 04:19

## 2023-04-30 RX ADMIN — Medication 10 ML: at 09:30

## 2023-04-30 RX ADMIN — ENOXAPARIN SODIUM 30 MG: 100 INJECTION SUBCUTANEOUS at 09:30

## 2023-04-30 RX ADMIN — KETOROLAC TROMETHAMINE 15 MG: 15 INJECTION, SOLUTION INTRAMUSCULAR; INTRAVENOUS at 09:22

## 2023-04-30 RX ADMIN — METOCLOPRAMIDE 10 MG: 5 INJECTION, SOLUTION INTRAMUSCULAR; INTRAVENOUS at 21:17

## 2023-04-30 RX ADMIN — MORPHINE SULFATE 4 MG: 4 INJECTION, SOLUTION INTRAMUSCULAR; INTRAVENOUS at 09:03

## 2023-04-30 RX ADMIN — IPRATROPIUM BROMIDE AND ALBUTEROL SULFATE 1 AMPULE: .5; 3 SOLUTION RESPIRATORY (INHALATION) at 13:37

## 2023-04-30 RX ADMIN — ACETAMINOPHEN 1000 MG: 325 TABLET ORAL at 09:25

## 2023-04-30 RX ADMIN — KETOROLAC TROMETHAMINE 15 MG: 15 INJECTION, SOLUTION INTRAMUSCULAR; INTRAVENOUS at 21:17

## 2023-04-30 RX ADMIN — ACETAMINOPHEN 1000 MG: 325 TABLET ORAL at 16:52

## 2023-04-30 RX ADMIN — SODIUM CHLORIDE, PRESERVATIVE FREE 20 MG: 5 INJECTION INTRAVENOUS at 21:17

## 2023-04-30 RX ADMIN — MORPHINE SULFATE 4 MG: 4 INJECTION, SOLUTION INTRAMUSCULAR; INTRAVENOUS at 16:40

## 2023-04-30 RX ADMIN — SODIUM CHLORIDE: 9 INJECTION, SOLUTION INTRAVENOUS at 19:10

## 2023-04-30 RX ADMIN — SODIUM CHLORIDE, PRESERVATIVE FREE 20 MG: 5 INJECTION INTRAVENOUS at 09:23

## 2023-04-30 RX ADMIN — ENOXAPARIN SODIUM 30 MG: 100 INJECTION SUBCUTANEOUS at 21:16

## 2023-04-30 RX ADMIN — NALOXEGOL OXALATE 25 MG: 25 TABLET, FILM COATED ORAL at 09:24

## 2023-04-30 ASSESSMENT — PAIN DESCRIPTION - ORIENTATION
ORIENTATION: MID;ANTERIOR
ORIENTATION: ANTERIOR;MID;LOWER
ORIENTATION: MID
ORIENTATION: ANTERIOR;MID;LOWER
ORIENTATION: ANTERIOR;MID
ORIENTATION: ANTERIOR;MID;LOWER
ORIENTATION: ANTERIOR;MID

## 2023-04-30 ASSESSMENT — PAIN DESCRIPTION - LOCATION
LOCATION: ABDOMEN

## 2023-04-30 ASSESSMENT — PAIN DESCRIPTION - DESCRIPTORS
DESCRIPTORS: ACHING
DESCRIPTORS: BURNING
DESCRIPTORS: ACHING

## 2023-04-30 ASSESSMENT — PAIN SCALES - GENERAL
PAINLEVEL_OUTOF10: 6
PAINLEVEL_OUTOF10: 6
PAINLEVEL_OUTOF10: 2
PAINLEVEL_OUTOF10: 6
PAINLEVEL_OUTOF10: 3
PAINLEVEL_OUTOF10: 2
PAINLEVEL_OUTOF10: 7
PAINLEVEL_OUTOF10: 6
PAINLEVEL_OUTOF10: 7

## 2023-05-01 LAB
ANION GAP SERPL CALCULATED.3IONS-SCNC: 7 MMOL/L (ref 3–16)
BASOPHILS # BLD: 0 K/UL (ref 0–0.2)
BASOPHILS NFR BLD: 0.1 %
BUN SERPL-MCNC: 22 MG/DL (ref 7–20)
CALCIUM SERPL-MCNC: 8.4 MG/DL (ref 8.3–10.6)
CHLORIDE SERPL-SCNC: 105 MMOL/L (ref 99–110)
CO2 SERPL-SCNC: 28 MMOL/L (ref 21–32)
CREAT SERPL-MCNC: 0.8 MG/DL (ref 0.9–1.3)
CRP SERPL-MCNC: 156.6 MG/L (ref 0–5.1)
DEPRECATED RDW RBC AUTO: 13.5 % (ref 12.4–15.4)
EOSINOPHIL # BLD: 0.1 K/UL (ref 0–0.6)
EOSINOPHIL NFR BLD: 0.5 %
GFR SERPLBLD CREATININE-BSD FMLA CKD-EPI: >60 ML/MIN/{1.73_M2}
GLUCOSE SERPL-MCNC: 114 MG/DL (ref 70–99)
HCT VFR BLD AUTO: 28.4 % (ref 40.5–52.5)
HGB BLD-MCNC: 9.5 G/DL (ref 13.5–17.5)
LYMPHOCYTES # BLD: 1.3 K/UL (ref 1–5.1)
LYMPHOCYTES NFR BLD: 10.1 %
MCH RBC QN AUTO: 31.4 PG (ref 26–34)
MCHC RBC AUTO-ENTMCNC: 33.4 G/DL (ref 31–36)
MCV RBC AUTO: 94.3 FL (ref 80–100)
MONOCYTES # BLD: 1 K/UL (ref 0–1.3)
MONOCYTES NFR BLD: 7.9 %
NEUTROPHILS # BLD: 10.4 K/UL (ref 1.7–7.7)
NEUTROPHILS NFR BLD: 81.4 %
PLATELET # BLD AUTO: 283 K/UL (ref 135–450)
PMV BLD AUTO: 7.5 FL (ref 5–10.5)
POTASSIUM SERPL-SCNC: 3.4 MMOL/L (ref 3.5–5.1)
RBC # BLD AUTO: 3.01 M/UL (ref 4.2–5.9)
SODIUM SERPL-SCNC: 140 MMOL/L (ref 136–145)
WBC # BLD AUTO: 12.7 K/UL (ref 4–11)

## 2023-05-01 PROCEDURE — 6370000000 HC RX 637 (ALT 250 FOR IP): Performed by: SURGERY

## 2023-05-01 PROCEDURE — 6360000002 HC RX W HCPCS: Performed by: SURGERY

## 2023-05-01 PROCEDURE — 97530 THERAPEUTIC ACTIVITIES: CPT

## 2023-05-01 PROCEDURE — 97116 GAIT TRAINING THERAPY: CPT

## 2023-05-01 PROCEDURE — 2700000000 HC OXYGEN THERAPY PER DAY

## 2023-05-01 PROCEDURE — APPNB30 APP NON BILLABLE TIME 0-30 MINS: Performed by: NURSE PRACTITIONER

## 2023-05-01 PROCEDURE — 80048 BASIC METABOLIC PNL TOTAL CA: CPT

## 2023-05-01 PROCEDURE — APPSS15 APP SPLIT SHARED TIME 0-15 MINUTES: Performed by: NURSE PRACTITIONER

## 2023-05-01 PROCEDURE — 2000000000 HC ICU R&B

## 2023-05-01 PROCEDURE — 97535 SELF CARE MNGMENT TRAINING: CPT

## 2023-05-01 PROCEDURE — 2580000003 HC RX 258: Performed by: SURGERY

## 2023-05-01 PROCEDURE — 94640 AIRWAY INHALATION TREATMENT: CPT

## 2023-05-01 PROCEDURE — 94761 N-INVAS EAR/PLS OXIMETRY MLT: CPT

## 2023-05-01 PROCEDURE — 6360000002 HC RX W HCPCS: Performed by: NURSE PRACTITIONER

## 2023-05-01 PROCEDURE — 2500000003 HC RX 250 WO HCPCS: Performed by: SURGERY

## 2023-05-01 PROCEDURE — 86140 C-REACTIVE PROTEIN: CPT

## 2023-05-01 PROCEDURE — A4216 STERILE WATER/SALINE, 10 ML: HCPCS | Performed by: SURGERY

## 2023-05-01 PROCEDURE — 85025 COMPLETE CBC W/AUTO DIFF WBC: CPT

## 2023-05-01 RX ORDER — POTASSIUM CHLORIDE 7.45 MG/ML
10 INJECTION INTRAVENOUS
Status: COMPLETED | OUTPATIENT
Start: 2023-05-01 | End: 2023-05-01

## 2023-05-01 RX ORDER — FLUCONAZOLE 2 MG/ML
400 INJECTION, SOLUTION INTRAVENOUS EVERY 24 HOURS
Status: DISCONTINUED | OUTPATIENT
Start: 2023-05-01 | End: 2023-05-06

## 2023-05-01 RX ADMIN — IPRATROPIUM BROMIDE AND ALBUTEROL SULFATE 1 AMPULE: .5; 3 SOLUTION RESPIRATORY (INHALATION) at 08:19

## 2023-05-01 RX ADMIN — ENOXAPARIN SODIUM 30 MG: 100 INJECTION SUBCUTANEOUS at 08:38

## 2023-05-01 RX ADMIN — ACETAMINOPHEN 1000 MG: 325 TABLET ORAL at 13:53

## 2023-05-01 RX ADMIN — SODIUM CHLORIDE: 9 INJECTION, SOLUTION INTRAVENOUS at 01:30

## 2023-05-01 RX ADMIN — METOCLOPRAMIDE 10 MG: 5 INJECTION, SOLUTION INTRAMUSCULAR; INTRAVENOUS at 04:21

## 2023-05-01 RX ADMIN — POTASSIUM CHLORIDE 10 MEQ: 7.46 INJECTION, SOLUTION INTRAVENOUS at 14:55

## 2023-05-01 RX ADMIN — METOCLOPRAMIDE 10 MG: 5 INJECTION, SOLUTION INTRAMUSCULAR; INTRAVENOUS at 16:18

## 2023-05-01 RX ADMIN — POTASSIUM CHLORIDE 10 MEQ: 7.46 INJECTION, SOLUTION INTRAVENOUS at 15:57

## 2023-05-01 RX ADMIN — CARVEDILOL 25 MG: 25 TABLET, FILM COATED ORAL at 16:25

## 2023-05-01 RX ADMIN — CARVEDILOL 25 MG: 25 TABLET, FILM COATED ORAL at 08:32

## 2023-05-01 RX ADMIN — FLUCONAZOLE 400 MG: 400 INJECTION, SOLUTION INTRAVENOUS at 17:03

## 2023-05-01 RX ADMIN — ACETAMINOPHEN 1000 MG: 325 TABLET ORAL at 04:21

## 2023-05-01 RX ADMIN — KETOROLAC TROMETHAMINE 15 MG: 15 INJECTION, SOLUTION INTRAMUSCULAR; INTRAVENOUS at 04:21

## 2023-05-01 RX ADMIN — ENOXAPARIN SODIUM 30 MG: 100 INJECTION SUBCUTANEOUS at 20:05

## 2023-05-01 RX ADMIN — PIPERACILLIN AND TAZOBACTAM 3375 MG: 3; .375 INJECTION, POWDER, LYOPHILIZED, FOR SOLUTION INTRAVENOUS at 09:56

## 2023-05-01 RX ADMIN — Medication 10 ML: at 20:05

## 2023-05-01 RX ADMIN — PIPERACILLIN AND TAZOBACTAM 3375 MG: 3; .375 INJECTION, POWDER, LYOPHILIZED, FOR SOLUTION INTRAVENOUS at 19:18

## 2023-05-01 RX ADMIN — IPRATROPIUM BROMIDE AND ALBUTEROL SULFATE 1 AMPULE: .5; 3 SOLUTION RESPIRATORY (INHALATION) at 05:27

## 2023-05-01 RX ADMIN — SODIUM CHLORIDE: 9 INJECTION, SOLUTION INTRAVENOUS at 09:51

## 2023-05-01 RX ADMIN — SODIUM CHLORIDE, PRESERVATIVE FREE 20 MG: 5 INJECTION INTRAVENOUS at 08:38

## 2023-05-01 RX ADMIN — HYDRALAZINE HYDROCHLORIDE 10 MG: 20 INJECTION INTRAMUSCULAR; INTRAVENOUS at 20:23

## 2023-05-01 RX ADMIN — KETOROLAC TROMETHAMINE 15 MG: 15 INJECTION, SOLUTION INTRAMUSCULAR; INTRAVENOUS at 16:18

## 2023-05-01 RX ADMIN — MORPHINE SULFATE 4 MG: 4 INJECTION, SOLUTION INTRAMUSCULAR; INTRAVENOUS at 20:05

## 2023-05-01 RX ADMIN — NALOXEGOL OXALATE 25 MG: 25 TABLET, FILM COATED ORAL at 04:21

## 2023-05-01 RX ADMIN — IPRATROPIUM BROMIDE AND ALBUTEROL SULFATE 1 AMPULE: .5; 3 SOLUTION RESPIRATORY (INHALATION) at 13:34

## 2023-05-01 RX ADMIN — METOCLOPRAMIDE 10 MG: 5 INJECTION, SOLUTION INTRAMUSCULAR; INTRAVENOUS at 09:40

## 2023-05-01 RX ADMIN — IPRATROPIUM BROMIDE AND ALBUTEROL SULFATE 1 AMPULE: .5; 3 SOLUTION RESPIRATORY (INHALATION) at 20:30

## 2023-05-01 RX ADMIN — PIPERACILLIN AND TAZOBACTAM 3375 MG: 3; .375 INJECTION, POWDER, LYOPHILIZED, FOR SOLUTION INTRAVENOUS at 01:33

## 2023-05-01 RX ADMIN — KETOROLAC TROMETHAMINE 15 MG: 15 INJECTION, SOLUTION INTRAMUSCULAR; INTRAVENOUS at 09:40

## 2023-05-01 RX ADMIN — FAMOTIDINE 20 MG: 20 TABLET ORAL at 20:05

## 2023-05-01 RX ADMIN — DOCUSATE SODIUM 100 MG: 100 CAPSULE, LIQUID FILLED ORAL at 20:05

## 2023-05-01 RX ADMIN — ACETAMINOPHEN 1000 MG: 325 TABLET ORAL at 20:05

## 2023-05-01 ASSESSMENT — PAIN DESCRIPTION - LOCATION
LOCATION: INCISION
LOCATION: ABDOMEN

## 2023-05-01 ASSESSMENT — PAIN DESCRIPTION - ORIENTATION: ORIENTATION: RIGHT;LEFT;MID;LOWER

## 2023-05-01 ASSESSMENT — PAIN SCALES - GENERAL
PAINLEVEL_OUTOF10: 8
PAINLEVEL_OUTOF10: 7
PAINLEVEL_OUTOF10: 7

## 2023-05-01 ASSESSMENT — PAIN DESCRIPTION - DESCRIPTORS: DESCRIPTORS: ACHING;TENDER

## 2023-05-02 LAB
ANION GAP SERPL CALCULATED.3IONS-SCNC: 9 MMOL/L (ref 3–16)
BASOPHILS # BLD: 0 K/UL (ref 0–0.2)
BASOPHILS NFR BLD: 0 %
BUN SERPL-MCNC: 15 MG/DL (ref 7–20)
CALCIUM SERPL-MCNC: 8.4 MG/DL (ref 8.3–10.6)
CHLORIDE SERPL-SCNC: 107 MMOL/L (ref 99–110)
CO2 SERPL-SCNC: 25 MMOL/L (ref 21–32)
CREAT SERPL-MCNC: 0.7 MG/DL (ref 0.9–1.3)
CRP SERPL-MCNC: 114.6 MG/L (ref 0–5.1)
DEPRECATED RDW RBC AUTO: 13.2 % (ref 12.4–15.4)
EKG ATRIAL RATE: 110 BPM
EKG DIAGNOSIS: NORMAL
EKG P AXIS: 48 DEGREES
EKG P-R INTERVAL: 168 MS
EKG Q-T INTERVAL: 328 MS
EKG QRS DURATION: 92 MS
EKG QTC CALCULATION (BAZETT): 443 MS
EKG R AXIS: -3 DEGREES
EKG T AXIS: 44 DEGREES
EKG VENTRICULAR RATE: 110 BPM
EOSINOPHIL # BLD: 0.9 K/UL (ref 0–0.6)
EOSINOPHIL NFR BLD: 9 %
GFR SERPLBLD CREATININE-BSD FMLA CKD-EPI: >60 ML/MIN/{1.73_M2}
GLUCOSE SERPL-MCNC: 97 MG/DL (ref 70–99)
HCT VFR BLD AUTO: 27.8 % (ref 40.5–52.5)
HGB BLD-MCNC: 9.5 G/DL (ref 13.5–17.5)
LYMPHOCYTES # BLD: 0.9 K/UL (ref 1–5.1)
LYMPHOCYTES NFR BLD: 9 %
MAGNESIUM SERPL-MCNC: 1.9 MG/DL (ref 1.8–2.4)
MCH RBC QN AUTO: 32.1 PG (ref 26–34)
MCHC RBC AUTO-ENTMCNC: 34.3 G/DL (ref 31–36)
MCV RBC AUTO: 93.6 FL (ref 80–100)
MONOCYTES # BLD: 0.8 K/UL (ref 0–1.3)
MONOCYTES NFR BLD: 8 %
NEUTROPHILS # BLD: 7.4 K/UL (ref 1.7–7.7)
NEUTROPHILS NFR BLD: 74 %
PHOSPHATE SERPL-MCNC: 2.7 MG/DL (ref 2.5–4.9)
PLATELET # BLD AUTO: 297 K/UL (ref 135–450)
PMV BLD AUTO: 7.6 FL (ref 5–10.5)
POTASSIUM SERPL-SCNC: 3.6 MMOL/L (ref 3.5–5.1)
RBC # BLD AUTO: 2.97 M/UL (ref 4.2–5.9)
RBC MORPH BLD: NORMAL
SODIUM SERPL-SCNC: 141 MMOL/L (ref 136–145)
WBC # BLD AUTO: 10 K/UL (ref 4–11)

## 2023-05-02 PROCEDURE — 86140 C-REACTIVE PROTEIN: CPT

## 2023-05-02 PROCEDURE — 2000000000 HC ICU R&B

## 2023-05-02 PROCEDURE — 6360000002 HC RX W HCPCS: Performed by: SURGERY

## 2023-05-02 PROCEDURE — 2580000003 HC RX 258: Performed by: SURGERY

## 2023-05-02 PROCEDURE — 94640 AIRWAY INHALATION TREATMENT: CPT

## 2023-05-02 PROCEDURE — 6360000002 HC RX W HCPCS: Performed by: NURSE PRACTITIONER

## 2023-05-02 PROCEDURE — APPSS15 APP SPLIT SHARED TIME 0-15 MINUTES: Performed by: NURSE PRACTITIONER

## 2023-05-02 PROCEDURE — 2700000000 HC OXYGEN THERAPY PER DAY

## 2023-05-02 PROCEDURE — 2500000003 HC RX 250 WO HCPCS: Performed by: NURSE PRACTITIONER

## 2023-05-02 PROCEDURE — 85025 COMPLETE CBC W/AUTO DIFF WBC: CPT

## 2023-05-02 PROCEDURE — 6370000000 HC RX 637 (ALT 250 FOR IP): Performed by: SURGERY

## 2023-05-02 PROCEDURE — 6360000002 HC RX W HCPCS: Performed by: INTERNAL MEDICINE

## 2023-05-02 PROCEDURE — 94761 N-INVAS EAR/PLS OXIMETRY MLT: CPT

## 2023-05-02 PROCEDURE — 83735 ASSAY OF MAGNESIUM: CPT

## 2023-05-02 PROCEDURE — 84100 ASSAY OF PHOSPHORUS: CPT

## 2023-05-02 PROCEDURE — 80048 BASIC METABOLIC PNL TOTAL CA: CPT

## 2023-05-02 PROCEDURE — 99024 POSTOP FOLLOW-UP VISIT: CPT | Performed by: SURGERY

## 2023-05-02 PROCEDURE — APPNB30 APP NON BILLABLE TIME 0-30 MINS: Performed by: NURSE PRACTITIONER

## 2023-05-02 RX ORDER — DEXTROSE, SODIUM CHLORIDE, AND POTASSIUM CHLORIDE 5; .45; .15 G/100ML; G/100ML; G/100ML
INJECTION INTRAVENOUS CONTINUOUS
Status: DISCONTINUED | OUTPATIENT
Start: 2023-05-02 | End: 2023-05-04

## 2023-05-02 RX ORDER — POTASSIUM CHLORIDE 7.45 MG/ML
10 INJECTION INTRAVENOUS
Status: COMPLETED | OUTPATIENT
Start: 2023-05-02 | End: 2023-05-02

## 2023-05-02 RX ORDER — FUROSEMIDE 10 MG/ML
20 INJECTION INTRAMUSCULAR; INTRAVENOUS 2 TIMES DAILY
Status: COMPLETED | OUTPATIENT
Start: 2023-05-02 | End: 2023-05-02

## 2023-05-02 RX ADMIN — CARVEDILOL 25 MG: 25 TABLET, FILM COATED ORAL at 10:50

## 2023-05-02 RX ADMIN — DOCUSATE SODIUM 100 MG: 100 CAPSULE, LIQUID FILLED ORAL at 20:13

## 2023-05-02 RX ADMIN — DOCUSATE SODIUM 100 MG: 100 CAPSULE, LIQUID FILLED ORAL at 10:50

## 2023-05-02 RX ADMIN — ACETAMINOPHEN 1000 MG: 325 TABLET ORAL at 13:49

## 2023-05-02 RX ADMIN — IPRATROPIUM BROMIDE AND ALBUTEROL SULFATE 1 AMPULE: .5; 3 SOLUTION RESPIRATORY (INHALATION) at 15:53

## 2023-05-02 RX ADMIN — PIPERACILLIN AND TAZOBACTAM 3375 MG: 3; .375 INJECTION, POWDER, LYOPHILIZED, FOR SOLUTION INTRAVENOUS at 17:47

## 2023-05-02 RX ADMIN — FLUCONAZOLE 400 MG: 400 INJECTION, SOLUTION INTRAVENOUS at 17:42

## 2023-05-02 RX ADMIN — PIPERACILLIN AND TAZOBACTAM 3375 MG: 3; .375 INJECTION, POWDER, LYOPHILIZED, FOR SOLUTION INTRAVENOUS at 10:58

## 2023-05-02 RX ADMIN — SODIUM CHLORIDE: 9 INJECTION, SOLUTION INTRAVENOUS at 02:21

## 2023-05-02 RX ADMIN — FAMOTIDINE 20 MG: 20 TABLET ORAL at 20:13

## 2023-05-02 RX ADMIN — CARVEDILOL 25 MG: 25 TABLET, FILM COATED ORAL at 17:41

## 2023-05-02 RX ADMIN — ENOXAPARIN SODIUM 30 MG: 100 INJECTION SUBCUTANEOUS at 20:13

## 2023-05-02 RX ADMIN — SODIUM CHLORIDE: 9 INJECTION, SOLUTION INTRAVENOUS at 11:16

## 2023-05-02 RX ADMIN — FUROSEMIDE 20 MG: 10 INJECTION, SOLUTION INTRAMUSCULAR; INTRAVENOUS at 17:41

## 2023-05-02 RX ADMIN — MORPHINE SULFATE 4 MG: 4 INJECTION, SOLUTION INTRAMUSCULAR; INTRAVENOUS at 05:09

## 2023-05-02 RX ADMIN — FUROSEMIDE 20 MG: 10 INJECTION, SOLUTION INTRAMUSCULAR; INTRAVENOUS at 10:50

## 2023-05-02 RX ADMIN — Medication 10 MEQ: at 06:34

## 2023-05-02 RX ADMIN — PIPERACILLIN AND TAZOBACTAM 3375 MG: 3; .375 INJECTION, POWDER, LYOPHILIZED, FOR SOLUTION INTRAVENOUS at 00:30

## 2023-05-02 RX ADMIN — IPRATROPIUM BROMIDE AND ALBUTEROL SULFATE 1 AMPULE: .5; 3 SOLUTION RESPIRATORY (INHALATION) at 08:17

## 2023-05-02 RX ADMIN — Medication 10 ML: at 10:51

## 2023-05-02 RX ADMIN — MORPHINE SULFATE 4 MG: 4 INJECTION, SOLUTION INTRAMUSCULAR; INTRAVENOUS at 00:26

## 2023-05-02 RX ADMIN — FAMOTIDINE 20 MG: 20 TABLET ORAL at 10:50

## 2023-05-02 RX ADMIN — Medication 10 MEQ: at 10:53

## 2023-05-02 RX ADMIN — ENOXAPARIN SODIUM 30 MG: 100 INJECTION SUBCUTANEOUS at 10:51

## 2023-05-02 RX ADMIN — NALOXEGOL OXALATE 25 MG: 25 TABLET, FILM COATED ORAL at 05:10

## 2023-05-02 RX ADMIN — POTASSIUM CHLORIDE, DEXTROSE MONOHYDRATE AND SODIUM CHLORIDE: 150; 5; 450 INJECTION, SOLUTION INTRAVENOUS at 10:55

## 2023-05-02 RX ADMIN — ACETAMINOPHEN 1000 MG: 325 TABLET ORAL at 05:10

## 2023-05-02 RX ADMIN — IPRATROPIUM BROMIDE AND ALBUTEROL SULFATE 1 AMPULE: .5; 3 SOLUTION RESPIRATORY (INHALATION) at 20:05

## 2023-05-02 ASSESSMENT — PAIN SCALES - GENERAL
PAINLEVEL_OUTOF10: 5
PAINLEVEL_OUTOF10: 7
PAINLEVEL_OUTOF10: 5

## 2023-05-02 ASSESSMENT — PAIN DESCRIPTION - LOCATION
LOCATION: INCISION
LOCATION: ABDOMEN

## 2023-05-02 ASSESSMENT — PAIN - FUNCTIONAL ASSESSMENT: PAIN_FUNCTIONAL_ASSESSMENT: ACTIVITIES ARE NOT PREVENTED

## 2023-05-02 ASSESSMENT — PAIN DESCRIPTION - PAIN TYPE: TYPE: SURGICAL PAIN

## 2023-05-03 LAB
ANION GAP SERPL CALCULATED.3IONS-SCNC: 10 MMOL/L (ref 3–16)
ANISOCYTOSIS BLD QL SMEAR: ABNORMAL
BASOPHILS # BLD: 0 K/UL (ref 0–0.2)
BASOPHILS NFR BLD: 0 %
BUN SERPL-MCNC: 13 MG/DL (ref 7–20)
CALCIUM SERPL-MCNC: 8.8 MG/DL (ref 8.3–10.6)
CHLORIDE SERPL-SCNC: 100 MMOL/L (ref 99–110)
CO2 SERPL-SCNC: 24 MMOL/L (ref 21–32)
CREAT SERPL-MCNC: 0.6 MG/DL (ref 0.9–1.3)
DEPRECATED RDW RBC AUTO: 13.5 % (ref 12.4–15.4)
EOSINOPHIL # BLD: 0.3 K/UL (ref 0–0.6)
EOSINOPHIL NFR BLD: 2 %
GFR SERPLBLD CREATININE-BSD FMLA CKD-EPI: >60 ML/MIN/{1.73_M2}
GLUCOSE SERPL-MCNC: 115 MG/DL (ref 70–99)
HCT VFR BLD AUTO: 29.5 % (ref 40.5–52.5)
HGB BLD-MCNC: 10 G/DL (ref 13.5–17.5)
LYMPHOCYTES # BLD: 1.8 K/UL (ref 1–5.1)
LYMPHOCYTES NFR BLD: 14 %
MCH RBC QN AUTO: 31.9 PG (ref 26–34)
MCHC RBC AUTO-ENTMCNC: 34.1 G/DL (ref 31–36)
MCV RBC AUTO: 93.6 FL (ref 80–100)
MONOCYTES # BLD: 1.1 K/UL (ref 0–1.3)
MONOCYTES NFR BLD: 9 %
NEUTROPHILS # BLD: 9.5 K/UL (ref 1.7–7.7)
NEUTROPHILS NFR BLD: 68 %
NEUTS BAND NFR BLD MANUAL: 7 % (ref 0–7)
OVALOCYTES BLD QL SMEAR: ABNORMAL
PLATELET # BLD AUTO: 340 K/UL (ref 135–450)
PLATELET BLD QL SMEAR: ADEQUATE
PMV BLD AUTO: 7.6 FL (ref 5–10.5)
POLYCHROMASIA BLD QL SMEAR: ABNORMAL
POTASSIUM SERPL-SCNC: 3.7 MMOL/L (ref 3.5–5.1)
RBC # BLD AUTO: 3.15 M/UL (ref 4.2–5.9)
SLIDE REVIEW: ABNORMAL
SODIUM SERPL-SCNC: 134 MMOL/L (ref 136–145)
WBC # BLD AUTO: 12.6 K/UL (ref 4–11)

## 2023-05-03 PROCEDURE — 6370000000 HC RX 637 (ALT 250 FOR IP): Performed by: SURGERY

## 2023-05-03 PROCEDURE — 2580000003 HC RX 258: Performed by: SURGERY

## 2023-05-03 PROCEDURE — APPNB30 APP NON BILLABLE TIME 0-30 MINS: Performed by: NURSE PRACTITIONER

## 2023-05-03 PROCEDURE — 85025 COMPLETE CBC W/AUTO DIFF WBC: CPT

## 2023-05-03 PROCEDURE — 2500000003 HC RX 250 WO HCPCS: Performed by: SURGERY

## 2023-05-03 PROCEDURE — 2500000003 HC RX 250 WO HCPCS: Performed by: NURSE PRACTITIONER

## 2023-05-03 PROCEDURE — APPSS15 APP SPLIT SHARED TIME 0-15 MINUTES: Performed by: NURSE PRACTITIONER

## 2023-05-03 PROCEDURE — 6360000002 HC RX W HCPCS: Performed by: SURGERY

## 2023-05-03 PROCEDURE — 80048 BASIC METABOLIC PNL TOTAL CA: CPT

## 2023-05-03 PROCEDURE — 94760 N-INVAS EAR/PLS OXIMETRY 1: CPT

## 2023-05-03 PROCEDURE — 6360000002 HC RX W HCPCS: Performed by: NURSE PRACTITIONER

## 2023-05-03 PROCEDURE — 97110 THERAPEUTIC EXERCISES: CPT

## 2023-05-03 PROCEDURE — 2000000000 HC ICU R&B

## 2023-05-03 PROCEDURE — 99024 POSTOP FOLLOW-UP VISIT: CPT | Performed by: SURGERY

## 2023-05-03 PROCEDURE — 97116 GAIT TRAINING THERAPY: CPT

## 2023-05-03 RX ADMIN — CARVEDILOL 25 MG: 25 TABLET, FILM COATED ORAL at 09:03

## 2023-05-03 RX ADMIN — PIPERACILLIN AND TAZOBACTAM 3375 MG: 3; .375 INJECTION, POWDER, LYOPHILIZED, FOR SOLUTION INTRAVENOUS at 18:34

## 2023-05-03 RX ADMIN — HYDRALAZINE HYDROCHLORIDE 10 MG: 20 INJECTION INTRAMUSCULAR; INTRAVENOUS at 03:52

## 2023-05-03 RX ADMIN — LABETALOL HYDROCHLORIDE 10 MG: 5 INJECTION INTRAVENOUS at 00:12

## 2023-05-03 RX ADMIN — PIPERACILLIN AND TAZOBACTAM 3375 MG: 3; .375 INJECTION, POWDER, LYOPHILIZED, FOR SOLUTION INTRAVENOUS at 09:08

## 2023-05-03 RX ADMIN — MORPHINE SULFATE 2 MG: 2 INJECTION, SOLUTION INTRAMUSCULAR; INTRAVENOUS at 14:27

## 2023-05-03 RX ADMIN — Medication 10 ML: at 21:51

## 2023-05-03 RX ADMIN — DOCUSATE SODIUM 100 MG: 100 CAPSULE, LIQUID FILLED ORAL at 09:03

## 2023-05-03 RX ADMIN — MORPHINE SULFATE 2 MG: 2 INJECTION, SOLUTION INTRAMUSCULAR; INTRAVENOUS at 09:04

## 2023-05-03 RX ADMIN — Medication 10 ML: at 09:05

## 2023-05-03 RX ADMIN — MORPHINE SULFATE 4 MG: 4 INJECTION, SOLUTION INTRAMUSCULAR; INTRAVENOUS at 21:51

## 2023-05-03 RX ADMIN — FAMOTIDINE 20 MG: 20 TABLET ORAL at 21:50

## 2023-05-03 RX ADMIN — POTASSIUM CHLORIDE, DEXTROSE MONOHYDRATE AND SODIUM CHLORIDE: 150; 5; 450 INJECTION, SOLUTION INTRAVENOUS at 07:31

## 2023-05-03 RX ADMIN — ENOXAPARIN SODIUM 30 MG: 100 INJECTION SUBCUTANEOUS at 21:50

## 2023-05-03 RX ADMIN — CARVEDILOL 25 MG: 25 TABLET, FILM COATED ORAL at 18:33

## 2023-05-03 RX ADMIN — FLUCONAZOLE 400 MG: 400 INJECTION, SOLUTION INTRAVENOUS at 16:10

## 2023-05-03 RX ADMIN — ENOXAPARIN SODIUM 30 MG: 100 INJECTION SUBCUTANEOUS at 09:03

## 2023-05-03 RX ADMIN — PIPERACILLIN AND TAZOBACTAM 3375 MG: 3; .375 INJECTION, POWDER, LYOPHILIZED, FOR SOLUTION INTRAVENOUS at 00:22

## 2023-05-03 RX ADMIN — FAMOTIDINE 20 MG: 20 TABLET ORAL at 09:03

## 2023-05-03 RX ADMIN — LABETALOL HYDROCHLORIDE 10 MG: 5 INJECTION INTRAVENOUS at 08:39

## 2023-05-03 RX ADMIN — DOCUSATE SODIUM 100 MG: 100 CAPSULE, LIQUID FILLED ORAL at 21:50

## 2023-05-03 RX ADMIN — MORPHINE SULFATE 4 MG: 4 INJECTION, SOLUTION INTRAMUSCULAR; INTRAVENOUS at 00:13

## 2023-05-03 ASSESSMENT — PAIN DESCRIPTION - LOCATION
LOCATION: ABDOMEN

## 2023-05-03 ASSESSMENT — PAIN DESCRIPTION - DESCRIPTORS
DESCRIPTORS: ACHING;SHARP
DESCRIPTORS: ACHING
DESCRIPTORS: ACHING;DISCOMFORT
DESCRIPTORS: ACHING;DISCOMFORT
DESCRIPTORS: ACHING

## 2023-05-03 ASSESSMENT — PAIN SCALES - GENERAL
PAINLEVEL_OUTOF10: 8
PAINLEVEL_OUTOF10: 0
PAINLEVEL_OUTOF10: 0
PAINLEVEL_OUTOF10: 6
PAINLEVEL_OUTOF10: 5
PAINLEVEL_OUTOF10: 4
PAINLEVEL_OUTOF10: 7
PAINLEVEL_OUTOF10: 8
PAINLEVEL_OUTOF10: 7
PAINLEVEL_OUTOF10: 0
PAINLEVEL_OUTOF10: 5

## 2023-05-03 ASSESSMENT — PAIN DESCRIPTION - FREQUENCY
FREQUENCY: INTERMITTENT
FREQUENCY: INTERMITTENT

## 2023-05-03 ASSESSMENT — PAIN - FUNCTIONAL ASSESSMENT: PAIN_FUNCTIONAL_ASSESSMENT: ACTIVITIES ARE NOT PREVENTED

## 2023-05-03 ASSESSMENT — PAIN DESCRIPTION - ONSET: ONSET: ON-GOING

## 2023-05-03 ASSESSMENT — PAIN DESCRIPTION - PAIN TYPE
TYPE: SURGICAL PAIN

## 2023-05-03 ASSESSMENT — PAIN DESCRIPTION - ORIENTATION
ORIENTATION: MID

## 2023-05-04 LAB
ANION GAP SERPL CALCULATED.3IONS-SCNC: 12 MMOL/L (ref 3–16)
ANION GAP SERPL CALCULATED.3IONS-SCNC: ABNORMAL MMOL/L (ref 3–16)
BASOPHILS # BLD: 0 K/UL (ref 0–0.2)
BASOPHILS # BLD: ABNORMAL K/UL (ref 0–0.2)
BASOPHILS NFR BLD: 0 %
BASOPHILS NFR BLD: ABNORMAL %
BUN SERPL-MCNC: 11 MG/DL (ref 7–20)
BUN SERPL-MCNC: ABNORMAL MG/DL (ref 7–20)
CALCIUM SERPL-MCNC: 8.9 MG/DL (ref 8.3–10.6)
CALCIUM SERPL-MCNC: ABNORMAL MG/DL (ref 8.3–10.6)
CHLORIDE SERPL-SCNC: 100 MMOL/L (ref 99–110)
CHLORIDE SERPL-SCNC: ABNORMAL MMOL/L (ref 99–110)
CO2 SERPL-SCNC: 22 MMOL/L (ref 21–32)
CO2 SERPL-SCNC: ABNORMAL MMOL/L (ref 21–32)
CREAT SERPL-MCNC: 0.6 MG/DL (ref 0.9–1.3)
CREAT SERPL-MCNC: ABNORMAL MG/DL (ref 0.9–1.3)
DEPRECATED RDW RBC AUTO: 13.6 % (ref 12.4–15.4)
DEPRECATED RDW RBC AUTO: ABNORMAL % (ref 12.4–15.4)
EOSINOPHIL # BLD: 0.1 K/UL (ref 0–0.6)
EOSINOPHIL # BLD: ABNORMAL K/UL (ref 0–0.6)
EOSINOPHIL NFR BLD: 1 %
EOSINOPHIL NFR BLD: ABNORMAL %
GFR SERPLBLD CREATININE-BSD FMLA CKD-EPI: >60 ML/MIN/{1.73_M2}
GFR SERPLBLD CREATININE-BSD FMLA CKD-EPI: ABNORMAL ML/MIN/{1.73_M2}
GLUCOSE SERPL-MCNC: 112 MG/DL (ref 70–99)
GLUCOSE SERPL-MCNC: ABNORMAL MG/DL (ref 70–99)
HCT VFR BLD AUTO: 31.3 % (ref 40.5–52.5)
HCT VFR BLD AUTO: ABNORMAL % (ref 40.5–52.5)
HGB BLD-MCNC: 10.5 G/DL (ref 13.5–17.5)
HGB BLD-MCNC: ABNORMAL G/DL (ref 13.5–17.5)
LYMPHOCYTES # BLD: 1.5 K/UL (ref 1–5.1)
LYMPHOCYTES # BLD: ABNORMAL K/UL (ref 1–5.1)
LYMPHOCYTES NFR BLD: 10 %
LYMPHOCYTES NFR BLD: ABNORMAL %
MCH RBC QN AUTO: 31.2 PG (ref 26–34)
MCH RBC QN AUTO: ABNORMAL PG (ref 26–34)
MCHC RBC AUTO-ENTMCNC: 33.4 G/DL (ref 31–36)
MCHC RBC AUTO-ENTMCNC: ABNORMAL G/DL (ref 31–36)
MCV RBC AUTO: 93.5 FL (ref 80–100)
MCV RBC AUTO: ABNORMAL FL (ref 80–100)
MONOCYTES # BLD: 1.2 K/UL (ref 0–1.3)
MONOCYTES # BLD: ABNORMAL K/UL (ref 0–1.3)
MONOCYTES NFR BLD: 9 %
MONOCYTES NFR BLD: ABNORMAL %
NEUTROPHILS # BLD: 10.6 K/UL (ref 1.7–7.7)
NEUTROPHILS # BLD: ABNORMAL K/UL (ref 1.7–7.7)
NEUTROPHILS NFR BLD: 79 %
NEUTROPHILS NFR BLD: ABNORMAL %
PLATELET # BLD AUTO: 373 K/UL (ref 135–450)
PLATELET # BLD AUTO: ABNORMAL K/UL (ref 135–450)
PLATELET BLD QL SMEAR: ADEQUATE
PMV BLD AUTO: 7.7 FL (ref 5–10.5)
PMV BLD AUTO: ABNORMAL FL (ref 5–10.5)
POLYCHROMASIA BLD QL SMEAR: ABNORMAL
POTASSIUM SERPL-SCNC: 3.8 MMOL/L (ref 3.5–5.1)
POTASSIUM SERPL-SCNC: ABNORMAL MMOL/L (ref 3.5–5.1)
RBC # BLD AUTO: 3.35 M/UL (ref 4.2–5.9)
RBC # BLD AUTO: ABNORMAL M/UL (ref 4.2–5.9)
SLIDE REVIEW: ABNORMAL
SODIUM SERPL-SCNC: 134 MMOL/L (ref 136–145)
SODIUM SERPL-SCNC: ABNORMAL MMOL/L (ref 136–145)
VARIANT LYMPHS NFR BLD MANUAL: 1 % (ref 0–6)
WBC # BLD AUTO: 13.4 K/UL (ref 4–11)
WBC # BLD AUTO: ABNORMAL K/UL (ref 4–11)

## 2023-05-04 PROCEDURE — 6360000002 HC RX W HCPCS: Performed by: SURGERY

## 2023-05-04 PROCEDURE — APPSS15 APP SPLIT SHARED TIME 0-15 MINUTES: Performed by: NURSE PRACTITIONER

## 2023-05-04 PROCEDURE — 2580000003 HC RX 258: Performed by: SURGERY

## 2023-05-04 PROCEDURE — 36415 COLL VENOUS BLD VENIPUNCTURE: CPT

## 2023-05-04 PROCEDURE — 80048 BASIC METABOLIC PNL TOTAL CA: CPT

## 2023-05-04 PROCEDURE — 85025 COMPLETE CBC W/AUTO DIFF WBC: CPT

## 2023-05-04 PROCEDURE — 1200000000 HC SEMI PRIVATE

## 2023-05-04 PROCEDURE — 6370000000 HC RX 637 (ALT 250 FOR IP): Performed by: SURGERY

## 2023-05-04 PROCEDURE — 2500000003 HC RX 250 WO HCPCS: Performed by: NURSE PRACTITIONER

## 2023-05-04 PROCEDURE — 6360000002 HC RX W HCPCS: Performed by: NURSE PRACTITIONER

## 2023-05-04 PROCEDURE — 99024 POSTOP FOLLOW-UP VISIT: CPT | Performed by: SURGERY

## 2023-05-04 PROCEDURE — APPNB30 APP NON BILLABLE TIME 0-30 MINS: Performed by: NURSE PRACTITIONER

## 2023-05-04 RX ADMIN — ENOXAPARIN SODIUM 30 MG: 100 INJECTION SUBCUTANEOUS at 08:45

## 2023-05-04 RX ADMIN — FLUCONAZOLE 400 MG: 400 INJECTION, SOLUTION INTRAVENOUS at 18:42

## 2023-05-04 RX ADMIN — CARVEDILOL 25 MG: 25 TABLET, FILM COATED ORAL at 08:44

## 2023-05-04 RX ADMIN — CARVEDILOL 25 MG: 25 TABLET, FILM COATED ORAL at 16:40

## 2023-05-04 RX ADMIN — Medication 10 ML: at 09:00

## 2023-05-04 RX ADMIN — FAMOTIDINE 20 MG: 20 TABLET ORAL at 08:45

## 2023-05-04 RX ADMIN — DOCUSATE SODIUM 100 MG: 100 CAPSULE, LIQUID FILLED ORAL at 08:44

## 2023-05-04 RX ADMIN — PIPERACILLIN AND TAZOBACTAM 3375 MG: 3; .375 INJECTION, POWDER, LYOPHILIZED, FOR SOLUTION INTRAVENOUS at 17:51

## 2023-05-04 RX ADMIN — POTASSIUM CHLORIDE, DEXTROSE MONOHYDRATE AND SODIUM CHLORIDE: 150; 5; 450 INJECTION, SOLUTION INTRAVENOUS at 02:10

## 2023-05-04 RX ADMIN — MORPHINE SULFATE 2 MG: 2 INJECTION, SOLUTION INTRAMUSCULAR; INTRAVENOUS at 16:40

## 2023-05-04 RX ADMIN — DOCUSATE SODIUM 100 MG: 100 CAPSULE, LIQUID FILLED ORAL at 20:05

## 2023-05-04 RX ADMIN — FAMOTIDINE 20 MG: 20 TABLET ORAL at 20:05

## 2023-05-04 RX ADMIN — ENOXAPARIN SODIUM 30 MG: 100 INJECTION SUBCUTANEOUS at 20:05

## 2023-05-04 RX ADMIN — PIPERACILLIN AND TAZOBACTAM 3375 MG: 3; .375 INJECTION, POWDER, LYOPHILIZED, FOR SOLUTION INTRAVENOUS at 08:47

## 2023-05-04 RX ADMIN — PIPERACILLIN AND TAZOBACTAM 3375 MG: 3; .375 INJECTION, POWDER, LYOPHILIZED, FOR SOLUTION INTRAVENOUS at 02:14

## 2023-05-04 ASSESSMENT — PAIN DESCRIPTION - LOCATION
LOCATION: ABDOMEN

## 2023-05-04 ASSESSMENT — PAIN DESCRIPTION - ORIENTATION
ORIENTATION: MID

## 2023-05-04 ASSESSMENT — PAIN SCALES - GENERAL
PAINLEVEL_OUTOF10: 6
PAINLEVEL_OUTOF10: 4
PAINLEVEL_OUTOF10: 5

## 2023-05-04 ASSESSMENT — PAIN SCALES - WONG BAKER: WONGBAKER_NUMERICALRESPONSE: 0

## 2023-05-04 ASSESSMENT — PAIN DESCRIPTION - PAIN TYPE
TYPE: SURGICAL PAIN

## 2023-05-04 ASSESSMENT — PAIN DESCRIPTION - DESCRIPTORS
DESCRIPTORS: ACHING

## 2023-05-04 ASSESSMENT — PAIN DESCRIPTION - FREQUENCY: FREQUENCY: INTERMITTENT

## 2023-05-04 ASSESSMENT — PAIN - FUNCTIONAL ASSESSMENT: PAIN_FUNCTIONAL_ASSESSMENT: ACTIVITIES ARE NOT PREVENTED

## 2023-05-05 PROBLEM — S31.109A OPEN ABDOMINAL WALL WOUND: Status: ACTIVE | Noted: 2023-05-05

## 2023-05-05 LAB
ANION GAP SERPL CALCULATED.3IONS-SCNC: 12 MMOL/L (ref 3–16)
BUN SERPL-MCNC: 9 MG/DL (ref 7–20)
CALCIUM SERPL-MCNC: 9.1 MG/DL (ref 8.3–10.6)
CHLORIDE SERPL-SCNC: 99 MMOL/L (ref 99–110)
CO2 SERPL-SCNC: 23 MMOL/L (ref 21–32)
CREAT SERPL-MCNC: 0.7 MG/DL (ref 0.9–1.3)
DEPRECATED RDW RBC AUTO: 13.4 % (ref 12.4–15.4)
GFR SERPLBLD CREATININE-BSD FMLA CKD-EPI: >60 ML/MIN/{1.73_M2}
GLUCOSE SERPL-MCNC: 111 MG/DL (ref 70–99)
HCT VFR BLD AUTO: 31.7 % (ref 40.5–52.5)
HGB BLD-MCNC: 10.4 G/DL (ref 13.5–17.5)
MCH RBC QN AUTO: 30.9 PG (ref 26–34)
MCHC RBC AUTO-ENTMCNC: 32.8 G/DL (ref 31–36)
MCV RBC AUTO: 94.3 FL (ref 80–100)
PLATELET # BLD AUTO: 396 K/UL (ref 135–450)
PMV BLD AUTO: 7.8 FL (ref 5–10.5)
POTASSIUM SERPL-SCNC: 4.1 MMOL/L (ref 3.5–5.1)
RBC # BLD AUTO: 3.36 M/UL (ref 4.2–5.9)
SODIUM SERPL-SCNC: 134 MMOL/L (ref 136–145)
WBC # BLD AUTO: 13.9 K/UL (ref 4–11)

## 2023-05-05 PROCEDURE — 36415 COLL VENOUS BLD VENIPUNCTURE: CPT

## 2023-05-05 PROCEDURE — 2580000003 HC RX 258: Performed by: SURGERY

## 2023-05-05 PROCEDURE — 97530 THERAPEUTIC ACTIVITIES: CPT

## 2023-05-05 PROCEDURE — 6360000002 HC RX W HCPCS: Performed by: NURSE PRACTITIONER

## 2023-05-05 PROCEDURE — 6370000000 HC RX 637 (ALT 250 FOR IP): Performed by: SURGERY

## 2023-05-05 PROCEDURE — 2500000003 HC RX 250 WO HCPCS: Performed by: SURGERY

## 2023-05-05 PROCEDURE — APPNB30 APP NON BILLABLE TIME 0-30 MINS: Performed by: NURSE PRACTITIONER

## 2023-05-05 PROCEDURE — 1200000000 HC SEMI PRIVATE

## 2023-05-05 PROCEDURE — 6360000002 HC RX W HCPCS: Performed by: SURGERY

## 2023-05-05 PROCEDURE — APPSS15 APP SPLIT SHARED TIME 0-15 MINUTES: Performed by: NURSE PRACTITIONER

## 2023-05-05 PROCEDURE — 97535 SELF CARE MNGMENT TRAINING: CPT

## 2023-05-05 PROCEDURE — 80048 BASIC METABOLIC PNL TOTAL CA: CPT

## 2023-05-05 PROCEDURE — 97606 NEG PRS WND THER DME>50 SQCM: CPT | Performed by: SURGERY

## 2023-05-05 PROCEDURE — 94760 N-INVAS EAR/PLS OXIMETRY 1: CPT

## 2023-05-05 PROCEDURE — 85027 COMPLETE CBC AUTOMATED: CPT

## 2023-05-05 RX ORDER — MORPHINE SULFATE 2 MG/ML
2 INJECTION, SOLUTION INTRAMUSCULAR; INTRAVENOUS EVERY 4 HOURS PRN
Status: DISCONTINUED | OUTPATIENT
Start: 2023-05-05 | End: 2023-05-11 | Stop reason: HOSPADM

## 2023-05-05 RX ORDER — HYDROCODONE BITARTRATE AND ACETAMINOPHEN 5; 325 MG/1; MG/1
1 TABLET ORAL EVERY 4 HOURS PRN
Status: DISCONTINUED | OUTPATIENT
Start: 2023-05-05 | End: 2023-05-11 | Stop reason: HOSPADM

## 2023-05-05 RX ADMIN — SODIUM CHLORIDE, PRESERVATIVE FREE 20 MG: 5 INJECTION INTRAVENOUS at 10:30

## 2023-05-05 RX ADMIN — GUAIFENESIN AND CODEINE PHOSPHATE 5 ML: 100; 10 SOLUTION ORAL at 10:31

## 2023-05-05 RX ADMIN — Medication 10 ML: at 21:05

## 2023-05-05 RX ADMIN — MORPHINE SULFATE 4 MG: 4 INJECTION, SOLUTION INTRAMUSCULAR; INTRAVENOUS at 00:02

## 2023-05-05 RX ADMIN — CARVEDILOL 25 MG: 25 TABLET, FILM COATED ORAL at 17:36

## 2023-05-05 RX ADMIN — PIPERACILLIN AND TAZOBACTAM 3375 MG: 3; .375 INJECTION, POWDER, LYOPHILIZED, FOR SOLUTION INTRAVENOUS at 01:39

## 2023-05-05 RX ADMIN — GUAIFENESIN AND CODEINE PHOSPHATE 5 ML: 100; 10 SOLUTION ORAL at 17:36

## 2023-05-05 RX ADMIN — PIPERACILLIN AND TAZOBACTAM 3375 MG: 3; .375 INJECTION, POWDER, LYOPHILIZED, FOR SOLUTION INTRAVENOUS at 17:46

## 2023-05-05 RX ADMIN — ENOXAPARIN SODIUM 30 MG: 100 INJECTION SUBCUTANEOUS at 21:01

## 2023-05-05 RX ADMIN — MORPHINE SULFATE 2 MG: 2 INJECTION, SOLUTION INTRAMUSCULAR; INTRAVENOUS at 21:10

## 2023-05-05 RX ADMIN — CARVEDILOL 25 MG: 25 TABLET, FILM COATED ORAL at 10:30

## 2023-05-05 RX ADMIN — ENOXAPARIN SODIUM 30 MG: 100 INJECTION SUBCUTANEOUS at 10:31

## 2023-05-05 RX ADMIN — SODIUM CHLORIDE, PRESERVATIVE FREE 20 MG: 5 INJECTION INTRAVENOUS at 21:02

## 2023-05-05 RX ADMIN — FLUCONAZOLE 400 MG: 400 INJECTION, SOLUTION INTRAVENOUS at 17:46

## 2023-05-05 RX ADMIN — DOCUSATE SODIUM 100 MG: 100 CAPSULE, LIQUID FILLED ORAL at 21:02

## 2023-05-05 RX ADMIN — PIPERACILLIN AND TAZOBACTAM 3375 MG: 3; .375 INJECTION, POWDER, LYOPHILIZED, FOR SOLUTION INTRAVENOUS at 10:42

## 2023-05-05 RX ADMIN — DOCUSATE SODIUM 100 MG: 100 CAPSULE, LIQUID FILLED ORAL at 10:30

## 2023-05-05 ASSESSMENT — PAIN DESCRIPTION - FREQUENCY
FREQUENCY: INTERMITTENT

## 2023-05-05 ASSESSMENT — PAIN - FUNCTIONAL ASSESSMENT: PAIN_FUNCTIONAL_ASSESSMENT: ACTIVITIES ARE NOT PREVENTED

## 2023-05-05 ASSESSMENT — PAIN DESCRIPTION - ORIENTATION
ORIENTATION: MID

## 2023-05-05 ASSESSMENT — PAIN DESCRIPTION - PAIN TYPE
TYPE: SURGICAL PAIN

## 2023-05-05 ASSESSMENT — PAIN DESCRIPTION - ONSET
ONSET: AWAKENED FROM SLEEP
ONSET: ON-GOING

## 2023-05-05 ASSESSMENT — PAIN DESCRIPTION - DESCRIPTORS
DESCRIPTORS: ACHING

## 2023-05-05 ASSESSMENT — PAIN SCALES - GENERAL
PAINLEVEL_OUTOF10: 7
PAINLEVEL_OUTOF10: 7
PAINLEVEL_OUTOF10: 5
PAINLEVEL_OUTOF10: 3

## 2023-05-05 ASSESSMENT — PAIN DESCRIPTION - LOCATION
LOCATION: ABDOMEN

## 2023-05-05 ASSESSMENT — PAIN SCALES - WONG BAKER: WONGBAKER_NUMERICALRESPONSE: 0

## 2023-05-06 ENCOUNTER — APPOINTMENT (OUTPATIENT)
Dept: GENERAL RADIOLOGY | Age: 33
DRG: 329 | End: 2023-05-06
Attending: SURGERY
Payer: COMMERCIAL

## 2023-05-06 PROBLEM — R05.1 ACUTE COUGH: Status: ACTIVE | Noted: 2023-05-06

## 2023-05-06 PROBLEM — D72.829 LEUKOCYTOSIS: Status: ACTIVE | Noted: 2023-05-06

## 2023-05-06 LAB
ANION GAP SERPL CALCULATED.3IONS-SCNC: 10 MMOL/L (ref 3–16)
ANION GAP SERPL CALCULATED.3IONS-SCNC: 11 MMOL/L (ref 3–16)
BASOPHILS # BLD: 0 K/UL (ref 0–0.2)
BASOPHILS NFR BLD: 0 %
BUN SERPL-MCNC: 8 MG/DL (ref 7–20)
BUN SERPL-MCNC: 8 MG/DL (ref 7–20)
CALCIUM SERPL-MCNC: 9.1 MG/DL (ref 8.3–10.6)
CALCIUM SERPL-MCNC: 9.4 MG/DL (ref 8.3–10.6)
CHLORIDE SERPL-SCNC: 100 MMOL/L (ref 99–110)
CHLORIDE SERPL-SCNC: 100 MMOL/L (ref 99–110)
CO2 SERPL-SCNC: 21 MMOL/L (ref 21–32)
CO2 SERPL-SCNC: 24 MMOL/L (ref 21–32)
CREAT SERPL-MCNC: 0.8 MG/DL (ref 0.9–1.3)
CREAT SERPL-MCNC: 0.8 MG/DL (ref 0.9–1.3)
DEPRECATED RDW RBC AUTO: 13.3 % (ref 12.4–15.4)
EOSINOPHIL # BLD: 0.6 K/UL (ref 0–0.6)
EOSINOPHIL NFR BLD: 4 %
GFR SERPLBLD CREATININE-BSD FMLA CKD-EPI: >60 ML/MIN/{1.73_M2}
GFR SERPLBLD CREATININE-BSD FMLA CKD-EPI: >60 ML/MIN/{1.73_M2}
GLUCOSE BLD-MCNC: 118 MG/DL (ref 70–99)
GLUCOSE SERPL-MCNC: 127 MG/DL (ref 70–99)
GLUCOSE SERPL-MCNC: 97 MG/DL (ref 70–99)
HCT VFR BLD AUTO: 32 % (ref 40.5–52.5)
HGB BLD-MCNC: 10.7 G/DL (ref 13.5–17.5)
LYMPHOCYTES # BLD: 0.6 K/UL (ref 1–5.1)
LYMPHOCYTES NFR BLD: 4 %
MCH RBC QN AUTO: 31.3 PG (ref 26–34)
MCHC RBC AUTO-ENTMCNC: 33.4 G/DL (ref 31–36)
MCV RBC AUTO: 93.9 FL (ref 80–100)
METAMYELOCYTES NFR BLD MANUAL: 1 %
MONOCYTES # BLD: 0.6 K/UL (ref 0–1.3)
MONOCYTES NFR BLD: 4 %
NEUTROPHILS # BLD: 13.1 K/UL (ref 1.7–7.7)
NEUTROPHILS NFR BLD: 80 %
NEUTS BAND NFR BLD MANUAL: 7 % (ref 0–7)
PERFORMED ON: ABNORMAL
PLATELET # BLD AUTO: 433 K/UL (ref 135–450)
PMV BLD AUTO: 8 FL (ref 5–10.5)
POTASSIUM SERPL-SCNC: 4.1 MMOL/L (ref 3.5–5.1)
POTASSIUM SERPL-SCNC: 4.4 MMOL/L (ref 3.5–5.1)
RBC # BLD AUTO: 3.41 M/UL (ref 4.2–5.9)
SODIUM SERPL-SCNC: 132 MMOL/L (ref 136–145)
SODIUM SERPL-SCNC: 134 MMOL/L (ref 136–145)
WBC # BLD AUTO: 14.9 K/UL (ref 4–11)

## 2023-05-06 PROCEDURE — 2580000003 HC RX 258: Performed by: SURGERY

## 2023-05-06 PROCEDURE — 80048 BASIC METABOLIC PNL TOTAL CA: CPT

## 2023-05-06 PROCEDURE — 94760 N-INVAS EAR/PLS OXIMETRY 1: CPT

## 2023-05-06 PROCEDURE — 6360000002 HC RX W HCPCS: Performed by: SURGERY

## 2023-05-06 PROCEDURE — 1200000000 HC SEMI PRIVATE

## 2023-05-06 PROCEDURE — 85025 COMPLETE CBC W/AUTO DIFF WBC: CPT

## 2023-05-06 PROCEDURE — 99024 POSTOP FOLLOW-UP VISIT: CPT | Performed by: SURGERY

## 2023-05-06 PROCEDURE — 6370000000 HC RX 637 (ALT 250 FOR IP): Performed by: SURGERY

## 2023-05-06 PROCEDURE — 71046 X-RAY EXAM CHEST 2 VIEWS: CPT

## 2023-05-06 PROCEDURE — 6360000002 HC RX W HCPCS: Performed by: NURSE PRACTITIONER

## 2023-05-06 PROCEDURE — 36415 COLL VENOUS BLD VENIPUNCTURE: CPT

## 2023-05-06 RX ORDER — FLUCONAZOLE 2 MG/ML
400 INJECTION, SOLUTION INTRAVENOUS EVERY 24 HOURS
Status: DISCONTINUED | OUTPATIENT
Start: 2023-05-06 | End: 2023-05-11 | Stop reason: HOSPADM

## 2023-05-06 RX ADMIN — FAMOTIDINE 20 MG: 20 TABLET ORAL at 20:29

## 2023-05-06 RX ADMIN — FAMOTIDINE 20 MG: 20 TABLET ORAL at 08:19

## 2023-05-06 RX ADMIN — ENOXAPARIN SODIUM 30 MG: 100 INJECTION SUBCUTANEOUS at 20:29

## 2023-05-06 RX ADMIN — Medication 10 ML: at 20:30

## 2023-05-06 RX ADMIN — CARVEDILOL 25 MG: 25 TABLET, FILM COATED ORAL at 08:19

## 2023-05-06 RX ADMIN — DOCUSATE SODIUM 100 MG: 100 CAPSULE, LIQUID FILLED ORAL at 20:29

## 2023-05-06 RX ADMIN — ENOXAPARIN SODIUM 30 MG: 100 INJECTION SUBCUTANEOUS at 08:20

## 2023-05-06 RX ADMIN — FLUCONAZOLE 400 MG: 2 INJECTION, SOLUTION INTRAVENOUS at 17:17

## 2023-05-06 RX ADMIN — PIPERACILLIN AND TAZOBACTAM 3375 MG: 3; .375 INJECTION, POWDER, LYOPHILIZED, FOR SOLUTION INTRAVENOUS at 20:22

## 2023-05-06 RX ADMIN — CARVEDILOL 25 MG: 25 TABLET, FILM COATED ORAL at 17:12

## 2023-05-06 RX ADMIN — DOCUSATE SODIUM 100 MG: 100 CAPSULE, LIQUID FILLED ORAL at 08:20

## 2023-05-06 RX ADMIN — PIPERACILLIN AND TAZOBACTAM 3375 MG: 3; .375 INJECTION, POWDER, LYOPHILIZED, FOR SOLUTION INTRAVENOUS at 08:23

## 2023-05-06 RX ADMIN — PIPERACILLIN AND TAZOBACTAM 3375 MG: 3; .375 INJECTION, POWDER, LYOPHILIZED, FOR SOLUTION INTRAVENOUS at 02:27

## 2023-05-06 RX ADMIN — MORPHINE SULFATE 2 MG: 2 INJECTION, SOLUTION INTRAMUSCULAR; INTRAVENOUS at 20:29

## 2023-05-06 ASSESSMENT — PAIN DESCRIPTION - LOCATION
LOCATION: ABDOMEN

## 2023-05-06 ASSESSMENT — PAIN DESCRIPTION - DESCRIPTORS
DESCRIPTORS: ACHING
DESCRIPTORS: ACHING
DESCRIPTORS: ACHING;SHARP

## 2023-05-06 ASSESSMENT — PAIN SCALES - GENERAL
PAINLEVEL_OUTOF10: 6
PAINLEVEL_OUTOF10: 5
PAINLEVEL_OUTOF10: 3

## 2023-05-06 ASSESSMENT — PAIN DESCRIPTION - FREQUENCY
FREQUENCY: INTERMITTENT

## 2023-05-06 ASSESSMENT — PAIN DESCRIPTION - ORIENTATION: ORIENTATION: MID

## 2023-05-06 ASSESSMENT — PAIN DESCRIPTION - PAIN TYPE
TYPE: SURGICAL PAIN
TYPE: SURGICAL PAIN
TYPE: ACUTE PAIN

## 2023-05-07 LAB
ANION GAP SERPL CALCULATED.3IONS-SCNC: 12 MMOL/L (ref 3–16)
APTT BLD: 28.7 SEC (ref 22.7–35.9)
BASOPHILS # BLD: 0 K/UL (ref 0–0.2)
BASOPHILS NFR BLD: 0 %
BUN SERPL-MCNC: 9 MG/DL (ref 7–20)
CALCIUM SERPL-MCNC: 9.4 MG/DL (ref 8.3–10.6)
CHLORIDE SERPL-SCNC: 102 MMOL/L (ref 99–110)
CO2 SERPL-SCNC: 23 MMOL/L (ref 21–32)
CREAT SERPL-MCNC: 0.8 MG/DL (ref 0.9–1.3)
DEPRECATED RDW RBC AUTO: 13.6 % (ref 12.4–15.4)
EOSINOPHIL # BLD: 0.3 K/UL (ref 0–0.6)
EOSINOPHIL NFR BLD: 2 %
GFR SERPLBLD CREATININE-BSD FMLA CKD-EPI: >60 ML/MIN/{1.73_M2}
GLUCOSE SERPL-MCNC: 96 MG/DL (ref 70–99)
HCT VFR BLD AUTO: 32.5 % (ref 40.5–52.5)
HGB BLD-MCNC: 10.8 G/DL (ref 13.5–17.5)
INR PPP: 1.26 (ref 0.84–1.16)
LACTATE BLDV-SCNC: 0.7 MMOL/L (ref 0.4–2)
LYMPHOCYTES # BLD: 1.2 K/UL (ref 1–5.1)
LYMPHOCYTES NFR BLD: 9 %
MAGNESIUM SERPL-MCNC: 2 MG/DL (ref 1.8–2.4)
MCH RBC QN AUTO: 31.3 PG (ref 26–34)
MCHC RBC AUTO-ENTMCNC: 33.1 G/DL (ref 31–36)
MCV RBC AUTO: 94.4 FL (ref 80–100)
METAMYELOCYTES NFR BLD MANUAL: 1 %
MONOCYTES # BLD: 0 K/UL (ref 0–1.3)
MONOCYTES NFR BLD: 0 %
MYELOCYTES NFR BLD MANUAL: 4 %
NEUTROPHILS # BLD: 11.7 K/UL (ref 1.7–7.7)
NEUTROPHILS NFR BLD: 81 %
NEUTS BAND NFR BLD MANUAL: 1 % (ref 0–7)
PHOSPHATE SERPL-MCNC: 4 MG/DL (ref 2.5–4.9)
PLATELET # BLD AUTO: 424 K/UL (ref 135–450)
PMV BLD AUTO: 7.7 FL (ref 5–10.5)
POTASSIUM SERPL-SCNC: 4.4 MMOL/L (ref 3.5–5.1)
PROMYELOCYTES NFR BLD MANUAL: 2 %
PROTHROMBIN TIME: 15.8 SEC (ref 11.5–14.8)
RBC # BLD AUTO: 3.44 M/UL (ref 4.2–5.9)
RBC MORPH BLD: NORMAL
SODIUM SERPL-SCNC: 137 MMOL/L (ref 136–145)
WBC # BLD AUTO: 13.2 K/UL (ref 4–11)

## 2023-05-07 PROCEDURE — 6360000002 HC RX W HCPCS: Performed by: SURGERY

## 2023-05-07 PROCEDURE — 1200000000 HC SEMI PRIVATE

## 2023-05-07 PROCEDURE — 85025 COMPLETE CBC W/AUTO DIFF WBC: CPT

## 2023-05-07 PROCEDURE — 85730 THROMBOPLASTIN TIME PARTIAL: CPT

## 2023-05-07 PROCEDURE — 83605 ASSAY OF LACTIC ACID: CPT

## 2023-05-07 PROCEDURE — 99024 POSTOP FOLLOW-UP VISIT: CPT | Performed by: SURGERY

## 2023-05-07 PROCEDURE — 84100 ASSAY OF PHOSPHORUS: CPT

## 2023-05-07 PROCEDURE — 2580000003 HC RX 258: Performed by: SURGERY

## 2023-05-07 PROCEDURE — 85610 PROTHROMBIN TIME: CPT

## 2023-05-07 PROCEDURE — 80048 BASIC METABOLIC PNL TOTAL CA: CPT

## 2023-05-07 PROCEDURE — 6370000000 HC RX 637 (ALT 250 FOR IP): Performed by: SURGERY

## 2023-05-07 PROCEDURE — 6360000002 HC RX W HCPCS: Performed by: NURSE PRACTITIONER

## 2023-05-07 PROCEDURE — 83735 ASSAY OF MAGNESIUM: CPT

## 2023-05-07 PROCEDURE — 6370000000 HC RX 637 (ALT 250 FOR IP): Performed by: INTERNAL MEDICINE

## 2023-05-07 RX ORDER — BENZONATATE 100 MG/1
100 CAPSULE ORAL 3 TIMES DAILY PRN
Status: DISCONTINUED | OUTPATIENT
Start: 2023-05-08 | End: 2023-05-11 | Stop reason: HOSPADM

## 2023-05-07 RX ORDER — BENZONATATE 100 MG/1
100 CAPSULE ORAL 3 TIMES DAILY
Status: COMPLETED | OUTPATIENT
Start: 2023-05-07 | End: 2023-05-08

## 2023-05-07 RX ORDER — GUAIFENESIN 600 MG/1
1200 TABLET, EXTENDED RELEASE ORAL 2 TIMES DAILY
Status: DISCONTINUED | OUTPATIENT
Start: 2023-05-07 | End: 2023-05-11 | Stop reason: HOSPADM

## 2023-05-07 RX ADMIN — FAMOTIDINE 20 MG: 20 TABLET ORAL at 20:07

## 2023-05-07 RX ADMIN — ENOXAPARIN SODIUM 30 MG: 100 INJECTION SUBCUTANEOUS at 20:06

## 2023-05-07 RX ADMIN — CARVEDILOL 25 MG: 25 TABLET, FILM COATED ORAL at 17:16

## 2023-05-07 RX ADMIN — BENZONATATE 100 MG: 100 CAPSULE ORAL at 12:46

## 2023-05-07 RX ADMIN — MORPHINE SULFATE 2 MG: 2 INJECTION, SOLUTION INTRAMUSCULAR; INTRAVENOUS at 10:25

## 2023-05-07 RX ADMIN — ENOXAPARIN SODIUM 30 MG: 100 INJECTION SUBCUTANEOUS at 09:23

## 2023-05-07 RX ADMIN — FLUCONAZOLE 400 MG: 2 INJECTION, SOLUTION INTRAVENOUS at 17:35

## 2023-05-07 RX ADMIN — GUAIFENESIN 1200 MG: 600 TABLET, EXTENDED RELEASE ORAL at 20:07

## 2023-05-07 RX ADMIN — CARVEDILOL 25 MG: 25 TABLET, FILM COATED ORAL at 09:23

## 2023-05-07 RX ADMIN — PIPERACILLIN AND TAZOBACTAM 3375 MG: 3; .375 INJECTION, POWDER, LYOPHILIZED, FOR SOLUTION INTRAVENOUS at 02:35

## 2023-05-07 RX ADMIN — FAMOTIDINE 20 MG: 20 TABLET ORAL at 09:23

## 2023-05-07 RX ADMIN — PIPERACILLIN AND TAZOBACTAM 3375 MG: 3; .375 INJECTION, POWDER, LYOPHILIZED, FOR SOLUTION INTRAVENOUS at 20:11

## 2023-05-07 RX ADMIN — PIPERACILLIN AND TAZOBACTAM 3375 MG: 3; .375 INJECTION, POWDER, LYOPHILIZED, FOR SOLUTION INTRAVENOUS at 09:24

## 2023-05-07 RX ADMIN — BENZONATATE 100 MG: 100 CAPSULE ORAL at 20:07

## 2023-05-07 RX ADMIN — DOCUSATE SODIUM 100 MG: 100 CAPSULE, LIQUID FILLED ORAL at 20:17

## 2023-05-07 RX ADMIN — GUAIFENESIN 1200 MG: 600 TABLET, EXTENDED RELEASE ORAL at 12:47

## 2023-05-07 RX ADMIN — DOCUSATE SODIUM 100 MG: 100 CAPSULE, LIQUID FILLED ORAL at 09:23

## 2023-05-07 ASSESSMENT — PAIN DESCRIPTION - LOCATION
LOCATION: ABDOMEN
LOCATION: ABDOMEN

## 2023-05-07 ASSESSMENT — PAIN DESCRIPTION - PAIN TYPE
TYPE: ACUTE PAIN
TYPE: ACUTE PAIN

## 2023-05-07 ASSESSMENT — PAIN SCALES - GENERAL
PAINLEVEL_OUTOF10: 4
PAINLEVEL_OUTOF10: 6

## 2023-05-07 ASSESSMENT — PAIN DESCRIPTION - DESCRIPTORS
DESCRIPTORS: ACHING
DESCRIPTORS: ACHING

## 2023-05-08 ENCOUNTER — ANESTHESIA EVENT (OUTPATIENT)
Dept: OPERATING ROOM | Age: 33
End: 2023-05-08
Payer: COMMERCIAL

## 2023-05-08 LAB
ANION GAP SERPL CALCULATED.3IONS-SCNC: 11 MMOL/L (ref 3–16)
BACTERIA SPEC AEROBE CULT: ABNORMAL
BACTERIA SPEC AEROBE CULT: ABNORMAL
BACTERIA SPEC ANAEROBE CULT: ABNORMAL
BASOPHILS # BLD: 0.1 K/UL (ref 0–0.2)
BASOPHILS NFR BLD: 1 %
BUN SERPL-MCNC: 9 MG/DL (ref 7–20)
CALCIUM SERPL-MCNC: 9.8 MG/DL (ref 8.3–10.6)
CHLORIDE SERPL-SCNC: 100 MMOL/L (ref 99–110)
CO2 SERPL-SCNC: 25 MMOL/L (ref 21–32)
CREAT SERPL-MCNC: 0.9 MG/DL (ref 0.9–1.3)
DEPRECATED RDW RBC AUTO: 13.3 % (ref 12.4–15.4)
EOSINOPHIL # BLD: 0.4 K/UL (ref 0–0.6)
EOSINOPHIL NFR BLD: 3 %
FUNGUS SPEC CULT: ABNORMAL
GFR SERPLBLD CREATININE-BSD FMLA CKD-EPI: >60 ML/MIN/{1.73_M2}
GLUCOSE SERPL-MCNC: 94 MG/DL (ref 70–99)
GRAM STN SPEC: ABNORMAL
HCT VFR BLD AUTO: 33.3 % (ref 40.5–52.5)
HGB BLD-MCNC: 11.1 G/DL (ref 13.5–17.5)
LOEFFLER MB STN SPEC: ABNORMAL
LYMPHOCYTES # BLD: 3.2 K/UL (ref 1–5.1)
LYMPHOCYTES NFR BLD: 22 %
Lab: NORMAL
MCH RBC QN AUTO: 31.6 PG (ref 26–34)
MCHC RBC AUTO-ENTMCNC: 33.5 G/DL (ref 31–36)
MCV RBC AUTO: 94.4 FL (ref 80–100)
MONOCYTES # BLD: 0.3 K/UL (ref 0–1.3)
MONOCYTES NFR BLD: 2 %
NEUTROPHILS # BLD: 10.5 K/UL (ref 1.7–7.7)
NEUTROPHILS NFR BLD: 72 %
ORGANISM: ABNORMAL
PLATELET # BLD AUTO: 479 K/UL (ref 135–450)
PLATELET BLD QL SMEAR: ABNORMAL
PMV BLD AUTO: 8.1 FL (ref 5–10.5)
POTASSIUM SERPL-SCNC: 4.3 MMOL/L (ref 3.5–5.1)
RBC # BLD AUTO: 3.53 M/UL (ref 4.2–5.9)
RBC MORPH BLD: NORMAL
REPORT: NORMAL
SLIDE REVIEW: ABNORMAL
SODIUM SERPL-SCNC: 136 MMOL/L (ref 136–145)
THIS TEST SENT TO: NORMAL
WBC # BLD AUTO: 14.6 K/UL (ref 4–11)

## 2023-05-08 PROCEDURE — APPNB30 APP NON BILLABLE TIME 0-30 MINS: Performed by: NURSE PRACTITIONER

## 2023-05-08 PROCEDURE — 6360000002 HC RX W HCPCS: Performed by: NURSE PRACTITIONER

## 2023-05-08 PROCEDURE — 6360000002 HC RX W HCPCS: Performed by: SURGERY

## 2023-05-08 PROCEDURE — 85025 COMPLETE CBC W/AUTO DIFF WBC: CPT

## 2023-05-08 PROCEDURE — 2580000003 HC RX 258: Performed by: SURGERY

## 2023-05-08 PROCEDURE — 99024 POSTOP FOLLOW-UP VISIT: CPT | Performed by: SURGERY

## 2023-05-08 PROCEDURE — APPSS15 APP SPLIT SHARED TIME 0-15 MINUTES: Performed by: NURSE PRACTITIONER

## 2023-05-08 PROCEDURE — 1200000000 HC SEMI PRIVATE

## 2023-05-08 PROCEDURE — 36415 COLL VENOUS BLD VENIPUNCTURE: CPT

## 2023-05-08 PROCEDURE — 6370000000 HC RX 637 (ALT 250 FOR IP): Performed by: INTERNAL MEDICINE

## 2023-05-08 PROCEDURE — 6370000000 HC RX 637 (ALT 250 FOR IP): Performed by: SURGERY

## 2023-05-08 PROCEDURE — 2500000003 HC RX 250 WO HCPCS: Performed by: SURGERY

## 2023-05-08 PROCEDURE — A4216 STERILE WATER/SALINE, 10 ML: HCPCS | Performed by: SURGERY

## 2023-05-08 PROCEDURE — 80048 BASIC METABOLIC PNL TOTAL CA: CPT

## 2023-05-08 RX ADMIN — ENOXAPARIN SODIUM 30 MG: 100 INJECTION SUBCUTANEOUS at 08:53

## 2023-05-08 RX ADMIN — PIPERACILLIN AND TAZOBACTAM 3375 MG: 3; .375 INJECTION, POWDER, LYOPHILIZED, FOR SOLUTION INTRAVENOUS at 17:23

## 2023-05-08 RX ADMIN — PIPERACILLIN AND TAZOBACTAM 3375 MG: 3; .375 INJECTION, POWDER, LYOPHILIZED, FOR SOLUTION INTRAVENOUS at 09:01

## 2023-05-08 RX ADMIN — CARVEDILOL 25 MG: 25 TABLET, FILM COATED ORAL at 08:54

## 2023-05-08 RX ADMIN — BENZONATATE 100 MG: 100 CAPSULE ORAL at 08:54

## 2023-05-08 RX ADMIN — DOCUSATE SODIUM 100 MG: 100 CAPSULE, LIQUID FILLED ORAL at 20:34

## 2023-05-08 RX ADMIN — FAMOTIDINE 20 MG: 20 TABLET ORAL at 20:34

## 2023-05-08 RX ADMIN — FLUCONAZOLE 400 MG: 2 INJECTION, SOLUTION INTRAVENOUS at 17:18

## 2023-05-08 RX ADMIN — SODIUM CHLORIDE, PRESERVATIVE FREE 20 MG: 5 INJECTION INTRAVENOUS at 08:54

## 2023-05-08 RX ADMIN — PIPERACILLIN AND TAZOBACTAM 3375 MG: 3; .375 INJECTION, POWDER, LYOPHILIZED, FOR SOLUTION INTRAVENOUS at 02:24

## 2023-05-08 RX ADMIN — GUAIFENESIN 1200 MG: 600 TABLET, EXTENDED RELEASE ORAL at 08:54

## 2023-05-08 RX ADMIN — GUAIFENESIN 1200 MG: 600 TABLET, EXTENDED RELEASE ORAL at 20:34

## 2023-05-08 RX ADMIN — ENOXAPARIN SODIUM 30 MG: 100 INJECTION SUBCUTANEOUS at 20:34

## 2023-05-08 RX ADMIN — DOCUSATE SODIUM 100 MG: 100 CAPSULE, LIQUID FILLED ORAL at 08:54

## 2023-05-08 NOTE — PLAN OF CARE
Problem: Discharge Planning  Goal: Discharge to home or other facility with appropriate resources  Outcome: Progressing     Problem: Chronic Conditions and Co-morbidities  Goal: Patient's chronic conditions and co-morbidity symptoms are monitored and maintained or improved  Outcome: Progressing     Problem: Pain  Goal: Verbalizes/displays adequate comfort level or baseline comfort level  Outcome: Progressing     Problem: Safety - Adult  Goal: Free from fall injury  Outcome: Progressing     Problem: Nutrition Deficit:  Goal: Optimize nutritional status  Outcome: Progressing

## 2023-05-09 ENCOUNTER — ANESTHESIA (OUTPATIENT)
Dept: OPERATING ROOM | Age: 33
End: 2023-05-09
Payer: COMMERCIAL

## 2023-05-09 LAB
ACID FAST STN SPEC QL: NORMAL
ANION GAP SERPL CALCULATED.3IONS-SCNC: 14 MMOL/L (ref 3–16)
BASOPHILS # BLD: 0.1 K/UL (ref 0–0.2)
BASOPHILS NFR BLD: 0.4 %
BUN SERPL-MCNC: 9 MG/DL (ref 7–20)
CALCIUM SERPL-MCNC: 9.4 MG/DL (ref 8.3–10.6)
CHLORIDE SERPL-SCNC: 99 MMOL/L (ref 99–110)
CO2 SERPL-SCNC: 21 MMOL/L (ref 21–32)
CREAT SERPL-MCNC: 0.9 MG/DL (ref 0.9–1.3)
DEPRECATED RDW RBC AUTO: 13.4 % (ref 12.4–15.4)
EOSINOPHIL # BLD: 0.2 K/UL (ref 0–0.6)
EOSINOPHIL NFR BLD: 1.5 %
GFR SERPLBLD CREATININE-BSD FMLA CKD-EPI: >60 ML/MIN/{1.73_M2}
GLUCOSE SERPL-MCNC: 95 MG/DL (ref 70–99)
HCT VFR BLD AUTO: 34.3 % (ref 40.5–52.5)
HGB BLD-MCNC: 11.4 G/DL (ref 13.5–17.5)
LYMPHOCYTES # BLD: 2.2 K/UL (ref 1–5.1)
LYMPHOCYTES NFR BLD: 15.6 %
MCH RBC QN AUTO: 31.5 PG (ref 26–34)
MCHC RBC AUTO-ENTMCNC: 33.1 G/DL (ref 31–36)
MCV RBC AUTO: 95 FL (ref 80–100)
MONOCYTES # BLD: 1 K/UL (ref 0–1.3)
MONOCYTES NFR BLD: 7.3 %
MYCOBACTERIUM SPEC CULT: NORMAL
NEUTROPHILS # BLD: 10.4 K/UL (ref 1.7–7.7)
NEUTROPHILS NFR BLD: 75.2 %
PLATELET # BLD AUTO: 499 K/UL (ref 135–450)
PMV BLD AUTO: 8 FL (ref 5–10.5)
POTASSIUM SERPL-SCNC: 4.4 MMOL/L (ref 3.5–5.1)
RBC # BLD AUTO: 3.62 M/UL (ref 4.2–5.9)
SODIUM SERPL-SCNC: 134 MMOL/L (ref 136–145)
WBC # BLD AUTO: 13.9 K/UL (ref 4–11)

## 2023-05-09 PROCEDURE — 6360000002 HC RX W HCPCS: Performed by: NURSE ANESTHETIST, CERTIFIED REGISTERED

## 2023-05-09 PROCEDURE — 7100000000 HC PACU RECOVERY - FIRST 15 MIN: Performed by: SURGERY

## 2023-05-09 PROCEDURE — 6370000000 HC RX 637 (ALT 250 FOR IP): Performed by: ANESTHESIOLOGY

## 2023-05-09 PROCEDURE — A4217 STERILE WATER/SALINE, 500 ML: HCPCS | Performed by: SURGERY

## 2023-05-09 PROCEDURE — 1200000000 HC SEMI PRIVATE

## 2023-05-09 PROCEDURE — 6370000000 HC RX 637 (ALT 250 FOR IP): Performed by: SURGERY

## 2023-05-09 PROCEDURE — 2580000003 HC RX 258: Performed by: SURGERY

## 2023-05-09 PROCEDURE — 2500000003 HC RX 250 WO HCPCS: Performed by: NURSE ANESTHETIST, CERTIFIED REGISTERED

## 2023-05-09 PROCEDURE — 0WQF0ZZ REPAIR ABDOMINAL WALL, OPEN APPROACH: ICD-10-PCS | Performed by: SURGERY

## 2023-05-09 PROCEDURE — 13160 SEC CLSR SURG WND/DEHSN XTN: CPT | Performed by: SURGERY

## 2023-05-09 PROCEDURE — 3700000001 HC ADD 15 MINUTES (ANESTHESIA): Performed by: SURGERY

## 2023-05-09 PROCEDURE — 3700000000 HC ANESTHESIA ATTENDED CARE: Performed by: SURGERY

## 2023-05-09 PROCEDURE — 36415 COLL VENOUS BLD VENIPUNCTURE: CPT

## 2023-05-09 PROCEDURE — 80048 BASIC METABOLIC PNL TOTAL CA: CPT

## 2023-05-09 PROCEDURE — 6360000002 HC RX W HCPCS: Performed by: SURGERY

## 2023-05-09 PROCEDURE — 2500000003 HC RX 250 WO HCPCS: Performed by: SURGERY

## 2023-05-09 PROCEDURE — 3600000012 HC SURGERY LEVEL 2 ADDTL 15MIN: Performed by: SURGERY

## 2023-05-09 PROCEDURE — 6370000000 HC RX 637 (ALT 250 FOR IP): Performed by: INTERNAL MEDICINE

## 2023-05-09 PROCEDURE — 2709999900 HC NON-CHARGEABLE SUPPLY: Performed by: SURGERY

## 2023-05-09 PROCEDURE — 85025 COMPLETE CBC W/AUTO DIFF WBC: CPT

## 2023-05-09 PROCEDURE — 6360000002 HC RX W HCPCS: Performed by: ANESTHESIOLOGY

## 2023-05-09 PROCEDURE — 3600000002 HC SURGERY LEVEL 2 BASE: Performed by: SURGERY

## 2023-05-09 PROCEDURE — 7100000001 HC PACU RECOVERY - ADDTL 15 MIN: Performed by: SURGERY

## 2023-05-09 RX ORDER — LIDOCAINE HYDROCHLORIDE 10 MG/ML
1 INJECTION, SOLUTION EPIDURAL; INFILTRATION; INTRACAUDAL; PERINEURAL
Status: DISCONTINUED | OUTPATIENT
Start: 2023-05-09 | End: 2023-05-09 | Stop reason: HOSPADM

## 2023-05-09 RX ORDER — HYDROMORPHONE HCL 110MG/55ML
0.5 PATIENT CONTROLLED ANALGESIA SYRINGE INTRAVENOUS EVERY 5 MIN PRN
Status: DISCONTINUED | OUTPATIENT
Start: 2023-05-09 | End: 2023-05-09 | Stop reason: HOSPADM

## 2023-05-09 RX ORDER — SUCCINYLCHOLINE/SOD CL,ISO/PF 200MG/10ML
SYRINGE (ML) INTRAVENOUS PRN
Status: DISCONTINUED | OUTPATIENT
Start: 2023-05-09 | End: 2023-05-09 | Stop reason: SDUPTHER

## 2023-05-09 RX ORDER — SODIUM CHLORIDE 0.9 % (FLUSH) 0.9 %
5-40 SYRINGE (ML) INJECTION PRN
Status: DISCONTINUED | OUTPATIENT
Start: 2023-05-09 | End: 2023-05-09 | Stop reason: HOSPADM

## 2023-05-09 RX ORDER — MAGNESIUM SULFATE HEPTAHYDRATE 500 MG/ML
INJECTION, SOLUTION INTRAMUSCULAR; INTRAVENOUS PRN
Status: DISCONTINUED | OUTPATIENT
Start: 2023-05-09 | End: 2023-05-09 | Stop reason: SDUPTHER

## 2023-05-09 RX ORDER — IPRATROPIUM BROMIDE AND ALBUTEROL SULFATE 2.5; .5 MG/3ML; MG/3ML
1 SOLUTION RESPIRATORY (INHALATION) ONCE
Status: COMPLETED | OUTPATIENT
Start: 2023-05-09 | End: 2023-05-09

## 2023-05-09 RX ORDER — LABETALOL HYDROCHLORIDE 5 MG/ML
10 INJECTION, SOLUTION INTRAVENOUS
Status: DISCONTINUED | OUTPATIENT
Start: 2023-05-09 | End: 2023-05-09 | Stop reason: HOSPADM

## 2023-05-09 RX ORDER — PROCHLORPERAZINE EDISYLATE 5 MG/ML
5 INJECTION INTRAMUSCULAR; INTRAVENOUS
Status: DISCONTINUED | OUTPATIENT
Start: 2023-05-09 | End: 2023-05-09 | Stop reason: HOSPADM

## 2023-05-09 RX ORDER — OXYCODONE HYDROCHLORIDE 5 MG/1
5 TABLET ORAL
Status: DISCONTINUED | OUTPATIENT
Start: 2023-05-09 | End: 2023-05-09 | Stop reason: HOSPADM

## 2023-05-09 RX ORDER — ONDANSETRON 2 MG/ML
4 INJECTION INTRAMUSCULAR; INTRAVENOUS
Status: DISCONTINUED | OUTPATIENT
Start: 2023-05-09 | End: 2023-05-09 | Stop reason: HOSPADM

## 2023-05-09 RX ORDER — SODIUM CHLORIDE 9 MG/ML
25 INJECTION, SOLUTION INTRAVENOUS PRN
Status: DISCONTINUED | OUTPATIENT
Start: 2023-05-09 | End: 2023-05-09 | Stop reason: HOSPADM

## 2023-05-09 RX ORDER — PROPOFOL 10 MG/ML
INJECTION, EMULSION INTRAVENOUS PRN
Status: DISCONTINUED | OUTPATIENT
Start: 2023-05-09 | End: 2023-05-09 | Stop reason: SDUPTHER

## 2023-05-09 RX ORDER — SODIUM CHLORIDE 0.9 % (FLUSH) 0.9 %
5-40 SYRINGE (ML) INJECTION EVERY 12 HOURS SCHEDULED
Status: DISCONTINUED | OUTPATIENT
Start: 2023-05-09 | End: 2023-05-09 | Stop reason: HOSPADM

## 2023-05-09 RX ORDER — LIDOCAINE HYDROCHLORIDE 20 MG/ML
INJECTION, SOLUTION EPIDURAL; INFILTRATION; INTRACAUDAL; PERINEURAL PRN
Status: DISCONTINUED | OUTPATIENT
Start: 2023-05-09 | End: 2023-05-09 | Stop reason: SDUPTHER

## 2023-05-09 RX ORDER — BUPIVACAINE HYDROCHLORIDE AND EPINEPHRINE 5; 5 MG/ML; UG/ML
INJECTION, SOLUTION PERINEURAL
Status: COMPLETED | OUTPATIENT
Start: 2023-05-09 | End: 2023-05-09

## 2023-05-09 RX ORDER — FENTANYL CITRATE 50 UG/ML
25 INJECTION, SOLUTION INTRAMUSCULAR; INTRAVENOUS EVERY 5 MIN PRN
Status: DISCONTINUED | OUTPATIENT
Start: 2023-05-09 | End: 2023-05-09 | Stop reason: HOSPADM

## 2023-05-09 RX ORDER — ONDANSETRON 2 MG/ML
INJECTION INTRAMUSCULAR; INTRAVENOUS PRN
Status: DISCONTINUED | OUTPATIENT
Start: 2023-05-09 | End: 2023-05-09 | Stop reason: SDUPTHER

## 2023-05-09 RX ORDER — MAGNESIUM HYDROXIDE 1200 MG/15ML
LIQUID ORAL CONTINUOUS PRN
Status: COMPLETED | OUTPATIENT
Start: 2023-05-09 | End: 2023-05-09

## 2023-05-09 RX ORDER — DEXAMETHASONE SODIUM PHOSPHATE 4 MG/ML
INJECTION, SOLUTION INTRA-ARTICULAR; INTRALESIONAL; INTRAMUSCULAR; INTRAVENOUS; SOFT TISSUE PRN
Status: DISCONTINUED | OUTPATIENT
Start: 2023-05-09 | End: 2023-05-09 | Stop reason: SDUPTHER

## 2023-05-09 RX ORDER — ROCURONIUM BROMIDE 10 MG/ML
INJECTION, SOLUTION INTRAVENOUS PRN
Status: DISCONTINUED | OUTPATIENT
Start: 2023-05-09 | End: 2023-05-09 | Stop reason: SDUPTHER

## 2023-05-09 RX ORDER — MIDAZOLAM HYDROCHLORIDE 1 MG/ML
INJECTION INTRAMUSCULAR; INTRAVENOUS PRN
Status: DISCONTINUED | OUTPATIENT
Start: 2023-05-09 | End: 2023-05-09 | Stop reason: SDUPTHER

## 2023-05-09 RX ORDER — FENTANYL CITRATE 50 UG/ML
INJECTION, SOLUTION INTRAMUSCULAR; INTRAVENOUS PRN
Status: DISCONTINUED | OUTPATIENT
Start: 2023-05-09 | End: 2023-05-09 | Stop reason: SDUPTHER

## 2023-05-09 RX ORDER — HYDRALAZINE HYDROCHLORIDE 20 MG/ML
10 INJECTION INTRAMUSCULAR; INTRAVENOUS
Status: DISCONTINUED | OUTPATIENT
Start: 2023-05-09 | End: 2023-05-09 | Stop reason: HOSPADM

## 2023-05-09 RX ORDER — SODIUM CHLORIDE 9 MG/ML
INJECTION, SOLUTION INTRAVENOUS PRN
Status: DISCONTINUED | OUTPATIENT
Start: 2023-05-09 | End: 2023-05-09 | Stop reason: HOSPADM

## 2023-05-09 RX ORDER — KETAMINE HCL IN NACL, ISO-OSM 100MG/10ML
SYRINGE (ML) INJECTION PRN
Status: DISCONTINUED | OUTPATIENT
Start: 2023-05-09 | End: 2023-05-09 | Stop reason: SDUPTHER

## 2023-05-09 RX ADMIN — SODIUM CHLORIDE: 9 INJECTION, SOLUTION INTRAVENOUS at 07:30

## 2023-05-09 RX ADMIN — DOCUSATE SODIUM 100 MG: 100 CAPSULE, LIQUID FILLED ORAL at 20:52

## 2023-05-09 RX ADMIN — Medication 20 MG: at 08:08

## 2023-05-09 RX ADMIN — MIDAZOLAM 2 MG: 1 INJECTION INTRAMUSCULAR; INTRAVENOUS at 07:35

## 2023-05-09 RX ADMIN — ROCURONIUM BROMIDE 10 MG: 10 INJECTION, SOLUTION INTRAVENOUS at 07:38

## 2023-05-09 RX ADMIN — ONDANSETRON 4 MG: 2 INJECTION INTRAMUSCULAR; INTRAVENOUS at 08:08

## 2023-05-09 RX ADMIN — PIPERACILLIN AND TAZOBACTAM 3375 MG: 3; .375 INJECTION, POWDER, LYOPHILIZED, FOR SOLUTION INTRAVENOUS at 11:39

## 2023-05-09 RX ADMIN — SUGAMMADEX 200 MG: 100 INJECTION, SOLUTION INTRAVENOUS at 08:16

## 2023-05-09 RX ADMIN — ENOXAPARIN SODIUM 30 MG: 100 INJECTION SUBCUTANEOUS at 20:51

## 2023-05-09 RX ADMIN — GUAIFENESIN 1200 MG: 600 TABLET, EXTENDED RELEASE ORAL at 20:52

## 2023-05-09 RX ADMIN — CARVEDILOL 25 MG: 25 TABLET, FILM COATED ORAL at 09:58

## 2023-05-09 RX ADMIN — SODIUM CHLORIDE, PRESERVATIVE FREE 20 MG: 5 INJECTION INTRAVENOUS at 09:59

## 2023-05-09 RX ADMIN — Medication 180 MG: at 07:38

## 2023-05-09 RX ADMIN — PIPERACILLIN AND TAZOBACTAM 3375 MG: 3; .375 INJECTION, POWDER, LYOPHILIZED, FOR SOLUTION INTRAVENOUS at 18:57

## 2023-05-09 RX ADMIN — ROCURONIUM BROMIDE 30 MG: 10 INJECTION, SOLUTION INTRAVENOUS at 07:46

## 2023-05-09 RX ADMIN — LIDOCAINE HYDROCHLORIDE 100 MG: 20 INJECTION, SOLUTION EPIDURAL; INFILTRATION; INTRACAUDAL; PERINEURAL at 07:38

## 2023-05-09 RX ADMIN — FENTANYL CITRATE 100 MCG: 50 INJECTION, SOLUTION INTRAMUSCULAR; INTRAVENOUS at 07:38

## 2023-05-09 RX ADMIN — GUAIFENESIN 1200 MG: 600 TABLET, EXTENDED RELEASE ORAL at 09:58

## 2023-05-09 RX ADMIN — FAMOTIDINE 20 MG: 20 TABLET ORAL at 20:52

## 2023-05-09 RX ADMIN — DEXAMETHASONE SODIUM PHOSPHATE 8 MG: 4 INJECTION, SOLUTION INTRAMUSCULAR; INTRAVENOUS at 07:45

## 2023-05-09 RX ADMIN — MAGNESIUM SULFATE HEPTAHYDRATE 1 G: 500 INJECTION, SOLUTION INTRAMUSCULAR; INTRAVENOUS at 07:47

## 2023-05-09 RX ADMIN — Medication 30 MG: at 07:47

## 2023-05-09 RX ADMIN — HYDROMORPHONE HYDROCHLORIDE 0.5 MG: 2 INJECTION, SOLUTION INTRAMUSCULAR; INTRAVENOUS; SUBCUTANEOUS at 09:12

## 2023-05-09 RX ADMIN — FLUCONAZOLE 400 MG: 2 INJECTION, SOLUTION INTRAVENOUS at 16:53

## 2023-05-09 RX ADMIN — PIPERACILLIN AND TAZOBACTAM 3375 MG: 3; .375 INJECTION, POWDER, LYOPHILIZED, FOR SOLUTION INTRAVENOUS at 01:42

## 2023-05-09 RX ADMIN — HYDROCODONE BITARTRATE AND ACETAMINOPHEN 1 TABLET: 5; 325 TABLET ORAL at 16:47

## 2023-05-09 RX ADMIN — PROPOFOL 250 MG: 10 INJECTION, EMULSION INTRAVENOUS at 07:38

## 2023-05-09 RX ADMIN — IPRATROPIUM BROMIDE AND ALBUTEROL SULFATE 1 AMPULE: .5; 2.5 SOLUTION RESPIRATORY (INHALATION) at 08:41

## 2023-05-09 RX ADMIN — CARVEDILOL 25 MG: 25 TABLET, FILM COATED ORAL at 16:47

## 2023-05-09 RX ADMIN — HYDROCODONE BITARTRATE AND ACETAMINOPHEN 1 TABLET: 5; 325 TABLET ORAL at 20:57

## 2023-05-09 ASSESSMENT — PAIN SCALES - GENERAL
PAINLEVEL_OUTOF10: 5
PAINLEVEL_OUTOF10: 4
PAINLEVEL_OUTOF10: 0
PAINLEVEL_OUTOF10: 6
PAINLEVEL_OUTOF10: 0
PAINLEVEL_OUTOF10: 8
PAINLEVEL_OUTOF10: 5

## 2023-05-09 ASSESSMENT — ENCOUNTER SYMPTOMS: SHORTNESS OF BREATH: 0

## 2023-05-09 ASSESSMENT — PAIN DESCRIPTION - LOCATION
LOCATION: ABDOMEN
LOCATION: ABDOMEN

## 2023-05-09 ASSESSMENT — PAIN DESCRIPTION - DESCRIPTORS: DESCRIPTORS: ACHING

## 2023-05-09 ASSESSMENT — PAIN - FUNCTIONAL ASSESSMENT: PAIN_FUNCTIONAL_ASSESSMENT: NONE - DENIES PAIN

## 2023-05-09 NOTE — ANESTHESIA POSTPROCEDURE EVALUATION
Department of Anesthesiology  Postprocedure Note    Patient: Kelly Andersen  MRN: [id number]  YOB: 1990  Date of evaluation: 5/9/2023      Procedure Summary     Date: 05/09/23 Room / Location: [address]    Anesthesia Start: 0730 Anesthesia Stop: 9879    Procedure: SECONDARY ABDOMINAL WOUND CLOSURE Diagnosis:       Open wound of abdominal wall, initial encounter      (Open wound of abdominal wall, initial encounter [S31.109A])    Surgeons: Lala Valentino MD Responsible Provider: Amol Beverly MD    Anesthesia Type: general ASA Status: 3          Anesthesia Type: No value filed.     Florence Phase I: Florence Score: 9    Florence Phase II:        Anesthesia Post Evaluation    Patient location during evaluation: PACU  Patient participation: complete - patient participated  Level of consciousness: awake and alert  Airway patency: patent  Nausea & Vomiting: no nausea and no vomiting  Complications: no  Cardiovascular status: hemodynamically stable  Respiratory status: acceptable  Hydration status: stable  Multimodal analgesia pain management approach

## 2023-05-09 NOTE — OP NOTE
[facility] [address]                     [address] [facility], [address] [facility]                                OPERATIVE REPORT    PATIENT NAME: Javier Barajas                    :        1990  MED REC NO:   [id number]                          ROOM:       1454  ACCOUNT NO:   [de-identified]                           ADMIT DATE: 2023  PROVIDER:     Isis Fonseca MD    DATE OF PROCEDURE:  2023    PREOPERATIVE DIAGNOSIS:  Open wound of abdominal wall. POSTOPERATIVE DIAGNOSIS:  Open wound of abdominal wall. PROCEDURE:  Return to OR for planned secondary closure of abdominal wall  wound. SURGEON:  Isis Fonseca MD    ANESTHESIA:  General plus local.    ESTIMATED BLOOD LOSS:  Minimal.    COMPLICATIONS:  None. SPECIMENS:  None. DETAILS OF SURGERY:  The patient returns to the OR for a planned clean  up and closure of his abdominal wound. Intended procedure reviewed with  the patient. All questions were  and he consents to proceed. ADDITIONAL DETAILS OF SURGERY: The patient was brought to the operating  room, placed on the operative table in supine position. General  anesthetic was administered. The old colostomy appliance was draped off  from the field with a 10/10 draped and Betadine scrub paint was used for  the surgical field to scrub and clean the wound after removal of the the  patient's wound vac. The time-out was done. Local anesthetic was then  administered liberally around the wound using 0.5% Marcaine. The wound  was debrided of some devitalized fatty tissue and scrubbed out with a  Betadine soaked scrub brush several times, irrigating the wound out with  saline and then doing fine hemostasis with Bovie electrocautery. Closure was performed with interrupted 3-0 nylon vertical mattress  sutures and skin staples. The surgical site appeared hemostatic.   The  CLYDE wound negative pressure dressing was placed for the wound with

## 2023-05-09 NOTE — PLAN OF CARE
Problem: Discharge Planning  Goal: Discharge to home or other facility with appropriate resources  5/8/2023 2122 by Marbin Edward RN  Outcome: Progressing     Problem: Chronic Conditions and Co-morbidities  Goal: Patient's chronic conditions and co-morbidity symptoms are monitored and maintained or improved  5/8/2023 2122 by Marbin Edawrd RN  Outcome: Progressing     Problem: Pain  Goal: Verbalizes/displays adequate comfort level or baseline comfort level  5/8/2023 2122 by Marbin Edward RN  Outcome: Progressing     Problem: Safety - Adult  Goal: Free from fall injury  5/8/2023 2122 by Marbin Edward RN  Outcome: Progressing     Problem: Nutrition Deficit:  Goal: Optimize nutritional status  5/8/2023 2122 by Marbin Edward RN  Outcome: Progressing

## 2023-05-09 NOTE — BRIEF OP NOTE
Brief Postoperative Note      Patient: Amada Ocasio  YOB: 1990  MRN: [id number]    Date of Procedure: 5/9/2023    Pre-Op Diagnosis Codes:     * Open wound of abdominal wall, initial encounter [S31.109A]    Post-Op Diagnosis: Same       Procedure(s):  SECONDARY ABDOMINAL WOUND CLOSURE    Surgeon(s):  Niharika Durham MD    Assistant:  Surgical Assistant: Sheng Chisholm    Anesthesia: General    Estimated Blood Loss (mL): Minimal    Complications: None    Specimens:   * No specimens in log *    Implants:  * No implants in log *      Drains:   Negative Pressure Wound Therapy Abdomen Medial;Upper (Active)   Dressing Type Black Foam 05/08/23 2031   Cycle Continuous 05/08/23 2031   Target Pressure (mmHg) 125 05/08/23 2031   Dressing Status Clean, dry & intact 05/08/23 2031   Output (ml) 0 ml 05/08/23 0540       Negative Pressure Wound Therapy Abdomen Medial (Active)       Colostomy LUQ Descending/sigmoid (Active)   Stomal Appliance 2 piece 05/08/23 1023   Flange Size (inches) 2.75 Inches 05/05/23 1736   Stoma  Assessment Red;Moist;Retracted 05/08/23 1023   Peristomal Assessment Clean, dry & intact 05/08/23 1023   Treatment Site care; Bag change 05/05/23 1736   Stool Appearance Loose 05/08/23 1023   Stool Color Brown 05/08/23 1023   Stool Amount Small 05/08/23 1023   Output (mL) 200 ml 05/08/23 1023       [REMOVED] Ureteral Drain/Stent 04/25/23  (Removed)       [REMOVED] NG/OG/NJ/NE Tube Nasogastric 18 fr Left nostril (Removed)   Surrounding Skin Clean, dry & intact 05/01/23 1200   Securement device Adhesive based gutierrez 05/01/23 1200   Status Suction-low continuous 05/01/23 1200   NG/OG/NJ/NE External Measurement (cm) 51 cm 04/30/23 2117   Drainage Appearance None 04/30/23 2117   Free Water/Flush (mL) 100 mL 05/01/23 0830   Output (mL) 100 ml 05/01/23 1349       [REMOVED] Urinary Catheter 04/25/23 2 Way; [place] (Removed)   Catheter Indications Perioperative use for selected surgical procedures

## 2023-05-09 NOTE — RT PROTOCOL NOTE
RT Nebulizer Bronchodilator Protocol Note    There is a bronchodilator order in the chart from a provider indicating to follow the RT Bronchodilator Protocol and there is an Initiate RT Bronchodilator Protocol order as well (see protocol at bottom of note). CXR Findings:  No results found. The findings from the last RT Protocol Assessment were as follows:  Smoking: Smoker 15 pack years or more  Respiratory Pattern: Regular pattern and RR 12-20 bpm  Breath Sounds: Clear breath sounds  Cough: Strong, productive  Indication for Bronchodilator Therapy: None  Bronchodilator Assessment Score: 2    Aerosolized bronchodilator medication orders have been revised according to the RT Nebulizer Bronchodilator Protocol below. Respiratory Therapist to perform RT Therapy Protocol Assessment initially then follow the protocol. Repeat RT Therapy Protocol Assessment PRN for score 0-3 or on second treatment, BID, and PRN for scores above 3. No Indications - adjust the frequency to every 6 hours PRN wheezing or bronchospasm, if no treatments needed after 48 hours then discontinue using Per Protocol order mode. If indication present, adjust the RT bronchodilator orders based on the Bronchodilator Assessment Score as indicated below. If a patient is on this medication at home then do not decrease Frequency below that used at home. 0-3 - enter or revise RT bronchodilator order(s) to equivalent RT Bronchodilator order with Frequency of every 4 hours PRN for wheezing or increased work of breathing using Per Protocol order mode. 4-6 - enter or revise RT Bronchodilator order(s) to two equivalent RT bronchodilator orders with one order with BID Frequency and one order with Frequency of every 4 hours PRN wheezing or increased work of breathing using Per Protocol order mode.          7-10 - enter or revise RT Bronchodilator order(s) to two equivalent RT bronchodilator orders with one order with TID Frequency and one

## 2023-05-09 NOTE — ANESTHESIA PRE PROCEDURE
Department of Anesthesiology  Preprocedure Note       Name:  Nida Erazo   Age:  27 y.o.  :  1990                                          MRN:  [id number]         Date:  2023      Surgeon: Ramy Osborne):  Weston Kruegerr, MD    Procedure: Procedure(s):  SECONDARY ABDOMINAL WOUND CLOSURE    Medications prior to admission:   Prior to Admission medications    Medication Sig Start Date End Date Taking? Authorizing Provider   carvedilol (COREG) 25 MG tablet Take 1 tablet by mouth 2 times daily (with meals) 3/20/23 6/18/23  JESSICA Celaya CNP   spironolactone (ALDACTONE) 25 MG tablet Take 1 tablet by mouth in the morning and 1 tablet in the evening. 3/20/23 6/18/23  JESSICA Celaya CNP   lisinopril-hydroCHLOROthiazide (PRINZIDE;ZESTORETIC) 20-25 MG per tablet Take 1 tablet by mouth every morning 3/20/23 6/18/23  JESSICA Celaya CNP   Multiple Vitamin (MULTI-VITAMIN PO) Take by mouth daily    Historical Provider, MD       Current medications:    No current facility-administered medications for this visit. No current outpatient medications on file.      Facility-Administered Medications Ordered in Other Visits   Medication Dose Route Frequency Provider Last Rate Last Admin    benzonatate (TESSALON) capsule 100 mg  100 mg Oral TID PRN Jayne Correia MD        [facility]) extended release tablet 1,200 mg  1,200 mg Oral BID Jayne Correia MD   1,200 mg at 23    fluconazole (DIFLUCAN) in 0.9 % sodium chloride IVPB 400 mg  400 mg IntraVENous Q24H JESSICA Santos  mL/hr at 23 1718 400 mg at 23 1718    HYDROcodone-acetaminophen (NORCO) 5-325 MG per tablet 1 tablet  1 tablet Oral Q4H PRN JESSICA Santos CNP        morphine (PF) injection 2 mg  2 mg IntraVENous Q4H PRN JESSICA Santos CNP   2 mg at 23 1025    lidocaine viscous hcl (XYLOCAINE) 2 % solution 1 mL  1 mL Nasal Q3H PRN MD Arian Bernal
Counseling given: Not Answered      Vital Signs (Current): There were no vitals filed for this visit.                                            BP Readings from Last 3 Encounters:   05/08/23 136/85   03/20/23 114/78   03/15/23 (!) 126/90       NPO Status:                                                                                 BMI:   Wt Readings from Last 3 Encounters:   05/04/23 (!) 338 lb 10 oz (153.6 kg)   03/20/23 (!) 339 lb (153.8 kg)   03/15/23 (!) 340 lb (154.2 kg)     There is no height or weight on file to calculate BMI.    CBC:   Lab Results   Component Value Date/Time    WBC 14.6 05/08/2023 04:32 AM    RBC 3.53 05/08/2023 04:32 AM    HGB 11.1 05/08/2023 04:32 AM    HCT 33.3 05/08/2023 04:32 AM    MCV 94.4 05/08/2023 04:32 AM    RDW 13.3 05/08/2023 04:32 AM     05/08/2023 04:32 AM       CMP:   Lab Results   Component Value Date/Time     05/08/2023 04:32 AM    K 4.3 05/08/2023 04:32 AM    K 3.9 04/26/2023 08:14 PM     05/08/2023 04:32 AM    CO2 25 05/08/2023 04:32 AM    BUN 9 05/08/2023 04:32 AM    CREATININE 0.9 05/08/2023 04:32 AM    GFRAA >60 08/02/2022 05:33 AM    AGRATIO 1.1 04/26/2023 08:14 PM    LABGLOM >60 05/08/2023 04:32 AM    GLUCOSE 94 05/08/2023 04:32 AM    PROT 7.2 04/26/2023 08:14 PM    CALCIUM 9.8 05/08/2023 04:32 AM    BILITOT 0.6 04/26/2023 08:14 PM    ALKPHOS 48 04/26/2023 08:14 PM    AST 22 04/26/2023 08:14 PM    ALT 15 04/26/2023 08:14 PM       POC Tests:   Recent Labs     05/06/23  1906   POCGLU 118*       Coags:   Lab Results   Component Value Date/Time    PROTIME 15.8 05/07/2023 05:14 AM    INR 1.26 05/07/2023 05:14 AM    APTT 28.7 05/07/2023 05:14 AM       HCG (If Applicable): No results found for: PREGTESTUR, PREGSERUM, HCG, HCGQUANT     ABGs:   Lab Results   Component Value Date/Time    PHART 7.489 04/26/2023 08:45 PM    PO2ART 107.0 04/26/2023 08:45 PM    JMN9GLZ 34.1 04/26/2023 08:45 PM    NEC8QBM 25.9 04/26/2023 08:45 PM    BEART 2.8 04/26/2023 08:45

## 2023-05-10 ENCOUNTER — APPOINTMENT (OUTPATIENT)
Dept: CT IMAGING | Age: 33
DRG: 329 | End: 2023-05-10
Attending: SURGERY
Payer: COMMERCIAL

## 2023-05-10 LAB
ANION GAP SERPL CALCULATED.3IONS-SCNC: 11 MMOL/L (ref 3–16)
BASOPHILS # BLD: 0 K/UL (ref 0–0.2)
BASOPHILS NFR BLD: 0.1 %
BUN SERPL-MCNC: 13 MG/DL (ref 7–20)
CALCIUM SERPL-MCNC: 9.5 MG/DL (ref 8.3–10.6)
CHLORIDE SERPL-SCNC: 100 MMOL/L (ref 99–110)
CO2 SERPL-SCNC: 23 MMOL/L (ref 21–32)
CREAT SERPL-MCNC: 0.7 MG/DL (ref 0.9–1.3)
DEPRECATED RDW RBC AUTO: 13.5 % (ref 12.4–15.4)
EOSINOPHIL # BLD: 0 K/UL (ref 0–0.6)
EOSINOPHIL NFR BLD: 0 %
GFR SERPLBLD CREATININE-BSD FMLA CKD-EPI: >60 ML/MIN/{1.73_M2}
GLUCOSE SERPL-MCNC: 174 MG/DL (ref 70–99)
HCT VFR BLD AUTO: 33 % (ref 40.5–52.5)
HGB BLD-MCNC: 10.9 G/DL (ref 13.5–17.5)
LYMPHOCYTES # BLD: 1.5 K/UL (ref 1–5.1)
LYMPHOCYTES NFR BLD: 8.4 %
MCH RBC QN AUTO: 31.1 PG (ref 26–34)
MCHC RBC AUTO-ENTMCNC: 33 G/DL (ref 31–36)
MCV RBC AUTO: 94.3 FL (ref 80–100)
MONOCYTES # BLD: 1.1 K/UL (ref 0–1.3)
MONOCYTES NFR BLD: 6 %
NEUTROPHILS # BLD: 15.4 K/UL (ref 1.7–7.7)
NEUTROPHILS NFR BLD: 85.5 %
PLATELET # BLD AUTO: 555 K/UL (ref 135–450)
PMV BLD AUTO: 8.5 FL (ref 5–10.5)
POTASSIUM SERPL-SCNC: 4.3 MMOL/L (ref 3.5–5.1)
RBC # BLD AUTO: 3.5 M/UL (ref 4.2–5.9)
SODIUM SERPL-SCNC: 134 MMOL/L (ref 136–145)
WBC # BLD AUTO: 18 K/UL (ref 4–11)

## 2023-05-10 PROCEDURE — 80048 BASIC METABOLIC PNL TOTAL CA: CPT

## 2023-05-10 PROCEDURE — 74177 CT ABD & PELVIS W/CONTRAST: CPT

## 2023-05-10 PROCEDURE — 6370000000 HC RX 637 (ALT 250 FOR IP): Performed by: SURGERY

## 2023-05-10 PROCEDURE — 1200000000 HC SEMI PRIVATE

## 2023-05-10 PROCEDURE — 6360000002 HC RX W HCPCS: Performed by: SURGERY

## 2023-05-10 PROCEDURE — 97535 SELF CARE MNGMENT TRAINING: CPT

## 2023-05-10 PROCEDURE — 99024 POSTOP FOLLOW-UP VISIT: CPT | Performed by: SURGERY

## 2023-05-10 PROCEDURE — 6360000004 HC RX CONTRAST MEDICATION: Performed by: SURGERY

## 2023-05-10 PROCEDURE — 36415 COLL VENOUS BLD VENIPUNCTURE: CPT

## 2023-05-10 PROCEDURE — 2580000003 HC RX 258: Performed by: SURGERY

## 2023-05-10 PROCEDURE — 85025 COMPLETE CBC W/AUTO DIFF WBC: CPT

## 2023-05-10 RX ADMIN — CARVEDILOL 25 MG: 25 TABLET, FILM COATED ORAL at 09:41

## 2023-05-10 RX ADMIN — HYDROCODONE BITARTRATE AND ACETAMINOPHEN 1 TABLET: 5; 325 TABLET ORAL at 09:39

## 2023-05-10 RX ADMIN — FLUCONAZOLE 400 MG: 2 INJECTION, SOLUTION INTRAVENOUS at 17:35

## 2023-05-10 RX ADMIN — GUAIFENESIN 1200 MG: 600 TABLET, EXTENDED RELEASE ORAL at 09:39

## 2023-05-10 RX ADMIN — CARVEDILOL 25 MG: 25 TABLET, FILM COATED ORAL at 17:35

## 2023-05-10 RX ADMIN — DOCUSATE SODIUM 100 MG: 100 CAPSULE, LIQUID FILLED ORAL at 20:15

## 2023-05-10 RX ADMIN — ENOXAPARIN SODIUM 30 MG: 100 INJECTION SUBCUTANEOUS at 20:16

## 2023-05-10 RX ADMIN — PIPERACILLIN AND TAZOBACTAM 3375 MG: 3; .375 INJECTION, POWDER, LYOPHILIZED, FOR SOLUTION INTRAVENOUS at 20:15

## 2023-05-10 RX ADMIN — IOPAMIDOL 75 ML: 755 INJECTION, SOLUTION INTRAVENOUS at 15:08

## 2023-05-10 RX ADMIN — DOCUSATE SODIUM 100 MG: 100 CAPSULE, LIQUID FILLED ORAL at 09:39

## 2023-05-10 RX ADMIN — FAMOTIDINE 20 MG: 20 TABLET ORAL at 20:16

## 2023-05-10 RX ADMIN — HYDROCODONE BITARTRATE AND ACETAMINOPHEN 1 TABLET: 5; 325 TABLET ORAL at 20:18

## 2023-05-10 RX ADMIN — PIPERACILLIN AND TAZOBACTAM 3375 MG: 3; .375 INJECTION, POWDER, LYOPHILIZED, FOR SOLUTION INTRAVENOUS at 09:44

## 2023-05-10 RX ADMIN — DIATRIZOATE MEGLUMINE AND DIATRIZOATE SODIUM 20 ML: 660; 100 LIQUID ORAL; RECTAL at 12:04

## 2023-05-10 RX ADMIN — ENOXAPARIN SODIUM 30 MG: 100 INJECTION SUBCUTANEOUS at 09:42

## 2023-05-10 RX ADMIN — GUAIFENESIN 1200 MG: 600 TABLET, EXTENDED RELEASE ORAL at 20:15

## 2023-05-10 RX ADMIN — FAMOTIDINE 20 MG: 20 TABLET ORAL at 09:39

## 2023-05-10 RX ADMIN — PIPERACILLIN AND TAZOBACTAM 3375 MG: 3; .375 INJECTION, POWDER, LYOPHILIZED, FOR SOLUTION INTRAVENOUS at 02:00

## 2023-05-10 ASSESSMENT — PAIN SCALES - GENERAL
PAINLEVEL_OUTOF10: 2
PAINLEVEL_OUTOF10: 7
PAINLEVEL_OUTOF10: 7
PAINLEVEL_OUTOF10: 5

## 2023-05-10 ASSESSMENT — PAIN DESCRIPTION - DESCRIPTORS
DESCRIPTORS: ACHING
DESCRIPTORS: ACHING

## 2023-05-10 ASSESSMENT — PAIN DESCRIPTION - LOCATION
LOCATION: ABDOMEN

## 2023-05-11 ENCOUNTER — TELEPHONE (OUTPATIENT)
Dept: PRIMARY CARE CLINIC | Age: 33
End: 2023-05-11

## 2023-05-11 VITALS
BODY MASS INDEX: 40.43 KG/M2 | SYSTOLIC BLOOD PRESSURE: 131 MMHG | WEIGHT: 315 LBS | RESPIRATION RATE: 16 BRPM | HEIGHT: 74 IN | HEART RATE: 62 BPM | OXYGEN SATURATION: 97 % | TEMPERATURE: 98.1 F | DIASTOLIC BLOOD PRESSURE: 87 MMHG

## 2023-05-11 LAB
ANION GAP SERPL CALCULATED.3IONS-SCNC: 11 MMOL/L (ref 3–16)
BASOPHILS # BLD: 0.1 K/UL (ref 0–0.2)
BASOPHILS NFR BLD: 1.2 %
BUN SERPL-MCNC: 13 MG/DL (ref 7–20)
CALCIUM SERPL-MCNC: 9.3 MG/DL (ref 8.3–10.6)
CHLORIDE SERPL-SCNC: 101 MMOL/L (ref 99–110)
CO2 SERPL-SCNC: 22 MMOL/L (ref 21–32)
CREAT SERPL-MCNC: 0.8 MG/DL (ref 0.9–1.3)
DEPRECATED RDW RBC AUTO: 13.3 % (ref 12.4–15.4)
EOSINOPHIL # BLD: 0.1 K/UL (ref 0–0.6)
EOSINOPHIL NFR BLD: 0.7 %
GFR SERPLBLD CREATININE-BSD FMLA CKD-EPI: >60 ML/MIN/{1.73_M2}
GLUCOSE SERPL-MCNC: 85 MG/DL (ref 70–99)
HCT VFR BLD AUTO: 31.8 % (ref 40.5–52.5)
HGB BLD-MCNC: 10.6 G/DL (ref 13.5–17.5)
LYMPHOCYTES # BLD: 3.4 K/UL (ref 1–5.1)
LYMPHOCYTES NFR BLD: 28.6 %
MCH RBC QN AUTO: 31.4 PG (ref 26–34)
MCHC RBC AUTO-ENTMCNC: 33.2 G/DL (ref 31–36)
MCV RBC AUTO: 94.5 FL (ref 80–100)
MONOCYTES # BLD: 0.9 K/UL (ref 0–1.3)
MONOCYTES NFR BLD: 7.8 %
NEUTROPHILS # BLD: 7.3 K/UL (ref 1.7–7.7)
NEUTROPHILS NFR BLD: 61.7 %
PLATELET # BLD AUTO: 523 K/UL (ref 135–450)
PMV BLD AUTO: 8.3 FL (ref 5–10.5)
POTASSIUM SERPL-SCNC: 4.2 MMOL/L (ref 3.5–5.1)
RBC # BLD AUTO: 3.37 M/UL (ref 4.2–5.9)
SODIUM SERPL-SCNC: 134 MMOL/L (ref 136–145)
WBC # BLD AUTO: 11.8 K/UL (ref 4–11)

## 2023-05-11 PROCEDURE — 99024 POSTOP FOLLOW-UP VISIT: CPT | Performed by: SURGERY

## 2023-05-11 PROCEDURE — APPSS30 APP SPLIT SHARED TIME 16-30 MINUTES: Performed by: NURSE PRACTITIONER

## 2023-05-11 PROCEDURE — APPNB30 APP NON BILLABLE TIME 0-30 MINS: Performed by: NURSE PRACTITIONER

## 2023-05-11 PROCEDURE — 2580000003 HC RX 258: Performed by: SURGERY

## 2023-05-11 PROCEDURE — 6360000002 HC RX W HCPCS: Performed by: SURGERY

## 2023-05-11 PROCEDURE — 6370000000 HC RX 637 (ALT 250 FOR IP): Performed by: SURGERY

## 2023-05-11 PROCEDURE — 85025 COMPLETE CBC W/AUTO DIFF WBC: CPT

## 2023-05-11 PROCEDURE — 36415 COLL VENOUS BLD VENIPUNCTURE: CPT

## 2023-05-11 PROCEDURE — 80048 BASIC METABOLIC PNL TOTAL CA: CPT

## 2023-05-11 RX ORDER — HYDROCODONE BITARTRATE AND ACETAMINOPHEN 5; 325 MG/1; MG/1
1 TABLET ORAL EVERY 6 HOURS PRN
Qty: 28 TABLET | Refills: 0 | Status: SHIPPED | OUTPATIENT
Start: 2023-05-11 | End: 2023-05-18

## 2023-05-11 RX ORDER — AMOXICILLIN AND CLAVULANATE POTASSIUM 875; 125 MG/1; MG/1
1 TABLET, FILM COATED ORAL 2 TIMES DAILY
Qty: 14 TABLET | Refills: 0 | Status: SHIPPED | OUTPATIENT
Start: 2023-05-11 | End: 2023-05-18

## 2023-05-11 RX ADMIN — FAMOTIDINE 20 MG: 20 TABLET ORAL at 08:46

## 2023-05-11 RX ADMIN — DOCUSATE SODIUM 100 MG: 100 CAPSULE, LIQUID FILLED ORAL at 08:46

## 2023-05-11 RX ADMIN — HYDROCODONE BITARTRATE AND ACETAMINOPHEN 1 TABLET: 5; 325 TABLET ORAL at 08:52

## 2023-05-11 RX ADMIN — CARVEDILOL 25 MG: 25 TABLET, FILM COATED ORAL at 08:46

## 2023-05-11 RX ADMIN — GUAIFENESIN 1200 MG: 600 TABLET, EXTENDED RELEASE ORAL at 08:46

## 2023-05-11 RX ADMIN — PIPERACILLIN AND TAZOBACTAM 3375 MG: 3; .375 INJECTION, POWDER, LYOPHILIZED, FOR SOLUTION INTRAVENOUS at 02:54

## 2023-05-11 ASSESSMENT — PAIN DESCRIPTION - ORIENTATION
ORIENTATION: MID
ORIENTATION: MID

## 2023-05-11 ASSESSMENT — PAIN DESCRIPTION - LOCATION
LOCATION: ABDOMEN
LOCATION: ABDOMEN

## 2023-05-11 ASSESSMENT — PAIN DESCRIPTION - DESCRIPTORS
DESCRIPTORS: ACHING
DESCRIPTORS: ACHING

## 2023-05-11 ASSESSMENT — PAIN SCALES - GENERAL
PAINLEVEL_OUTOF10: 5
PAINLEVEL_OUTOF10: 2

## 2023-05-11 ASSESSMENT — PAIN SCALES - WONG BAKER: WONGBAKER_NUMERICALRESPONSE: 0

## 2023-05-11 NOTE — CARE COORDINATION
Discharge Planning Note:    Talked with [address]:     [facility] has been informed of the patient's discharge order. Will continue to follow.     KARLENE VelázquezN RN    [facility]  Phone: [phone]

## 2023-05-11 NOTE — PROGRESS NOTES
11:48 AM  Went over discharge instructions with patient. Patient verbalized understanding and denies any questions. Scripts given to patient. IV removed without any complications.
9:55 AM  Morning assessment complete. Patient tolerating diet. Pain controlled with pain medication. Surgery in to see patient. No need for IR drain placement. Possible discharge later today. The care plan and education has been reviewed and mutually agreed upon with the patient.
Brief IR Note    Pt is a [address] y/o M with recent abdominal surgery due to acute sigmoid diverticulitis. Pt found to have leukocytosis and imaging work-up shows a new deep pelvic fluid collection and IR was consulted for possible drain placement. Review of imaging shows that there is no safe way to access the deep pelvic collection thus there is no intervention possible by IR at this time.      Sabrina Vance MD  Interventional Radiology
CT results pages to on call Dr Constantine Bentley. Telephone orders noted to make pt NPO after MN and consult IR for percutaneous drainage. Discussed with pt who verbalized understanding.
[facility]        Progress Note    Patient Name: Brandon Gregory  MRN: [id number]  [place]: 1990  Date of Evaluation: 5/10/2023    Subjective:  No acute events overnight  Pain controlled  Flatus and stool per colostomy  No nausea or vomiting, tolerating soft/low fiber diet  Mild cough remains  Resting in bed at this time; ambulates without difficulty    Post-Operative Day #15, 11, 1      Vital Signs:  Patient Vitals for the past 24 hrs:   BP Temp Temp src Pulse Resp SpO2   05/10/23 1158 118/80 97.5 °F (36.4 °C) Oral 61 17 97 %   05/10/23 0939 -- -- -- -- 16 --   05/10/23 0900 136/83 97.6 °F (36.4 °C) Oral 75 16 96 %   05/10/23 0426 (!) 169/79 97.7 °F (36.5 °C) Oral 70 16 94 %   05/10/23 0137 119/80 97.5 °F (36.4 °C) Oral 69 18 95 %   23 2127 -- -- -- -- 16 --   23 2050 126/85 97.8 °F (36.6 °C) Oral 70 16 94 %   23 1814 133/87 97.7 °F (36.5 °C) Oral 70 15 95 %   23 1647 -- -- -- -- 16 --   23 1516 123/78 98 °F (36.7 °C) Oral 75 16 92 %        TEMPERATURE HISTORY 24H: Temp (24hrs), Av.7 °F (36.5 °C), Min:97.5 °F (36.4 °C), Max:98 °F (36.7 °C)    BLOOD PRESSURE HISTORY: Systolic (81FZT), PCF:006 , Min:112 , CCU:786    Diastolic (40KNQ), HEM:42, Min:72, Max:97      Intake/Output:  I/O last 3 completed shifts:   In: 2440 [P.O.:780; I.V.:1610; IV Piggyback:50]  Out: 5 [Blood:5]  I/O this shift:  In: 1150 [P.O.:1150]  Out: 0   Drain/tube Output:  Negative Pressure Wound Therapy Abdomen Medial;Upper-Output (ml): 0 ml    Physical Exam:  General: awake, alert, oriented to person, place, time; face/cheeks flushed  Pulmonary: unlabored respirations, congested cough intermittently  Abdomen: soft, non-distended, obese, appropriate incisional tenderness, bowel sounds active, stoma pink/viable--with loose brown/green stool output  Skin/Wound: closed with CLYDE    Labs:  CBC:    Recent Labs     23  0432 23  0429 05/10/23  0336   WBC
[facility]        Progress Note    Patient Name: Rufino Rao  MRN: [id number]  [place]: 1990  Date of Evaluation: 2023    Subjective:  No acute events overnight  Pain controlled  Flatus and stool per colostomy  No nausea or vomiting, tolerating soft/low fiber diet  Denies SOB but does have productive cough  Resting in bed at this time; ambulates without difficulty    Post-Operative Day #13, 9      Vital Signs:  Patient Vitals for the past 24 hrs:   BP Temp Temp src Pulse Resp SpO2   23 0850 136/85 98 °F (36.7 °C) Oral 73 14 97 %   23 0441 129/87 98.1 °F (36.7 °C) Oral 69 14 95 %   23 0021 128/84 98.1 °F (36.7 °C) Oral 74 14 96 %   23 2005 (!) 137/90 98 °F (36.7 °C) Oral 73 14 96 %   23 1803 122/85 97.6 °F (36.4 °C) Oral 70 14 95 %        TEMPERATURE HISTORY 24H: Temp (24hrs), Av °F (36.7 °C), Min:97.6 °F (36.4 °C), Max:98.1 °F (36.7 °C)    BLOOD PRESSURE HISTORY: Systolic (23AQT), CQD:393 , Min:122 , PFE:089    Diastolic (05CKV), HLB:58, Min:84, Max:94      Intake/Output:  I/O last 3 completed shifts:   In: 2185 [P.O.:50; I.V.:2135]  Out: 980 [Urine:350; Drains:250; Stool:380]  I/O this shift:  In: -   Out: 200 [Stool:200]  Drain/tube Output:  Negative Pressure Wound Therapy Abdomen Medial;Upper-Output (ml): 0 ml    Physical Exam:  General: awake, alert, oriented to person, place, time; face/cheeks flushed  Pulmonary: unlabored respirations, congested cough intermittently  Abdomen: soft, non-distended, obese, appropriate incisional tenderness, bowel sounds active, stoma pink/viable--with loose brown/green stool output  Skin/Wound: open abdominal wound with wound VAC in place, seal/suction intact    Labs:  CBC:    Recent Labs     23  0837 23  0514 23  0432   WBC 14.9* 13.2* 14.6*   HGB 10.7* 10.8* 11.1*   HCT 32.0* 32.5* 33.3*    424 479*       BMP:    Recent Labs     23  0837 23  0514
No acute events overnight. Patient aware of NPO status. Patient up per self to the bathroom. Ostomy output recorded. NO wound vac output overnight. Patient continues to have productive cough, but improving. Will continue to monitor.
No acute events overnight. Small amount of stool visualized in ostomy pouch. Patient reports passing flatus. BS +. Drainage to midline dressing stable. Will continue to monitor.
Nutrition Note    RECOMMENDATIONS  PO Diet: low fiber, easy to chew  ONS: Ensure High Protein BID    NUTRITION ASSESSMENT   Nutrition status is improving, pt tolerating low fiber / easy to chew PO diet. He reports good appetite but taking it slow. Agreed to Ensure High Protein to consume between meals. Pt will be NPO on 5/9 for secondary wound closure. Discussed Johan BID after would closure and pt agreed to try. Nutrition Related Findings: outpt in ostomy  Wounds: Multiple, Surgical Incision  Nutrition Education:  No recommendation at this time   Nutrition Goals: PO intake 50% or greater, by next RD assessment, other (specify) (diet advancement with tolerance)     MALNUTRITION ASSESSMENT   Acute Illness  Malnutrition Status: At risk for malnutrition (Comment)      NUTRITION DIAGNOSIS   Inadequate oral intake related to altered GI function as evidenced by intake 26-50%, intake 51-75%      CURRENT NUTRITION THERAPIES  ADULT DIET; Easy to Chew; Low Fiber  ADULT ORAL NUTRITION SUPPLEMENT; Lunch, Dinner; Low Calorie/High Protein Oral Supplement  Diet NPO Exceptions are: Sips of Water with Meds     PO Intake: 26-50%, 51-75%   PO Supplement Intake:None Ordered      ANTHROPOMETRICS  Current Height: 6' 2\" (188 cm)  Current Weight - Scale: (!) 338 lb 10 oz (153.6 kg)  Admission weight: 323 lb (146.5 kg)  Ideal Body Weight (IBW): 190 lbs  (86 kg)        BMI: 43.4      COMPARATIVE STANDARDS  Energy (kcal):  1176 - 2205     Protein (g):  172       Fluid (mL/day):  1176 - 2205    The patient will be monitored per nutrition standards of care. Consult dietitian if additional nutrition interventions are needed prior to RD reassessment.      Manjeet Mullen RD, LD    Contact: [phone]
Ostomy Referral Follow-up Progress Note      NAME:  Parminder Kim  MEDICAL RECORD NUMBER:  [id number]  AGE: 27 y.o. GENDER:  male  :  1990  TODAY'S DATE:  2023    Subjective:    Parminder Kim is a 27 y.o. male referred by:   [x] Physician  [] Nursing  [] Other:     New    Objective    BP (!) 147/89   Pulse 80   Temp 98 °F (36.7 °C) (Oral)   Resp 16   Ht 6' 2\" (1.88 m)   Wt (!) 338 lb 10 oz (153.6 kg)   SpO2 96%   PF (!) 1 L/min   BMI 43.48 kg/m²     River Risk Score River Scale Score: 21    Assessment   the patient is awake and willing to do return demonstration of colostomy dressing change. Surgeon Robe Morris    Colostomy      Stoma 35 mm diameter    Colostomy LUQ Descending/sigmoid (Active)   Stomal Appliance Changed;2 piece 23 1436   Flange Size (inches) 2.25 Inches 23 1436   Stoma  Assessment Moist;Red;Protrudes 23 1436   Peristomal Assessment Clean, dry & intact 23 1436   Treatment Site care; Bag change; Heat applied 23 1436   Stool Appearance Loose 23 1436   Stool Color Brown 23 1436   Stool Amount Smear 23 1436   Output (mL) 200 ml 23 1023   Number of days: 9         Intake/Output Summary (Last 24 hours) at 2023 1440  Last data filed at 2023 1316  Gross per 24 hour   Intake 1150.02 ml   Output 5 ml   Net 1145.02 ml       Plan:   Plan for Ostomy Care:   the patient to continue to empty own colostomy drainage pouch at least twice daily. Stoma care orders in place. Ostomy Plan of Care  [x] Supplies/Instructions left in room  [] Patient using home supplies  [x] Brand/supplies at bedside; coloplast 2-piece #[id number] & [id number]    Current Diet: ADULT DIET;  Regular  Dietician consult:  N/A    Discharge Plan:  Placement for patient upon discharge: home with support    Outpatient visit plan No  Supplies given Yes   Samples requested Yes    Referrals:  [x]   [x]  [facility]  [x] Supplies  []
Pt arrived from OR to PACU, awakens to voice, denies pain. VSS, O2 sats 96% on 6 l simple mask. Dressing to abdomen dry and intact, several small spots of sanguinous drainage on CLYDE dressing. Colostomy in place, stoma red, moist. Will monitor.
Pt finished oral contrast at 1300. Tolerated well. Spoke with CT, they will order transport.
Pt resting awake in bed. Requested and received Norco for pain rated 5/10 in abdomen. Ostomy remains in place with no leaking noted. Very little output at this time. Stoma is pink and protrudes. No new drainage noted to midline dressing with Jennifer vaccuum intact and running. Dr Rai Gasper in to round and discussed pending CT with pt. Pt tolerating diet overnight and this morning. Pt is axox4 and able to answer questions and follow commands throughout assessment.
Pt resting in bed with eyes closed. Respirations even and unlabored. Call light in easy reach. Will continue to monitor. Did void after surgery.
Pt resting quietly in bed, awakens to voice. Rates pain as a 5 out of 10 in abdomen, states it is tolerable for him. VSS, O2 sats 94% on 2 L NC. Dressing to abdomen dry and intact, abdomen soft, ice pack in place. Pt seen by anesthesia, phase 1 criteria met. Will transfer pt to .
Pt to room 4457 from PACU. Awake and alert upon arrival. States abdominal pain is mild and does not require additional intervention at this time. Radha dressing in place to midline incision. Old scant bloody drainage noted and marked with pen. Ostomy to left abdomen is intact and patent. Pt is axox4 and able to answer questions and follow commands throughout assessment. Spouse at bedside. Discussed orders and POC. Pt is axox4 and able to answer questions and follow commands throughout assessment. Denies other needs at this time. Will continue to monitor.
Secondary closure of abdominal incision rescheduled for tomorrow or Wednesday. Eating supper at this time. the patient has been emptying his own colostomy pouch since Sunday evening. the patient will need to do colostomy dressing return demonstration prior to discharge, this OMS will follow up tomorrow, BOBO Fountain RN, BSN, WOCN will follow up Wednesday or Thursday if the patient has surgery tomorrow. Baudilio Acosta RN, BSN, [facility] [place].  [phone]
Shift assessment completed, morning medication given per MAR. VSS, alert and oriented. Call light within reach. The care plan and education has been reviewed and mutually agreed upon with the patient.
Shift assessment completed, pt is alert and oriented, VSS, independent in room, call light within reach, denies any pain or other needs at this time, pt is reminded of NPO status, see flowsheets and MAR, will monitor pt. The care plan and education has been reviewed and mutually agreed upon with the patient.
Surgical consent signed and on chart
Teaching / education initiated regarding perioperative experience, expectations, and pain management during stay. Patient verbalized understanding.
Tolerating diet well. Gave PO pain medication for c/o 5/10 and pain. Denies other needs.
V2.0    [facility] [place]      Name:  Otilia Quintanilla /Age/Sex: 1990  (27 y.o. male)   MRN & CSN:  [id number] & [id number] Encounter Date/Time: 23   Location:  [id number] PCP: JESSICA Carver - CNP     Attending:Jorge [address] [place] [address] Day: 14    Assessment and Recommendations   Otilia Quintanilla is a 27 y.o. male with pmh of diverticulosis who presents with Perforated sigmoid colon ([address]      Plan:  Perforated sigmoid colon ([address]  OR Date 2023, exploratory laparotomy, rectosigmoid resection with closure of rectal stump and end colostomy for perforated colorectal anastomosis  OR Date 2023, robot-assisted laparoscopic sigmoidectomy with low pelvic anastomosis, flexible sigmoidoscopy with General Surgery, and intraoperative cystoscopy with bilateral ureteral stent placement, and ICG injection with Urology (Dr. Zaida Borden) for recurrent sigmoid diverticulitis    Plan  - Per surgery  - Still has wound vac. Closure tomorrow    Leukocytosis  Patient noted to have persistent leukocytosis. No fevers documented. He remains on antibiotics (pip/tazo and diflucan). Chest xray reviewed and no evidence of new infiltrates noted. He does have atelectasis which is resolving. Plan  - Cough medication  - Incentive spirometer    Acute cough  Plan  - CXR did not show any evidence of new infection  - Incentive Spirometer - patient is not using it effectively. Reeducated on proper IS technique and use. Open abdominal wall wound  Planned for closure on Tuesday. Diet ADULT DIET; Easy to Chew; Low Fiber  ADULT ORAL NUTRITION SUPPLEMENT; Lunch, Dinner;  Low Calorie/High Protein Oral Supplement  Diet NPO Exceptions are: Sips of Water with Meds   DVT Prophylaxis [x] Lovenox, []  Heparin, [] SCDs, [] Ambulation,  [] Eliquis, [] Xarelto   Code Status Full Code   Disposition From: Home  Expected Disposition: Home  Estimated Date of Discharge: 48 hours  Patient requires
V2.0    [facility] Progress Note      Name:  Amna Liang /Age/Sex: 1990  (27 y.o. male)   MRN & CSN:  [id number] & [id number] Encounter Date/Time: 5/10/2023   0810   Location:  [id number]/[id number] PCP: JESSICA Gordon CNP     Attending:Jorge [address] [place] [address] Day: 16    Assessment and Recommendations   Amna Liang is a 27 y.o. male with pmh of diverticulosis who presents with Perforated sigmoid colon ([address]      Plan:  Perforated sigmoid colon ([facility] [address] [facility]  OR Date 2023, exploratory laparotomy, rectosigmoid resection with closure of rectal stump and end colostomy for perforated colorectal anastomosis  OR Date 2023, robot-assisted laparoscopic sigmoidectomy with low pelvic anastomosis, flexible sigmoidoscopy with General Surgery, and intraoperative cystoscopy with bilateral ureteral stent placement, and ICG injection with Urology (Dr. Vibha Corrigan) for recurrent sigmoid diverticulitis    Plan  - Per surgery  -Patient is status post closure. Looking better    Leukocytosis-she is 16 today.  = Has had persistent leukocytosis but but no fever. On antibiotics. -Signs of acute infection otherwise  Patient noted to have persistent leukocytosis. No fevers documented. He remains on antibiotics (pip/tazo and diflucan). Chest xray reviewed and no evidence of new infiltrates noted. He does have atelectasis which is resolving. P    Acute cough resolved      Open abdominal wall wound  Closure        Diet ADULT DIET; Regular   DVT Prophylaxis [x] Lovenox, []  Heparin, [] SCDs, [] Ambulation,  [] Eliquis, [] Xarelto   Code Status Full Code   Disposition From: Home  Expected Disposition: Home  Estimated Date of Discharge: Surgery  Patient requires continued admiss   Surrogate Decision Maker/ POA  TBD     Personally reviewed Lab Studies and Imaging         Subjective:     No new issues overnight. S post closure feeling better. Denies any chest pain or shortness of breath.   Using IS as
V2.0    [facility] [place] Progress Note      Name:  Neha Workman /Age/Sex: 1990  (27 y.o. male)   MRN & CSN:  [id number] & [id number] Encounter Date/Time: 23   Location:  [id number]/[id number] PCP: JESSICA Holland - CNP     Attending:Jorge [address] [place] [address] Day: 15    Assessment and Recommendations   Neha Workman is a 27 y.o. male with pmh of diverticulosis who presents with Perforated sigmoid colon ([address]      Plan:  Perforated sigmoid colon ([facility] [address] [facility]  OR Date 2023, exploratory laparotomy, rectosigmoid resection with closure of rectal stump and end colostomy for perforated colorectal anastomosis  OR Date 2023, robot-assisted laparoscopic sigmoidectomy with low pelvic anastomosis, flexible sigmoidoscopy with General Surgery, and intraoperative cystoscopy with bilateral ureteral stent placement, and ICG injection with Urology (Dr. Gina Matos) for recurrent sigmoid diverticulitis    Plan  - Per surgery  -  Closure today     Leukocytosis  Patient noted to have persistent leukocytosis. No fevers documented. He remains on antibiotics (pip/tazo and diflucan). Chest xray reviewed and no evidence of new infiltrates noted. He does have atelectasis which is resolving. Plan  - Cough medication  - Incentive spirometer    Acute cough  Plan  - CXR did not show any evidence of new infection  - Incentive Spirometer - patient is not using it effectively. Reeducated on proper IS technique and use. Open abdominal wall wound  Planned for closure on Tuesday. Diet ADULT DIET;  Regular   DVT Prophylaxis [x] Lovenox, []  Heparin, [] SCDs, [] Ambulation,  [] Eliquis, [] Xarelto   Code Status Full Code   Disposition From: Home  Expected Disposition: Home  Estimated Date of Discharge: 48 hours  Patient requires continued admission due to planned surgery tomorrow   Surrogate Decision Maker/ POA  TBD     Personally reviewed Lab Studies and Imaging         Subjective:     No new issues
V2.0    [facility] [place] Progress Note      Name:  Otilia Quintanilla /Age/Sex: 1990  (27 y.o. male)   MRN & CSN:  [id number] & [id number] Encounter Date/Time: 23   Location:  [id number] PCP: JESSICA Carver - CNP     Attending:Jorge [facility] Day: 13    Assessment and Recommendations   Otilia Quintanilla is a 27 y.o. male with pmh of diverticulosis who presents with Perforated sigmoid colon ([address]      Plan:  Perforated sigmoid colon ([address]  OR Date 2023, exploratory laparotomy, rectosigmoid resection with closure of rectal stump and end colostomy for perforated colorectal anastomosis  OR Date 2023, robot-assisted laparoscopic sigmoidectomy with low pelvic anastomosis, flexible sigmoidoscopy with General Surgery, and intraoperative cystoscopy with bilateral ureteral stent placement, and ICG injection with Urology (Dr. Zaida Borden) for recurrent sigmoid diverticulitis    Plan  - Per surgery  - Still has wound vac. Closure this week ? Leukocytosis  Patient noted to have persistent leukocytosis. No fevers documented. He remains on antibiotics (pip/tazo and diflucan). Noted to be coughing while I saw him yesterday. .. Chest xray reviewed and no evidence of new infiltrates noted. He does have atelectasis which is resolving. Plan  - Cough medication  - Incentive spirometer    Acute cough  Plan  - CXR did not show any evidence of new infection  - Incentive Spirometer - patient is not using it effectively. Reeducated on proper IS technique and use.         Diet ADULT DIET; Easy to Chew; Low Fiber  Diet NPO Exceptions are: Sips of Water with Meds, Popsicles   DVT Prophylaxis [x] Lovenox, []  Heparin, [] SCDs, [] Ambulation,  [] Eliquis, [] Xarelto   Code Status Full Code   Disposition From: Home  Expected Disposition: Home vs SNF  Estimated Date of Discharge: >2 days  Patient requires continued admission due to ongoing surgical issues, ongoing leukocytosis   Surrogate
are ongoing at this time unless indicated above.      Therapy Session Time     Individual Group Co-treatment   Time In 0164     Time Out 1114     Minutes           Timed Code Treatment Minutes: 16    Total Treatment Minutes:  16       Electronically Signed By: THERESA Mac/ALBINA [phone]

## 2023-05-11 NOTE — DISCHARGE SUMMARY
retroperitoneum with moderate intraperitoneal air which is   probably due to recent surgery or less likely a bowel perforation. Recommend   surgical correlation. Moderate subcutaneous emphysema along the left lateral chest wall extending   inferiorly along the anterior abdomen along the lower pelvis with no focal   fluid or air collection seen. Recommend clinical follow-up of the air. Small bibasilar pleural effusions. Subsegmental atelectasis or infiltrates along the lung bases posteriorly   extending into the right upper lobe      Unremarkable mediastinum. Mild free fluid throughout in the pelvis with no obvious focal fluid   collection or abscess seen. The findings were called to Dr. Derek Quiñonez at 6 40 p.m. XR CHEST PORTABLE   Final Result   1. Left lower lobe airspace opacity favored to represent atelectasis. 2.  Stable appearance of post laparoscopic surgical free abdominal air. CT CHEST PULMONARY EMBOLISM W CONTRAST   Final Result   1. No acute pulmonary emboli. 2. Hypoaeration with bilateral patchy medial volume loss and consolidation   which likely represents atelectasis. 3. Post laparoscopic surgical free peritoneal air and abdominal wall   pneumatosis. Exam results were called by Dr. Carlyle Capellan. Marleni Matos MD to Dr. Jayne Amanda on   4/26/2023 at 23:03. XR CHEST PORTABLE   Final Result   Bilateral patchy perihilar opacities which may represent atelectasis versus   possible central viral pneumonia. Discharge Condition: good    HOSPITAL COURSE: Sandy Fontana originally presented to the hospital on 4/25/2023  5:40 AM with a history multiple episodes of microperforated sigmoid diverticulitis. The patient has had a colonoscopy that ruled out underlying malignancy. Due to severity of symptoms as well as frequency, the risks of the patient having another attack that may not be contained outweigh the benefits of waiting.   The patient also lost some weight

## 2023-05-11 NOTE — PLAN OF CARE
Problem: Discharge Planning  Goal: Discharge to home or other facility with appropriate resources  5/11/2023 1149 by Enio Madera RN  Outcome: Adequate for Discharge  5/11/2023 1149 by Enio Madera RN  Outcome: Adequate for Discharge     Problem: Chronic Conditions and Co-morbidities  Goal: Patient's chronic conditions and co-morbidity symptoms are monitored and maintained or improved  5/11/2023 1149 by Enio Madera RN  Outcome: Adequate for Discharge  5/11/2023 1149 by Enio Madera RN  Outcome: Adequate for Discharge     Problem: Pain  Goal: Verbalizes/displays adequate comfort level or baseline comfort level  5/11/2023 1149 by Enio Madera RN  Outcome: Adequate for Discharge  5/11/2023 1149 by Enio Madera RN  Outcome: Adequate for Discharge     Problem: Safety - Adult  Goal: Free from fall injury  5/11/2023 1149 by Enio Madera RN  Outcome: Adequate for Discharge  5/11/2023 1149 by Enio Madera RN  Outcome: Adequate for Discharge     Problem: Nutrition Deficit:  Goal: Optimize nutritional status  5/11/2023 1149 by Enio Madera RN  Outcome: Adequate for Discharge  5/11/2023 1149 by Enio Madera RN  Outcome: Adequate for Discharge     Problem: Pain  Goal: Verbalizes/displays adequate comfort level or baseline comfort level  5/11/2023 1149 by Enio Madera RN  Outcome: Adequate for Discharge  5/11/2023 1149 by Enio Madera RN  Outcome: Adequate for Discharge     Problem: Safety - Adult  Goal: Free from fall injury  5/11/2023 1149 by Enio Madera RN  Outcome: Adequate for Discharge  5/11/2023 1149 by Enio Madera RN  Outcome: Adequate for Discharge

## 2023-05-11 NOTE — PLAN OF CARE
Problem: Discharge Planning  Goal: Discharge to home or other facility with appropriate resources  Outcome: Adequate for Discharge     Problem: Chronic Conditions and Co-morbidities  Goal: Patient's chronic conditions and co-morbidity symptoms are monitored and maintained or improved  Outcome: Adequate for Discharge     Problem: Pain  Goal: Verbalizes/displays adequate comfort level or baseline comfort level  Outcome: Adequate for Discharge     Problem: Safety - Adult  Goal: Free from fall injury  Outcome: Adequate for Discharge     Problem: Nutrition Deficit:  Goal: Optimize nutritional status  Outcome: Adequate for Discharge     Problem: Discharge Planning  Goal: Discharge to home or other facility with appropriate resources  Outcome: Adequate for Discharge     Problem: Chronic Conditions and Co-morbidities  Goal: Patient's chronic conditions and co-morbidity symptoms are monitored and maintained or improved  Outcome: Adequate for Discharge     Problem: Pain  Goal: Verbalizes/displays adequate comfort level or baseline comfort level  Outcome: Adequate for Discharge     Problem: Safety - Adult  Goal: Free from fall injury  Outcome: Adequate for Discharge

## 2023-05-11 NOTE — TELEPHONE ENCOUNTER
Care Transitions Initial Follow Up Call    Outreach made within 2 business days of discharge: Yes    Patient: Amna Liang Patient : 1990   MRN: [id number]  Reason for Admission: There are no discharge diagnoses documented for the most recent discharge. Discharge Date: 23       Spoke with: Zac Castillo    Discharge department/facility: [facility] Interactive Patient Contact:  Was patient able to fill all prescriptions: Yes  Was patient instructed to bring all medications to the follow-up visit: Yes  Is patient taking all medications as directed in the discharge summary?  Yes  Does patient understand their discharge instructions: Yes  Does patient have questions or concerns that need addressed prior to 7-14 day follow up office visit: no    Scheduled appointment with PCP within 7-14 days    Follow Up  Future Appointments   Date Time Provider Jonathon Flynn   2023  2:15 PM JESSICA Katz - [facility] [address] [place], [place]

## 2023-05-14 ENCOUNTER — PATIENT MESSAGE (OUTPATIENT)
Dept: PRIMARY CARE CLINIC | Age: 33
End: 2023-05-14

## 2023-05-15 NOTE — TELEPHONE ENCOUNTER
From: Milagro Barrera  To: Arlette Davalos  Sent: 5/14/2023 5:33 PM EDT  Subject: Blood pressure medication     I've been taking my normal prescribed dose of my blood pressure medicine. I'm recovering from my 3 surgeries I had and I think because I'm not doing as much it is giving me vertigo and causing me to be lightheaded. Any suggestions on lowering the dose or another option?

## 2023-05-16 ENCOUNTER — OFFICE VISIT (OUTPATIENT)
Dept: SURGERY | Age: 33
End: 2023-05-16

## 2023-05-16 VITALS — WEIGHT: 313 LBS | SYSTOLIC BLOOD PRESSURE: 112 MMHG | BODY MASS INDEX: 40.19 KG/M2 | DIASTOLIC BLOOD PRESSURE: 85 MMHG

## 2023-05-16 DIAGNOSIS — K57.20 DIVERTICULITIS OF LARGE INTESTINE WITH PERFORATION WITHOUT BLEEDING: Primary | ICD-10-CM

## 2023-05-16 LAB
ACID FAST STN SPEC QL: NORMAL
MYCOBACTERIUM SPEC CULT: NORMAL

## 2023-05-16 PROCEDURE — 99024 POSTOP FOLLOW-UP VISIT: CPT | Performed by: SURGERY

## 2023-05-16 NOTE — TELEPHONE ENCOUNTER
- Continue Lisinopril-HCTZ 20-25 mg  - Hold Carvedilol 25 mg twice daily  - Will check BP at office visit scheduled on 5/19/23.

## 2023-05-16 NOTE — PROGRESS NOTES
[facility] GENERAL AND LAPAROSCOPIC SURGERY          PATIENT NAME: Naomy Christensen     TODAY'S DATE: 5/16/2023    SUBJECTIVE:    Pt seen post op, doing well at home.      OBJECTIVE:  VITALS:  /85   Wt (!) 313 lb (142 kg)   BMI 40.19 kg/m²     CONSTITUTIONAL:  awake and alert  LUNGS:  clear to auscultation  ABDOMEN:  normal bowel sounds, soft, non-distended, non-tender, CLYDE in place     ASSESSMENT AND PLAN:  CLYDE removed, wound clean  POD 7 from secondary closure  Will ask pt to return in a week for suture removal    David Galvez MD

## 2023-05-19 ENCOUNTER — OFFICE VISIT (OUTPATIENT)
Dept: PRIMARY CARE CLINIC | Age: 33
End: 2023-05-19

## 2023-05-19 VITALS
HEIGHT: 74 IN | OXYGEN SATURATION: 96 % | HEART RATE: 86 BPM | SYSTOLIC BLOOD PRESSURE: 100 MMHG | RESPIRATION RATE: 16 BRPM | DIASTOLIC BLOOD PRESSURE: 70 MMHG | BODY MASS INDEX: 39.22 KG/M2 | WEIGHT: 305.6 LBS | TEMPERATURE: 96.2 F

## 2023-05-19 DIAGNOSIS — E66.01 CLASS 3 SEVERE OBESITY DUE TO EXCESS CALORIES WITH SERIOUS COMORBIDITY AND BODY MASS INDEX (BMI) OF 45.0 TO 49.9 IN ADULT (HCC): ICD-10-CM

## 2023-05-19 DIAGNOSIS — Z09 HOSPITAL DISCHARGE FOLLOW-UP: Primary | ICD-10-CM

## 2023-05-19 DIAGNOSIS — Z90.49 S/P SMALL BOWEL RESECTION: ICD-10-CM

## 2023-05-19 RX ORDER — DOCUSATE SODIUM 100 MG/1
100 CAPSULE, LIQUID FILLED ORAL 2 TIMES DAILY
COMMUNITY

## 2023-05-19 ASSESSMENT — ENCOUNTER SYMPTOMS
CHEST TIGHTNESS: 0
ABDOMINAL DISTENTION: 0
NAUSEA: 0
VOMITING: 0
SHORTNESS OF BREATH: 0
DIARRHEA: 0
CONSTIPATION: 0
RHINORRHEA: 0
COUGH: 0
WHEEZING: 0
ABDOMINAL PAIN: 0

## 2023-05-19 NOTE — PROGRESS NOTES
Post-Discharge Transitional Care Follow Up      Mckenna Tabor   YOB: 1990    Date of Office Visit:  5/19/2023  Date of Hospital Admission: 4/25/23  Date of Hospital Discharge: 5/11/23  Readmission Risk Score (high >=14%. Medium >=10%):Readmission Risk Score: 12.4      Care management risk score Rising risk (score 2-5) and Complex Care (Scores >=6): No Risk Score On File     Non face to face  following discharge, date last encounter closed (first attempt may have been earlier): 05/11/2023     Call initiated 2 business days of discharge: Yes     Hospital discharge follow-up  -     RI DISCHARGE MEDS RECONCILED W/ CURRENT OUTPATIENT MED LIST  S/P small bowel resection  Class 3 severe obesity due to excess calories with serious comorbidity and body mass index (BMI) of 45.0 to 49.9 in [facility])  -     Amb Referral to Nutrition Services      Medical Decision Making: straightforward    Return if symptoms worsen or fail to improve. Subjective:   HPI    Inpatient course: Discharge summary reviewed-   Admitting diagnosis: Diverticulitis of colon with perforation, Diverticulitis    Gabbie Fairchild originally presented to the hospital on 4/25/2023  5:40 AM with a history multiple episodes of microperforated sigmoid diverticulitis. The patient has had a colonoscopy that ruled out underlying malignancy. Due to severity of symptoms as well as frequency, the risks of the patient having another attack that may not be contained outweigh the benefits of waiting. The patient also lost some weight since his last office visit with Dr. Sharon Salgado. He was taken to the operating room on 4/25/2023 and underwent robot-assisted laparoscopic sigmoidectomy with low pelvic anastomosis, flexible sigmoidoscopy with General Surgery, and intraoperative cystoscopy with bilateral ureteral stent placement, and ICG injection with Urology.  On postoperative day #4 the patient had deteriorating clinical status and concerned of

## 2023-05-23 ENCOUNTER — OFFICE VISIT (OUTPATIENT)
Dept: SURGERY | Age: 33
End: 2023-05-23

## 2023-05-23 VITALS — BODY MASS INDEX: 39.16 KG/M2 | DIASTOLIC BLOOD PRESSURE: 64 MMHG | SYSTOLIC BLOOD PRESSURE: 101 MMHG | WEIGHT: 305 LBS

## 2023-05-23 DIAGNOSIS — K57.20 DIVERTICULITIS OF LARGE INTESTINE WITH PERFORATION WITHOUT BLEEDING: Primary | ICD-10-CM

## 2023-05-23 LAB
ACID FAST STN SPEC QL: NORMAL
MYCOBACTERIUM SPEC CULT: NORMAL

## 2023-05-23 PROCEDURE — 99024 POSTOP FOLLOW-UP VISIT: CPT | Performed by: SURGERY

## 2023-05-23 ASSESSMENT — ENCOUNTER SYMPTOMS: NAUSEA: 1

## 2023-05-23 NOTE — PROGRESS NOTES
[facility] GENERAL AND LAPAROSCOPIC SURGERY          PATIENT NAME: Fabio Hodgson     TODAY'S DATE: 5/23/2023    SUBJECTIVE:    Pt returns for follow up, regaining energy.      OBJECTIVE:  VITALS:  Wt (!) 305 lb (138.3 kg)   BMI 39.16 kg/m²     CONSTITUTIONAL:  awake and alert  LUNGS:  clear to auscultation  ABDOMEN:  normal bowel sounds, soft, non-distended, non-tender, incision healing, no infection     Data:      Radiology Review:  None      ASSESSMENT AND PLAN:  S/P Ex lap, Guzman's  Sutures and staples removed  Progressing well  RTW 6/6/23  See in office in 4 - 6 weeks      Wang Vidal MD

## 2023-05-30 LAB
ACID FAST STN SPEC QL: NORMAL
FUNGUS SPEC CULT: ABNORMAL
LOEFFLER MB STN SPEC: ABNORMAL
MYCOBACTERIUM SPEC CULT: NORMAL
ORGANISM: ABNORMAL

## 2023-06-05 PROBLEM — Z01.818 PREOP TESTING: Status: RESOLVED | Noted: 2023-04-25 | Resolved: 2023-06-05

## 2023-06-06 LAB
ACID FAST STN SPEC QL: NORMAL
MYCOBACTERIUM SPEC CULT: NORMAL

## 2023-06-12 PROBLEM — Z43.3 COLOSTOMY CARE (HCC): Status: ACTIVE | Noted: 2023-06-12

## 2023-06-17 DIAGNOSIS — I10 PRIMARY HYPERTENSION: ICD-10-CM

## 2023-06-19 ENCOUNTER — TELEPHONE (OUTPATIENT)
Dept: PRIMARY CARE CLINIC | Age: 33
End: 2023-06-19

## 2023-06-19 RX ORDER — LISINOPRIL AND HYDROCHLOROTHIAZIDE 25; 20 MG/1; MG/1
TABLET ORAL
Qty: 90 TABLET | Refills: 0 | Status: SHIPPED | OUTPATIENT
Start: 2023-06-19

## 2023-06-19 NOTE — TELEPHONE ENCOUNTER
[address] be [facility] at the provider Asif King & Wojciech Grady

## 2023-06-19 NOTE — TELEPHONE ENCOUNTER
Requested Prescriptions     Pending Prescriptions Disp Refills    lisinopril-hydroCHLOROthiazide (PRINZIDE;ZESTORETIC) 20-25 MG per tablet [Pharmacy Med Name: LISINOPRIL-HCTZ 20-25 MG TAB] 90 tablet 0     Sig: TAKE ONE TABLET BY MOUTH EVERY MORNING     Last OV - 3/20/23. Next OV - 6/21/23. Last refill - 3/20/23. Last labs - 5/11/23.

## 2023-06-20 ENCOUNTER — OFFICE VISIT (OUTPATIENT)
Dept: SURGERY | Age: 33
End: 2023-06-20

## 2023-06-20 VITALS — WEIGHT: 305 LBS | DIASTOLIC BLOOD PRESSURE: 63 MMHG | SYSTOLIC BLOOD PRESSURE: 112 MMHG | BODY MASS INDEX: 39.16 KG/M2

## 2023-06-20 DIAGNOSIS — K57.20 DIVERTICULITIS OF LARGE INTESTINE WITH PERFORATION WITHOUT BLEEDING: Primary | ICD-10-CM

## 2023-06-20 PROCEDURE — 99024 POSTOP FOLLOW-UP VISIT: CPT | Performed by: SURGERY

## 2023-06-20 NOTE — PROGRESS NOTES
[facility] GENERAL AND LAPAROSCOPIC SURGERY          PATIENT NAME: Katalina Daley     TODAY'S DATE: 6/20/2023    SUBJECTIVE:    Pt ***. OBJECTIVE:  VITALS:  Wt (!) 320 lb (145.2 kg)   BMI 41.09 kg/m²     CONSTITUTIONAL:  {AWAKE/FATIGUED:828514884} and {ALERTNESS:762580270}  LUNGS:  {AUSCULTATION:521860399}  ABDOMEN:  {BOWEL SOUNDS:546775952}, {CONSISTENCY:041158986}, {DISTENTION:884504746}, {TENDERNESS:598542048}     Data:  CBC: No results for input(s): WBC, HGB, HCT, PLT in the last 72 hours. BMP:  No results for input(s): NA, K, CL, CO2, BUN, CREATININE, GLUCOSE in the last 72 hours. Hepatic: No results for input(s): AST, ALT, ALB, BILITOT, ALKPHOS in the last 72 hours. Mag:    No results for input(s): MG in the last 72 hours. Phos:   No results for input(s): PHOS in the last 72 hours. INR: No results for input(s): INR in the last 72 hours.     Radiology Review:  ***      ASSESSMENT AND PLAN:  ***    Bryce Chavira

## 2023-06-20 NOTE — DISCHARGE INSTRUCTIONS
[address]  Telephone: [phone]           Name: Opal Dale  : 1990   AGE: 27 y.o. MRN: [id number]  Today's Date: 2023     Ostomy skin care  Cleanse skin prior to applying a new pouch/wafer with:  yes - Water         yes - Cleanse skin with Mild Soap & Water  yes - May Shower    Other:        Topical Treatments to skin around stoma:  Do not apply lotions, creams, or ointments to wound bed unless directed. no - Apply barrier film/spray to skin around stoma and let it dry  no - Apply stoma powder to irritated skin around stoma, seal in with barrier film/spray and repeat x1  N/A - Apply antifungal cream to irritated skin surrounding stoma and work into skin until no longer able to feel cream.  N/A - Apply antifungal powder to irritated skin surrounding stoma, seal in with barrier film; and repeat x1  Other:      Pouch/Wafer Application     - Change your pouch every 3 days or when leaking. yes - Appliance: 2 piece Ladonna   yes - Product Numbers:[id number], [id number] pouch  yes - Other: Accessories: rings, barrier, powder, and film- Use powder and barrier film if have irritated skin  Other:___________________________________     Application of Pouch  yes - Gather supplies needed to change pouch and wafer. yes - Assemble new pouch system before removing old one.  yes - Using the measuring guide or pattern, trace size of opening onto back new pouch system. yes - Cut out opening. yes - Remove the control backing  yes - Set aside assembled pouch  yes - Remove worn appliance by gently pulling away from skin. yes - Look at back of the pouch before throwing away to see how it is worn.   yes - Wash skin with warm water or _____________, rinse and pat dry. yes - Inspect  skin for redness or irritation. yes - Apply  barrier seals or rings around stoma or back of wafer.   yes - Apply wafer/pouch system over stoma

## 2023-06-20 NOTE — PROGRESS NOTES
[facility] GENERAL AND LAPAROSCOPIC SURGERY          PATIENT NAME: Carlos Moqsueda     TODAY'S DATE: 6/20/2023    CC: here for recheck  SUBJECTIVE:    Pt back to work, active.   No concerns  Doing well with stomal bag     OBJECTIVE:  VITALS:  /63   Wt (!) 305 lb (138.3 kg)   BMI 39.16 kg/m²     CONSTITUTIONAL:  awake and alert  LUNGS:  clear to auscultation  ABDOMEN:  normal bowel sounds, soft, non-distended, non-tender, incision healed, stoma in good condition     Data:      Radiology Review:  None      ASSESSMENT AND PLAN:  Doing well  Working and active  Will work on exercise and weight loss  See back in 2 mos  Thinking about colostomy closure 1/2024    Eric Jarquin MD

## 2023-06-22 ENCOUNTER — HOSPITAL ENCOUNTER (OUTPATIENT)
Dept: WOUND CARE | Age: 33
Discharge: HOME | End: 2023-06-22
Payer: COMMERCIAL

## 2023-06-22 VITALS
RESPIRATION RATE: 15 BRPM | TEMPERATURE: 96.6 F | HEIGHT: 74 IN | WEIGHT: 315 LBS | HEART RATE: 89 BPM | SYSTOLIC BLOOD PRESSURE: 129 MMHG | DIASTOLIC BLOOD PRESSURE: 82 MMHG | BODY MASS INDEX: 40.43 KG/M2

## 2023-06-22 DIAGNOSIS — Z43.3 COLOSTOMY CARE (HCC): Primary | ICD-10-CM

## 2023-06-22 PROCEDURE — 99212 OFFICE O/P EST SF 10 MIN: CPT

## 2023-06-22 NOTE — PLAN OF CARE
Discharge instructions given. Patient verbalized understanding. Return to [facility] in 2 week(s).   Continue Ostomy Care

## 2023-06-22 NOTE — PROGRESS NOTES
[facility]   [address], [address] [place] [address]  Telephone: [phone]      NAME:  Amelia VÁSQUEZ RECORD NUMBER:  [id number]  AGE: 27 y.o. GENDER:  male  :  1990  TODAY'S DATE:  2023    Subjective       Chief Complaint   Patient presents with    Wound Check     Colostomy         HISTORY of PRESENT ILLNESS HPI  Marvin Varela is a 27 y.o. male patient referred by home care nurse, who presents today for ostomy/stoma evaluation. Pouching/Skin Issues:   Is taking Colace daily, and not having problems with pancaking of stool anymore. He is discharged from Ostomy follow-up. Current Pouching System: 2 piece Coloplast  History of Ostomy: 2023/Surgeon: Dr. Luellen Boas. Pt anticipates reversal at end of  or beginning of .    Wound/Ulcer Pain Timing/Severity: none  Quality of pain: N/A  Severity:  0 / 10   Modifying Factors: None  PAST MEDICAL HISTORY        Diagnosis Date    Colostomy care ([facility] 2023    Diabetes mellitus ([facility]     patient denies    diverticulitis     with hx of microperforations    HTN (hypertension)        PAST SURGICAL HISTORY    Past Surgical History:   Procedure Laterality Date    ABDOMEN SURGERY N/A 2023    SECONDARY ABDOMINAL WOUND CLOSURE performed by Moisés Andersen MD at [facility]. [address] Left     elbow screws and plates, removal of hardware    COLECTOMY N/A 2023    ROBOT ASSISTED LAPAROSCOPIC SIGMOIDECTOMY [facility] 2023    CYSTOSCOPY WITH BILATERAL URETERAL STENT PLACEMENT, VAZQUEZ CATHETER INSERTION performed by Mone Euceda MD at [address] N/A 2023    EXPLORATORY LAPAROTOMY WITH RECTOSIGMOID RESECTION & COLOSTOMY CREATION performed by Moisés Andersen MD at [facility] N/A 2023    FLEXIBLE SIGMOIDOSCOPY performed by Jovon Penny MD at [facility]

## 2023-06-29 ENCOUNTER — OFFICE VISIT (OUTPATIENT)
Dept: PRIMARY CARE CLINIC | Age: 33
End: 2023-06-29
Payer: COMMERCIAL

## 2023-06-29 VITALS
TEMPERATURE: 98 F | RESPIRATION RATE: 14 BRPM | OXYGEN SATURATION: 98 % | BODY MASS INDEX: 40.43 KG/M2 | WEIGHT: 315 LBS | SYSTOLIC BLOOD PRESSURE: 120 MMHG | DIASTOLIC BLOOD PRESSURE: 78 MMHG | HEART RATE: 64 BPM | HEIGHT: 74 IN

## 2023-06-29 DIAGNOSIS — Z13.21 ENCOUNTER FOR VITAMIN DEFICIENCY SCREENING: ICD-10-CM

## 2023-06-29 DIAGNOSIS — Z87.898 HISTORY OF PREDIABETES: ICD-10-CM

## 2023-06-29 DIAGNOSIS — I10 PRIMARY HYPERTENSION: Primary | ICD-10-CM

## 2023-06-29 DIAGNOSIS — E66.01 CLASS 3 SEVERE OBESITY DUE TO EXCESS CALORIES WITH SERIOUS COMORBIDITY AND BODY MASS INDEX (BMI) OF 40.0 TO 44.9 IN ADULT (HCC): ICD-10-CM

## 2023-06-29 DIAGNOSIS — Z13.220 LIPID SCREENING: ICD-10-CM

## 2023-06-29 PROCEDURE — 3074F SYST BP LT 130 MM HG: CPT | Performed by: NURSE PRACTITIONER

## 2023-06-29 PROCEDURE — 99214 OFFICE O/P EST MOD 30 MIN: CPT | Performed by: NURSE PRACTITIONER

## 2023-06-29 PROCEDURE — 3078F DIAST BP <80 MM HG: CPT | Performed by: NURSE PRACTITIONER

## 2023-06-29 RX ORDER — LISINOPRIL AND HYDROCHLOROTHIAZIDE 25; 20 MG/1; MG/1
1 TABLET ORAL EVERY MORNING
Qty: 90 TABLET | Refills: 0 | Status: SHIPPED | OUTPATIENT
Start: 2023-06-29 | End: 2023-09-27

## 2023-06-29 RX ORDER — SPIRONOLACTONE 25 MG/1
25 TABLET ORAL 2 TIMES DAILY
Qty: 180 TABLET | Refills: 0 | Status: SHIPPED | OUTPATIENT
Start: 2023-06-29 | End: 2023-09-27

## 2023-06-29 RX ORDER — CARVEDILOL 25 MG/1
25 TABLET ORAL 2 TIMES DAILY WITH MEALS
Qty: 180 TABLET | Refills: 0 | Status: SHIPPED | OUTPATIENT
Start: 2023-06-29 | End: 2023-09-27

## 2023-06-29 ASSESSMENT — ENCOUNTER SYMPTOMS
BLURRED VISION: 0
COUGH: 0
DIARRHEA: 0
CHEST TIGHTNESS: 0
ABDOMINAL DISTENTION: 0
NAUSEA: 0
ABDOMINAL PAIN: 0
CONSTIPATION: 0
WHEEZING: 0
SHORTNESS OF BREATH: 0
APNEA: 0
VOMITING: 0
ORTHOPNEA: 0

## 2023-09-07 ENCOUNTER — OFFICE VISIT (OUTPATIENT)
Dept: SURGERY | Age: 33
End: 2023-09-07
Payer: COMMERCIAL

## 2023-09-07 VITALS — WEIGHT: 315 LBS | BODY MASS INDEX: 42.5 KG/M2 | SYSTOLIC BLOOD PRESSURE: 131 MMHG | DIASTOLIC BLOOD PRESSURE: 83 MMHG

## 2023-09-07 DIAGNOSIS — K57.21 DIVERTICULITIS OF LARGE INTESTINE WITH PERFORATION AND ABSCESS WITH BLEEDING: Primary | ICD-10-CM

## 2023-09-07 DIAGNOSIS — Z43.3 COLOSTOMY CARE (HCC): ICD-10-CM

## 2023-09-07 PROCEDURE — 3074F SYST BP LT 130 MM HG: CPT | Performed by: SURGERY

## 2023-09-07 PROCEDURE — 3078F DIAST BP <80 MM HG: CPT | Performed by: SURGERY

## 2023-09-07 PROCEDURE — 99213 OFFICE O/P EST LOW 20 MIN: CPT | Performed by: SURGERY

## 2023-09-14 ENCOUNTER — TELEPHONE (OUTPATIENT)
Dept: BARIATRICS/WEIGHT MGMT | Age: 33
End: 2023-09-14

## 2023-09-14 NOTE — TELEPHONE ENCOUNTER
Called as a new pt courtesy call - left message. Told patient to have new pt paperwork completely filled out, insurance card, id, and any copay that may be due. If they didn't have the paperwork filled out and arrive on time may be rescheduled. Also stated if they didn't receive paperwork to let us know so we could get it to them another way. Left office number on message. Reminded patient of the [facility] location.

## 2023-09-21 ENCOUNTER — TELEPHONE (OUTPATIENT)
Dept: BARIATRICS/WEIGHT MGMT | Age: 33
End: 2023-09-21

## 2023-09-21 NOTE — TELEPHONE ENCOUNTER
Called as a new pt courtesy call - spoke to patient. Told patient to have new pt paperwork completely filled out, insurance card, id, and any copay that may be due. If they didn't have the paperwork filled out and arrive on time may be rescheduled. Also stated if they didn't receive paperwork to let us know so we could get it to them another way. Reminded patient of the [facility] location.

## 2023-10-16 ENCOUNTER — OFFICE VISIT (OUTPATIENT)
Dept: PRIMARY CARE CLINIC | Age: 33
End: 2023-10-16
Payer: COMMERCIAL

## 2023-10-16 VITALS
SYSTOLIC BLOOD PRESSURE: 128 MMHG | HEART RATE: 89 BPM | DIASTOLIC BLOOD PRESSURE: 80 MMHG | OXYGEN SATURATION: 95 % | BODY MASS INDEX: 40.43 KG/M2 | HEIGHT: 74 IN | WEIGHT: 315 LBS

## 2023-10-16 DIAGNOSIS — Z87.898 HISTORY OF PREDIABETES: ICD-10-CM

## 2023-10-16 DIAGNOSIS — I10 PRIMARY HYPERTENSION: Primary | ICD-10-CM

## 2023-10-16 DIAGNOSIS — Z23 FLU VACCINE NEED: ICD-10-CM

## 2023-10-16 DIAGNOSIS — E66.01 CLASS 3 SEVERE OBESITY DUE TO EXCESS CALORIES WITH SERIOUS COMORBIDITY AND BODY MASS INDEX (BMI) OF 40.0 TO 44.9 IN ADULT (HCC): ICD-10-CM

## 2023-10-16 PROBLEM — K63.1 PERFORATED SIGMOID COLON (HCC): Status: RESOLVED | Noted: 2023-04-29 | Resolved: 2023-10-16

## 2023-10-16 PROBLEM — R05.1 ACUTE COUGH: Status: RESOLVED | Noted: 2023-05-06 | Resolved: 2023-10-16

## 2023-10-16 PROBLEM — D72.829 LEUKOCYTOSIS: Status: RESOLVED | Noted: 2023-05-06 | Resolved: 2023-10-16

## 2023-10-16 PROBLEM — K57.21 DIVERTICULITIS OF LARGE INTESTINE WITH PERFORATION AND ABSCESS WITH BLEEDING: Status: RESOLVED | Noted: 2022-07-28 | Resolved: 2023-10-16

## 2023-10-16 PROBLEM — I16.0 HYPERTENSIVE URGENCY: Status: RESOLVED | Noted: 2022-08-01 | Resolved: 2023-10-16

## 2023-10-16 PROBLEM — S31.109A OPEN ABDOMINAL WALL WOUND: Status: RESOLVED | Noted: 2023-05-05 | Resolved: 2023-10-16

## 2023-10-16 PROBLEM — R73.09 IMPAIRED GLUCOSE REGULATION: Status: RESOLVED | Noted: 2022-12-19 | Resolved: 2023-10-16

## 2023-10-16 PROBLEM — K91.89 ANASTOMOTIC LEAK OF INTESTINE: Status: RESOLVED | Noted: 2023-04-29 | Resolved: 2023-10-16

## 2023-10-16 PROCEDURE — 3074F SYST BP LT 130 MM HG: CPT | Performed by: NURSE PRACTITIONER

## 2023-10-16 PROCEDURE — 99214 OFFICE O/P EST MOD 30 MIN: CPT | Performed by: NURSE PRACTITIONER

## 2023-10-16 PROCEDURE — 3079F DIAST BP 80-89 MM HG: CPT | Performed by: NURSE PRACTITIONER

## 2023-10-16 PROCEDURE — 90471 IMMUNIZATION ADMIN: CPT | Performed by: NURSE PRACTITIONER

## 2023-10-16 PROCEDURE — 90674 CCIIV4 VAC NO PRSV 0.5 ML IM: CPT | Performed by: NURSE PRACTITIONER

## 2023-10-16 RX ORDER — CARVEDILOL 25 MG/1
25 TABLET ORAL 2 TIMES DAILY WITH MEALS
Qty: 180 TABLET | Refills: 0 | Status: SHIPPED | OUTPATIENT
Start: 2023-10-16 | End: 2024-01-14

## 2023-10-16 RX ORDER — SPIRONOLACTONE 25 MG/1
25 TABLET ORAL 2 TIMES DAILY
Qty: 180 TABLET | Refills: 0 | Status: SHIPPED | OUTPATIENT
Start: 2023-10-16 | End: 2024-01-14

## 2023-10-16 RX ORDER — LISINOPRIL AND HYDROCHLOROTHIAZIDE 25; 20 MG/1; MG/1
1 TABLET ORAL EVERY MORNING
Qty: 90 TABLET | Refills: 0 | Status: SHIPPED | OUTPATIENT
Start: 2023-10-16 | End: 2024-01-14

## 2023-10-16 ASSESSMENT — ENCOUNTER SYMPTOMS
ABDOMINAL PAIN: 0
WHEEZING: 0
APNEA: 0
SHORTNESS OF BREATH: 0
NAUSEA: 0
VOMITING: 0
CONSTIPATION: 0
DIARRHEA: 0
COUGH: 0
ABDOMINAL DISTENTION: 0
ORTHOPNEA: 0
BLURRED VISION: 0
CHEST TIGHTNESS: 0

## 2023-10-16 NOTE — PROGRESS NOTES
10/16/2023    Chief Complaint   Patient presents with    Hypertension    Follow-up     3 month follow up of hypertension, history of impaired glucose regulation and obesity       Clarke Dixon is a 27 y.o. male, presents today for 3 month follow up of hypertension, history of impaired glucose regulation and obesity. 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 He reports improved diet with changed needed to prevent issues with colostomy. Hypertension  Episode onset: August 2022. The problem is controlled (running low 120s/80s). Pertinent negatives include no anxiety, blurred vision, chest pain, headaches, malaise/fatigue, orthopnea, palpitations, peripheral edema, PND, shortness of breath or sweats. Risk factors for coronary artery disease include obesity, male gender and family history. Treatments tried: Carvedilol 25 mg, lisinopril-hydroCHLOROthiazide  20-25 mg daily, Sprinolactone. The current treatment provides significant improvement. There are no compliance problems (Taking all medication as prescribed). There is no history of angina, kidney disease, CAD/MI, CVA, heart failure, left ventricular hypertrophy, PVD or retinopathy. History of Impaired glucose regulation  He limits potatoes, rice, pasta, bread and watches carb intake. Patient is exercising 150 minutes week. Last A1C  Component Ref Range & Units 3/14/23 0817 12/6/22 0941 8/19/22 0947   Hemoglobin A1C See comment % 5.4  5.3 CM  5.7 CM        Obesity  Patient reports he has completely change his diet, and has stopped drinking soda and stopped smoking in Aug 2022 after being diagnosed with HTN. 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 He continue to struggle with diet, stating quitting smoking during hospitalization was easier than losing weight. Patient is motivated to lose weight.      Patient had to miss appointment at [facility]

## 2023-10-30 ENCOUNTER — TELEPHONE (OUTPATIENT)
Dept: PRIMARY CARE CLINIC | Age: 33
End: 2023-10-30

## 2023-10-30 NOTE — TELEPHONE ENCOUNTER
----- Message from JESSICA Verduzco - CNP sent at 10/29/2023  6:46 AM EDT -----  Please notify patient of lab results. -Vitamin D level is a little low, 26.1 (normal range is above 30). -New prescription for vitamin D, high-dose sent to pharmacy. Take 1 capsule every week. -Sodium, potassium, chloride are within normal ranges.  -Liver and kidney function are normal  -Hemoglobin A1c, 5.5 (no prediabetes or diabetes.  -Cholesterol numbers are all within normal ranges. Thank you.

## 2024-01-04 ENCOUNTER — OFFICE VISIT (OUTPATIENT)
Dept: SURGERY | Age: 34
End: 2024-01-04
Payer: COMMERCIAL

## 2024-01-04 VITALS — WEIGHT: 315 LBS | SYSTOLIC BLOOD PRESSURE: 128 MMHG | DIASTOLIC BLOOD PRESSURE: 80 MMHG | BODY MASS INDEX: 42.24 KG/M2

## 2024-01-04 DIAGNOSIS — Z93.3 COLOSTOMY IN PLACE (HCC): Primary | ICD-10-CM

## 2024-01-04 DIAGNOSIS — K57.92 DIVERTICULITIS: ICD-10-CM

## 2024-01-04 PROCEDURE — 99213 OFFICE O/P EST LOW 20 MIN: CPT | Performed by: SURGERY

## 2024-01-04 PROCEDURE — 3074F SYST BP LT 130 MM HG: CPT | Performed by: SURGERY

## 2024-01-04 PROCEDURE — 3079F DIAST BP 80-89 MM HG: CPT | Performed by: SURGERY

## 2024-01-04 NOTE — PROGRESS NOTES
[facility] GENERAL AND LAPAROSCOPIC SURGERY          PATIENT NAME: Jaylen Adhikari     TODAY'S DATE: 1/4/2024    CC: colostomy  SUBJECTIVE:    Pt return for office follow up, feeling well, no concerns, dealing with stoma, no leak. No F/C, abd pain.  Has been active, stable weight  No CP or SOB.     OBJECTIVE:  VITALS:  /80   Wt (!) 149.2 kg (329 lb)   BMI 42.24 kg/m²     CONSTITUTIONAL:  awake and alert  LUNGS:  clear to auscultation  HEART: RRR  ABDOMEN:  normal bowel sounds, soft, non-distended, non-tender, incision healed, stoma on left     Data:    Radiology Review:  None      ASSESSMENT AND PLAN:  Colostomy in place  Prior Guzman's - Had post op leak from Robot sigmoid resection for diverticulitis  Will plan for colostomy closure  Op, R/B/A reviewed, set up at [facility], admit after  Will need to be open ex lap    25 minutes spent    Jorge Gupta MD

## 2024-01-13 DIAGNOSIS — I10 PRIMARY HYPERTENSION: ICD-10-CM

## 2024-01-15 NOTE — TELEPHONE ENCOUNTER
Recent Visits  Date Type Provider Dept   10/16/23 Office Visit Pooja Nuñez, APRN - [facility]   06/29/23 Office Visit Pooja Nuñez, APRN - [facility]   05/19/23 Office Visit Pooja Nuñez, APRN - [facility]   03/20/23 Office Visit Pooja Nuñez, APRN - [facility]   12/19/22 Office Visit Pooja Nuñez, APRN - [facility]   09/19/22 Office Visit Pooja Nuñez, APRN - [facility]   08/19/22 Office Visit Pooja Nuñez, APRN - [facility]   Showing recent visits within past 540 days with a meds authorizing provider and meeting all other requirements  Future Appointments  No visits were found meeting these conditions.  Showing future appointments within next 150 days with a meds authorizing provider and meeting all other requirements

## 2024-01-17 RX ORDER — LISINOPRIL AND HYDROCHLOROTHIAZIDE 25; 20 MG/1; MG/1
1 TABLET ORAL EVERY MORNING
Qty: 90 TABLET | Refills: 0 | Status: SHIPPED | OUTPATIENT
Start: 2024-01-17

## 2024-01-17 RX ORDER — SPIRONOLACTONE 25 MG/1
TABLET ORAL
Qty: 180 TABLET | Refills: 0 | Status: SHIPPED | OUTPATIENT
Start: 2024-01-17

## 2024-01-30 ENCOUNTER — HOSPITAL ENCOUNTER (OUTPATIENT)
Age: 34
Discharge: HOME | End: 2024-01-30
Payer: COMMERCIAL

## 2024-01-30 ENCOUNTER — HOSPITAL ENCOUNTER (OUTPATIENT)
Dept: GENERAL RADIOLOGY | Age: 34
Discharge: HOME | End: 2024-01-30
Payer: COMMERCIAL

## 2024-01-30 ENCOUNTER — OFFICE VISIT (OUTPATIENT)
Dept: PRIMARY CARE CLINIC | Age: 34
End: 2024-01-30
Payer: COMMERCIAL

## 2024-01-30 VITALS
BODY MASS INDEX: 40.43 KG/M2 | OXYGEN SATURATION: 97 % | HEIGHT: 74 IN | RESPIRATION RATE: 14 BRPM | HEART RATE: 64 BPM | SYSTOLIC BLOOD PRESSURE: 110 MMHG | DIASTOLIC BLOOD PRESSURE: 80 MMHG | WEIGHT: 315 LBS | TEMPERATURE: 97.1 F

## 2024-01-30 DIAGNOSIS — R05.1 ACUTE COUGH: ICD-10-CM

## 2024-01-30 DIAGNOSIS — J06.9 VIRAL UPPER RESPIRATORY TRACT INFECTION: ICD-10-CM

## 2024-01-30 DIAGNOSIS — J06.9 VIRAL UPPER RESPIRATORY TRACT INFECTION: Primary | ICD-10-CM

## 2024-01-30 LAB
INFLUENZA A ANTIBODY: NON REACTIVE
INFLUENZA B ANTIBODY: NON REACTIVE

## 2024-01-30 PROCEDURE — 71046 X-RAY EXAM CHEST 2 VIEWS: CPT

## 2024-01-30 PROCEDURE — 87804 INFLUENZA ASSAY W/OPTIC: CPT | Performed by: INTERNAL MEDICINE

## 2024-01-30 PROCEDURE — 3074F SYST BP LT 130 MM HG: CPT | Performed by: INTERNAL MEDICINE

## 2024-01-30 PROCEDURE — 99213 OFFICE O/P EST LOW 20 MIN: CPT | Performed by: INTERNAL MEDICINE

## 2024-01-30 PROCEDURE — 3079F DIAST BP 80-89 MM HG: CPT | Performed by: INTERNAL MEDICINE

## 2024-01-30 RX ORDER — GUAIFENESIN 600 MG/1
1200 TABLET, EXTENDED RELEASE ORAL 2 TIMES DAILY
Qty: 40 TABLET | Refills: 0 | Status: SHIPPED | OUTPATIENT
Start: 2024-01-30 | End: 2024-02-09

## 2024-01-30 RX ORDER — BENZONATATE 200 MG/1
200 CAPSULE ORAL 3 TIMES DAILY PRN
Qty: 30 CAPSULE | Refills: 0 | Status: SHIPPED | OUTPATIENT
Start: 2024-01-30 | End: 2024-02-09

## 2024-01-30 ASSESSMENT — ENCOUNTER SYMPTOMS
COLOR CHANGE: 0
WHEEZING: 0
CONSTIPATION: 0
SINUS PRESSURE: 0
BACK PAIN: 0
EYE REDNESS: 0
SHORTNESS OF BREATH: 0
NAUSEA: 0
BLOOD IN STOOL: 0
EYE PAIN: 0
SORE THROAT: 0
VOMITING: 0
ABDOMINAL DISTENTION: 0
CHEST TIGHTNESS: 0
TROUBLE SWALLOWING: 0
DIARRHEA: 0
COUGH: 1
ABDOMINAL PAIN: 0

## 2024-01-30 ASSESSMENT — PATIENT HEALTH QUESTIONNAIRE - PHQ9
SUM OF ALL RESPONSES TO PHQ QUESTIONS 1-9: 0
1. LITTLE INTEREST OR PLEASURE IN DOING THINGS: 0
2. FEELING DOWN, DEPRESSED OR HOPELESS: 0
SUM OF ALL RESPONSES TO PHQ9 QUESTIONS 1 & 2: 0
SUM OF ALL RESPONSES TO PHQ QUESTIONS 1-9: 0

## 2024-01-30 NOTE — ASSESSMENT & PLAN NOTE
We will continue with symptomatic treatment.  Cough medicine, something for nasal congestion etc. Patient can take Zinconium to expedite recovery.  Patient informed that antibiotics are not prescribed for viral URIs.  Patient was told that the virus may depress immune system allowing for secondary bacterial infection.  If patient's symptoms were to worsen fever chills, cough productive of yellow/green sputum, having stigmata of sinus infection with high fever not responding to nasal steroids or having stigmata of a middle ear infection or strep throat patient to call us and they will be reassessed.

## 2024-01-30 NOTE — PROGRESS NOTES
Jaylen Adhikari is 33 y.o. male who p/w Cough and Fever    The patient complained of 1 day history of malaise/fatigue given from work went straight to bed.  Since then patient had persistent cough primarily nonproductive, times sparse production whitish sputum.  Patient has a colostomy bag in situ secondary to diverticulitis s/p partial colectomy.  Patient says every time he coughs it is causing discomfort to the area of his colostomy bag.  Patient denies stigmata of LRTI no yellow-green sputum, no pleuritic chest pain, no shortness of breath, no wheezing.  Patient denies  myalgia, arthralgia, nausea/vomiting/diarrhea. Patient denies contact with COVID-19 /influenza POS individual.  Patient had a negative home COVID-19 test.  Patient has been self isolating at home.     The problem and medicine lists and chart were reviewed in detail.       Review of Systems   Constitutional:  Positive for fever. Negative for activity change, appetite change, chills, fatigue and unexpected weight change.   HENT:  Negative for congestion, ear pain, postnasal drip, sinus pressure, sore throat, tinnitus and trouble swallowing.    Eyes:  Negative for pain and redness.   Respiratory:  Positive for cough. Negative for chest tightness, shortness of breath and wheezing.    Cardiovascular:  Negative for chest pain, palpitations and leg swelling.   Gastrointestinal:  Negative for abdominal distention, abdominal pain, blood in stool, constipation, diarrhea, nausea and vomiting.   Endocrine: Negative for cold intolerance, heat intolerance and polydipsia.   Genitourinary:  Negative for decreased urine volume, dysuria, flank pain, frequency, hematuria and urgency.   Musculoskeletal:  Negative for arthralgias, back pain, joint swelling, neck pain and neck stiffness.   Skin:  Negative for color change and rash.   Neurological:  Negative for dizziness, weakness, numbness and headaches.   Hematological:  Negative for adenopathy.

## 2024-02-01 ENCOUNTER — TELEPHONE (OUTPATIENT)
Dept: PRIMARY CARE CLINIC | Age: 34
End: 2024-02-01

## 2024-02-01 NOTE — TELEPHONE ENCOUNTER
Pt is asking for a work note to me extended through today. He was seen on Tuesday for acute sick visit and is still experiencing symptoms.    Other/Patient

## 2024-02-01 NOTE — TELEPHONE ENCOUNTER
Please let patient know letter has been sent.  He can either pick it up from our  at the office or we could send it through [employer].

## 2024-02-14 NOTE — PROGRESS NOTES
Name_______________________________________Printed:____________________  Date and time of surgery_3/15 0730_______________________Arrival Time:____0600____________   1. The instructions given regarding when and if a patient needs to stop oral intake prior to surgery varies.Follow the specific instructions you were given                  _x__Nothing to eat or to drink after Midnight the night before.                   ____Carbo loading or instructions will be given to select patients-if you have been given those instructions -please do the following                           The evening before your surgery after dinner before midnight drink 40 ounces of gatorade.If you are diabetic use sugar free.  The morning of surgery drink 40 ounces of water.This needs to be finished 3 hours prior to your surgery start time.    2. Take the following pills with a small sip of water on the morning of surgery___________________________________________________                  Do not take blood pressure medications ending in pril or sartan the anamaria prior to surgery or the morning of surgery. Dr Abbasi's patient are not to take any medications the AM of surgery.         3. Aspirin, Ibuprofen, Advil, Naproxen, Vitamin E and other Anti-inflammatory products and supplements should be stopped for 5 -7days before surgery or as directed by your physician.   4. Check with your Doctor regarding stopping Plavix, Coumadin,Eliquis, Lovenox,Effient,Pradaxa,Xarelto, Fragmin or other blood thinners and follow their instructions.   5. Do not smoke, and do not drink any alcoholic beverages 24 hours prior to surgery.  This includes NA Beer.Refrain from the usage of any recreational drugs.   6. You may brush your teeth and gargle the morning of surgery.  DO NOT SWALLOW WATER   7. You MUST make arrangements for a responsible adult to stay on site while you are here and take you home after your surgery. You will not be allowed to leave alone or drive

## 2024-02-29 ENCOUNTER — OFFICE VISIT (OUTPATIENT)
Dept: PRIMARY CARE CLINIC | Age: 34
End: 2024-02-29
Payer: COMMERCIAL

## 2024-02-29 VITALS
HEIGHT: 72 IN | WEIGHT: 315 LBS | BODY MASS INDEX: 42.66 KG/M2 | TEMPERATURE: 97.6 F | DIASTOLIC BLOOD PRESSURE: 76 MMHG | HEART RATE: 81 BPM | OXYGEN SATURATION: 97 % | SYSTOLIC BLOOD PRESSURE: 128 MMHG

## 2024-02-29 DIAGNOSIS — Z87.898 HISTORY OF PREDIABETES: ICD-10-CM

## 2024-02-29 DIAGNOSIS — I10 PRIMARY HYPERTENSION: Primary | ICD-10-CM

## 2024-02-29 DIAGNOSIS — Z43.3 COLOSTOMY CARE (HCC): ICD-10-CM

## 2024-02-29 DIAGNOSIS — E55.9 VITAMIN D INSUFFICIENCY: ICD-10-CM

## 2024-02-29 DIAGNOSIS — Z90.49 S/P SMALL BOWEL RESECTION: ICD-10-CM

## 2024-02-29 DIAGNOSIS — E66.01 CLASS 3 SEVERE OBESITY DUE TO EXCESS CALORIES WITH SERIOUS COMORBIDITY AND BODY MASS INDEX (BMI) OF 40.0 TO 44.9 IN ADULT (HCC): ICD-10-CM

## 2024-02-29 PROBLEM — J06.9 VIRAL UPPER RESPIRATORY TRACT INFECTION: Status: RESOLVED | Noted: 2024-01-30 | Resolved: 2024-02-29

## 2024-02-29 PROCEDURE — 99214 OFFICE O/P EST MOD 30 MIN: CPT | Performed by: NURSE PRACTITIONER

## 2024-02-29 PROCEDURE — 3078F DIAST BP <80 MM HG: CPT | Performed by: NURSE PRACTITIONER

## 2024-02-29 PROCEDURE — 3074F SYST BP LT 130 MM HG: CPT | Performed by: NURSE PRACTITIONER

## 2024-02-29 RX ORDER — CARVEDILOL 25 MG/1
25 TABLET ORAL 2 TIMES DAILY WITH MEALS
Qty: 180 TABLET | Refills: 0 | Status: SHIPPED | OUTPATIENT
Start: 2024-02-29 | End: 2024-05-29

## 2024-02-29 RX ORDER — SPIRONOLACTONE 25 MG/1
TABLET ORAL
Qty: 180 TABLET | Refills: 0 | Status: SHIPPED | OUTPATIENT
Start: 2024-02-29

## 2024-02-29 RX ORDER — LISINOPRIL AND HYDROCHLOROTHIAZIDE 25; 20 MG/1; MG/1
1 TABLET ORAL EVERY MORNING
Qty: 90 TABLET | Refills: 0 | Status: SHIPPED | OUTPATIENT
Start: 2024-02-29

## 2024-02-29 ASSESSMENT — ENCOUNTER SYMPTOMS
ABDOMINAL DISTENTION: 0
COUGH: 0
ABDOMINAL PAIN: 0
WHEEZING: 0
ORTHOPNEA: 0
BLURRED VISION: 0
APNEA: 0
CHEST TIGHTNESS: 0
VOMITING: 0
SHORTNESS OF BREATH: 0
NAUSEA: 0
DIARRHEA: 0
CONSTIPATION: 0

## 2024-02-29 NOTE — PROGRESS NOTES
2/29/2024    Chief Complaint   Patient presents with    3 Month Follow-Up     Hypertension, history of impaired glucose regulation and obesity       Jaylen Adhikari is a 33 y.o. male, presents today for 3 month follow up of hypertension, history of impaired glucose regulation and obesity.      HPI  Hypertension  Episode onset: August 2022. The problem is controlled (running low 120s/80s). Pertinent negatives include no anxiety, blurred vision, chest pain, headaches, malaise/fatigue, orthopnea, palpitations, peripheral edema, PND, shortness of breath or sweats. Risk factors for coronary artery disease include obesity, male gender and family history. Treatments tried: Carvedilol 25 mg, lisinopril-hydroCHLOROthiazide  20-25 mg daily, Sprinolactone. The current treatment provides significant improvement. There are no compliance problems (Taking all medication as prescribed).  There is no history of angina, kidney disease, CAD/MI, CVA, heart failure, left ventricular hypertrophy, PVD or retinopathy.        History of Impaired glucose regulation  He limits potatoes, rice, pasta, bread and watches carb intake. Patient is exercising 150 minutes week.     Last Hemoglobin A1C   Component  Ref Range & Units 10/17/23 1019 3/14/23 0817 12/6/22 0941 8/19/22 0947   Hemoglobin A1C  See comment % 5.5 5.4 CM 5.3 CM 5.7 CM       Obesity  Patient reports he has completely change his diet, and has stopped drinking soda and stopped smoking in Aug 2022 after being diagnosed with HTN.     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 He reports improved diet with changed needed to prevent issues with colostomy. Eating smaller portion and not eating left overs.    Patient had to miss appointment at [facility] due to death in the family. Patient plans to reschedule after his colostomy reversal in March 2024.      Vitamin D Insufficiency  Taking Vitamin D

## 2024-03-15 ENCOUNTER — HOSPITAL ENCOUNTER (INPATIENT)
Age: 34
LOS: 8 days | Discharge: HOME | End: 2024-03-23
Attending: SURGERY | Admitting: SURGERY
Payer: COMMERCIAL

## 2024-03-15 ENCOUNTER — ANESTHESIA EVENT (OUTPATIENT)
Dept: OPERATING ROOM | Age: 34
End: 2024-03-15
Payer: COMMERCIAL

## 2024-03-15 ENCOUNTER — ANESTHESIA (OUTPATIENT)
Dept: OPERATING ROOM | Age: 34
End: 2024-03-15
Payer: COMMERCIAL

## 2024-03-15 DIAGNOSIS — Z93.3 COLOSTOMY IN PLACE (HCC): ICD-10-CM

## 2024-03-15 LAB
ANION GAP SERPL CALCULATED.3IONS-SCNC: 13 MMOL/L (ref 3–16)
BUN SERPL-MCNC: 18 MG/DL (ref 7–20)
CALCIUM SERPL-MCNC: 9.8 MG/DL (ref 8.3–10.6)
CHLORIDE SERPL-SCNC: 102 MMOL/L (ref 99–110)
CO2 SERPL-SCNC: 23 MMOL/L (ref 21–32)
CREAT SERPL-MCNC: 1 MG/DL (ref 0.9–1.3)
GFR SERPLBLD CREATININE-BSD FMLA CKD-EPI: >60 ML/MIN/{1.73_M2}
GLUCOSE BLD-MCNC: 186 MG/DL (ref 70–99)
GLUCOSE SERPL-MCNC: 132 MG/DL (ref 70–99)
PERFORMED ON: ABNORMAL
POTASSIUM SERPL-SCNC: 3.9 MMOL/L (ref 3.5–5.1)
SODIUM SERPL-SCNC: 138 MMOL/L (ref 136–145)

## 2024-03-15 PROCEDURE — 6370000000 HC RX 637 (ALT 250 FOR IP): Performed by: ANESTHESIOLOGY

## 2024-03-15 PROCEDURE — 2709999900 HC NON-CHARGEABLE SUPPLY: Performed by: SURGERY

## 2024-03-15 PROCEDURE — 0DJD8ZZ INSPECTION OF LOWER INTESTINAL TRACT, VIA NATURAL OR ARTIFICIAL OPENING ENDOSCOPIC: ICD-10-PCS | Performed by: SURGERY

## 2024-03-15 PROCEDURE — 2580000003 HC RX 258: Performed by: SURGERY

## 2024-03-15 PROCEDURE — 0DQL0ZZ REPAIR TRANSVERSE COLON, OPEN APPROACH: ICD-10-PCS | Performed by: SURGERY

## 2024-03-15 PROCEDURE — 2500000003 HC RX 250 WO HCPCS: Performed by: NURSE ANESTHETIST, CERTIFIED REGISTERED

## 2024-03-15 PROCEDURE — 7100000001 HC PACU RECOVERY - ADDTL 15 MIN: Performed by: SURGERY

## 2024-03-15 PROCEDURE — 3600000004 HC SURGERY LEVEL 4 BASE: Performed by: SURGERY

## 2024-03-15 PROCEDURE — 7100000000 HC PACU RECOVERY - FIRST 15 MIN: Performed by: SURGERY

## 2024-03-15 PROCEDURE — 6370000000 HC RX 637 (ALT 250 FOR IP): Performed by: SURGERY

## 2024-03-15 PROCEDURE — 2500000003 HC RX 250 WO HCPCS: Performed by: SURGERY

## 2024-03-15 PROCEDURE — 6360000002 HC RX W HCPCS: Performed by: SURGERY

## 2024-03-15 PROCEDURE — 44626 REPAIR BOWEL OPENING: CPT | Performed by: SURGERY

## 2024-03-15 PROCEDURE — 97606 NEG PRS WND THER DME>50 SQCM: CPT | Performed by: SURGERY

## 2024-03-15 PROCEDURE — 2720000010 HC SURG SUPPLY STERILE: Performed by: SURGERY

## 2024-03-15 PROCEDURE — 3600000014 HC SURGERY LEVEL 4 ADDTL 15MIN: Performed by: SURGERY

## 2024-03-15 PROCEDURE — 6360000002 HC RX W HCPCS: Performed by: NURSE ANESTHETIST, CERTIFIED REGISTERED

## 2024-03-15 PROCEDURE — 0DBL0ZZ EXCISION OF TRANSVERSE COLON, OPEN APPROACH: ICD-10-PCS | Performed by: SURGERY

## 2024-03-15 PROCEDURE — 3700000001 HC ADD 15 MINUTES (ANESTHESIA): Performed by: SURGERY

## 2024-03-15 PROCEDURE — 80048 BASIC METABOLIC PNL TOTAL CA: CPT

## 2024-03-15 PROCEDURE — 3700000000 HC ANESTHESIA ATTENDED CARE: Performed by: SURGERY

## 2024-03-15 PROCEDURE — 36415 COLL VENOUS BLD VENIPUNCTURE: CPT

## 2024-03-15 PROCEDURE — C9290 INJ, BUPIVACAINE LIPOSOME: HCPCS | Performed by: SURGERY

## 2024-03-15 PROCEDURE — C9113 INJ PANTOPRAZOLE SODIUM, VIA: HCPCS | Performed by: NURSE PRACTITIONER

## 2024-03-15 PROCEDURE — 0DTJ0ZZ RESECTION OF APPENDIX, OPEN APPROACH: ICD-10-PCS | Performed by: SURGERY

## 2024-03-15 PROCEDURE — 49905 OMENTAL FLAP INTRA-ABDOM: CPT | Performed by: SURGERY

## 2024-03-15 PROCEDURE — 6370000000 HC RX 637 (ALT 250 FOR IP): Performed by: INTERNAL MEDICINE

## 2024-03-15 PROCEDURE — 1200000000 HC SEMI PRIVATE

## 2024-03-15 PROCEDURE — 6360000002 HC RX W HCPCS: Performed by: ANESTHESIOLOGY

## 2024-03-15 PROCEDURE — 2580000003 HC RX 258: Performed by: NURSE ANESTHETIST, CERTIFIED REGISTERED

## 2024-03-15 PROCEDURE — 6370000000 HC RX 637 (ALT 250 FOR IP): Performed by: NURSE ANESTHETIST, CERTIFIED REGISTERED

## 2024-03-15 PROCEDURE — 6360000002 HC RX W HCPCS: Performed by: NURSE PRACTITIONER

## 2024-03-15 PROCEDURE — 88304 TISSUE EXAM BY PATHOLOGIST: CPT

## 2024-03-15 RX ORDER — KETAMINE HCL IN NACL, ISO-OSM 100MG/10ML
SYRINGE (ML) INJECTION PRN
Status: DISCONTINUED | OUTPATIENT
Start: 2024-03-15 | End: 2024-03-15 | Stop reason: SDUPTHER

## 2024-03-15 RX ORDER — METHOCARBAMOL 100 MG/ML
1000 INJECTION, SOLUTION INTRAMUSCULAR; INTRAVENOUS ONCE
Status: COMPLETED | OUTPATIENT
Start: 2024-03-15 | End: 2024-03-15

## 2024-03-15 RX ORDER — METOCLOPRAMIDE HYDROCHLORIDE 5 MG/ML
10 INJECTION INTRAMUSCULAR; INTRAVENOUS EVERY 6 HOURS
Status: COMPLETED | OUTPATIENT
Start: 2024-03-15 | End: 2024-03-17

## 2024-03-15 RX ORDER — SODIUM CHLORIDE 0.9 % (FLUSH) 0.9 %
5-40 SYRINGE (ML) INJECTION EVERY 12 HOURS SCHEDULED
Status: DISCONTINUED | OUTPATIENT
Start: 2024-03-15 | End: 2024-03-15 | Stop reason: HOSPADM

## 2024-03-15 RX ORDER — CARVEDILOL 25 MG/1
25 TABLET ORAL ONCE
Status: COMPLETED | OUTPATIENT
Start: 2024-03-15 | End: 2024-03-15

## 2024-03-15 RX ORDER — HYDRALAZINE HYDROCHLORIDE 20 MG/ML
INJECTION INTRAMUSCULAR; INTRAVENOUS PRN
Status: DISCONTINUED | OUTPATIENT
Start: 2024-03-15 | End: 2024-03-15 | Stop reason: SDUPTHER

## 2024-03-15 RX ORDER — SODIUM CHLORIDE 0.9 % (FLUSH) 0.9 %
5-40 SYRINGE (ML) INJECTION EVERY 12 HOURS SCHEDULED
Status: DISCONTINUED | OUTPATIENT
Start: 2024-03-15 | End: 2024-03-23 | Stop reason: HOSPADM

## 2024-03-15 RX ORDER — LIDOCAINE HYDROCHLORIDE 40 MG/ML
SOLUTION TOPICAL PRN
Status: DISCONTINUED | OUTPATIENT
Start: 2024-03-15 | End: 2024-03-15 | Stop reason: SDUPTHER

## 2024-03-15 RX ORDER — SODIUM CHLORIDE 0.9 % (FLUSH) 0.9 %
5-40 SYRINGE (ML) INJECTION PRN
Status: DISCONTINUED | OUTPATIENT
Start: 2024-03-15 | End: 2024-03-15 | Stop reason: HOSPADM

## 2024-03-15 RX ORDER — PROPOFOL 10 MG/ML
INJECTION, EMULSION INTRAVENOUS PRN
Status: DISCONTINUED | OUTPATIENT
Start: 2024-03-15 | End: 2024-03-15 | Stop reason: SDUPTHER

## 2024-03-15 RX ORDER — MORPHINE SULFATE 2 MG/ML
2 INJECTION, SOLUTION INTRAMUSCULAR; INTRAVENOUS
Status: DISCONTINUED | OUTPATIENT
Start: 2024-03-15 | End: 2024-03-19

## 2024-03-15 RX ORDER — MEPERIDINE HYDROCHLORIDE 25 MG/ML
12.5 INJECTION INTRAMUSCULAR; INTRAVENOUS; SUBCUTANEOUS EVERY 5 MIN PRN
Status: DISCONTINUED | OUTPATIENT
Start: 2024-03-15 | End: 2024-03-15 | Stop reason: HOSPADM

## 2024-03-15 RX ORDER — MIDAZOLAM HYDROCHLORIDE 1 MG/ML
INJECTION INTRAMUSCULAR; INTRAVENOUS PRN
Status: DISCONTINUED | OUTPATIENT
Start: 2024-03-15 | End: 2024-03-15 | Stop reason: SDUPTHER

## 2024-03-15 RX ORDER — OXYCODONE HYDROCHLORIDE 5 MG/1
10 TABLET ORAL PRN
Status: DISCONTINUED | OUTPATIENT
Start: 2024-03-15 | End: 2024-03-15 | Stop reason: HOSPADM

## 2024-03-15 RX ORDER — FENTANYL CITRATE 50 UG/ML
INJECTION, SOLUTION INTRAMUSCULAR; INTRAVENOUS PRN
Status: DISCONTINUED | OUTPATIENT
Start: 2024-03-15 | End: 2024-03-15 | Stop reason: SDUPTHER

## 2024-03-15 RX ORDER — LIDOCAINE HYDROCHLORIDE 20 MG/ML
INJECTION, SOLUTION EPIDURAL; INFILTRATION; INTRACAUDAL; PERINEURAL PRN
Status: DISCONTINUED | OUTPATIENT
Start: 2024-03-15 | End: 2024-03-15 | Stop reason: SDUPTHER

## 2024-03-15 RX ORDER — LISINOPRIL 20 MG/1
20 TABLET ORAL DAILY
Status: DISCONTINUED | OUTPATIENT
Start: 2024-03-16 | End: 2024-03-17

## 2024-03-15 RX ORDER — HYDROCHLOROTHIAZIDE 25 MG/1
25 TABLET ORAL DAILY
Status: DISCONTINUED | OUTPATIENT
Start: 2024-03-16 | End: 2024-03-17

## 2024-03-15 RX ORDER — SODIUM CHLORIDE 9 MG/ML
INJECTION, SOLUTION INTRAVENOUS PRN
Status: DISCONTINUED | OUTPATIENT
Start: 2024-03-15 | End: 2024-03-15 | Stop reason: HOSPADM

## 2024-03-15 RX ORDER — CARVEDILOL 25 MG/1
25 TABLET ORAL 2 TIMES DAILY WITH MEALS
Status: DISCONTINUED | OUTPATIENT
Start: 2024-03-16 | End: 2024-03-23 | Stop reason: HOSPADM

## 2024-03-15 RX ORDER — HYDROMORPHONE HYDROCHLORIDE 2 MG/ML
0.5 INJECTION, SOLUTION INTRAMUSCULAR; INTRAVENOUS; SUBCUTANEOUS EVERY 5 MIN PRN
Status: COMPLETED | OUTPATIENT
Start: 2024-03-15 | End: 2024-03-15

## 2024-03-15 RX ORDER — ONDANSETRON 2 MG/ML
4 INJECTION INTRAMUSCULAR; INTRAVENOUS EVERY 4 HOURS PRN
Status: DISCONTINUED | OUTPATIENT
Start: 2024-03-15 | End: 2024-03-23 | Stop reason: HOSPADM

## 2024-03-15 RX ORDER — FENTANYL CITRATE 50 UG/ML
50 INJECTION, SOLUTION INTRAMUSCULAR; INTRAVENOUS EVERY 5 MIN PRN
Status: COMPLETED | OUTPATIENT
Start: 2024-03-15 | End: 2024-03-15

## 2024-03-15 RX ORDER — SODIUM CHLORIDE 0.9 % (FLUSH) 0.9 %
5-40 SYRINGE (ML) INJECTION PRN
Status: DISCONTINUED | OUTPATIENT
Start: 2024-03-15 | End: 2024-03-23 | Stop reason: HOSPADM

## 2024-03-15 RX ORDER — LISINOPRIL 20 MG/1
20 TABLET ORAL ONCE
Status: COMPLETED | OUTPATIENT
Start: 2024-03-15 | End: 2024-03-15

## 2024-03-15 RX ORDER — DEXAMETHASONE SODIUM PHOSPHATE 4 MG/ML
INJECTION, SOLUTION INTRA-ARTICULAR; INTRALESIONAL; INTRAMUSCULAR; INTRAVENOUS; SOFT TISSUE PRN
Status: DISCONTINUED | OUTPATIENT
Start: 2024-03-15 | End: 2024-03-15 | Stop reason: SDUPTHER

## 2024-03-15 RX ORDER — ACETAMINOPHEN 500 MG
1000 TABLET ORAL ONCE
Status: COMPLETED | OUTPATIENT
Start: 2024-03-15 | End: 2024-03-15

## 2024-03-15 RX ORDER — ACETAMINOPHEN 500 MG
1000 TABLET ORAL EVERY 8 HOURS
Status: COMPLETED | OUTPATIENT
Start: 2024-03-15 | End: 2024-03-18

## 2024-03-15 RX ORDER — NALOXONE HYDROCHLORIDE 0.4 MG/ML
INJECTION, SOLUTION INTRAMUSCULAR; INTRAVENOUS; SUBCUTANEOUS PRN
Status: DISCONTINUED | OUTPATIENT
Start: 2024-03-15 | End: 2024-03-15 | Stop reason: HOSPADM

## 2024-03-15 RX ORDER — VECURONIUM BROMIDE 1 MG/ML
INJECTION, POWDER, LYOPHILIZED, FOR SOLUTION INTRAVENOUS PRN
Status: DISCONTINUED | OUTPATIENT
Start: 2024-03-15 | End: 2024-03-15 | Stop reason: SDUPTHER

## 2024-03-15 RX ORDER — OXYCODONE HYDROCHLORIDE 5 MG/1
5 TABLET ORAL EVERY 4 HOURS PRN
Status: DISCONTINUED | OUTPATIENT
Start: 2024-03-15 | End: 2024-03-23 | Stop reason: HOSPADM

## 2024-03-15 RX ORDER — SPIRONOLACTONE 25 MG/1
25 TABLET ORAL DAILY
Status: DISCONTINUED | OUTPATIENT
Start: 2024-03-16 | End: 2024-03-23 | Stop reason: HOSPADM

## 2024-03-15 RX ORDER — ONDANSETRON 2 MG/ML
4 INJECTION INTRAMUSCULAR; INTRAVENOUS
Status: COMPLETED | OUTPATIENT
Start: 2024-03-15 | End: 2024-03-15

## 2024-03-15 RX ORDER — SODIUM CHLORIDE 9 MG/ML
INJECTION, SOLUTION INTRAVENOUS PRN
Status: DISCONTINUED | OUTPATIENT
Start: 2024-03-15 | End: 2024-03-23 | Stop reason: HOSPADM

## 2024-03-15 RX ORDER — OXYCODONE HYDROCHLORIDE 5 MG/1
5 TABLET ORAL PRN
Status: DISCONTINUED | OUTPATIENT
Start: 2024-03-15 | End: 2024-03-15 | Stop reason: HOSPADM

## 2024-03-15 RX ORDER — SODIUM CHLORIDE 9 MG/ML
INJECTION, SOLUTION INTRAVENOUS CONTINUOUS
Status: DISCONTINUED | OUTPATIENT
Start: 2024-03-15 | End: 2024-03-16

## 2024-03-15 RX ORDER — MAGNESIUM SULFATE HEPTAHYDRATE 500 MG/ML
INJECTION, SOLUTION INTRAMUSCULAR; INTRAVENOUS PRN
Status: DISCONTINUED | OUTPATIENT
Start: 2024-03-15 | End: 2024-03-15 | Stop reason: SDUPTHER

## 2024-03-15 RX ORDER — CALCIUM CARBONATE 500 MG/1
500 TABLET, CHEWABLE ORAL 3 TIMES DAILY PRN
Status: DISCONTINUED | OUTPATIENT
Start: 2024-03-15 | End: 2024-03-23 | Stop reason: HOSPADM

## 2024-03-15 RX ORDER — HYDROMORPHONE HYDROCHLORIDE 2 MG/ML
INJECTION, SOLUTION INTRAMUSCULAR; INTRAVENOUS; SUBCUTANEOUS PRN
Status: DISCONTINUED | OUTPATIENT
Start: 2024-03-15 | End: 2024-03-15 | Stop reason: SDUPTHER

## 2024-03-15 RX ORDER — PANTOPRAZOLE SODIUM 40 MG/10ML
40 INJECTION, POWDER, LYOPHILIZED, FOR SOLUTION INTRAVENOUS DAILY
Status: DISCONTINUED | OUTPATIENT
Start: 2024-03-15 | End: 2024-03-23 | Stop reason: HOSPADM

## 2024-03-15 RX ORDER — ENOXAPARIN SODIUM 100 MG/ML
30 INJECTION SUBCUTANEOUS 2 TIMES DAILY
Status: DISCONTINUED | OUTPATIENT
Start: 2024-03-16 | End: 2024-03-23 | Stop reason: HOSPADM

## 2024-03-15 RX ORDER — DEXTROSE MONOHYDRATE, SODIUM CHLORIDE, AND POTASSIUM CHLORIDE 50; 1.49; 4.5 G/1000ML; G/1000ML; G/1000ML
INJECTION, SOLUTION INTRAVENOUS CONTINUOUS
Status: DISCONTINUED | OUTPATIENT
Start: 2024-03-15 | End: 2024-03-20

## 2024-03-15 RX ORDER — LISINOPRIL AND HYDROCHLOROTHIAZIDE 25; 20 MG/1; MG/1
1 TABLET ORAL EVERY MORNING
Status: DISCONTINUED | OUTPATIENT
Start: 2024-03-16 | End: 2024-03-15 | Stop reason: CLARIF

## 2024-03-15 RX ORDER — SUCCINYLCHOLINE/SOD CL,ISO/PF 200MG/10ML
SYRINGE (ML) INTRAVENOUS PRN
Status: DISCONTINUED | OUTPATIENT
Start: 2024-03-15 | End: 2024-03-15 | Stop reason: SDUPTHER

## 2024-03-15 RX ORDER — MORPHINE SULFATE 4 MG/ML
4 INJECTION, SOLUTION INTRAMUSCULAR; INTRAVENOUS
Status: DISCONTINUED | OUTPATIENT
Start: 2024-03-15 | End: 2024-03-19

## 2024-03-15 RX ORDER — METRONIDAZOLE 500 MG/100ML
500 INJECTION, SOLUTION INTRAVENOUS EVERY 8 HOURS
Status: COMPLETED | OUTPATIENT
Start: 2024-03-15 | End: 2024-03-17

## 2024-03-15 RX ORDER — ONDANSETRON 2 MG/ML
INJECTION INTRAMUSCULAR; INTRAVENOUS PRN
Status: DISCONTINUED | OUTPATIENT
Start: 2024-03-15 | End: 2024-03-15 | Stop reason: SDUPTHER

## 2024-03-15 RX ADMIN — SODIUM CHLORIDE: 9 INJECTION, SOLUTION INTRAVENOUS at 06:28

## 2024-03-15 RX ADMIN — FENTANYL CITRATE 50 MCG: 50 INJECTION, SOLUTION INTRAMUSCULAR; INTRAVENOUS at 10:29

## 2024-03-15 RX ADMIN — PANTOPRAZOLE SODIUM 40 MG: 40 INJECTION, POWDER, FOR SOLUTION INTRAVENOUS at 20:49

## 2024-03-15 RX ADMIN — VECURONIUM BROMIDE 5 MG: 1 INJECTION, POWDER, LYOPHILIZED, FOR SOLUTION INTRAVENOUS at 08:51

## 2024-03-15 RX ADMIN — METHOCARBAMOL 1000 MG: 100 INJECTION, SOLUTION INTRAMUSCULAR; INTRAVENOUS at 11:50

## 2024-03-15 RX ADMIN — LIDOCAINE HYDROCHLORIDE 100 MG: 20 INJECTION, SOLUTION EPIDURAL; INFILTRATION; INTRACAUDAL; PERINEURAL at 07:34

## 2024-03-15 RX ADMIN — LIDOCAINE HYDROCHLORIDE 4 ML: 40 SOLUTION TOPICAL at 07:41

## 2024-03-15 RX ADMIN — HYDROMORPHONE HYDROCHLORIDE 1 MG: 2 INJECTION, SOLUTION INTRAMUSCULAR; INTRAVENOUS; SUBCUTANEOUS at 11:32

## 2024-03-15 RX ADMIN — Medication 10 MG: at 08:51

## 2024-03-15 RX ADMIN — HYDROMORPHONE HYDROCHLORIDE 0.5 MG: 2 INJECTION, SOLUTION INTRAMUSCULAR; INTRAVENOUS; SUBCUTANEOUS at 08:53

## 2024-03-15 RX ADMIN — HYDRALAZINE HYDROCHLORIDE 2 MG: 20 INJECTION INTRAMUSCULAR; INTRAVENOUS at 10:41

## 2024-03-15 RX ADMIN — Medication 20 MG: at 07:56

## 2024-03-15 RX ADMIN — ACETAMINOPHEN 1000 MG: 500 TABLET ORAL at 14:07

## 2024-03-15 RX ADMIN — CARVEDILOL 25 MG: 25 TABLET, FILM COATED ORAL at 17:36

## 2024-03-15 RX ADMIN — SODIUM CHLORIDE, PRESERVATIVE FREE 10 ML: 5 INJECTION INTRAVENOUS at 20:35

## 2024-03-15 RX ADMIN — ONDANSETRON 4 MG: 2 INJECTION INTRAMUSCULAR; INTRAVENOUS at 11:50

## 2024-03-15 RX ADMIN — HYDRALAZINE HYDROCHLORIDE 2 MG: 20 INJECTION INTRAMUSCULAR; INTRAVENOUS at 09:20

## 2024-03-15 RX ADMIN — LISINOPRIL 20 MG: 20 TABLET ORAL at 17:36

## 2024-03-15 RX ADMIN — METOCLOPRAMIDE 10 MG: 5 INJECTION, SOLUTION INTRAMUSCULAR; INTRAVENOUS at 20:35

## 2024-03-15 RX ADMIN — VECURONIUM BROMIDE 5 MG: 1 INJECTION, POWDER, LYOPHILIZED, FOR SOLUTION INTRAVENOUS at 09:45

## 2024-03-15 RX ADMIN — HYDRALAZINE HYDROCHLORIDE 2 MG: 20 INJECTION INTRAMUSCULAR; INTRAVENOUS at 10:26

## 2024-03-15 RX ADMIN — VECURONIUM BROMIDE 4 MG: 1 INJECTION, POWDER, LYOPHILIZED, FOR SOLUTION INTRAVENOUS at 07:41

## 2024-03-15 RX ADMIN — PROPOFOL 50 MG: 10 INJECTION, EMULSION INTRAVENOUS at 08:53

## 2024-03-15 RX ADMIN — ACETAMINOPHEN 1000 MG: 500 TABLET ORAL at 23:05

## 2024-03-15 RX ADMIN — METHOCARBAMOL 200 MG: 100 INJECTION INTRAMUSCULAR; INTRAVENOUS at 10:49

## 2024-03-15 RX ADMIN — METHOCARBAMOL 300 MG: 100 INJECTION INTRAMUSCULAR; INTRAVENOUS at 11:06

## 2024-03-15 RX ADMIN — HYDRALAZINE HYDROCHLORIDE 4 MG: 20 INJECTION INTRAMUSCULAR; INTRAVENOUS at 11:03

## 2024-03-15 RX ADMIN — FENTANYL CITRATE 50 MCG: 0.05 INJECTION, SOLUTION INTRAMUSCULAR; INTRAVENOUS at 12:15

## 2024-03-15 RX ADMIN — PROPOFOL 250 MG: 10 INJECTION, EMULSION INTRAVENOUS at 07:38

## 2024-03-15 RX ADMIN — HYDRALAZINE HYDROCHLORIDE 2 MG: 20 INJECTION INTRAMUSCULAR; INTRAVENOUS at 10:11

## 2024-03-15 RX ADMIN — FENTANYL CITRATE 50 MCG: 0.05 INJECTION, SOLUTION INTRAMUSCULAR; INTRAVENOUS at 12:24

## 2024-03-15 RX ADMIN — POTASSIUM CHLORIDE, DEXTROSE MONOHYDRATE AND SODIUM CHLORIDE: 150; 5; 450 INJECTION, SOLUTION INTRAVENOUS at 14:15

## 2024-03-15 RX ADMIN — ONDANSETRON 4 MG: 2 INJECTION INTRAMUSCULAR; INTRAVENOUS at 10:38

## 2024-03-15 RX ADMIN — MORPHINE SULFATE 2 MG: 2 INJECTION, SOLUTION INTRAMUSCULAR; INTRAVENOUS at 14:17

## 2024-03-15 RX ADMIN — SODIUM CHLORIDE 3000 MG: 900 INJECTION INTRAVENOUS at 23:09

## 2024-03-15 RX ADMIN — MAGNESIUM SULFATE HEPTAHYDRATE 1 G: 500 INJECTION, SOLUTION INTRAMUSCULAR; INTRAVENOUS at 09:09

## 2024-03-15 RX ADMIN — METHOCARBAMOL 100 MG: 100 INJECTION INTRAMUSCULAR; INTRAVENOUS at 09:50

## 2024-03-15 RX ADMIN — METRONIDAZOLE 500 MG: 500 INJECTION, SOLUTION INTRAVENOUS at 17:34

## 2024-03-15 RX ADMIN — HYDROMORPHONE HYDROCHLORIDE 1 MG: 2 INJECTION, SOLUTION INTRAMUSCULAR; INTRAVENOUS; SUBCUTANEOUS at 11:24

## 2024-03-15 RX ADMIN — ACETAMINOPHEN 1000 MG: 500 TABLET ORAL at 07:21

## 2024-03-15 RX ADMIN — METRONIDAZOLE 500 MG: 500 INJECTION, SOLUTION INTRAVENOUS at 07:28

## 2024-03-15 RX ADMIN — Medication 10 MG: at 10:35

## 2024-03-15 RX ADMIN — METHOCARBAMOL 200 MG: 100 INJECTION INTRAMUSCULAR; INTRAVENOUS at 10:25

## 2024-03-15 RX ADMIN — HYDROMORPHONE HYDROCHLORIDE 0.5 MG: 2 INJECTION, SOLUTION INTRAMUSCULAR; INTRAVENOUS; SUBCUTANEOUS at 11:50

## 2024-03-15 RX ADMIN — MIDAZOLAM 2 MG: 1 INJECTION INTRAMUSCULAR; INTRAVENOUS at 07:27

## 2024-03-15 RX ADMIN — HYDROMORPHONE HYDROCHLORIDE 0.5 MG: 2 INJECTION, SOLUTION INTRAMUSCULAR; INTRAVENOUS; SUBCUTANEOUS at 12:11

## 2024-03-15 RX ADMIN — Medication 10 MG: at 09:55

## 2024-03-15 RX ADMIN — HYDRALAZINE HYDROCHLORIDE 2 MG: 20 INJECTION INTRAMUSCULAR; INTRAVENOUS at 11:16

## 2024-03-15 RX ADMIN — DEXAMETHASONE SODIUM PHOSPHATE 10 MG: 4 INJECTION, SOLUTION INTRAMUSCULAR; INTRAVENOUS at 07:44

## 2024-03-15 RX ADMIN — FENTANYL CITRATE 50 MCG: 50 INJECTION, SOLUTION INTRAMUSCULAR; INTRAVENOUS at 09:52

## 2024-03-15 RX ADMIN — HYDROMORPHONE HYDROCHLORIDE 0.5 MG: 2 INJECTION, SOLUTION INTRAMUSCULAR; INTRAVENOUS; SUBCUTANEOUS at 08:18

## 2024-03-15 RX ADMIN — SODIUM CHLORIDE 3000 MG: 900 INJECTION INTRAVENOUS at 07:27

## 2024-03-15 RX ADMIN — Medication 160 MG: at 07:38

## 2024-03-15 RX ADMIN — VECURONIUM BROMIDE 6 MG: 1 INJECTION, POWDER, LYOPHILIZED, FOR SOLUTION INTRAVENOUS at 07:54

## 2024-03-15 RX ADMIN — ALUMINUM HYDROXIDE, MAGNESIUM HYDROXIDE, DIMETHICONE: 200; 200; 20 LIQUID ORAL at 19:39

## 2024-03-15 RX ADMIN — METRONIDAZOLE 500 MG: 500 INJECTION, SOLUTION INTRAVENOUS at 23:10

## 2024-03-15 RX ADMIN — HYDROMORPHONE HYDROCHLORIDE 0.5 MG: 2 INJECTION, SOLUTION INTRAMUSCULAR; INTRAVENOUS; SUBCUTANEOUS at 08:07

## 2024-03-15 RX ADMIN — SODIUM CHLORIDE 3000 MG: 900 INJECTION INTRAVENOUS at 16:29

## 2024-03-15 RX ADMIN — SUGAMMADEX 200 MG: 100 INJECTION, SOLUTION INTRAVENOUS at 11:14

## 2024-03-15 RX ADMIN — FENTANYL CITRATE 100 MCG: 50 INJECTION, SOLUTION INTRAMUSCULAR; INTRAVENOUS at 07:32

## 2024-03-15 RX ADMIN — METOCLOPRAMIDE 10 MG: 5 INJECTION, SOLUTION INTRAMUSCULAR; INTRAVENOUS at 14:07

## 2024-03-15 RX ADMIN — HYDROMORPHONE HYDROCHLORIDE 0.5 MG: 2 INJECTION, SOLUTION INTRAMUSCULAR; INTRAVENOUS; SUBCUTANEOUS at 09:09

## 2024-03-15 RX ADMIN — METHOCARBAMOL 200 MG: 100 INJECTION INTRAMUSCULAR; INTRAVENOUS at 09:08

## 2024-03-15 RX ADMIN — MAGNESIUM SULFATE HEPTAHYDRATE 1 G: 500 INJECTION, SOLUTION INTRAMUSCULAR; INTRAVENOUS at 07:33

## 2024-03-15 ASSESSMENT — PAIN - FUNCTIONAL ASSESSMENT: PAIN_FUNCTIONAL_ASSESSMENT: NONE - DENIES PAIN

## 2024-03-15 ASSESSMENT — PAIN DESCRIPTION - DESCRIPTORS
DESCRIPTORS: ACHING

## 2024-03-15 ASSESSMENT — PAIN SCALES - GENERAL
PAINLEVEL_OUTOF10: 0
PAINLEVEL_OUTOF10: 6
PAINLEVEL_OUTOF10: 6
PAINLEVEL_OUTOF10: 5
PAINLEVEL_OUTOF10: 6
PAINLEVEL_OUTOF10: 7
PAINLEVEL_OUTOF10: 8
PAINLEVEL_OUTOF10: 7

## 2024-03-15 ASSESSMENT — PAIN DESCRIPTION - LOCATION
LOCATION: ABDOMEN

## 2024-03-15 ASSESSMENT — PAIN DESCRIPTION - ORIENTATION
ORIENTATION: MID

## 2024-03-15 NOTE — OP NOTE
[place] [address]                            OPERATIVE REPORT      PATIENT NAME: ZAN HAHN              : 1990  MED REC NO: [id number]                      ROOM: [address] [place] [address]  ACCOUNT NO: [id number]                       ADMIT DATE: 03/15/2024  PROVIDER: Jorge Gupta MD      DATE OF PROCEDURE:  03/15/2024    SURGEON:  Jorge Gupta MD    PREOPERATIVE DIAGNOSES:    1. Colostomy in place.   2. Status post Collin's procedure for perforated diverticulitis and prior anastomotic leak.    ANESTHESIA:  General plus local.    ESTIMATED BLOOD LOSS:  Minimal.    PROCEDURES:  Exploratory laparotomy, lysis of adhesions, colostomy takedown with resection and colorectal anastomosis, appendectomy, flexible sigmoidoscopy, intra-abdominal omental flap creation for anastomosis coverage, and negative-pressure wound VAC placement.    DETAILS OF SURGERY:  The patient is a 33-year-old gentleman, presenting with colostomy in place.  He most recently had a Collin's procedure following a failed initial resection and anastomosis for diverticulitis.  He had a postoperative leak requiring reoperation and since that time has healed and presents for stomal closure at this point.    All questions answered with the patient, and he consents to proceed as planned.    The patient was brought to the operating room, placed on the operating table in supine position.  Anesthetic was administered.  Time-out was done and the old colostomy was suture closed with 2-0 silk suture.  Following this, lithotomy position was established.  Rectal examination was performed.  No gross mass was present.  Some old stool was removed.  The perineal area and abdomen were all prepped and draped sterilely with Betadine scrub and prep.  A time-out was again done and a vertical midline laparotomy incision was made through the patient's previous incision.   first was a posterior approach with running looped 0 PDS suture and then the anterior layer also with running looped 0 PDS suture for a 2-layered fascial closure transversely oriented for the colostomy site.  We checked all counts, all were correct.  The midline fascia was then closed.  Running looped #1 PDS was used for this inferiorly and superiorly and tying the sutures in the mid wound.  The wound was rendered hemostatic with Bovie electrocautery.  Marcaine and Exparel were placed liberally throughout the wound field area.  The black foam VAC dressing was obtained and placed in the colostomy site and the old wound site with good seal overlying and good wound closure with the VAC system.  We will do VAC changes and dressing changes and hopefully, as the tissue freshens up and we are sure there is no infection, we can close the skin.    The midline wound was 25 cm x 3 cm for the VAC placement and the colostomy area was 6 cm x 4 cm.  The patient's dressings were secured and the seal was intact for the VAC.  He was then taken to the recovery room in stable condition postop.          NATAN STORY MD      D:  03/15/2024 11:44:55     T:  03/15/2024 17:48:33     AMERICO/LATOYA  Job #:  [id number]     Doc#:  [id number]    CC:   MD Pooja De Leon ARNP

## 2024-03-15 NOTE — BRIEF OP NOTE
Brief Postoperative Note      Patient: Jaylen Adhikari  YOB: 1990  MRN: [id number]    Date of Procedure: 3/15/2024    Pre-Op Diagnosis Codes:     * Colostomy in place (HCC) [Z93.3]    Post-Op Diagnosis: Same       Procedure(s):  OPEN COLOSTOMY CLOSURE WITH COLORECTAL ANASTOMOSIS  FLEXIBLE SIGMOIDOSCOPY  APPENDECTOMY    Surgeon(s):  Jorge Gupta MD    Assistant:  Surgical Assistant: Yevgeniy Medellin    Anesthesia: General    Estimated Blood Loss (mL): Minimal    Complications: None    Specimens:   ID Type Source Tests Collected by Time Destination   A : A) APPENDIX, OLD OSTOMY SITE, AND ANASTOMOTIC RINGS Tissue Tissue SURGICAL PATHOLOGY Jorge Gupta MD 3/15/2024 0859        Implants:  * No implants in log *      Drains:   Negative Pressure Wound Therapy Abdomen Medial;Upper (Active)       Negative Pressure Wound Therapy Abdomen Medial (Active)       Colostomy LUQ Descending/sigmoid (Active)       Urinary Catheter 03/15/24 2 Way (Active)       Findings: Colostomy closure with colorectal anastomosis completed      Electronically signed by Jorge Gupta MD on 3/15/2024 at 11:29 AM

## 2024-03-15 NOTE — ANESTHESIA PRE PROCEDURE
Department of Anesthesiology  Preprocedure Note       Name:  Jaylen Adhikari   Age:  33 y.o.  :  1990                                          MRN:  [id number]         Date:  3/15/2024      Surgeon: Surgeon(s):  Jorge Gupta MD    Procedure: Procedure(s):  OPEN COLOSTOMY CLOSURE WITH COLORECTAL ANASTOMOSIS;  FLEXIBLE SIGMOIDOSCOPY    Medications prior to admission:   Prior to Admission medications    Medication Sig Start Date End Date Taking? Authorizing Provider   carvedilol (COREG) 25 MG tablet Take 1 tablet by mouth 2 times daily (with meals) 24  Pooja Nuñez APRN - CNP   lisinopril-hydroCHLOROthiazide (PRINZIDE;ZESTORETIC) 20-25 MG per tablet Take 1 tablet by mouth every morning 24   Pooja Nuñez APRN - CNP   spironolactone (ALDACTONE) 25 MG tablet TAKE ONE TABLET BY MOUTH EVERY MORNING, THEN TAKE ONE TABLET BY MOUTH EVERY EVENING 24   Pooja Nuñez APRN - CNP   vitamin D (ERGOCALCIFEROL) 1.25 MG (80796 UT) CAPS capsule Take 1 capsule by mouth once a week 10/29/23   Pooja Nuñez APRN - CNP   Multiple Vitamin (MULTI-VITAMIN PO) Take by mouth daily    Provider, MD Fernando       Current medications:    Current Facility-Administered Medications   Medication Dose Route Frequency Provider Last Rate Last Admin   • 0.9 % sodium chloride infusion   IntraVENous Continuous Jorge Gupta MD           Allergies:  No Known Allergies    Problem List:    Patient Active Problem List   Diagnosis Code   • Diverticulitis K57.92   • Class 3 severe obesity due to excess calories with serious comorbidity and body mass index (BMI) of 40.0 to 44.9 in adult ([facility]) E66.01, Z68.41   • Primary hypertension I10   • Colostomy care ([facility]) Z43.3   • History of prediabetes Z87.898   • Vitamin D insufficiency E55.9       Past Medical History:        Diagnosis Date   • Acute cough 2023   • Colostomy care ([facility]) 2023   • Diabetes mellitus ([facility])     patient denies

## 2024-03-15 NOTE — PROGRESS NOTES
Pharmacist Review and Automatic Dose Adjustment of Prophylactic Enoxaparin         The reviewing pharmacist has made an adjustment to the ordered enoxaparin dose or converted to UFH per the approved [facility] protocol and table as identified below.        Jaylen Adhikari is a 33 y.o. male.     Recent Labs     03/15/24  0623   CREATININE 1.0       Estimated Creatinine Clearance: 155 mL/min (based on SCr of 1 mg/dL).    No results for input(s): \"HGB\", \"HCT\", \"PLT\" in the last 72 hours.  No results for input(s): \"INR\" in the last 72 hours.    Height:   Ht Readings from Last 1 Encounters:   02/14/24 1.88 m (6' 2\")     Weight:  Wt Readings from Last 1 Encounters:   03/15/24 (!) 142.4 kg (314 lb)               Plan: Based upon the patient's weight and renal function    Ordered: Enoxaparin 40mg SUBQ Daily    Changed/converted to    New Order: Enoxaparin 30mg SUBQ BID      Thank you,  Coleen Dominguez [facility]  3/15/2024, 1:09 PM

## 2024-03-15 NOTE — H&P
[facility] GENERAL AND LAPAROSCOPIC SURGERY                       PATIENT NAME: Jaylen Adhikari     ADMISSION DATE: 3/15/2024  5:38 AM      TODAY'S DATE: 3/15/2024      HISTORY OF PRESENT ILLNESS:              The patient is a 33 y.o. male who presents with a colostomy, had diverticulitis, prior surgery for resection had a post op leak, had Guzman's thereafter. Now has colostomy in place..    Past Medical History:        Diagnosis Date    Acute cough 05/06/2023    Colostomy care (HCC) 06/12/2023    Diabetes mellitus (HCC)     patient denies   resolvedper patient    diverticulitis     with hx of microperforations    Diverticulitis of large intestine with perforation and abscess with bleeding 07/28/2022    HTN (hypertension)     Hypertensive urgency 08/01/2022    Impaired glucose regulation 12/19/2022    Leukocytosis 05/06/2023    Open abdominal wall wound 05/05/2023    Perforated sigmoid colon (HCC) 04/29/2023       Past Surgical History:        Procedure Laterality Date    ABDOMEN SURGERY N/A 5/9/2023    SECONDARY ABDOMINAL WOUND CLOSURE performed by Jorge Gupta MD at [facility] OR    ARM SURGERY Left     elbow screws and plates, removal of hardware    COLECTOMY N/A 4/25/2023    ROBOT ASSISTED LAPAROSCOPIC SIGMOIDECTOMY performed by Darell Roche MD at [facility] OR    COLONOSCOPY      CYSTOSCOPY Bilateral 4/25/2023    CYSTOSCOPY WITH BILATERAL URETERAL STENT PLACEMENT, VAZQUEZ CATHETER INSERTION performed by Sonu Sanchez MD at [facility] OR    LAPAROSCOPY N/A 4/29/2023    EXPLORATORY LAPAROTOMY WITH RECTOSIGMOID RESECTION & COLOSTOMY CREATION performed by Jorge Gupta MD at [facility] OR    PROCTOSIGMOIDOSCOPY N/A 4/25/2023    FLEXIBLE SIGMOIDOSCOPY performed by Darell Roche MD at [facility] OR       Current Medications:   Current Facility-Administered Medications: 0.9 % sodium chloride infusion, , IntraVENous, Continuous  acetaminophen (TYLENOL) tablet 1,000 mg, 1,000 mg, Oral, Once  sodium chloride

## 2024-03-15 NOTE — CARE COORDINATION
Case Management Assessment  Initial Evaluation    Date/Time of Evaluation: 3/15/2024 2:30 PM  Assessment Completed by: Chad Kumar Jr, RN    If patient is discharged prior to next notation, then this note serves as note for discharge by case management.    Patient Name: Jaylen Adhikari                   YOB: 1990  Diagnosis: Colostomy in place (HCC) [Z93.3]                   Date / Time: 3/15/2024  5:38 AM    Patient Admission Status: Inpatient   Readmission Risk (Low < 19, Mod (19-27), High > 27): Readmission Risk Score: 12.4    Current PCP: Pooja Nuñez APRN - CNP  PCP verified by CM? (P) Yes    Chart Reviewed: Yes      History Provided by: (P) Patient  Patient Orientation: (P) Alert and Oriented    Patient Cognition: (P) Alert    Hospitalization in the last 30 days (Readmission):  No    If yes, Readmission Assessment in  Navigator will be completed.    Advance Directives:      Code Status: Full Code   Patient's Primary Decision Maker is: (P) Legal Next of Kin      Discharge Planning:    Patient lives with: (P) Spouse/Significant Other Type of Home: (P) House  Primary Care Giver: (P) Self  Patient Support Systems include: (P) Spouse/Significant Other, Family Members   Current Financial resources: (P) None  Current community resources: (P) None  Current services prior to admission: (P) None            Current DME:              Type of Home Care services:  (P) None    ADLS  Prior functional level: (P) Independent in ADLs/IADLs  Current functional level: (P) Independent in ADLs/IADLs    PT AM-PAC:   /24  OT AM-PAC:   /24    Family can provide assistance at DC: (P) Yes  Would you like Case Management to discuss the discharge plan with any other family members/significant others, and if so, who? (P) No  Plans to Return to Present Housing: (P) Yes  Other Identified Issues/Barriers to RETURNING to current housing:   Potential Assistance needed at discharge: (P) Other (Comment) (none)

## 2024-03-15 NOTE — ANESTHESIA POSTPROCEDURE EVALUATION
Department of Anesthesiology  Postprocedure Note    Patient: Jaylen Adhikari  MRN: [id number]  YOB: 1990  Date of evaluation: 3/15/2024    Procedure Summary       Date: 03/15/24 Room / Location: [address]    Anesthesia Start: 0730 Anesthesia Stop: 1133    Procedures:       OPEN COLOSTOMY CLOSURE WITH COLORECTAL ANASTOMOSIS (Abdomen)      FLEXIBLE SIGMOIDOSCOPY      APPENDECTOMY (Abdomen) Diagnosis:       Colostomy in place (HCC)      (Colostomy in place (HCC) [Z93.3])    Surgeons: Jorge Gupta MD Responsible Provider: JUSTICE Beyer MD    Anesthesia Type: general ASA Status: 3            Anesthesia Type: No value filed.    Florence Phase I: Florence Score: 10    Florence Phase II:      Anesthesia Post Evaluation    Patient location during evaluation: bedside  Patient participation: complete - patient participated  Level of consciousness: awake and alert  Pain score: 3  Airway patency: patent  Nausea & Vomiting: no vomiting  Cardiovascular status: hemodynamically stable  Respiratory status: nonlabored ventilation  Hydration status: stable  Multimodal analgesia pain management approach  Pain management: adequate    No notable events documented.

## 2024-03-15 NOTE — PROGRESS NOTES
Pt to PACU from OR.  Post op 1) OPEN COLOSTOMY CLOSURE WITH COLORECTAL ANASTOMOSIS 2) FLEXIBLE SIGMOIDOSCOPY 3) APPENDECTOMY.   VSS.  Pt arouses to voice.   Abd midline incision site CDI, ice applied. Wound vac in place.  NSR noted.  Family updated.  Will continue to monitor.

## 2024-03-15 NOTE — PROGRESS NOTES
Phase 1 complete, pt seen by anesthesiologist. VSS, pt resting comfortably. Abd incision site CDI, ice applied. Wound vac in place.  Galvez in place.  Family updated. Pt is eating ice chips.  Belongings on bed.  Pain 5/10, tolerable.  Called floor.

## 2024-03-16 LAB
ANION GAP SERPL CALCULATED.3IONS-SCNC: 10 MMOL/L (ref 3–16)
BASOPHILS # BLD: 0 K/UL (ref 0–0.2)
BASOPHILS NFR BLD: 0 %
BUN SERPL-MCNC: 18 MG/DL (ref 7–20)
CALCIUM SERPL-MCNC: 8.9 MG/DL (ref 8.3–10.6)
CHLORIDE SERPL-SCNC: 105 MMOL/L (ref 99–110)
CO2 SERPL-SCNC: 22 MMOL/L (ref 21–32)
CREAT SERPL-MCNC: 0.8 MG/DL (ref 0.9–1.3)
DEPRECATED RDW RBC AUTO: 14 % (ref 12.4–15.4)
EOSINOPHIL # BLD: 0 K/UL (ref 0–0.6)
EOSINOPHIL NFR BLD: 0 %
GFR SERPLBLD CREATININE-BSD FMLA CKD-EPI: >60 ML/MIN/{1.73_M2}
GLUCOSE SERPL-MCNC: 173 MG/DL (ref 70–99)
HCT VFR BLD AUTO: 43 % (ref 40.5–52.5)
HGB BLD-MCNC: 15.1 G/DL (ref 13.5–17.5)
LYMPHOCYTES # BLD: 1.8 K/UL (ref 1–5.1)
LYMPHOCYTES NFR BLD: 6 %
MCH RBC QN AUTO: 32.7 PG (ref 26–34)
MCHC RBC AUTO-ENTMCNC: 35.1 G/DL (ref 31–36)
MCV RBC AUTO: 93.2 FL (ref 80–100)
MONOCYTES # BLD: 1.1 K/UL (ref 0–1.3)
MONOCYTES NFR BLD: 5 %
NEUTROPHILS # BLD: 19.1 K/UL (ref 1.7–7.7)
NEUTROPHILS NFR BLD: 83 %
NEUTS BAND NFR BLD MANUAL: 4 % (ref 0–7)
PLATELET # BLD AUTO: 311 K/UL (ref 135–450)
PLATELET BLD QL SMEAR: ADEQUATE
PMV BLD AUTO: 8.1 FL (ref 5–10.5)
POTASSIUM SERPL-SCNC: 4.1 MMOL/L (ref 3.5–5.1)
RBC # BLD AUTO: 4.61 M/UL (ref 4.2–5.9)
RBC MORPH BLD: NORMAL
SLIDE REVIEW: ABNORMAL
SODIUM SERPL-SCNC: 137 MMOL/L (ref 136–145)
VARIANT LYMPHS NFR BLD MANUAL: 2 % (ref 0–6)
WBC # BLD AUTO: 21.9 K/UL (ref 4–11)

## 2024-03-16 PROCEDURE — 6360000002 HC RX W HCPCS: Performed by: NURSE PRACTITIONER

## 2024-03-16 PROCEDURE — 99024 POSTOP FOLLOW-UP VISIT: CPT | Performed by: SURGERY

## 2024-03-16 PROCEDURE — 36415 COLL VENOUS BLD VENIPUNCTURE: CPT

## 2024-03-16 PROCEDURE — 80048 BASIC METABOLIC PNL TOTAL CA: CPT

## 2024-03-16 PROCEDURE — 2500000003 HC RX 250 WO HCPCS: Performed by: SURGERY

## 2024-03-16 PROCEDURE — 85025 COMPLETE CBC W/AUTO DIFF WBC: CPT

## 2024-03-16 PROCEDURE — 6360000002 HC RX W HCPCS: Performed by: SURGERY

## 2024-03-16 PROCEDURE — 6370000000 HC RX 637 (ALT 250 FOR IP): Performed by: SURGERY

## 2024-03-16 PROCEDURE — 1200000000 HC SEMI PRIVATE

## 2024-03-16 PROCEDURE — C9113 INJ PANTOPRAZOLE SODIUM, VIA: HCPCS | Performed by: NURSE PRACTITIONER

## 2024-03-16 PROCEDURE — 2580000003 HC RX 258: Performed by: SURGERY

## 2024-03-16 RX ADMIN — OXYCODONE 5 MG: 5 TABLET ORAL at 15:26

## 2024-03-16 RX ADMIN — MORPHINE SULFATE 4 MG: 4 INJECTION, SOLUTION INTRAMUSCULAR; INTRAVENOUS at 14:41

## 2024-03-16 RX ADMIN — HYDROCHLOROTHIAZIDE 25 MG: 25 TABLET ORAL at 10:02

## 2024-03-16 RX ADMIN — METOCLOPRAMIDE 10 MG: 5 INJECTION, SOLUTION INTRAMUSCULAR; INTRAVENOUS at 20:10

## 2024-03-16 RX ADMIN — ENOXAPARIN SODIUM 30 MG: 100 INJECTION SUBCUTANEOUS at 20:11

## 2024-03-16 RX ADMIN — METOCLOPRAMIDE 10 MG: 5 INJECTION, SOLUTION INTRAMUSCULAR; INTRAVENOUS at 10:11

## 2024-03-16 RX ADMIN — METOCLOPRAMIDE 10 MG: 5 INJECTION, SOLUTION INTRAMUSCULAR; INTRAVENOUS at 02:00

## 2024-03-16 RX ADMIN — MORPHINE SULFATE 4 MG: 4 INJECTION, SOLUTION INTRAMUSCULAR; INTRAVENOUS at 17:59

## 2024-03-16 RX ADMIN — ENOXAPARIN SODIUM 30 MG: 100 INJECTION SUBCUTANEOUS at 10:03

## 2024-03-16 RX ADMIN — SODIUM CHLORIDE: 9 INJECTION, SOLUTION INTRAVENOUS at 06:07

## 2024-03-16 RX ADMIN — PANTOPRAZOLE SODIUM 40 MG: 40 INJECTION, POWDER, FOR SOLUTION INTRAVENOUS at 10:00

## 2024-03-16 RX ADMIN — OXYCODONE 5 MG: 5 TABLET ORAL at 20:15

## 2024-03-16 RX ADMIN — ACETAMINOPHEN 1000 MG: 500 TABLET ORAL at 05:59

## 2024-03-16 RX ADMIN — METRONIDAZOLE 500 MG: 500 INJECTION, SOLUTION INTRAVENOUS at 10:59

## 2024-03-16 RX ADMIN — SODIUM CHLORIDE 3000 MG: 900 INJECTION INTRAVENOUS at 14:45

## 2024-03-16 RX ADMIN — METRONIDAZOLE 500 MG: 500 INJECTION, SOLUTION INTRAVENOUS at 15:20

## 2024-03-16 RX ADMIN — SPIRONOLACTONE 25 MG: 25 TABLET ORAL at 10:02

## 2024-03-16 RX ADMIN — ACETAMINOPHEN 1000 MG: 500 TABLET ORAL at 14:41

## 2024-03-16 RX ADMIN — LISINOPRIL 20 MG: 20 TABLET ORAL at 10:02

## 2024-03-16 RX ADMIN — CARVEDILOL 25 MG: 25 TABLET, FILM COATED ORAL at 17:58

## 2024-03-16 RX ADMIN — OXYCODONE 5 MG: 5 TABLET ORAL at 11:01

## 2024-03-16 RX ADMIN — MORPHINE SULFATE 4 MG: 4 INJECTION, SOLUTION INTRAMUSCULAR; INTRAVENOUS at 22:39

## 2024-03-16 RX ADMIN — SODIUM CHLORIDE, PRESERVATIVE FREE 10 ML: 5 INJECTION INTRAVENOUS at 09:53

## 2024-03-16 RX ADMIN — METOCLOPRAMIDE 10 MG: 5 INJECTION, SOLUTION INTRAMUSCULAR; INTRAVENOUS at 14:43

## 2024-03-16 RX ADMIN — SODIUM CHLORIDE 3000 MG: 900 INJECTION INTRAVENOUS at 09:56

## 2024-03-16 RX ADMIN — SODIUM CHLORIDE, PRESERVATIVE FREE 10 ML: 5 INJECTION INTRAVENOUS at 20:16

## 2024-03-16 RX ADMIN — MORPHINE SULFATE 4 MG: 4 INJECTION, SOLUTION INTRAMUSCULAR; INTRAVENOUS at 09:52

## 2024-03-16 RX ADMIN — MORPHINE SULFATE 2 MG: 2 INJECTION, SOLUTION INTRAMUSCULAR; INTRAVENOUS at 06:00

## 2024-03-16 RX ADMIN — POTASSIUM CHLORIDE, DEXTROSE MONOHYDRATE AND SODIUM CHLORIDE: 150; 5; 450 INJECTION, SOLUTION INTRAVENOUS at 20:21

## 2024-03-16 RX ADMIN — CARVEDILOL 25 MG: 25 TABLET, FILM COATED ORAL at 10:02

## 2024-03-16 ASSESSMENT — PAIN DESCRIPTION - DESCRIPTORS
DESCRIPTORS: ACHING
DESCRIPTORS: ACHING;THROBBING
DESCRIPTORS: ACHING
DESCRIPTORS: ACHING
DESCRIPTORS: ACHING;THROBBING
DESCRIPTORS: ACHING
DESCRIPTORS: ACHING;TIGHTNESS;TENDER

## 2024-03-16 ASSESSMENT — PAIN DESCRIPTION - PAIN TYPE
TYPE: SURGICAL PAIN

## 2024-03-16 ASSESSMENT — PAIN DESCRIPTION - LOCATION
LOCATION: ABDOMEN

## 2024-03-16 ASSESSMENT — PAIN DESCRIPTION - ORIENTATION
ORIENTATION: MID

## 2024-03-16 ASSESSMENT — PAIN - FUNCTIONAL ASSESSMENT: PAIN_FUNCTIONAL_ASSESSMENT: PREVENTS OR INTERFERES SOME ACTIVE ACTIVITIES AND ADLS

## 2024-03-16 ASSESSMENT — PAIN SCALES - GENERAL
PAINLEVEL_OUTOF10: 7
PAINLEVEL_OUTOF10: 7
PAINLEVEL_OUTOF10: 0
PAINLEVEL_OUTOF10: 6
PAINLEVEL_OUTOF10: 8
PAINLEVEL_OUTOF10: 7
PAINLEVEL_OUTOF10: 5
PAINLEVEL_OUTOF10: 7
PAINLEVEL_OUTOF10: 7
PAINLEVEL_OUTOF10: 5
PAINLEVEL_OUTOF10: 4
PAINLEVEL_OUTOF10: 0
PAINLEVEL_OUTOF10: 7

## 2024-03-16 ASSESSMENT — PAIN DESCRIPTION - ONSET: ONSET: ON-GOING

## 2024-03-16 ASSESSMENT — PAIN DESCRIPTION - FREQUENCY: FREQUENCY: INTERMITTENT

## 2024-03-16 NOTE — PROGRESS NOTES
3:10 PM  Frank catheter removed @1500. Pt walked 25 ft with nurse assist. Bowels sound active. Pt denies passing flatus. Pain well controlled at this time. Wound vac dressing clean, dry, intact. Pt denies N/V, endorses right sided shoulder pain after ambulating to chair, pt educated - pt verbalized understanding of education. Pt stable and denied needs when writer left room.    6:14 PM  Pt voiding after frank catheter removal. Pt voided 200 ml clear, rajat urine.

## 2024-03-16 NOTE — PROGRESS NOTES
Jaylen Adhikari is a 33 y.o. male patient.    Current Facility-Administered Medications   Medication Dose Route Frequency Provider Last Rate Last Admin    ceFAZolin Sodium (ANCEF) 3,000 mg in sodium chloride 0.9 % 100 mL (mini-bag)  3,000 mg IntraVENous Q8H Jorge Gupta  mL/hr at 03/16/24 0956 3,000 mg at 03/16/24 0956    metroNIDAZOLE (FLAGYL) 500 mg in 0.9% NaCl 100 mL IVPB premix  500 mg IntraVENous Q8H Jorge Gupta MD   Stopped at 03/16/24 0010    carvedilol (COREG) tablet 25 mg  25 mg Oral BID WC Jorge Gupta MD   25 mg at 03/16/24 1002    spironolactone (ALDACTONE) tablet 25 mg  25 mg Oral Daily Jorge Gupta MD   25 mg at 03/16/24 1002    dextrose 5 % and 0.45 % NaCl with KCl 20 mEq infusion   IntraVENous Continuous Jorge Gupta MD 75 mL/hr at 03/15/24 1415 New Bag at 03/15/24 1415    sodium chloride flush 0.9 % injection 5-40 mL  5-40 mL IntraVENous 2 times per day Jorge Gupta MD   10 mL at 03/16/24 0953    sodium chloride flush 0.9 % injection 5-40 mL  5-40 mL IntraVENous PRN Jorge Gupta MD        0.9 % sodium chloride infusion   IntraVENous PRN Jorge Gupta MD        acetaminophen (TYLENOL) tablet 1,000 mg  1,000 mg Oral q8h Jorge Gupta MD   1,000 mg at 03/16/24 0559    oxyCODONE (ROXICODONE) immediate release tablet 5 mg  5 mg Oral Q4H PRN Jorge Gupta MD        morphine (PF) injection 2 mg  2 mg IntraVENous Q2H PRN Jorge Gupta MD   2 mg at 03/16/24 0600    Or    morphine injection 4 mg  4 mg IntraVENous Q2H PRN Jorge Gupta MD   4 mg at 03/16/24 0952    ondansetron (ZOFRAN) injection 4 mg  4 mg IntraVENous Q4H PRN Jorge Gupta MD        enoxaparin Sodium (LOVENOX) injection 30 mg  30 mg SubCUTAneous BID Jorge Gupta MD   30 mg at 03/16/24 1003    metoclopramide (REGLAN) injection 10 mg  10 mg IntraVENous Q6H Jorge Gupta  MD MURPHY   10 mg at 03/16/24 1011    lisinopril (PRINIVIL;ZESTRIL) tablet 20 mg  20 mg Oral Daily Jorge Gupta MD   20 mg at 03/16/24 1002    And    hydroCHLOROthiazide (HYDRODIURIL) tablet 25 mg  25 mg Oral Daily Jorge Gupta MD   25 mg at 03/16/24 1002    pantoprazole (PROTONIX) injection 40 mg  40 mg IntraVENous Daily Mildred Solares APRN - CNP   40 mg at 03/16/24 1000    calcium carbonate (TUMS) chewable tablet 500 mg  500 mg Oral TID PRN Mildred Solares APRN - CNP         No Known Allergies  Principal Problem:    Colostomy in place (HCC)  Resolved Problems:    * No resolved hospital problems. *    Blood pressure (!) 161/97, pulse 81, temperature 97.6 °F (36.4 °C), temperature source Oral, resp. rate 17, height 1.88 m (6' 2\"), weight (!) 142.4 kg (314 lb), SpO2 94 %.    Subjective:  Symptoms:  Stable.    Diet:  NPO.    Activity level: Impaired due to pain.    Pain:  He complains of pain that is moderate.      Objective:  General Appearance:  Comfortable.    Vital signs: (most recent): Blood pressure (!) 161/97, pulse 81, temperature 97.6 °F (36.4 °C), temperature source Oral, resp. rate 17, height 1.88 m (6' 2\"), weight (!) 142.4 kg (314 lb), SpO2 94 %.    Output: Producing urine.    Lungs:  Normal effort and normal respiratory rate.    Abdomen: Abdomen is soft and non-distended.  (Abdomen appropriately tender  Wound VAC in place).    Neurological: Patient is alert and oriented to person, place and time.    Skin:  Warm and dry.      White blood count-21.9    Assessment & Plan 33-year-old male who is postop day #1 status post reversal of end sigmoid colostomy.  Doing fairly well.  Will remove Galvez catheter.  Clear liquid diet.  OOB.  Repeat labs in AM.    Nic Reagan MD  3/16/2024

## 2024-03-16 NOTE — CONSULTS
HOSPITALISTS Consult note    3/15/2024 9:54 PM    Patient Information:  JAYLEN ADHIKARI is a 33 y.o. male [id number]  PCP:  Pooja Nuñez APRN - CNP (Tel: [phone] )    Reason for Consult  Medical maangement  History of Present Illness:  Jaylen Adhikari is a 33 y.o. male who who underwent reversal and sigmoid colostomy.  Hospital service asked to consult for medical management patient has a history of hypertension denies any other medical issues no significant smoking or drinking patient denies any fevers chills cough abdominal pain is controlled with medications    REVIEW OF SYSTEMS:   Constitutional: Negative for fever,chills or night sweats  ENT: Negative for rhinorrhea, epistaxis, hoarseness, sore throat.  Respiratory: Negative for shortness of breath,wheezing  Cardiovascular: Negative for chest pain, palpitations   Genitourinary: Negative for polyuria, dysuria   Hematologic/Lymphatic: Negative for bleeding tendency, easy bruising  Musculoskeletal: Negative for myalgias and arthralgias  Neurologic: Negative for confusion,dysarthria.  Skin: Negative for itching,rash  Psychiatric: Negative for depression,anxiety, agitation.  Endocrine: Negative for polydipsia,polyuria,heat /cold intolerance.    Past Medical History:   has a past medical history of Acute cough, Colostomy care (HCC), Diabetes mellitus (HCC), diverticulitis, Diverticulitis of large intestine with perforation and abscess with bleeding, HTN (hypertension), Hypertensive urgency, Impaired glucose regulation, Leukocytosis, Open abdominal wall wound, and Perforated sigmoid colon (HCC).     Past Surgical History:   has a past surgical history that includes Arm Surgery (Left); Colonoscopy; colectomy (N/A, 4/25/2023); proctosigmoidoscopy (N/A, 4/25/2023); Cystoscopy (Bilateral, 4/25/2023); laparoscopy (N/A, 4/29/2023); Abdomen surgery (N/A, 5/9/2023); Small

## 2024-03-16 NOTE — PROGRESS NOTES
[facility]    3/16/2024 4:06 PM        Name: Jaylen Adhikari .              Admitted: 3/15/2024  Primary Care Provider: Pooja Nuñez APRN - CNP (Tel: [phone])      Subjective:      Patient lying bed no nausea vomiting pain is under control  Reviewed interval ancillary notes    Current Medications  ceFAZolin Sodium (ANCEF) 3,000 mg in sodium chloride 0.9 % 100 mL (mini-bag), Q8H  metroNIDAZOLE (FLAGYL) 500 mg in 0.9% NaCl 100 mL IVPB premix, Q8H  carvedilol (COREG) tablet 25 mg, BID WC  spironolactone (ALDACTONE) tablet 25 mg, Daily  dextrose 5 % and 0.45 % NaCl with KCl 20 mEq infusion, Continuous  sodium chloride flush 0.9 % injection 5-40 mL, 2 times per day  sodium chloride flush 0.9 % injection 5-40 mL, PRN  0.9 % sodium chloride infusion, PRN  acetaminophen (TYLENOL) tablet 1,000 mg, q8h  oxyCODONE (ROXICODONE) immediate release tablet 5 mg, Q4H PRN  morphine (PF) injection 2 mg, Q2H PRN   Or  morphine injection 4 mg, Q2H PRN  ondansetron (ZOFRAN) injection 4 mg, Q4H PRN  enoxaparin Sodium (LOVENOX) injection 30 mg, BID  metoclopramide (REGLAN) injection 10 mg, Q6H  lisinopril (PRINIVIL;ZESTRIL) tablet 20 mg, Daily   And  hydroCHLOROthiazide (HYDRODIURIL) tablet 25 mg, Daily  pantoprazole (PROTONIX) injection 40 mg, Daily  calcium carbonate (TUMS) chewable tablet 500 mg, TID PRN        Objective:  /86   Pulse 88   Temp 97.9 °F (36.6 °C) (Oral)   Resp 17   Ht 1.88 m (6' 2\")   Wt (!) 142.4 kg (314 lb)   SpO2 92%   BMI 42.59 kg/m²     Intake/Output Summary (Last 24 hours) at 3/16/2024 1606  Last data filed at 3/16/2024 1509  Gross per 24 hour   Intake 360 ml   Output 1550 ml   Net -1190 ml      Wt Readings from Last 3 Encounters:   03/15/24 (!) 142.4 kg (314 lb)   02/29/24 (!) 147.9 kg (326 lb)   01/30/24 (!) 147.9 kg (326 lb)       General appearance:  Appears comfortable. AAOx3  HEENT:  atraumatic, Pupils equal, muscous membranes moist, no masses appreciated  Cardiovascular: Regular rate and rhythm no murmurs appreciated  Respiratory: CTAB no wheezing  Gastrointestinal: wound vac in place soft   EXT: no edema  Neurology: no gross focal deficts  Psychiatry: Appropriate affect. Not agitated  Skin: Warm, dry, no rashes appreciated    Labs and Tests:  CBC:   Recent Labs     03/16/24  0524   WBC 21.9*   HGB 15.1        BMP:    Recent Labs     03/15/24  0623 03/16/24  0524    137   K 3.9 4.1    105   CO2 23 22   BUN 18 18   CREATININE 1.0 0.8*   GLUCOSE 132* 173*     Hepatic: No results for input(s): \"AST\", \"ALT\", \"ALB\", \"BILITOT\", \"ALKPHOS\" in the last 72 hours.  No orders to display       Recent imaging reviewed    Problem List  Principal Problem:    Colostomy in place (HCC)  Resolved Problems:    * No resolved hospital problems. *       Assessment/Plan:   status post reversal of end sigmoid colostomy  Per surgery  Leukocytosis likely reactive repeat in am     Htn rmonitor       Asim Swan MD   3/16/2024 4:06 PM

## 2024-03-17 LAB
ANION GAP SERPL CALCULATED.3IONS-SCNC: 9 MMOL/L (ref 3–16)
BUN SERPL-MCNC: 11 MG/DL (ref 7–20)
CALCIUM SERPL-MCNC: 8.7 MG/DL (ref 8.3–10.6)
CHLORIDE SERPL-SCNC: 102 MMOL/L (ref 99–110)
CO2 SERPL-SCNC: 25 MMOL/L (ref 21–32)
CREAT SERPL-MCNC: 0.7 MG/DL (ref 0.9–1.3)
DEPRECATED RDW RBC AUTO: 13.9 % (ref 12.4–15.4)
GFR SERPLBLD CREATININE-BSD FMLA CKD-EPI: >60 ML/MIN/{1.73_M2}
GLUCOSE SERPL-MCNC: 149 MG/DL (ref 70–99)
HCT VFR BLD AUTO: 41.6 % (ref 40.5–52.5)
HGB BLD-MCNC: 14.4 G/DL (ref 13.5–17.5)
MCH RBC QN AUTO: 32.4 PG (ref 26–34)
MCHC RBC AUTO-ENTMCNC: 34.6 G/DL (ref 31–36)
MCV RBC AUTO: 93.6 FL (ref 80–100)
PLATELET # BLD AUTO: 264 K/UL (ref 135–450)
PMV BLD AUTO: 7.8 FL (ref 5–10.5)
POTASSIUM SERPL-SCNC: 3.9 MMOL/L (ref 3.5–5.1)
RBC # BLD AUTO: 4.44 M/UL (ref 4.2–5.9)
SODIUM SERPL-SCNC: 136 MMOL/L (ref 136–145)
WBC # BLD AUTO: 18.5 K/UL (ref 4–11)

## 2024-03-17 PROCEDURE — 36415 COLL VENOUS BLD VENIPUNCTURE: CPT

## 2024-03-17 PROCEDURE — 99024 POSTOP FOLLOW-UP VISIT: CPT | Performed by: SURGERY

## 2024-03-17 PROCEDURE — 2580000003 HC RX 258: Performed by: SURGERY

## 2024-03-17 PROCEDURE — C9113 INJ PANTOPRAZOLE SODIUM, VIA: HCPCS | Performed by: NURSE PRACTITIONER

## 2024-03-17 PROCEDURE — 1200000000 HC SEMI PRIVATE

## 2024-03-17 PROCEDURE — 6360000002 HC RX W HCPCS: Performed by: NURSE PRACTITIONER

## 2024-03-17 PROCEDURE — 2500000003 HC RX 250 WO HCPCS: Performed by: SURGERY

## 2024-03-17 PROCEDURE — 6370000000 HC RX 637 (ALT 250 FOR IP): Performed by: SURGERY

## 2024-03-17 PROCEDURE — 6360000002 HC RX W HCPCS: Performed by: SURGERY

## 2024-03-17 PROCEDURE — 85027 COMPLETE CBC AUTOMATED: CPT

## 2024-03-17 PROCEDURE — 80048 BASIC METABOLIC PNL TOTAL CA: CPT

## 2024-03-17 RX ORDER — LISINOPRIL 20 MG/1
20 TABLET ORAL DAILY
Status: ACTIVE | OUTPATIENT
Start: 2024-03-17 | End: 2024-03-18

## 2024-03-17 RX ORDER — LISINOPRIL 20 MG/1
40 TABLET ORAL DAILY
Status: DISCONTINUED | OUTPATIENT
Start: 2024-03-18 | End: 2024-03-23 | Stop reason: HOSPADM

## 2024-03-17 RX ORDER — HYDROCHLOROTHIAZIDE 25 MG/1
25 TABLET ORAL DAILY
Status: DISCONTINUED | OUTPATIENT
Start: 2024-03-18 | End: 2024-03-23 | Stop reason: HOSPADM

## 2024-03-17 RX ADMIN — CARVEDILOL 25 MG: 25 TABLET, FILM COATED ORAL at 17:20

## 2024-03-17 RX ADMIN — OXYCODONE 5 MG: 5 TABLET ORAL at 22:13

## 2024-03-17 RX ADMIN — CARVEDILOL 25 MG: 25 TABLET, FILM COATED ORAL at 08:11

## 2024-03-17 RX ADMIN — OXYCODONE 5 MG: 5 TABLET ORAL at 18:07

## 2024-03-17 RX ADMIN — METOCLOPRAMIDE 10 MG: 5 INJECTION, SOLUTION INTRAMUSCULAR; INTRAVENOUS at 08:08

## 2024-03-17 RX ADMIN — SPIRONOLACTONE 25 MG: 25 TABLET ORAL at 08:11

## 2024-03-17 RX ADMIN — METRONIDAZOLE 500 MG: 500 INJECTION, SOLUTION INTRAVENOUS at 00:58

## 2024-03-17 RX ADMIN — MORPHINE SULFATE 4 MG: 4 INJECTION, SOLUTION INTRAMUSCULAR; INTRAVENOUS at 23:37

## 2024-03-17 RX ADMIN — SODIUM CHLORIDE 3000 MG: 900 INJECTION INTRAVENOUS at 00:05

## 2024-03-17 RX ADMIN — ENOXAPARIN SODIUM 30 MG: 100 INJECTION SUBCUTANEOUS at 08:11

## 2024-03-17 RX ADMIN — OXYCODONE 5 MG: 5 TABLET ORAL at 13:14

## 2024-03-17 RX ADMIN — MORPHINE SULFATE 4 MG: 4 INJECTION, SOLUTION INTRAMUSCULAR; INTRAVENOUS at 08:09

## 2024-03-17 RX ADMIN — MORPHINE SULFATE 4 MG: 4 INJECTION, SOLUTION INTRAMUSCULAR; INTRAVENOUS at 17:18

## 2024-03-17 RX ADMIN — METOCLOPRAMIDE 10 MG: 5 INJECTION, SOLUTION INTRAMUSCULAR; INTRAVENOUS at 01:55

## 2024-03-17 RX ADMIN — MORPHINE SULFATE 4 MG: 4 INJECTION, SOLUTION INTRAMUSCULAR; INTRAVENOUS at 11:17

## 2024-03-17 RX ADMIN — MORPHINE SULFATE 4 MG: 4 INJECTION, SOLUTION INTRAMUSCULAR; INTRAVENOUS at 01:55

## 2024-03-17 RX ADMIN — ACETAMINOPHEN 1000 MG: 500 TABLET ORAL at 23:38

## 2024-03-17 RX ADMIN — ACETAMINOPHEN 1000 MG: 500 TABLET ORAL at 17:20

## 2024-03-17 RX ADMIN — ACETAMINOPHEN 1000 MG: 500 TABLET ORAL at 00:05

## 2024-03-17 RX ADMIN — MORPHINE SULFATE 4 MG: 4 INJECTION, SOLUTION INTRAMUSCULAR; INTRAVENOUS at 20:16

## 2024-03-17 RX ADMIN — OXYCODONE 5 MG: 5 TABLET ORAL at 05:11

## 2024-03-17 RX ADMIN — PANTOPRAZOLE SODIUM 40 MG: 40 INJECTION, POWDER, FOR SOLUTION INTRAVENOUS at 08:07

## 2024-03-17 RX ADMIN — ACETAMINOPHEN 1000 MG: 500 TABLET ORAL at 06:48

## 2024-03-17 RX ADMIN — HYDROCHLOROTHIAZIDE 25 MG: 25 TABLET ORAL at 08:11

## 2024-03-17 RX ADMIN — LISINOPRIL 20 MG: 20 TABLET ORAL at 08:11

## 2024-03-17 RX ADMIN — SODIUM CHLORIDE, PRESERVATIVE FREE 10 ML: 5 INJECTION INTRAVENOUS at 08:07

## 2024-03-17 RX ADMIN — POTASSIUM CHLORIDE, DEXTROSE MONOHYDRATE AND SODIUM CHLORIDE: 150; 5; 450 INJECTION, SOLUTION INTRAVENOUS at 23:43

## 2024-03-17 RX ADMIN — OXYCODONE 5 MG: 5 TABLET ORAL at 00:42

## 2024-03-17 ASSESSMENT — PAIN DESCRIPTION - LOCATION
LOCATION: ABDOMEN

## 2024-03-17 ASSESSMENT — PAIN SCALES - GENERAL
PAINLEVEL_OUTOF10: 6
PAINLEVEL_OUTOF10: 7
PAINLEVEL_OUTOF10: 7
PAINLEVEL_OUTOF10: 8
PAINLEVEL_OUTOF10: 4
PAINLEVEL_OUTOF10: 7
PAINLEVEL_OUTOF10: 5
PAINLEVEL_OUTOF10: 7
PAINLEVEL_OUTOF10: 4
PAINLEVEL_OUTOF10: 8
PAINLEVEL_OUTOF10: 10
PAINLEVEL_OUTOF10: 6
PAINLEVEL_OUTOF10: 7
PAINLEVEL_OUTOF10: 4
PAINLEVEL_OUTOF10: 8
PAINLEVEL_OUTOF10: 10

## 2024-03-17 ASSESSMENT — PAIN DESCRIPTION - ORIENTATION
ORIENTATION: MID

## 2024-03-17 ASSESSMENT — PAIN DESCRIPTION - DESCRIPTORS
DESCRIPTORS: ACHING

## 2024-03-17 ASSESSMENT — PAIN DESCRIPTION - PAIN TYPE: TYPE: SURGICAL PAIN

## 2024-03-17 ASSESSMENT — PAIN - FUNCTIONAL ASSESSMENT: PAIN_FUNCTIONAL_ASSESSMENT: ACTIVITIES ARE NOT PREVENTED

## 2024-03-17 NOTE — PROGRESS NOTES
Jaylen Adhikari is a 33 y.o. male patient.    Current Facility-Administered Medications   Medication Dose Route Frequency Provider Last Rate Last Admin    carvedilol (COREG) tablet 25 mg  25 mg Oral BID  Jorge Gupta MD   25 mg at 03/17/24 0811    spironolactone (ALDACTONE) tablet 25 mg  25 mg Oral Daily Jorge Gupta MD   25 mg at 03/17/24 0811    dextrose 5 % and 0.45 % NaCl with KCl 20 mEq infusion   IntraVENous Continuous Jorge Gupta MD 75 mL/hr at 03/16/24 2021 New Bag at 03/16/24 2021    sodium chloride flush 0.9 % injection 5-40 mL  5-40 mL IntraVENous 2 times per day Jorge Gupta MD   10 mL at 03/17/24 0807    sodium chloride flush 0.9 % injection 5-40 mL  5-40 mL IntraVENous PRN Jorge Gupta MD        0.9 % sodium chloride infusion   IntraVENous PRN Jorge Gupta MD        acetaminophen (TYLENOL) tablet 1,000 mg  1,000 mg Oral q8h Jorge Gupta MD   1,000 mg at 03/17/24 0648    oxyCODONE (ROXICODONE) immediate release tablet 5 mg  5 mg Oral Q4H PRN Jorge Gupta MD   5 mg at 03/17/24 0511    morphine (PF) injection 2 mg  2 mg IntraVENous Q2H PRN Jorge Gupta MD   2 mg at 03/16/24 0600    Or    morphine injection 4 mg  4 mg IntraVENous Q2H PRN Jorge Gupta MD   4 mg at 03/17/24 0809    ondansetron (ZOFRAN) injection 4 mg  4 mg IntraVENous Q4H PRN Jorge Gupta MD        enoxaparin Sodium (LOVENOX) injection 30 mg  30 mg SubCUTAneous BID Jorge Gupta MD   30 mg at 03/17/24 0811    lisinopril (PRINIVIL;ZESTRIL) tablet 20 mg  20 mg Oral Daily Jorge Gupta MD   20 mg at 03/17/24 0811    And    hydroCHLOROthiazide (HYDRODIURIL) tablet 25 mg  25 mg Oral Daily Jorge Gupta MD   25 mg at 03/17/24 0811    pantoprazole (PROTONIX) injection 40 mg  40 mg IntraVENous Daily Mildred Solares APRN - CNP   40 mg at 03/17/24 0807    calcium carbonate (TUMS)  chewable tablet 500 mg  500 mg Oral TID PRN SujathaMildred huggins, APRN - CNP         No Known Allergies  Principal Problem:    Colostomy in place (HCC)  Resolved Problems:    * No resolved hospital problems. *    Blood pressure (!) 162/105, pulse 98, temperature 97.7 °F (36.5 °C), temperature source Oral, resp. rate 17, height 1.88 m (6' 2\"), weight (!) 142.4 kg (314 lb), SpO2 94 %.    Subjective:  Symptoms:  Stable.    Diet:  Dietary issues: Tolerating clear liquids.    Activity level: Impaired due to pain.    Pain:  He complains of pain that is moderate.      Objective:  General Appearance:  Comfortable.    Vital signs: (most recent): Blood pressure (!) 162/105, pulse 98, temperature 97.7 °F (36.5 °C), temperature source Oral, resp. rate 17, height 1.88 m (6' 2\"), weight (!) 142.4 kg (314 lb), SpO2 94 %.    Output: Producing urine and no stool output.    Lungs:  Normal effort and normal respiratory rate.    Abdomen: Abdomen is soft and non-distended.  (Wound VAC change.  Midline abdominal wound and left lower quadrant transverse wounds are clean.).    Neurological: Patient is alert and oriented to person, place and time.    Skin:  Warm and dry.      Assessment & Plan 33-year-old male who is postop day #2 status post reversal of sigmoid colostomy.  Doing well.  Tolerating clear liquids.  No bowel function.  Wound VAC changed.  Hold at clear liquid diet until bowel function resumes.  Increase ambulation.    Nic Reagan MD  3/17/2024

## 2024-03-17 NOTE — PROGRESS NOTES
[facility]    3/17/2024 10:56 AM        Name: Jaylen Adhikari .              Admitted: 3/15/2024  Primary Care Provider: Pooja Nuñez APRN - CNP (Tel: [phone])      Subjective:    Tolerating clears pain under control  Reviewed interval ancillary notes    Current Medications  carvedilol (COREG) tablet 25 mg, BID WC  spironolactone (ALDACTONE) tablet 25 mg, Daily  dextrose 5 % and 0.45 % NaCl with KCl 20 mEq infusion, Continuous  sodium chloride flush 0.9 % injection 5-40 mL, 2 times per day  sodium chloride flush 0.9 % injection 5-40 mL, PRN  0.9 % sodium chloride infusion, PRN  acetaminophen (TYLENOL) tablet 1,000 mg, q8h  oxyCODONE (ROXICODONE) immediate release tablet 5 mg, Q4H PRN  morphine (PF) injection 2 mg, Q2H PRN   Or  morphine injection 4 mg, Q2H PRN  ondansetron (ZOFRAN) injection 4 mg, Q4H PRN  enoxaparin Sodium (LOVENOX) injection 30 mg, BID  lisinopril (PRINIVIL;ZESTRIL) tablet 20 mg, Daily   And  hydroCHLOROthiazide (HYDRODIURIL) tablet 25 mg, Daily  pantoprazole (PROTONIX) injection 40 mg, Daily  calcium carbonate (TUMS) chewable tablet 500 mg, TID PRN        Objective:  BP (!) 162/105   Pulse 98   Temp 97.7 °F (36.5 °C) (Oral)   Resp 17   Ht 1.88 m (6' 2\")   Wt (!) 142.4 kg (314 lb)   SpO2 94%   BMI 42.59 kg/m²     Intake/Output Summary (Last 24 hours) at 3/17/2024 1056  Last data filed at 3/17/2024 0807  Gross per 24 hour   Intake 370 ml   Output 1750 ml   Net -1380 ml        Wt Readings from Last 3 Encounters:   03/15/24 (!) 142.4 kg (314 lb)   02/29/24 (!) 147.9 kg (326 lb)   01/30/24 (!) 147.9 kg (326 lb)       General appearance:  Appears comfortable. AAOx3  HEENT: atraumatic, Pupils equal, muscous membranes moist, no masses appreciated  Cardiovascular: Regular rate and rhythm no murmurs appreciated  Respiratory: CTAB no wheezing  Gastrointestinal: wound vac in place soft   EXT: no  edema  Neurology: no gross focal deficts  Psychiatry: Appropriate affect. Not agitated  Skin: Warm, dry, no rashes appreciated    Labs and Tests:  CBC:   Recent Labs     03/16/24  0524 03/17/24  0533   WBC 21.9* 18.5*   HGB 15.1 14.4    264       BMP:    Recent Labs     03/15/24  0623 03/16/24  0524 03/17/24  0533    137 136   K 3.9 4.1 3.9    105 102   CO2 23 22 25   BUN 18 18 11   CREATININE 1.0 0.8* 0.7*   GLUCOSE 132* 173* 149*       Hepatic: No results for input(s): \"AST\", \"ALT\", \"ALB\", \"BILITOT\", \"ALKPHOS\" in the last 72 hours.  No orders to display       Recent imaging reviewed    Problem List  Principal Problem:    Colostomy in place (HCC)  Resolved Problems:    * No resolved hospital problems. *       Assessment/Plan:   status post reversal of end sigmoid colostomy  Per surgery  Leukocytosis likely reactive rimrpoving     Htn increase lisinpril to 40      Deepbandar Swan MD   3/17/2024 10:56 AM

## 2024-03-18 PROBLEM — S31.109A OPEN ABDOMINAL WALL WOUND: Status: ACTIVE | Noted: 2024-03-18

## 2024-03-18 LAB
DEPRECATED RDW RBC AUTO: 13.8 % (ref 12.4–15.4)
HCT VFR BLD AUTO: 40.9 % (ref 40.5–52.5)
HGB BLD-MCNC: 13.9 G/DL (ref 13.5–17.5)
MCH RBC QN AUTO: 31.9 PG (ref 26–34)
MCHC RBC AUTO-ENTMCNC: 34.1 G/DL (ref 31–36)
MCV RBC AUTO: 93.5 FL (ref 80–100)
PLATELET # BLD AUTO: 273 K/UL (ref 135–450)
PMV BLD AUTO: 8.1 FL (ref 5–10.5)
RBC # BLD AUTO: 4.37 M/UL (ref 4.2–5.9)
WBC # BLD AUTO: 13.9 K/UL (ref 4–11)

## 2024-03-18 PROCEDURE — APPSS15 APP SPLIT SHARED TIME 0-15 MINUTES: Performed by: NURSE PRACTITIONER

## 2024-03-18 PROCEDURE — 36415 COLL VENOUS BLD VENIPUNCTURE: CPT

## 2024-03-18 PROCEDURE — 94760 N-INVAS EAR/PLS OXIMETRY 1: CPT

## 2024-03-18 PROCEDURE — C9113 INJ PANTOPRAZOLE SODIUM, VIA: HCPCS | Performed by: NURSE PRACTITIONER

## 2024-03-18 PROCEDURE — 6370000000 HC RX 637 (ALT 250 FOR IP): Performed by: SURGERY

## 2024-03-18 PROCEDURE — APPNB30 APP NON BILLABLE TIME 0-30 MINS: Performed by: NURSE PRACTITIONER

## 2024-03-18 PROCEDURE — 97606 NEG PRS WND THER DME>50 SQCM: CPT | Performed by: SURGERY

## 2024-03-18 PROCEDURE — 6360000002 HC RX W HCPCS: Performed by: NURSE PRACTITIONER

## 2024-03-18 PROCEDURE — 85027 COMPLETE CBC AUTOMATED: CPT

## 2024-03-18 PROCEDURE — 6360000002 HC RX W HCPCS: Performed by: SURGERY

## 2024-03-18 PROCEDURE — 1200000000 HC SEMI PRIVATE

## 2024-03-18 PROCEDURE — 6370000000 HC RX 637 (ALT 250 FOR IP): Performed by: INTERNAL MEDICINE

## 2024-03-18 RX ADMIN — PANTOPRAZOLE SODIUM 40 MG: 40 INJECTION, POWDER, FOR SOLUTION INTRAVENOUS at 08:25

## 2024-03-18 RX ADMIN — OXYCODONE 5 MG: 5 TABLET ORAL at 08:24

## 2024-03-18 RX ADMIN — HYDROCHLOROTHIAZIDE 25 MG: 25 TABLET ORAL at 08:25

## 2024-03-18 RX ADMIN — LISINOPRIL 40 MG: 20 TABLET ORAL at 08:25

## 2024-03-18 RX ADMIN — CARVEDILOL 25 MG: 25 TABLET, FILM COATED ORAL at 16:46

## 2024-03-18 RX ADMIN — MORPHINE SULFATE 4 MG: 4 INJECTION, SOLUTION INTRAMUSCULAR; INTRAVENOUS at 22:39

## 2024-03-18 RX ADMIN — OXYCODONE 5 MG: 5 TABLET ORAL at 14:32

## 2024-03-18 RX ADMIN — SPIRONOLACTONE 25 MG: 25 TABLET ORAL at 08:25

## 2024-03-18 RX ADMIN — CARVEDILOL 25 MG: 25 TABLET, FILM COATED ORAL at 08:25

## 2024-03-18 RX ADMIN — ENOXAPARIN SODIUM 30 MG: 100 INJECTION SUBCUTANEOUS at 08:32

## 2024-03-18 RX ADMIN — ONDANSETRON 4 MG: 2 INJECTION INTRAMUSCULAR; INTRAVENOUS at 22:48

## 2024-03-18 RX ADMIN — ACETAMINOPHEN 1000 MG: 500 TABLET ORAL at 08:25

## 2024-03-18 RX ADMIN — ENOXAPARIN SODIUM 30 MG: 100 INJECTION SUBCUTANEOUS at 21:26

## 2024-03-18 RX ADMIN — OXYCODONE 5 MG: 5 TABLET ORAL at 21:25

## 2024-03-18 RX ADMIN — MORPHINE SULFATE 4 MG: 4 INJECTION, SOLUTION INTRAMUSCULAR; INTRAVENOUS at 01:37

## 2024-03-18 ASSESSMENT — PAIN DESCRIPTION - DESCRIPTORS
DESCRIPTORS: ACHING;DISCOMFORT
DESCRIPTORS: ACHING;SORE;THROBBING
DESCRIPTORS: ACHING;SORE;THROBBING

## 2024-03-18 ASSESSMENT — PAIN DESCRIPTION - ORIENTATION
ORIENTATION: RIGHT
ORIENTATION: MID

## 2024-03-18 ASSESSMENT — PAIN SCALES - GENERAL
PAINLEVEL_OUTOF10: 10
PAINLEVEL_OUTOF10: 6
PAINLEVEL_OUTOF10: 2
PAINLEVEL_OUTOF10: 8
PAINLEVEL_OUTOF10: 7
PAINLEVEL_OUTOF10: 4
PAINLEVEL_OUTOF10: 3
PAINLEVEL_OUTOF10: 6

## 2024-03-18 ASSESSMENT — PAIN - FUNCTIONAL ASSESSMENT
PAIN_FUNCTIONAL_ASSESSMENT: ACTIVITIES ARE NOT PREVENTED

## 2024-03-18 ASSESSMENT — PAIN DESCRIPTION - LOCATION
LOCATION: ABDOMEN

## 2024-03-18 ASSESSMENT — PAIN DESCRIPTION - PAIN TYPE: TYPE: SURGICAL PAIN

## 2024-03-18 ASSESSMENT — PAIN DESCRIPTION - FREQUENCY: FREQUENCY: INTERMITTENT

## 2024-03-18 ASSESSMENT — PAIN DESCRIPTION - ONSET: ONSET: ON-GOING

## 2024-03-18 NOTE — PROGRESS NOTES
[facility]    3/18/2024 7:58 PM        Name: Jaylen Adhikari .              Admitted: 3/15/2024  Primary Care Provider: Pooja Nuñez APRN - CNP (Tel: [phone])      Subjective:    Patient seen and examined.   Abdominal pain well-controlled.   Tolerating clears, no nausea or vomiting.   No BM or Flatus but burping.      Current Medications  lisinopril (PRINIVIL;ZESTRIL) tablet 40 mg, Daily   And  hydroCHLOROthiazide (HYDRODIURIL) tablet 25 mg, Daily  carvedilol (COREG) tablet 25 mg, BID WC  spironolactone (ALDACTONE) tablet 25 mg, Daily  dextrose 5 % and 0.45 % NaCl with KCl 20 mEq infusion, Continuous  sodium chloride flush 0.9 % injection 5-40 mL, 2 times per day  sodium chloride flush 0.9 % injection 5-40 mL, PRN  0.9 % sodium chloride infusion, PRN  oxyCODONE (ROXICODONE) immediate release tablet 5 mg, Q4H PRN  morphine (PF) injection 2 mg, Q2H PRN   Or  morphine injection 4 mg, Q2H PRN  ondansetron (ZOFRAN) injection 4 mg, Q4H PRN  enoxaparin Sodium (LOVENOX) injection 30 mg, BID  pantoprazole (PROTONIX) injection 40 mg, Daily  calcium carbonate (TUMS) chewable tablet 500 mg, TID PRN        Objective:  /89   Pulse 90   Temp 98.2 °F (36.8 °C) (Oral)   Resp 18   Ht 1.829 m (6')   Wt (!) 142.4 kg (314 lb)   SpO2 95%   BMI 42.59 kg/m²     Intake/Output Summary (Last 24 hours) at 3/18/2024 1958  Last data filed at 3/18/2024 0815  Gross per 24 hour   Intake 240 ml   Output --   Net 240 ml      Wt Readings from Last 3 Encounters:   03/18/24 (!) 142.4 kg (314 lb)   02/29/24 (!) 147.9 kg (326 lb)   01/30/24 (!) 147.9 kg (326 lb)       General appearance:  Appears comfortable. AAOx3  HEENT: atraumatic, Pupils equal, muscous membranes moist, no masses appreciated  Cardiovascular: Regular rate and rhythm no murmurs appreciated  Respiratory: CTAB no wheezing  Gastrointestinal: soft, nondistended, appropriate  incisional tenderness, BS +.  VAC in place.  EXT: no edema  Neurology: no gross focal deficts  Psychiatry: Appropriate affect. Not agitated  Skin: Warm, dry, no rashes appreciated    Labs and Tests:  CBC:   Recent Labs     03/16/24  0524 03/17/24  0533 03/18/24  0922   WBC 21.9* 18.5* 13.9*   HGB 15.1 14.4 13.9    264 273     BMP:    Recent Labs     03/16/24  0524 03/17/24  0533    136   K 4.1 3.9    102   CO2 22 25   BUN 18 11   CREATININE 0.8* 0.7*   GLUCOSE 173* 149*     Hepatic: No results for input(s): \"AST\", \"ALT\", \"ALB\", \"BILITOT\", \"ALKPHOS\" in the last 72 hours.  No orders to display       Recent imaging reviewed    Problem List  Principal Problem:    Colostomy in place (HCC)  Active Problems:    Open abdominal wall wound  Resolved Problems:    * No resolved hospital problems. *       Assessment/Plan:     Reversal of Colostomy:  s/p exp Lap, ERMA, colostomy takedown with resection and colorectal anastomosis, appendectomy, and negative-pressure wound VAC placement 3/15/24.  History of Guzman's procedure for perforated diverticulitis  Continue clears and IVF  Pain control, PPI.  Further recommendations per surgery.    Leukocytosis: Likely reactive.  Resolving.  Monitor CBC.     HTN: BP controlled on Coreg, Aldactone, HCTZ and lisinopril.      Prophylaxis: Protonix, Lovenox.  CODE STATUS: Full  Disposition: Home once medically stable.      Camila Childress MD   3/18/2024 7:58 PM

## 2024-03-18 NOTE — PROGRESS NOTES
[facility]        Progress Note    Patient Name: Jaylen Adhikari  MRN: [id number]  YOB: 1990  Date of Evaluation: 3/18/2024    Subjective:  No acute events overnight  Pain controlled   No nausea or vomiting, tolerating clear liquids  No significant flatus, no stool, reports burping  Resting in bed at this time    Post-Operative Day #3      Vital Signs:  Patient Vitals for the past 24 hrs:   BP Temp Temp src Pulse Resp SpO2 Height Weight   24 0815 (!) 140/95 98 °F (36.7 °C) Oral 99 16 96 % -- --   24 0207 -- -- -- -- 16 -- -- --   24 0130 (!) 146/99 98 °F (36.7 °C) Oral 87 16 95 % -- --   24 0011 -- -- -- -- -- -- 1.829 m (6') (!) 142.4 kg (314 lb)   24 0007 -- -- -- -- 16 -- -- --   24 2337 -- -- -- -- 16 -- -- --   24 2243 -- -- -- -- 16 -- -- --   24 2213 -- -- -- -- 16 -- -- --   246 -- -- -- -- 16 -- -- --   24 (!) 136/98 98.1 °F (36.7 °C) Oral 93 16 94 % -- --   24 1748 -- -- -- -- 18 -- -- --   24 1720 (!) 153/102 97.9 °F (36.6 °C) Oral 95 18 95 % -- --   24 1344 -- -- -- -- 18 -- -- --   24 1314 -- -- -- -- 18 -- -- --   24 1234 (!) 133/94 97.9 °F (36.6 °C) Oral 100 19 93 % -- --   24 1147 -- -- -- -- 18 -- -- --      TEMPERATURE HISTORY 24H: Temp (24hrs), Av °F (36.7 °C), Min:97.9 °F (36.6 °C), Max:98.1 °F (36.7 °C)    BLOOD PRESSURE HISTORY: Systolic (36hrs), Av , Min:133 , Max:162    Diastolic (36hrs), Av, Min:79, Max:105      Intake/Output:  I/O last 3 completed shifts:  In: 370 [P.O.:360; I.V.:10]  Out: 1200 [Urine:1200]  I/O this shift:  In: 240 [P.O.:240]  Out: -   Drain/tube Output:       Physical Exam:  General: awake, alert, oriented to  person, place, time  Lungs: unlabored respirations  Abdomen: soft, non-distended, incisional tenderness only, bowel sounds present   Skin/Wound: wound VAC in place, no surrounding erythema

## 2024-03-18 NOTE — PLAN OF CARE
Problem: Chronic Conditions and Co-morbidities  Goal: Patient's chronic conditions and co-morbidity symptoms are monitored and maintained or improved  Outcome: Progressing  Flowsheets (Taken 3/17/2024 2015 by Samantha Hinds, RN)  Care Plan - Patient's Chronic Conditions and Co-Morbidity Symptoms are Monitored and Maintained or Improved: Monitor and assess patient's chronic conditions and comorbid symptoms for stability, deterioration, or improvement     Problem: Discharge Planning  Goal: Discharge to home or other facility with appropriate resources  Outcome: Progressing  Flowsheets (Taken 3/17/2024 2015 by Samantha Hinds, RN)  Discharge to home or other facility with appropriate resources: Refer to discharge planning if patient needs post-hospital services based on physician order or complex needs related to functional status, cognitive ability or social support system     Problem: Safety - Adult  Goal: Free from fall injury  Outcome: Progressing     Problem: Pain  Goal: Verbalizes/displays adequate comfort level or baseline comfort level  Outcome: Progressing

## 2024-03-18 NOTE — CARE COORDINATION
Received voicemail from Barbara Reynolds , [phone] ex [phone].  She stated she can be of assistance if needed and to call if needed.    Electronically signed by PIERRE Nieves, ROSAW on 3/18/2024 at 6:18 PM

## 2024-03-18 NOTE — PROGRESS NOTES
PM assessment complete, vitals stable, medications given per MAR. Wound vac seal intact, small amount of drainage noted to Left abdominal site, dressing reinforced. Patient ambulating in the hallway, not passing gas yet.

## 2024-03-19 ENCOUNTER — APPOINTMENT (OUTPATIENT)
Dept: GENERAL RADIOLOGY | Age: 34
End: 2024-03-19
Attending: SURGERY
Payer: COMMERCIAL

## 2024-03-19 LAB
ALBUMIN SERPL-MCNC: 3.7 G/DL (ref 3.4–5)
ALBUMIN/GLOB SERPL: 1 {RATIO} (ref 1.1–2.2)
ALP SERPL-CCNC: 60 U/L (ref 40–129)
ALT SERPL-CCNC: 10 U/L (ref 10–40)
ANION GAP SERPL CALCULATED.3IONS-SCNC: 13 MMOL/L (ref 3–16)
AST SERPL-CCNC: 12 U/L (ref 15–37)
BASOPHILS # BLD: 0.1 K/UL (ref 0–0.2)
BASOPHILS NFR BLD: 0.6 %
BILIRUB SERPL-MCNC: 0.7 MG/DL (ref 0–1)
BUN SERPL-MCNC: 16 MG/DL (ref 7–20)
CALCIUM SERPL-MCNC: 9.3 MG/DL (ref 8.3–10.6)
CHLORIDE SERPL-SCNC: 97 MMOL/L (ref 99–110)
CO2 SERPL-SCNC: 24 MMOL/L (ref 21–32)
CREAT SERPL-MCNC: 0.8 MG/DL (ref 0.9–1.3)
DEPRECATED RDW RBC AUTO: 13.9 % (ref 12.4–15.4)
EOSINOPHIL # BLD: 0.5 K/UL (ref 0–0.6)
EOSINOPHIL NFR BLD: 3 %
GFR SERPLBLD CREATININE-BSD FMLA CKD-EPI: >60 ML/MIN/{1.73_M2}
GLUCOSE SERPL-MCNC: 156 MG/DL (ref 70–99)
HCT VFR BLD AUTO: 41.4 % (ref 40.5–52.5)
HGB BLD-MCNC: 14.4 G/DL (ref 13.5–17.5)
LYMPHOCYTES # BLD: 1.7 K/UL (ref 1–5.1)
LYMPHOCYTES NFR BLD: 10.6 %
MAGNESIUM SERPL-MCNC: 1.8 MG/DL (ref 1.8–2.4)
MCH RBC QN AUTO: 32.6 PG (ref 26–34)
MCHC RBC AUTO-ENTMCNC: 34.8 G/DL (ref 31–36)
MCV RBC AUTO: 93.8 FL (ref 80–100)
MONOCYTES # BLD: 1.3 K/UL (ref 0–1.3)
MONOCYTES NFR BLD: 8.3 %
NEUTROPHILS # BLD: 12.3 K/UL (ref 1.7–7.7)
NEUTROPHILS NFR BLD: 77.5 %
PHOSPHATE SERPL-MCNC: 3.6 MG/DL (ref 2.5–4.9)
PLATELET # BLD AUTO: 305 K/UL (ref 135–450)
PMV BLD AUTO: 8.1 FL (ref 5–10.5)
POTASSIUM SERPL-SCNC: 3.8 MMOL/L (ref 3.5–5.1)
PROT SERPL-MCNC: 7.3 G/DL (ref 6.4–8.2)
RBC # BLD AUTO: 4.41 M/UL (ref 4.2–5.9)
SODIUM SERPL-SCNC: 134 MMOL/L (ref 136–145)
WBC # BLD AUTO: 15.9 K/UL (ref 4–11)

## 2024-03-19 PROCEDURE — 83735 ASSAY OF MAGNESIUM: CPT

## 2024-03-19 PROCEDURE — 6360000002 HC RX W HCPCS: Performed by: SURGERY

## 2024-03-19 PROCEDURE — APPNB30 APP NON BILLABLE TIME 0-30 MINS: Performed by: NURSE PRACTITIONER

## 2024-03-19 PROCEDURE — 36415 COLL VENOUS BLD VENIPUNCTURE: CPT

## 2024-03-19 PROCEDURE — 2500000003 HC RX 250 WO HCPCS: Performed by: SURGERY

## 2024-03-19 PROCEDURE — 6370000000 HC RX 637 (ALT 250 FOR IP): Performed by: INTERNAL MEDICINE

## 2024-03-19 PROCEDURE — 6370000000 HC RX 637 (ALT 250 FOR IP): Performed by: SURGERY

## 2024-03-19 PROCEDURE — 84100 ASSAY OF PHOSPHORUS: CPT

## 2024-03-19 PROCEDURE — 99024 POSTOP FOLLOW-UP VISIT: CPT | Performed by: SURGERY

## 2024-03-19 PROCEDURE — APPSS15 APP SPLIT SHARED TIME 0-15 MINUTES: Performed by: NURSE PRACTITIONER

## 2024-03-19 PROCEDURE — C9113 INJ PANTOPRAZOLE SODIUM, VIA: HCPCS | Performed by: NURSE PRACTITIONER

## 2024-03-19 PROCEDURE — 6360000002 HC RX W HCPCS: Performed by: NURSE PRACTITIONER

## 2024-03-19 PROCEDURE — 1200000000 HC SEMI PRIVATE

## 2024-03-19 PROCEDURE — 74019 RADEX ABDOMEN 2 VIEWS: CPT

## 2024-03-19 PROCEDURE — 80053 COMPREHEN METABOLIC PANEL: CPT

## 2024-03-19 PROCEDURE — 2580000003 HC RX 258: Performed by: INTERNAL MEDICINE

## 2024-03-19 PROCEDURE — 85025 COMPLETE CBC W/AUTO DIFF WBC: CPT

## 2024-03-19 RX ORDER — METOCLOPRAMIDE HYDROCHLORIDE 5 MG/ML
10 INJECTION INTRAMUSCULAR; INTRAVENOUS EVERY 6 HOURS
Status: COMPLETED | OUTPATIENT
Start: 2024-03-19 | End: 2024-03-20

## 2024-03-19 RX ORDER — KETOROLAC TROMETHAMINE 30 MG/ML
30 INJECTION, SOLUTION INTRAMUSCULAR; INTRAVENOUS EVERY 6 HOURS
Status: DISPENSED | OUTPATIENT
Start: 2024-03-19 | End: 2024-03-20

## 2024-03-19 RX ORDER — 0.9 % SODIUM CHLORIDE 0.9 %
500 INTRAVENOUS SOLUTION INTRAVENOUS ONCE
Status: COMPLETED | OUTPATIENT
Start: 2024-03-19 | End: 2024-03-19

## 2024-03-19 RX ORDER — MORPHINE SULFATE 2 MG/ML
2 INJECTION, SOLUTION INTRAMUSCULAR; INTRAVENOUS
Status: DISCONTINUED | OUTPATIENT
Start: 2024-03-19 | End: 2024-03-23 | Stop reason: HOSPADM

## 2024-03-19 RX ADMIN — ENOXAPARIN SODIUM 30 MG: 100 INJECTION SUBCUTANEOUS at 21:22

## 2024-03-19 RX ADMIN — CARVEDILOL 25 MG: 25 TABLET, FILM COATED ORAL at 10:33

## 2024-03-19 RX ADMIN — HYDROCHLOROTHIAZIDE 25 MG: 25 TABLET ORAL at 10:33

## 2024-03-19 RX ADMIN — METOCLOPRAMIDE 10 MG: 5 INJECTION, SOLUTION INTRAMUSCULAR; INTRAVENOUS at 19:05

## 2024-03-19 RX ADMIN — PANTOPRAZOLE SODIUM 40 MG: 40 INJECTION, POWDER, FOR SOLUTION INTRAVENOUS at 10:25

## 2024-03-19 RX ADMIN — ONDANSETRON 4 MG: 2 INJECTION INTRAMUSCULAR; INTRAVENOUS at 10:25

## 2024-03-19 RX ADMIN — OXYCODONE 5 MG: 5 TABLET ORAL at 04:12

## 2024-03-19 RX ADMIN — ENOXAPARIN SODIUM 30 MG: 100 INJECTION SUBCUTANEOUS at 10:35

## 2024-03-19 RX ADMIN — ONDANSETRON 4 MG: 2 INJECTION INTRAMUSCULAR; INTRAVENOUS at 04:36

## 2024-03-19 RX ADMIN — LISINOPRIL 40 MG: 20 TABLET ORAL at 10:33

## 2024-03-19 RX ADMIN — SPIRONOLACTONE 25 MG: 25 TABLET ORAL at 10:32

## 2024-03-19 RX ADMIN — METOCLOPRAMIDE 10 MG: 5 INJECTION, SOLUTION INTRAMUSCULAR; INTRAVENOUS at 10:37

## 2024-03-19 RX ADMIN — KETOROLAC TROMETHAMINE 30 MG: 30 INJECTION, SOLUTION INTRAMUSCULAR at 10:37

## 2024-03-19 RX ADMIN — POTASSIUM CHLORIDE, DEXTROSE MONOHYDRATE AND SODIUM CHLORIDE: 150; 5; 450 INJECTION, SOLUTION INTRAVENOUS at 13:17

## 2024-03-19 RX ADMIN — SODIUM CHLORIDE 500 ML: 9 INJECTION, SOLUTION INTRAVENOUS at 12:42

## 2024-03-19 ASSESSMENT — PAIN SCALES - GENERAL
PAINLEVEL_OUTOF10: 6
PAINLEVEL_OUTOF10: 2

## 2024-03-19 ASSESSMENT — PAIN DESCRIPTION - LOCATION: LOCATION: ABDOMEN

## 2024-03-19 NOTE — PROGRESS NOTES
PM assessment complete, vitals stable, wound vac in place, patient ambulating frequently in the hallway

## 2024-03-19 NOTE — PROGRESS NOTES
PM assessment complete, vitals stable, medications given per MAR, midline abdominal incision with wound vac intact. Patient ambulating independently, significant other at bedside.

## 2024-03-19 NOTE — PROGRESS NOTES
[facility]    3/19/2024 10:51 AM        Name: Jaylen Adhikari .              Admitted: 3/15/2024  Primary Care Provider: Pooja Nuñez APRN - CNP (Tel: [phone])      Subjective:    No nausea vomitting or chest pian      Current Medications  metoclopramide (REGLAN) injection 10 mg, Q6H  ketorolac (TORADOL) injection 30 mg, Q6H  morphine (PF) injection 2 mg, Q3H PRN  lisinopril (PRINIVIL;ZESTRIL) tablet 40 mg, Daily   And  hydroCHLOROthiazide (HYDRODIURIL) tablet 25 mg, Daily  carvedilol (COREG) tablet 25 mg, BID WC  spironolactone (ALDACTONE) tablet 25 mg, Daily  dextrose 5 % and 0.45 % NaCl with KCl 20 mEq infusion, Continuous  sodium chloride flush 0.9 % injection 5-40 mL, 2 times per day  sodium chloride flush 0.9 % injection 5-40 mL, PRN  0.9 % sodium chloride infusion, PRN  oxyCODONE (ROXICODONE) immediate release tablet 5 mg, Q4H PRN  ondansetron (ZOFRAN) injection 4 mg, Q4H PRN  enoxaparin Sodium (LOVENOX) injection 30 mg, BID  pantoprazole (PROTONIX) injection 40 mg, Daily  calcium carbonate (TUMS) chewable tablet 500 mg, TID PRN        Objective:  /87   Pulse 98   Temp 98 °F (36.7 °C) (Oral)   Resp 16   Ht 1.829 m (6')   Wt (!) 142.4 kg (314 lb)   SpO2 93%   BMI 42.59 kg/m²     Intake/Output Summary (Last 24 hours) at 3/19/2024 1051  Last data filed at 3/19/2024 0437  Gross per 24 hour   Intake --   Output 100 ml   Net -100 ml        Wt Readings from Last 3 Encounters:   03/18/24 (!) 142.4 kg (314 lb)   02/29/24 (!) 147.9 kg (326 lb)   01/30/24 (!) 147.9 kg (326 lb)       General appearance:  Appears comfortable. AAOx3  HEENT: atraumatic, Pupils equal, muscous membranes moist, no masses appreciated  Cardiovascular: Regular rate and rhythm no murmurs appreciated  Respiratory: CTAB no wheezing  Gastrointestinal: soft, nondistended, appropriate incisional tenderness, BS +.  VAC in place.  EXT: no

## 2024-03-19 NOTE — PROGRESS NOTES
[facility]        Progress Note    Patient Name: Jaylen Adhikari  MRN: [id number]  YOB: 1990  Date of Evaluation: 3/19/2024    Subjective:  No acute events overnight  Pain controlled   Reports nausea and a couple episodes of vomiting following analgesic administration   Passing more flatus today, no stool  Up to chair at this time, seen in radiology    Post-Operative Day #4      Vital Signs:  Patient Vitals for the past 24 hrs:   BP Temp Temp src Pulse Resp SpO2   24 1224 (!) 88/62 -- -- -- -- --   24 1222 (!) 82/57 97.9 °F (36.6 °C) Axillary 88 15 94 %   24 1030 129/87 98 °F (36.7 °C) Oral 98 16 93 %   24 0442 -- -- -- -- 16 --   24 0412 -- -- -- -- 16 --   24 0338 (!) 142/95 98 °F (36.7 °C) Oral 97 26 95 %   24 2309 -- -- -- -- 16 --   24 2239 -- -- -- -- 16 --   24 2155 -- -- -- -- 16 --   24 2115 111/79 98.3 °F (36.8 °C) Oral 100 16 95 %   24 1715 138/89 98.2 °F (36.8 °C) Oral 90 18 95 %   24 1524 -- -- -- 96 18 96 %   24 1502 -- -- -- -- 18 --   24 1432 -- -- -- -- 18 --        TEMPERATURE HISTORY 24H: Temp (24hrs), Av.1 °F (36.7 °C), Min:97.9 °F (36.6 °C), Max:98.3 °F (36.8 °C)    BLOOD PRESSURE HISTORY: Systolic (36hrs), Av , Min:82 , Max:146    Diastolic (36hrs), Av, Min:57, Max:99      Intake/Output:  I/O last 3 completed shifts:  In: 360 [P.O.:360]  Out: 700 [Urine:600; Emesis/NG output:100]  No intake/output data recorded.  Drain/tube Output:       Physical Exam:  General: awake, alert, oriented to person, place, time  Lungs: unlabored respirations  Abdomen: soft, non-distended, incisional tenderness only, bowel sounds present   Skin/Wound: wound VAC in place, no surrounding erythema noted, seal/suction intact    Labs:  CBC:    Recent Labs     24  0533 24  0922 24  0554   WBC 18.5* 13.9* 15.9*   HGB 14.4 13.9 14.4   HCT 41.6 40.9 41.4  Results   Component Value Date/Time    LABURIN  07/27/2022 10:34 PM     >50,000 CFU/ml mixed skin/urogenital myriam. No further workup       Pathology:  OR 3/15/2024--FINAL DIAGNOSIS:     Appendix, old ostomy site, and anastomotic rings, resection:      - Appendix with reactive lymphoid hyperplasia.      - Portion of colon and skin compatible with ostomy site.      - Anastomotic rings with reactive change.     Imaging:  I have personally reviewed the following films:    No results found.    Scheduled Meds:   metoclopramide  10 mg IntraVENous Q6H    ketorolac  30 mg IntraVENous Q6H    sodium chloride  500 mL IntraVENous Once    lisinopril  40 mg Oral Daily    And    hydroCHLOROthiazide  25 mg Oral Daily    carvedilol  25 mg Oral BID WC    spironolactone  25 mg Oral Daily    sodium chloride flush  5-40 mL IntraVENous 2 times per day    enoxaparin  30 mg SubCUTAneous BID    pantoprazole  40 mg IntraVENous Daily     Continuous Infusions:   dextrose 5% and 0.45% NaCl with KCl 20 mEq 75 mL/hr at 03/17/24 2343    sodium chloride       PRN Meds:.morphine **OR** [DISCONTINUED] morphine, sodium chloride flush, sodium chloride, oxyCODONE, ondansetron, calcium carbonate      Assessment:  33 y.o. male admitted with   1. Colostomy in place (HCC)        OR Date 3/15/2024, exploratory laparotomy, lysis of adhesions, colostomy takedown with resection and colorectal anastomosis, appendectomy, flexible sigmoidoscopy, intra-abdominal omental flap creation for anastomosis coverage, and negative-pressure wound VAC placement for colostomy in place, status-post Collin's procedure for perforated diverticulitis and prior anastomotic leak  Hypertension      Plan:  1. Pain controlled, incisional tenderness only on exam, reports nausea and a couple episodes of vomiting following analgesic administration, passing more flatus today, no stool, vitals stable, increasing leukocytosis; continued supportive care, local wound care--continue wound  VAC--tentatively planning for secondary wound closure 3 days from now on 3/22/2024, add Reglan, check AXR  2. Hold at clear liquid diet as tolerated; monitor bowel function  3. IV hydration; monitor and correct electrolytes  4. Activity as tolerated, ambulate TID, up to chair for all meals  5. Pulmonary toilet, incentive spirometry  6. PRN analgesics and antiemetics--minimizing narcotics as tolerated, add scheduled Toradol  7. DVT prophylaxis with  Lovenox and SCD's  8. Management of medical comorbid etiologies per consulting services    EDUCATION:  Educated patient on plan of care and disease process--all questions answered.    Plans discussed with patient and nursing.  Reviewed and discussed with Dr. Gupta.      Signed:  Negin Nichols, JESSICA - CNP  3/19/2024 12:56 PM    Surgery Staff:     I have personally performed a face to face diagnostic evaluation on this patient. I have interviewed and examined the patient and I agree with the assessment above. Time was spent reviewing patient chart (including but not limited to notes, labs, imaging and other testing), interviewing and counseling patient and present family members, performing physical exam, documentation of my findings and subsequent follow up of ordered medication and testing, placing referrals and communication with patient care providers, coordinating future care as well as documentation in the EHR. This encompassed more than 50% of the total encounter time. In summary, my findings and plan are the following:   Pt has had normal exam, VAC ok, did have N/V after pain meds  Hold diet, check AXR today      Jorge Gupta MD

## 2024-03-19 NOTE — PLAN OF CARE
Problem: Chronic Conditions and Co-morbidities  Goal: Patient's chronic conditions and co-morbidity symptoms are monitored and maintained or improved  Outcome: Progressing  Flowsheets  Taken 3/19/2024 1030 by Sarah Monae RN  Care Plan - Patient's Chronic Conditions and Co-Morbidity Symptoms are Monitored and Maintained or Improved: Monitor and assess patient's chronic conditions and comorbid symptoms for stability, deterioration, or improvement     Problem: Discharge Planning  Goal: Discharge to home or other facility with appropriate resources  Outcome: Progressing  Flowsheets  Taken 3/19/2024 1030 by Sarah Monae RN  Discharge to home or other facility with appropriate resources: Identify barriers to discharge with patient and caregiver    Problem: Safety - Adult  Goal: Free from fall injury  Outcome: Progressing     Problem: Pain  Goal: Verbalizes/displays adequate comfort level or baseline comfort level  Outcome: Progressing     Problem: Gastrointestinal - Adult  Goal: Minimal or absence of nausea and vomiting  Outcome: Progressing

## 2024-03-20 PROBLEM — E44.1 MILD MALNUTRITION (HCC): Status: ACTIVE | Noted: 2024-03-20

## 2024-03-20 LAB
ANION GAP SERPL CALCULATED.3IONS-SCNC: 14 MMOL/L (ref 3–16)
BUN SERPL-MCNC: 22 MG/DL (ref 7–20)
CALCIUM SERPL-MCNC: 9.1 MG/DL (ref 8.3–10.6)
CHLORIDE SERPL-SCNC: 96 MMOL/L (ref 99–110)
CO2 SERPL-SCNC: 22 MMOL/L (ref 21–32)
CREAT SERPL-MCNC: 0.9 MG/DL (ref 0.9–1.3)
DEPRECATED RDW RBC AUTO: 14.1 % (ref 12.4–15.4)
GFR SERPLBLD CREATININE-BSD FMLA CKD-EPI: >60 ML/MIN/{1.73_M2}
GLUCOSE SERPL-MCNC: 150 MG/DL (ref 70–99)
HCT VFR BLD AUTO: 40.3 % (ref 40.5–52.5)
HGB BLD-MCNC: 13.6 G/DL (ref 13.5–17.5)
MAGNESIUM SERPL-MCNC: 2 MG/DL (ref 1.8–2.4)
MCH RBC QN AUTO: 31.7 PG (ref 26–34)
MCHC RBC AUTO-ENTMCNC: 33.7 G/DL (ref 31–36)
MCV RBC AUTO: 94 FL (ref 80–100)
PHOSPHATE SERPL-MCNC: 3.7 MG/DL (ref 2.5–4.9)
PLATELET # BLD AUTO: 354 K/UL (ref 135–450)
PMV BLD AUTO: 7.8 FL (ref 5–10.5)
POTASSIUM SERPL-SCNC: 4.1 MMOL/L (ref 3.5–5.1)
RBC # BLD AUTO: 4.28 M/UL (ref 4.2–5.9)
SODIUM SERPL-SCNC: 132 MMOL/L (ref 136–145)
WBC # BLD AUTO: 16.4 K/UL (ref 4–11)

## 2024-03-20 PROCEDURE — 84100 ASSAY OF PHOSPHORUS: CPT

## 2024-03-20 PROCEDURE — 6360000002 HC RX W HCPCS: Performed by: NURSE PRACTITIONER

## 2024-03-20 PROCEDURE — 85027 COMPLETE CBC AUTOMATED: CPT

## 2024-03-20 PROCEDURE — APPNB30 APP NON BILLABLE TIME 0-30 MINS: Performed by: NURSE PRACTITIONER

## 2024-03-20 PROCEDURE — 83735 ASSAY OF MAGNESIUM: CPT

## 2024-03-20 PROCEDURE — APPSS15 APP SPLIT SHARED TIME 0-15 MINUTES: Performed by: NURSE PRACTITIONER

## 2024-03-20 PROCEDURE — 80048 BASIC METABOLIC PNL TOTAL CA: CPT

## 2024-03-20 PROCEDURE — 94760 N-INVAS EAR/PLS OXIMETRY 1: CPT

## 2024-03-20 PROCEDURE — 6360000002 HC RX W HCPCS: Performed by: SURGERY

## 2024-03-20 PROCEDURE — 6370000000 HC RX 637 (ALT 250 FOR IP): Performed by: SURGERY

## 2024-03-20 PROCEDURE — 2580000003 HC RX 258: Performed by: SURGERY

## 2024-03-20 PROCEDURE — 1200000000 HC SEMI PRIVATE

## 2024-03-20 PROCEDURE — C9113 INJ PANTOPRAZOLE SODIUM, VIA: HCPCS | Performed by: NURSE PRACTITIONER

## 2024-03-20 PROCEDURE — 97606 NEG PRS WND THER DME>50 SQCM: CPT | Performed by: SURGERY

## 2024-03-20 PROCEDURE — 6370000000 HC RX 637 (ALT 250 FOR IP): Performed by: INTERNAL MEDICINE

## 2024-03-20 PROCEDURE — 36415 COLL VENOUS BLD VENIPUNCTURE: CPT

## 2024-03-20 RX ADMIN — SODIUM CHLORIDE, PRESERVATIVE FREE 10 ML: 5 INJECTION INTRAVENOUS at 20:59

## 2024-03-20 RX ADMIN — HYDROCHLOROTHIAZIDE 25 MG: 25 TABLET ORAL at 09:27

## 2024-03-20 RX ADMIN — METOCLOPRAMIDE 10 MG: 5 INJECTION, SOLUTION INTRAMUSCULAR; INTRAVENOUS at 05:51

## 2024-03-20 RX ADMIN — SODIUM CHLORIDE, PRESERVATIVE FREE 10 ML: 5 INJECTION INTRAVENOUS at 09:28

## 2024-03-20 RX ADMIN — PANTOPRAZOLE SODIUM 40 MG: 40 INJECTION, POWDER, FOR SOLUTION INTRAVENOUS at 09:15

## 2024-03-20 RX ADMIN — SPIRONOLACTONE 25 MG: 25 TABLET ORAL at 09:27

## 2024-03-20 RX ADMIN — ENOXAPARIN SODIUM 30 MG: 100 INJECTION SUBCUTANEOUS at 09:11

## 2024-03-20 RX ADMIN — METOCLOPRAMIDE 10 MG: 5 INJECTION, SOLUTION INTRAMUSCULAR; INTRAVENOUS at 00:18

## 2024-03-20 RX ADMIN — KETOROLAC TROMETHAMINE 30 MG: 30 INJECTION, SOLUTION INTRAMUSCULAR at 09:19

## 2024-03-20 RX ADMIN — ENOXAPARIN SODIUM 30 MG: 100 INJECTION SUBCUTANEOUS at 20:58

## 2024-03-20 ASSESSMENT — PAIN SCALES - GENERAL
PAINLEVEL_OUTOF10: 6
PAINLEVEL_OUTOF10: 0
PAINLEVEL_OUTOF10: 0

## 2024-03-20 NOTE — PROGRESS NOTES
[facility]    3/20/2024 1:41 PM        Name: Jaylen Adhikari .              Admitted: 3/15/2024  Primary Care Provider: Pooja Nuñez APRN - CNP (Tel: [phone])      Subjective:    Having bowel movements passing gas      Current Medications  ketorolac (TORADOL) injection 30 mg, Q6H  morphine (PF) injection 2 mg, Q3H PRN  lisinopril (PRINIVIL;ZESTRIL) tablet 40 mg, Daily   And  hydroCHLOROthiazide (HYDRODIURIL) tablet 25 mg, Daily  carvedilol (COREG) tablet 25 mg, BID WC  spironolactone (ALDACTONE) tablet 25 mg, Daily  sodium chloride flush 0.9 % injection 5-40 mL, 2 times per day  sodium chloride flush 0.9 % injection 5-40 mL, PRN  0.9 % sodium chloride infusion, PRN  oxyCODONE (ROXICODONE) immediate release tablet 5 mg, Q4H PRN  ondansetron (ZOFRAN) injection 4 mg, Q4H PRN  enoxaparin Sodium (LOVENOX) injection 30 mg, BID  pantoprazole (PROTONIX) injection 40 mg, Daily  calcium carbonate (TUMS) chewable tablet 500 mg, TID PRN        Objective:  /77   Pulse 76   Temp 97.8 °F (36.6 °C) (Oral)   Resp 16   Ht 1.829 m (6')   Wt (!) 142.4 kg (314 lb)   SpO2 94%   BMI 42.59 kg/m²   No intake or output data in the 24 hours ending 03/20/24 1341     Wt Readings from Last 3 Encounters:   03/18/24 (!) 142.4 kg (314 lb)   02/29/24 (!) 147.9 kg (326 lb)   01/30/24 (!) 147.9 kg (326 lb)       General appearance:  Appears comfortable. AAOx3  HEENT: atraumatic, Pupils equal, muscous membranes moist, no masses appreciated  Cardiovascular: Regular rate and rhythm no murmurs appreciated  Respiratory: CTAB no wheezing  Gastrointestinal: soft, nondistended, appropriate incisional tenderness, BS +.  VAC in place.  EXT: no edema  Neurology: no gross focal deficts  Psychiatry: Appropriate affect. Not agitated  Skin: Warm, dry, no rashes appreciated    Labs and Tests:  CBC:   Recent Labs     03/18/24  0922 03/19/24  0504

## 2024-03-20 NOTE — PROGRESS NOTES
[facility]        Progress Note    Patient Name: Jaylen Adhikari  MRN: [id number]  YOB: 1990  Date of Evaluation: 3/20/2024    Subjective:  No acute events overnight  Pain controlled   Nausea and vomiting resolved  Passing flatus and multiple stool, rust colored  Denies bloating  Resting in bed at this time, ambulates halls without difficulty    Post-Operative Day #5      Vital Signs:  Patient Vitals for the past 24 hrs:   BP Temp Temp src Pulse Resp SpO2 Height   24 1200 -- -- -- 76 16 94 % --   24 1114 112/77 97.8 °F (36.6 °C) Oral 76 16 92 % --   24 1035 -- -- -- -- -- -- 1.829 m (6')   24 0900 121/75 98.3 °F (36.8 °C) -- (!) 101 16 93 % --   24 0545 104/70 -- -- 88 16 -- --   24 0015 110/76 98.3 °F (36.8 °C) Oral 88 16 94 % --   24 2115 106/74 98 °F (36.7 °C) Oral 82 16 95 % --   24 1630 108/76 -- -- 87 16 94 % --        TEMPERATURE HISTORY 24H: Temp (24hrs), Av.1 °F (36.7 °C), Min:97.8 °F (36.6 °C), Max:98.3 °F (36.8 °C)    BLOOD PRESSURE HISTORY: Systolic (36hrs), Av , Min:82 , Max:142    Diastolic (36hrs), Av, Min:57, Max:95      Intake/Output:  I/O last 3 completed shifts:  In: -   Out: 100 [Emesis/NG output:100]  No intake/output data recorded.  Drain/tube Output:       Physical Exam:  General: awake, alert, oriented to person, place, time  Lungs: unlabored respirations  Abdomen: soft, non-distended, incisional tenderness only, bowel sounds present   Skin/Wound: open abdominal wound beds are clean, no drainage--wound VAC dressing changed, seal/suction intact    Labs:  CBC:    Recent Labs     24  0922 24  0554 24  0902   WBC 13.9* 15.9* 16.4*   HGB 13.9 14.4 13.6   HCT 40.9 41.4 40.3*    305 354       BMP:    Recent Labs     24  0554 24  0902   * 132*   K 3.8 4.1   CL 97* 96*   CO2 24 22   BUN 16 22*   CREATININE 0.8* 0.9   GLUCOSE 156* 150*        Hepatic:    Recent Labs     03/19/24  0554   AST 12*   ALT 10   BILITOT 0.7   ALKPHOS 60       Amylase:  No results found for: \"AMYLASE\"  Lipase:    Lab Results   Component Value Date/Time    LIPASE 13.0 07/27/2022 10:35 PM      Mag:    Lab Results   Component Value Date/Time    MG 2.00 03/20/2024 09:02 AM    MG 1.80 03/19/2024 05:54 AM     Phos:     Lab Results   Component Value Date/Time    PHOS 3.7 03/20/2024 09:02 AM    PHOS 3.6 03/19/2024 05:54 AM      Coags:   Lab Results   Component Value Date/Time    PROTIME 15.8 05/07/2023 05:14 AM    INR 1.26 05/07/2023 05:14 AM    APTT 28.7 05/07/2023 05:14 AM       Cultures:  Anaerobic culture  Lab Results   Component Value Date/Time    LABANAE No anaerobes isolated 04/29/2023 04:48 PM     Fungus stain  No results found for requested labs within last 30 days.     Gram stain  No results found for requested labs within last 30 days.     Organism  Lab Results   Component Value Date/Time    ORG Candida albicans (A) 04/29/2023 04:48 PM    ORG Enterococcus faecium (A) 04/29/2023 04:48 PM    ORG Yeast (A) 04/29/2023 04:48 PM     Surgical culture  Lab Results   Component Value Date/Time    CXSURG  04/29/2023 04:48 PM     Light growth  Identification and Sensitivities completed by [facility]  See Reference Report for Sensitivities      CXSURG Isolated from Broth 04/29/2023 04:48 PM     Blood culture 1  No results found for requested labs within last 30 days.     Blood culture 2  No results found for requested labs within last 30 days.     Fecal occult  No results found for requested labs within last 30 days.     GI bacterial pathogens by PCR  No results found for requested labs within last 30 days.     C. difficile  No results found for requested labs within last 30 days.     Urine culture  Lab Results   Component Value Date/Time    LABURIN  07/27/2022 10:34 PM     >50,000 CFU/ml mixed skin/urogenital myriam. No further workup       Pathology:  OR 3/15/2024--FINAL DIAGNOSIS:      Appendix, old ostomy site, and anastomotic rings, resection:      - Appendix with reactive lymphoid hyperplasia.      - Portion of colon and skin compatible with ostomy site.      - Anastomotic rings with reactive change.     Imaging:  I have personally reviewed the following films:    XR ABDOMEN (2 VIEWS)    Result Date: 3/19/2024  EXAMINATION: TWO XRAY VIEWS OF THE ABDOMEN 3/19/2024 11:33 am COMPARISON: None. HISTORY: ORDERING SYSTEM PROVIDED HISTORY: Follow up abd distention TECHNOLOGIST PROVIDED HISTORY: Reason for exam:->Follow up abd distention Reason for Exam: Follow up abd distention FINDINGS: Multiple dilated loops of air-filled small bowel are seen measuring up to 6 cm.  There is also gaseous distension of the colon     Multiple dilated loops of air-filled small bowel measuring up to 6 cm.  These findings appear to represent either partial small bowel obstruction versus ileus      Scheduled Meds:   ketorolac  30 mg IntraVENous Q6H    lisinopril  40 mg Oral Daily    And    hydroCHLOROthiazide  25 mg Oral Daily    carvedilol  25 mg Oral BID WC    spironolactone  25 mg Oral Daily    sodium chloride flush  5-40 mL IntraVENous 2 times per day    enoxaparin  30 mg SubCUTAneous BID    pantoprazole  40 mg IntraVENous Daily     Continuous Infusions:   sodium chloride       PRN Meds:.morphine **OR** [DISCONTINUED] morphine, sodium chloride flush, sodium chloride, oxyCODONE, ondansetron, calcium carbonate      Assessment:  33 y.o. male admitted with   1. Colostomy in place (HCC)        OR Date 3/15/2024, exploratory laparotomy, lysis of adhesions, colostomy takedown with resection and colorectal anastomosis, appendectomy, flexible sigmoidoscopy, intra-abdominal omental flap creation for anastomosis coverage, and negative-pressure wound VAC placement for colostomy in place, status-post Ocllin's procedure for perforated diverticulitis and prior anastomotic leak  Hypertension      Plan:  1. Pain controlled,

## 2024-03-20 NOTE — PLAN OF CARE
Problem: Nutrition Deficit:  Goal: Optimize nutritional status  Outcome: Progressing     Problem: Gastrointestinal - Adult  Goal: Maintains adequate nutritional intake  Outcome: Progressing     Problem: Gastrointestinal - Adult  Goal: Maintains or returns to baseline bowel function  Outcome: Progressing     Problem: Gastrointestinal - Adult  Goal: Minimal or absence of nausea and vomiting  Outcome: Progressing     Problem: Skin/Tissue Integrity - Adult  Goal: Incisions, wounds, or drain sites healing without S/S of infection  Outcome: Progressing     Problem: Skin/Tissue Integrity - Adult  Goal: Skin integrity remains intact  Outcome: Progressing     Problem: Pain  Goal: Verbalizes/displays adequate comfort level or baseline comfort level  Outcome: Progressing     Problem: Safety - Adult  Goal: Free from fall injury  Outcome: Progressing     Problem: Discharge Planning  Goal: Discharge to home or other facility with appropriate resources  Outcome: Progressing     Problem: Chronic Conditions and Co-morbidities  Goal: Patient's chronic conditions and co-morbidity symptoms are monitored and maintained or improved  Outcome: Progressing

## 2024-03-20 NOTE — PROGRESS NOTES
Nutrition Note    RECOMMENDATIONS  PO Diet: start diet as tolerated or consider TPN  ONS: monitor for supplement needs once diet advances    NUTRITION ASSESSMENT   Nutrition intervention triggered for NPO/clear liquid diet x 5 days of admission s/p surgery as above.  Pt with +flatus & BM this am.  RN states diet expected to advance to clear liquids once surgery sees pt today.  Pt with increased nutrient needs with wound vac in place; planning secondary wound closure for possibly 3/22.  Pt wt was 331 lb x 6 months ago (method not specified).  If accurate, pt has lost 17 lb, 5% wt loss, which is not considered significant per ASPEN guidelines.  Pt does meet criteria for mild malnutrition d/t NPO status with increased nutrient needs, & is at risk for further compromise as further surgery planned within next few days.  Will monitor for diet advancement; consider TPN if unable to advance diet beyond clears within next 24 hours.  Will monitor for po intake & diet tolerance & offer ONS to promote wound healing as diet advances.     Nutrition Related Findings: LBM 3/20; gluc 156; Na 134; Mg, k & phos WNL.  No edema noted  Wounds: Surgical Incision, Open Wounds  Nutrition Education:  Education not indicated   Nutrition Goals: Initiate nutrition support, Initiate PO diet     MALNUTRITION ASSESSMENT   Acute Illness  Malnutrition Status: Mild malnutrition  Findings of the 6 clinical characteristics of malnutrition:  Energy Intake:  50% or less of estimated energy requirements for 5 or more days  Weight Loss:  No significant weight loss     Body Fat Loss:  No significant body fat loss     Muscle Mass Loss:  No significant muscle mass loss    Fluid Accumulation:  No significant fluid accumulation     Strength:  Not Performed      NUTRITION DIAGNOSIS   In context of acute illness or injury, Other (Comment) (mild malnutrition) related to altered GI function as evidenced by NPO or clear liquid status due to medical

## 2024-03-20 NOTE — PROGRESS NOTES
PM assessment complete, vitals stable, medications given per MAR, wound vac remains in place with good seal. Patient ambulating independently in the hallway, passing flatus, no complaints of pain or nausea.

## 2024-03-20 NOTE — PROGRESS NOTES
Shift assessment done, VSS, A/O. Neuro checks WNL. Denies pain at this time. Meds given per MAR. Standard safety measures in place. The care plan and education has been reviewed and mutually agreed upon with the patient. Patient had multiple bowel movements today and is tolerating liquid diet.

## 2024-03-21 ENCOUNTER — ANESTHESIA EVENT (OUTPATIENT)
Dept: OPERATING ROOM | Age: 34
End: 2024-03-21
Payer: COMMERCIAL

## 2024-03-21 LAB
ANION GAP SERPL CALCULATED.3IONS-SCNC: 14 MMOL/L (ref 3–16)
BUN SERPL-MCNC: 20 MG/DL (ref 7–20)
CALCIUM SERPL-MCNC: 9.2 MG/DL (ref 8.3–10.6)
CHLORIDE SERPL-SCNC: 96 MMOL/L (ref 99–110)
CO2 SERPL-SCNC: 24 MMOL/L (ref 21–32)
CREAT SERPL-MCNC: 0.9 MG/DL (ref 0.9–1.3)
DEPRECATED RDW RBC AUTO: 14 % (ref 12.4–15.4)
GFR SERPLBLD CREATININE-BSD FMLA CKD-EPI: >60 ML/MIN/{1.73_M2}
GLUCOSE SERPL-MCNC: 112 MG/DL (ref 70–99)
HCT VFR BLD AUTO: 35.7 % (ref 40.5–52.5)
HGB BLD-MCNC: 12.6 G/DL (ref 13.5–17.5)
MCH RBC QN AUTO: 32.9 PG (ref 26–34)
MCHC RBC AUTO-ENTMCNC: 35.3 G/DL (ref 31–36)
MCV RBC AUTO: 93.2 FL (ref 80–100)
PLATELET # BLD AUTO: 292 K/UL (ref 135–450)
PMV BLD AUTO: 7.5 FL (ref 5–10.5)
POTASSIUM SERPL-SCNC: 4.3 MMOL/L (ref 3.5–5.1)
RBC # BLD AUTO: 3.83 M/UL (ref 4.2–5.9)
SODIUM SERPL-SCNC: 134 MMOL/L (ref 136–145)
WBC # BLD AUTO: 13.6 K/UL (ref 4–11)

## 2024-03-21 PROCEDURE — C9113 INJ PANTOPRAZOLE SODIUM, VIA: HCPCS | Performed by: NURSE PRACTITIONER

## 2024-03-21 PROCEDURE — 36415 COLL VENOUS BLD VENIPUNCTURE: CPT

## 2024-03-21 PROCEDURE — APPNB30 APP NON BILLABLE TIME 0-30 MINS: Performed by: NURSE PRACTITIONER

## 2024-03-21 PROCEDURE — 6360000002 HC RX W HCPCS: Performed by: NURSE PRACTITIONER

## 2024-03-21 PROCEDURE — 1200000000 HC SEMI PRIVATE

## 2024-03-21 PROCEDURE — 6370000000 HC RX 637 (ALT 250 FOR IP): Performed by: SURGERY

## 2024-03-21 PROCEDURE — 80048 BASIC METABOLIC PNL TOTAL CA: CPT

## 2024-03-21 PROCEDURE — 99024 POSTOP FOLLOW-UP VISIT: CPT | Performed by: SURGERY

## 2024-03-21 PROCEDURE — APPSS15 APP SPLIT SHARED TIME 0-15 MINUTES: Performed by: NURSE PRACTITIONER

## 2024-03-21 PROCEDURE — 6360000002 HC RX W HCPCS: Performed by: SURGERY

## 2024-03-21 PROCEDURE — 85027 COMPLETE CBC AUTOMATED: CPT

## 2024-03-21 PROCEDURE — 6370000000 HC RX 637 (ALT 250 FOR IP): Performed by: INTERNAL MEDICINE

## 2024-03-21 RX ADMIN — LISINOPRIL 40 MG: 20 TABLET ORAL at 08:02

## 2024-03-21 RX ADMIN — PANTOPRAZOLE SODIUM 40 MG: 40 INJECTION, POWDER, FOR SOLUTION INTRAVENOUS at 13:04

## 2024-03-21 RX ADMIN — ENOXAPARIN SODIUM 30 MG: 100 INJECTION SUBCUTANEOUS at 08:01

## 2024-03-21 RX ADMIN — HYDROCHLOROTHIAZIDE 25 MG: 25 TABLET ORAL at 08:03

## 2024-03-21 RX ADMIN — CARVEDILOL 25 MG: 25 TABLET, FILM COATED ORAL at 16:08

## 2024-03-21 RX ADMIN — SPIRONOLACTONE 25 MG: 25 TABLET ORAL at 08:04

## 2024-03-21 RX ADMIN — CARVEDILOL 25 MG: 25 TABLET, FILM COATED ORAL at 08:04

## 2024-03-21 ASSESSMENT — ENCOUNTER SYMPTOMS: SHORTNESS OF BREATH: 0

## 2024-03-21 ASSESSMENT — PAIN SCALES - GENERAL: PAINLEVEL_OUTOF10: 4

## 2024-03-21 ASSESSMENT — PAIN DESCRIPTION - ONSET: ONSET: ON-GOING

## 2024-03-21 ASSESSMENT — PAIN - FUNCTIONAL ASSESSMENT: PAIN_FUNCTIONAL_ASSESSMENT: ACTIVITIES ARE NOT PREVENTED

## 2024-03-21 ASSESSMENT — PAIN DESCRIPTION - FREQUENCY: FREQUENCY: INTERMITTENT

## 2024-03-21 ASSESSMENT — PAIN DESCRIPTION - DESCRIPTORS: DESCRIPTORS: ACHING;DISCOMFORT

## 2024-03-21 ASSESSMENT — PAIN DESCRIPTION - LOCATION: LOCATION: ABDOMEN

## 2024-03-21 ASSESSMENT — PAIN DESCRIPTION - ORIENTATION: ORIENTATION: MID

## 2024-03-21 ASSESSMENT — PAIN DESCRIPTION - PAIN TYPE: TYPE: SURGICAL PAIN

## 2024-03-21 NOTE — PROGRESS NOTES
[facility]        Progress Note    Patient Name: Jaylen Adhikari  MRN: [id number]  YOB: 1990  Date of Evaluation: 3/21/2024    Subjective:  No acute events overnight  Pain controlled without narcotics  No nausea or vomiting, tolerating full liquids  Passing flatus and stool  Resting in bed at this time, ambulates halls without difficulty    Post-Operative Day #6      Vital Signs:  Patient Vitals for the past 24 hrs:   BP Temp Temp src Pulse Resp SpO2   24 1300 114/77 98.4 °F (36.9 °C) Oral 88 17 94 %   24 0745 131/82 98.2 °F (36.8 °C) Oral 97 17 94 %   24 0455 (!) 143/91 98 °F (36.7 °C) Oral (!) 103 17 93 %   24 2324 (!) 148/93 98 °F (36.7 °C) Oral 95 16 92 %   24 2055 135/88 98.4 °F (36.9 °C) Oral 89 17 95 %   24 1618 120/76 98 °F (36.7 °C) Oral 78 18 94 %        TEMPERATURE HISTORY 24H: Temp (24hrs), Av.2 °F (36.8 °C), Min:98 °F (36.7 °C), Max:98.4 °F (36.9 °C)    BLOOD PRESSURE HISTORY: Systolic (36hrs), Av , Min:104 , Max:148    Diastolic (36hrs), Av, Min:70, Max:93      Intake/Output:  I/O last 3 completed shifts:  In: 240 [P.O.:240]  Out: -   I/O this shift:  In: 240 [P.O.:240]  Out: -   Drain/tube Output:       Physical Exam:  General: awake, alert, oriented to person, place, time  Lungs: unlabored respirations  Abdomen: soft, non-distended, incisional tenderness only, bowel sounds present   Skin/Wound: open abdominal wound with wound VAC in place, seal/suction intact    Labs:  CBC:    Recent Labs     24  0554 24  0902 24  0557   WBC 15.9* 16.4* 13.6*   HGB 14.4 13.6 12.6*   HCT 41.4 40.3* 35.7*    354 292       BMP:    Recent Labs     24  0554 24  0902 24  0557   * 132* 134*   K 3.8 4.1 4.3   CL 97* 96* 96*   CO2 24 22 24   BUN 16 22* 20   CREATININE 0.8* 0.9 0.9   GLUCOSE 156* 150* 112*       Hepatic:    Recent Labs     24  0554   AST 12*   ALT 10  Appendix with reactive lymphoid hyperplasia.      - Portion of colon and skin compatible with ostomy site.      - Anastomotic rings with reactive change.     Imaging:  I have personally reviewed the following films:    No results found.    Scheduled Meds:   ceFAZolin  2,000 mg IntraVENous On Call to OR    lisinopril  40 mg Oral Daily    And    hydroCHLOROthiazide  25 mg Oral Daily    carvedilol  25 mg Oral BID WC    spironolactone  25 mg Oral Daily    sodium chloride flush  5-40 mL IntraVENous 2 times per day    enoxaparin  30 mg SubCUTAneous BID    pantoprazole  40 mg IntraVENous Daily     Continuous Infusions:   sodium chloride       PRN Meds:.morphine **OR** [DISCONTINUED] morphine, sodium chloride flush, sodium chloride, oxyCODONE, ondansetron, calcium carbonate      Assessment:  33 y.o. male admitted with   1. Colostomy in place ([facility])        OR Date 3/15/2024, exploratory laparotomy, lysis of adhesions, colostomy takedown with resection and colorectal anastomosis, appendectomy, flexible sigmoidoscopy, intra-abdominal omental flap creation for anastomosis coverage, and negative-pressure wound VAC placement for colostomy in place, status-post Collin's procedure for perforated diverticulitis and prior anastomotic leak  Hypertension      Plan:  1. Pain controlled without narcotics, incisional tenderness only on exam, no nausea or vomiting, tolerating full liquids, passing flatus and stool, vitals stable, decreasing leukocytosis; continued supportive care, local wound care--continue wound VAC--planning for secondary wound closure tomorrow  2. Advance to regular diet with supplements as tolerated, NPO at midnight; monitor bowel function  3. Activity as tolerated, ambulate TID, up to chair for all meals  4. Pulmonary toilet, incentive spirometry  5. PRN analgesics and antiemetics--minimizing narcotics as tolerated, transition to PO  6. DVT prophylaxis with  Lovenox and SCD's  7. Management of medical comorbid  etiologies per consulting services  8. Disposition: Anticipate discharge home in the next 2 days    EDUCATION:  Educated patient on plan of care and disease process--all questions answered.    Plans discussed with patient and nursing.  Reviewed and discussed with Dr. Gupta.      Signed:  JESSICA Carreno - CNP  3/21/2024 1:25 PM    Surgery Staff:     I have personally performed a face to face diagnostic evaluation on this patient. I have interviewed and examined the patient and I agree with the assessment above. Time was spent reviewing patient chart (including but not limited to notes, labs, imaging and other testing), interviewing and counseling patient and present family members, performing physical exam, documentation of my findings and subsequent follow up of ordered medication and testing, placing referrals and communication with patient care providers, coordinating future care as well as documentation in the EHR. This encompassed more than 50% of the total encounter time. In summary, my findings and plan are the following:   Pt doing well today  VAC site OK  Adv diet  NPO after MN  Wound closure tomorrow    Jorge Gupta MD

## 2024-03-21 NOTE — PLAN OF CARE
Problem: Chronic Conditions and Co-morbidities  Goal: Patient's chronic conditions and co-morbidity symptoms are monitored and maintained or improved  3/21/2024 0131 by Francisca Bey RN  Outcome: Progressing  3/20/2024 1732 by Janine Covington RN  Outcome: Progressing     Problem: Discharge Planning  Goal: Discharge to home or other facility with appropriate resources  3/21/2024 0131 by Francisca Bey RN  Outcome: Progressing  3/20/2024 1732 by Janine Covington RN  Outcome: Progressing     Problem: Safety - Adult  Goal: Free from fall injury  3/21/2024 0131 by Francisca Bey RN  Outcome: Progressing  3/20/2024 1732 by Janine Covington RN  Outcome: Progressing     Problem: Pain  Goal: Verbalizes/displays adequate comfort level or baseline comfort level  3/21/2024 0131 by Francisca Bey RN  Outcome: Progressing  3/20/2024 1732 by Janine Covington RN  Outcome: Progressing     Problem: Skin/Tissue Integrity - Adult  Goal: Skin integrity remains intact  3/21/2024 0131 by Francisca Bey RN  Outcome: Progressing  Flowsheets  Taken 3/20/2024 2249 by Francisca Bey RN  Skin Integrity Remains Intact: Monitor for areas of redness and/or skin breakdown  Taken 3/20/2024 1824 by Janine Covington RN  Skin Integrity Remains Intact: Monitor for areas of redness and/or skin breakdown  3/20/2024 1732 by Janine Covington RN  Outcome: Progressing  Goal: Incisions, wounds, or drain sites healing without S/S of infection  3/21/2024 0131 by Francisca Bey RN  Outcome: Progressing  3/20/2024 1732 by Janine Covington RN  Outcome: Progressing     Problem: Gastrointestinal - Adult  Goal: Minimal or absence of nausea and vomiting  3/21/2024 0131 by Francisca Bey RN  Outcome: Progressing  3/20/2024 1732 by Janine Covington RN  Outcome: Progressing  Goal: Maintains or returns to baseline bowel function  3/21/2024 0131 by Francisca Bey RN  Outcome: Progressing  3/20/2024 1732 by Nadya

## 2024-03-21 NOTE — PROGRESS NOTES
11:40 AM  RN called picc nurse for PIV placement. Floor nurses attempted x3 for PIV and were unsuccessful.

## 2024-03-21 NOTE — DISCHARGE INSTRUCTIONS
[facility]    Discharge Instructions      Activity  - activity as tolerated, no driving while on analgesics, and no heavy lifting for 6 weeks; it is OK to be up walking around--walking up and down stairs is also OK. Do what is comfortable: stop and rest if you feel tired.    Diet  - regular diet  - Drink plenty of fluids     Pain  - You may use narcotic pain medication as prescribed for breakthrough pain  - You can use OTC Tylenol or Ibuprofen for 1-2 weeks (may need to adjust the dose as directed on the bottle if enteric coated ibuprofen chosen)  - Avoid taking narcotic pain medication on an empty stomach which may result in nausea, if pain medication is causing nausea try decreasing the dose  - Narcotic pain medication is not necessary, please contact the office if pain is not controlled  - Narcotic pain medication will not be refilled on weekends and after hours  - Please contact the office Monday-Friday 8a-4p if a refill is requested    Nausea  - Avoid taking narcotic pain medication on an empty stomach which may result in nausea  - If pain medication is causing nausea you may try decreasing the dose  - Nausea can be common for 24 hours after surgery--if nausea uncontrolled or worsening, contact the office or go to the ED    Constipation  - Pain medication can be constipating  - Often, it helps to take a stool softener with the goal of at least one soft bowel movement daily with minimal straining  - You may also need to increase water and fiber intake--may supplement with Benefiber and increasing your activity will also be helpful  - If a stool softener is needed, the following OTC medications are recommend: Colace 100 mg by mouth twice daily, Miralax 17 gm by mouth 1-3 times daily with glass of water, dulcolax suppositories, and Fleets enemas    Wound Care  - keep wound clean and dry  - If incisional bleeding is noted, hold firm pressure for 15-20 minutes. If remains

## 2024-03-21 NOTE — PROGRESS NOTES
22:00 Assessment complete. VSS. Pt denies pain at the time. No nausea/v. The care plan and education has been reviewed and mutually agreed upon with the patient.   Francisca Bey RN

## 2024-03-21 NOTE — PROGRESS NOTES
[facility]    3/21/2024 11:02 AM        Name: Jaylen Adhikari .              Admitted: 3/15/2024  Primary Care Provider: Pooja Nuñez APRN - CNP (Tel: [phone])      Subjective:    Start on regular diet tolerating no nausea vomiting chest      Current Medications  ceFAZolin (ANCEF) 2,000 mg in sodium chloride 0.9 % 50 mL IVPB (mini-bag), On Call to OR  morphine (PF) injection 2 mg, Q3H PRN  lisinopril (PRINIVIL;ZESTRIL) tablet 40 mg, Daily   And  hydroCHLOROthiazide (HYDRODIURIL) tablet 25 mg, Daily  carvedilol (COREG) tablet 25 mg, BID WC  spironolactone (ALDACTONE) tablet 25 mg, Daily  sodium chloride flush 0.9 % injection 5-40 mL, 2 times per day  sodium chloride flush 0.9 % injection 5-40 mL, PRN  0.9 % sodium chloride infusion, PRN  oxyCODONE (ROXICODONE) immediate release tablet 5 mg, Q4H PRN  ondansetron (ZOFRAN) injection 4 mg, Q4H PRN  enoxaparin Sodium (LOVENOX) injection 30 mg, BID  pantoprazole (PROTONIX) injection 40 mg, Daily  calcium carbonate (TUMS) chewable tablet 500 mg, TID PRN        Objective:  /82   Pulse 97   Temp 98.2 °F (36.8 °C) (Oral)   Resp 17   Ht 1.829 m (6')   Wt (!) 142.4 kg (314 lb)   SpO2 94%   BMI 42.59 kg/m²     Intake/Output Summary (Last 24 hours) at 3/21/2024 1102  Last data filed at 3/20/2024 1400  Gross per 24 hour   Intake 240 ml   Output --   Net 240 ml        Wt Readings from Last 3 Encounters:   03/18/24 (!) 142.4 kg (314 lb)   02/29/24 (!) 147.9 kg (326 lb)   01/30/24 (!) 147.9 kg (326 lb)       General appearance:  Appears comfortable. AAOx3  HEENT: atraumatic, Pupils equal, muscous membranes moist, no masses appreciated  Cardiovascular: Regular rate and rhythm no murmurs appreciated  Respiratory: CTAB no wheezing  Gastrointestinal: soft, nondistended, appropriate incisional tenderness, BS   EXT: no edema  Neurology: no gross focal deficts  Psychiatry:  Appropriate affect. Not agitated  Skin: Warm, dry, no rashes appreciated    Labs and Tests:  CBC:   Recent Labs     03/19/24  0554 03/20/24  0902 03/21/24  0557   WBC 15.9* 16.4* 13.6*   HGB 14.4 13.6 12.6*    354 292       BMP:    Recent Labs     03/19/24  0554 03/20/24  0902 03/21/24  0557   * 132* 134*   K 3.8 4.1 4.3   CL 97* 96* 96*   CO2 24 22 24   BUN 16 22* 20   CREATININE 0.8* 0.9 0.9   GLUCOSE 156* 150* 112*       Hepatic:   Recent Labs     03/19/24  0554   AST 12*   ALT 10   BILITOT 0.7   ALKPHOS 60       XR ABDOMEN (2 VIEWS)   Final Result   Multiple dilated loops of air-filled small bowel measuring up to 6 cm.  These   findings appear to represent either partial small bowel obstruction versus   ileus             Recent imaging reviewed    Problem List  Principal Problem:    Colostomy in place (HCC)  Active Problems:    Open abdominal wall wound    Mild malnutrition (HCC)  Resolved Problems:    * No resolved hospital problems. *       Assessment/Plan:     Reversal of Colostomy:  s/p exp Lap, ERMA, colostomy takedown with resection and colorectal anastomosis, appendectomy, and negative-pressure wound VAC placement 3/15/24.  History of Guzman's procedure for perforated diverticulitis  Start reg diet   Per suergery       HTN: BP controlled, monitor      Prophylaxis: Protonix, Lovenox.  CODE STATUS: Full      Asim Swan MD   3/21/2024 11:02 AM

## 2024-03-22 ENCOUNTER — ANESTHESIA (OUTPATIENT)
Dept: OPERATING ROOM | Age: 34
End: 2024-03-22
Payer: COMMERCIAL

## 2024-03-22 LAB
GLUCOSE BLD-MCNC: 118 MG/DL (ref 70–99)
GLUCOSE BLD-MCNC: 136 MG/DL (ref 70–99)
PERFORMED ON: ABNORMAL
PERFORMED ON: ABNORMAL

## 2024-03-22 PROCEDURE — 6360000002 HC RX W HCPCS: Performed by: NURSE ANESTHETIST, CERTIFIED REGISTERED

## 2024-03-22 PROCEDURE — 0WQF0ZZ REPAIR ABDOMINAL WALL, OPEN APPROACH: ICD-10-PCS | Performed by: SURGERY

## 2024-03-22 PROCEDURE — 3700000000 HC ANESTHESIA ATTENDED CARE: Performed by: SURGERY

## 2024-03-22 PROCEDURE — C9113 INJ PANTOPRAZOLE SODIUM, VIA: HCPCS | Performed by: NURSE PRACTITIONER

## 2024-03-22 PROCEDURE — 2580000003 HC RX 258: Performed by: SURGERY

## 2024-03-22 PROCEDURE — 6360000002 HC RX W HCPCS: Performed by: SURGERY

## 2024-03-22 PROCEDURE — 3600000002 HC SURGERY LEVEL 2 BASE: Performed by: SURGERY

## 2024-03-22 PROCEDURE — 6370000000 HC RX 637 (ALT 250 FOR IP): Performed by: SURGERY

## 2024-03-22 PROCEDURE — 2709999900 HC NON-CHARGEABLE SUPPLY: Performed by: SURGERY

## 2024-03-22 PROCEDURE — 3700000001 HC ADD 15 MINUTES (ANESTHESIA): Performed by: SURGERY

## 2024-03-22 PROCEDURE — 7100000001 HC PACU RECOVERY - ADDTL 15 MIN: Performed by: SURGERY

## 2024-03-22 PROCEDURE — 1200000000 HC SEMI PRIVATE

## 2024-03-22 PROCEDURE — 7100000000 HC PACU RECOVERY - FIRST 15 MIN: Performed by: SURGERY

## 2024-03-22 PROCEDURE — 13160 SEC CLSR SURG WND/DEHSN XTN: CPT | Performed by: SURGERY

## 2024-03-22 PROCEDURE — 6370000000 HC RX 637 (ALT 250 FOR IP): Performed by: STUDENT IN AN ORGANIZED HEALTH CARE EDUCATION/TRAINING PROGRAM

## 2024-03-22 PROCEDURE — 3600000012 HC SURGERY LEVEL 2 ADDTL 15MIN: Performed by: SURGERY

## 2024-03-22 PROCEDURE — 6360000002 HC RX W HCPCS: Performed by: NURSE PRACTITIONER

## 2024-03-22 PROCEDURE — 2500000003 HC RX 250 WO HCPCS: Performed by: NURSE ANESTHETIST, CERTIFIED REGISTERED

## 2024-03-22 PROCEDURE — 6360000002 HC RX W HCPCS: Performed by: STUDENT IN AN ORGANIZED HEALTH CARE EDUCATION/TRAINING PROGRAM

## 2024-03-22 RX ORDER — MIDAZOLAM HYDROCHLORIDE 1 MG/ML
INJECTION INTRAMUSCULAR; INTRAVENOUS PRN
Status: DISCONTINUED | OUTPATIENT
Start: 2024-03-22 | End: 2024-03-22 | Stop reason: SDUPTHER

## 2024-03-22 RX ORDER — ROCURONIUM BROMIDE 10 MG/ML
INJECTION, SOLUTION INTRAVENOUS PRN
Status: DISCONTINUED | OUTPATIENT
Start: 2024-03-22 | End: 2024-03-22 | Stop reason: SDUPTHER

## 2024-03-22 RX ORDER — SODIUM CHLORIDE 9 MG/ML
INJECTION, SOLUTION INTRAVENOUS PRN
Status: DISCONTINUED | OUTPATIENT
Start: 2024-03-22 | End: 2024-03-22 | Stop reason: HOSPADM

## 2024-03-22 RX ORDER — APREPITANT 40 MG/1
40 CAPSULE ORAL ONCE
Status: COMPLETED | OUTPATIENT
Start: 2024-03-22 | End: 2024-03-22

## 2024-03-22 RX ORDER — DEXAMETHASONE SODIUM PHOSPHATE 4 MG/ML
INJECTION, SOLUTION INTRA-ARTICULAR; INTRALESIONAL; INTRAMUSCULAR; INTRAVENOUS; SOFT TISSUE PRN
Status: DISCONTINUED | OUTPATIENT
Start: 2024-03-22 | End: 2024-03-22 | Stop reason: SDUPTHER

## 2024-03-22 RX ORDER — HYDROMORPHONE HYDROCHLORIDE 2 MG/ML
INJECTION, SOLUTION INTRAMUSCULAR; INTRAVENOUS; SUBCUTANEOUS PRN
Status: DISCONTINUED | OUTPATIENT
Start: 2024-03-22 | End: 2024-03-22 | Stop reason: SDUPTHER

## 2024-03-22 RX ORDER — NALOXONE HYDROCHLORIDE 0.4 MG/ML
INJECTION, SOLUTION INTRAMUSCULAR; INTRAVENOUS; SUBCUTANEOUS PRN
Status: DISCONTINUED | OUTPATIENT
Start: 2024-03-22 | End: 2024-03-22

## 2024-03-22 RX ORDER — SODIUM CHLORIDE 0.9 % (FLUSH) 0.9 %
5-40 SYRINGE (ML) INJECTION PRN
Status: DISCONTINUED | OUTPATIENT
Start: 2024-03-22 | End: 2024-03-22

## 2024-03-22 RX ORDER — ACETAMINOPHEN 325 MG/1
650 TABLET ORAL ONCE
Status: COMPLETED | OUTPATIENT
Start: 2024-03-22 | End: 2024-03-22

## 2024-03-22 RX ORDER — SODIUM CHLORIDE 0.9 % (FLUSH) 0.9 %
5-40 SYRINGE (ML) INJECTION EVERY 12 HOURS SCHEDULED
Status: DISCONTINUED | OUTPATIENT
Start: 2024-03-22 | End: 2024-03-22

## 2024-03-22 RX ORDER — SODIUM CHLORIDE 0.9 % (FLUSH) 0.9 %
5-40 SYRINGE (ML) INJECTION EVERY 12 HOURS SCHEDULED
Status: DISCONTINUED | OUTPATIENT
Start: 2024-03-22 | End: 2024-03-22 | Stop reason: HOSPADM

## 2024-03-22 RX ORDER — HYDROMORPHONE HYDROCHLORIDE 2 MG/ML
0.5 INJECTION, SOLUTION INTRAMUSCULAR; INTRAVENOUS; SUBCUTANEOUS EVERY 5 MIN PRN
Status: DISCONTINUED | OUTPATIENT
Start: 2024-03-22 | End: 2024-03-22

## 2024-03-22 RX ORDER — LIDOCAINE HYDROCHLORIDE 20 MG/ML
INJECTION, SOLUTION EPIDURAL; INFILTRATION; INTRACAUDAL; PERINEURAL PRN
Status: DISCONTINUED | OUTPATIENT
Start: 2024-03-22 | End: 2024-03-22 | Stop reason: SDUPTHER

## 2024-03-22 RX ORDER — FENTANYL CITRATE 50 UG/ML
50 INJECTION, SOLUTION INTRAMUSCULAR; INTRAVENOUS EVERY 5 MIN PRN
Status: DISCONTINUED | OUTPATIENT
Start: 2024-03-22 | End: 2024-03-22

## 2024-03-22 RX ORDER — SODIUM CHLORIDE, SODIUM LACTATE, POTASSIUM CHLORIDE, CALCIUM CHLORIDE 600; 310; 30; 20 MG/100ML; MG/100ML; MG/100ML; MG/100ML
INJECTION, SOLUTION INTRAVENOUS CONTINUOUS
Status: DISCONTINUED | OUTPATIENT
Start: 2024-03-22 | End: 2024-03-22 | Stop reason: HOSPADM

## 2024-03-22 RX ORDER — SODIUM CHLORIDE 0.9 % (FLUSH) 0.9 %
5-40 SYRINGE (ML) INJECTION PRN
Status: DISCONTINUED | OUTPATIENT
Start: 2024-03-22 | End: 2024-03-22 | Stop reason: HOSPADM

## 2024-03-22 RX ORDER — PROPOFOL 10 MG/ML
INJECTION, EMULSION INTRAVENOUS PRN
Status: DISCONTINUED | OUTPATIENT
Start: 2024-03-22 | End: 2024-03-22 | Stop reason: SDUPTHER

## 2024-03-22 RX ORDER — FENTANYL CITRATE 50 UG/ML
INJECTION, SOLUTION INTRAMUSCULAR; INTRAVENOUS PRN
Status: DISCONTINUED | OUTPATIENT
Start: 2024-03-22 | End: 2024-03-22 | Stop reason: SDUPTHER

## 2024-03-22 RX ORDER — SUCCINYLCHOLINE CHLORIDE 20 MG/ML
INJECTION INTRAMUSCULAR; INTRAVENOUS PRN
Status: DISCONTINUED | OUTPATIENT
Start: 2024-03-22 | End: 2024-03-22 | Stop reason: SDUPTHER

## 2024-03-22 RX ORDER — ONDANSETRON 2 MG/ML
4 INJECTION INTRAMUSCULAR; INTRAVENOUS
Status: DISCONTINUED | OUTPATIENT
Start: 2024-03-22 | End: 2024-03-22

## 2024-03-22 RX ORDER — SODIUM CHLORIDE 9 MG/ML
INJECTION, SOLUTION INTRAVENOUS PRN
Status: DISCONTINUED | OUTPATIENT
Start: 2024-03-22 | End: 2024-03-22

## 2024-03-22 RX ORDER — OXYCODONE HYDROCHLORIDE 5 MG/1
5 TABLET ORAL
Status: DISCONTINUED | OUTPATIENT
Start: 2024-03-22 | End: 2024-03-22

## 2024-03-22 RX ORDER — ONDANSETRON 2 MG/ML
INJECTION INTRAMUSCULAR; INTRAVENOUS PRN
Status: DISCONTINUED | OUTPATIENT
Start: 2024-03-22 | End: 2024-03-22 | Stop reason: SDUPTHER

## 2024-03-22 RX ADMIN — SODIUM CHLORIDE 3000 MG: 900 INJECTION INTRAVENOUS at 09:52

## 2024-03-22 RX ADMIN — ENOXAPARIN SODIUM 30 MG: 100 INJECTION SUBCUTANEOUS at 20:01

## 2024-03-22 RX ADMIN — SODIUM CHLORIDE, PRESERVATIVE FREE 10 ML: 5 INJECTION INTRAVENOUS at 20:03

## 2024-03-22 RX ADMIN — MIDAZOLAM 2 MG: 1 INJECTION INTRAMUSCULAR; INTRAVENOUS at 09:56

## 2024-03-22 RX ADMIN — PANTOPRAZOLE SODIUM 40 MG: 40 INJECTION, POWDER, FOR SOLUTION INTRAVENOUS at 08:17

## 2024-03-22 RX ADMIN — APREPITANT 40 MG: 40 CAPSULE ORAL at 09:56

## 2024-03-22 RX ADMIN — ACETAMINOPHEN 650 MG: 325 TABLET ORAL at 09:57

## 2024-03-22 RX ADMIN — DEXAMETHASONE SODIUM PHOSPHATE 8 MG: 4 INJECTION, SOLUTION INTRAMUSCULAR; INTRAVENOUS at 10:09

## 2024-03-22 RX ADMIN — ONDANSETRON 4 MG: 2 INJECTION INTRAMUSCULAR; INTRAVENOUS at 10:36

## 2024-03-22 RX ADMIN — CARVEDILOL 25 MG: 25 TABLET, FILM COATED ORAL at 08:15

## 2024-03-22 RX ADMIN — SUCCINYLCHOLINE CHLORIDE 160 MG: 20 INJECTION, SOLUTION INTRAMUSCULAR; INTRAVENOUS at 10:04

## 2024-03-22 RX ADMIN — HYDROMORPHONE HYDROCHLORIDE 0.5 MG: 2 INJECTION, SOLUTION INTRAMUSCULAR; INTRAVENOUS; SUBCUTANEOUS at 10:57

## 2024-03-22 RX ADMIN — ROCURONIUM BROMIDE 10 MG: 10 INJECTION, SOLUTION INTRAVENOUS at 10:04

## 2024-03-22 RX ADMIN — CARVEDILOL 25 MG: 25 TABLET, FILM COATED ORAL at 16:42

## 2024-03-22 RX ADMIN — PROPOFOL 200 MG: 10 INJECTION, EMULSION INTRAVENOUS at 10:04

## 2024-03-22 RX ADMIN — FENTANYL CITRATE 100 MCG: 50 INJECTION, SOLUTION INTRAMUSCULAR; INTRAVENOUS at 10:01

## 2024-03-22 RX ADMIN — SODIUM CHLORIDE, PRESERVATIVE FREE 10 ML: 5 INJECTION INTRAVENOUS at 08:19

## 2024-03-22 RX ADMIN — HYDROMORPHONE HYDROCHLORIDE 1 MG: 2 INJECTION, SOLUTION INTRAMUSCULAR; INTRAVENOUS; SUBCUTANEOUS at 10:45

## 2024-03-22 RX ADMIN — LIDOCAINE HYDROCHLORIDE 100 MG: 20 INJECTION, SOLUTION EPIDURAL; INFILTRATION; INTRACAUDAL; PERINEURAL at 10:02

## 2024-03-22 RX ADMIN — SODIUM CHLORIDE: 9 INJECTION, SOLUTION INTRAVENOUS at 09:26

## 2024-03-22 RX ADMIN — HYDROMORPHONE HYDROCHLORIDE 0.5 MG: 2 INJECTION, SOLUTION INTRAMUSCULAR; INTRAVENOUS; SUBCUTANEOUS at 10:50

## 2024-03-22 RX ADMIN — CEFAZOLIN 2000 MG: 2 INJECTION, POWDER, FOR SOLUTION INTRAMUSCULAR; INTRAVENOUS at 16:47

## 2024-03-22 RX ADMIN — ROCURONIUM BROMIDE 40 MG: 10 INJECTION, SOLUTION INTRAVENOUS at 10:09

## 2024-03-22 RX ADMIN — SUGAMMADEX 200 MG: 100 INJECTION, SOLUTION INTRAVENOUS at 10:42

## 2024-03-22 RX ADMIN — SODIUM CHLORIDE: 9 INJECTION, SOLUTION INTRAVENOUS at 16:46

## 2024-03-22 ASSESSMENT — PAIN - FUNCTIONAL ASSESSMENT: PAIN_FUNCTIONAL_ASSESSMENT: NONE - DENIES PAIN

## 2024-03-22 ASSESSMENT — PAIN SCALES - GENERAL
PAINLEVEL_OUTOF10: 0
PAINLEVEL_OUTOF10: 4
PAINLEVEL_OUTOF10: 0
PAINLEVEL_OUTOF10: 4
PAINLEVEL_OUTOF10: 0
PAINLEVEL_OUTOF10: 6
PAINLEVEL_OUTOF10: 4

## 2024-03-22 ASSESSMENT — PAIN DESCRIPTION - LOCATION: LOCATION: ABDOMEN

## 2024-03-22 NOTE — PROGRESS NOTES
[facility]    3/22/2024 10:56 AM        Name: Jaylen Adhikari .              Admitted: 3/15/2024  Primary Care Provider: Pooja Nuñez APRN - CNP (Tel: [phone])      Subjective:    Tolerating diet no chest pain sob or fevers      Current Medications  naloxone 0.4 mg in 10 mL sodium chloride syringe, PRN  sodium chloride flush 0.9 % injection 5-40 mL, 2 times per day  sodium chloride flush 0.9 % injection 5-40 mL, PRN  0.9 % sodium chloride infusion, PRN  fentaNYL (SUBLIMAZE) injection 50 mcg, Q5 Min PRN  HYDROmorphone (DILAUDID) injection 0.5 mg, Q5 Min PRN  oxyCODONE (ROXICODONE) immediate release tablet 5 mg, Once PRN  ondansetron (ZOFRAN) injection 4 mg, Once PRN  lactated ringers IV soln infusion, Continuous  sodium chloride flush 0.9 % injection 5-40 mL, 2 times per day  sodium chloride flush 0.9 % injection 5-40 mL, PRN  0.9 % sodium chloride infusion, PRN  morphine (PF) injection 2 mg, Q3H PRN  lisinopril (PRINIVIL;ZESTRIL) tablet 40 mg, Daily   And  hydroCHLOROthiazide (HYDRODIURIL) tablet 25 mg, Daily  carvedilol (COREG) tablet 25 mg, BID WC  spironolactone (ALDACTONE) tablet 25 mg, Daily  sodium chloride flush 0.9 % injection 5-40 mL, 2 times per day  sodium chloride flush 0.9 % injection 5-40 mL, PRN  0.9 % sodium chloride infusion, PRN  oxyCODONE (ROXICODONE) immediate release tablet 5 mg, Q4H PRN  ondansetron (ZOFRAN) injection 4 mg, Q4H PRN  enoxaparin Sodium (LOVENOX) injection 30 mg, BID  pantoprazole (PROTONIX) injection 40 mg, Daily  calcium carbonate (TUMS) chewable tablet 500 mg, TID PRN    midazolam (VERSED) injection, PRN  fentaNYL (SUBLIMAZE) injection, PRN  lidocaine PF 2 % injection, PRN  propofol infusion, PRN  succinylcholine (ANECTINE) injection, PRN  rocuronium (ZEMURON) injection, PRN  dexAMETHasone Sodium Phosphate injection, PRN  ondansetron (ZOFRAN) injection, PRN  sugammadex

## 2024-03-22 NOTE — BRIEF OP NOTE
Brief Postoperative Note      Patient: Jaylen Adhikari  YOB: 1990  MRN: [id number]    Date of Procedure: 3/22/2024    Pre-Op Diagnosis Codes:     * Open wound of abdomen, initial encounter [S31.109A]    Post-Op Diagnosis: Same       Procedure(s):  SECONDARY ABDOMINAL WOUND CLOSURE    Surgeon(s):  Jorge Gupta MD    Assistant:  Surgical Assistant: Humberto Green    Anesthesia: General    Estimated Blood Loss (mL): Minimal    Complications: None    Specimens:   * No specimens in log *    Implants:  * No implants in log *      Drains:   Negative Pressure Wound Therapy Abdomen Medial (Active)       [REMOVED] Negative Pressure Wound Therapy Abdomen Medial;Upper (Removed)       [REMOVED] Negative Pressure Wound Therapy Abdomen Medial (Removed)       [REMOVED] Colostomy LUQ Descending/sigmoid (Removed)       [REMOVED] Urinary Catheter 03/15/24 2 Way (Removed)   Catheter Indications Perioperative use for selected surgical procedures 03/16/24 1509   Site Assessment [place] 03/16/24 1509   Urine Color Cora 03/16/24 1509   Urine Appearance Clear 03/16/24 1509   Urine Odor Other (Comment) 03/16/24 1509   Collection Container Standard 03/16/24 1509   Securement Method Securing device (Describe) 03/16/24 1509   Catheter Care  Perineal wipes 03/16/24 1509   Catheter Best Practices  Drainage tube clipped to bed;Catheter secured to thigh;Tamper seal intact;Bag below bladder;Bag not on floor;Lack of dependent loop in tubing;Drainage bag less than half full 03/16/24 1509   Status Draining;Patent 03/16/24 1509   Output (mL) 950 mL 03/16/24 1509   Discontinuation Reason Per provider order 03/16/24 1509       Findings: Wounds cleaned and closed, CLYDE placed.      Electronically signed by Jorge Gupta MD on 3/22/2024 at 10:48 AM

## 2024-03-22 NOTE — OP NOTE
[place] [id number]                            OPERATIVE REPORT      PATIENT NAME: ZAN HAHN              : 1990  MED REC NO: [id number]                      ROOM: [address] [place] [address]  ACCOUNT NO: [id number]                       ADMIT DATE: 03/15/2024  PROVIDER: Jorge Gupta MD      DATE OF PROCEDURE:  2024    SURGEON:  Jorge Gupta MD    PREOPERATIVE DIAGNOSIS:  Open abdominal wound.    POSTOPERATIVE DIAGNOSIS:  Open abdominal wound.    PROCEDURE:  Secondary closure of abdominal wound.    ANESTHESIA:  General plus local.    ESTIMATED BLOOD LOSS:  Minimal.    COMPLICATIONS:  None.    INDICATIONS FOR SURGERY:  The patient presents for planned return to the OR today for secondary abdominal wound closure.  The patient has had prior laparotomy with colostomy takedown, colostomy closure, and bowel resection.  The wounds have cleaned up nicely with the VAC over the last several days, and the patient presents to the OR today for closure.    DESCRIPTION OF PROCEDURE:  The patient was brought to the operating room and placed on the operating table in supine position.  Old dressings were removed.  Old VAC was removed.  Anesthetic was administered.  The abdomen was prepped and draped sterilely, and a time-out was done.  Betadine scrub was used to clean and open the wounds thoroughly with the scrub irrigation.  The fascial sutures were intact, and no deep necrosis or purulent fluid was evident.  I did place a couple of additional 0 Vicryl sutures in the fascia to tighten up a couple of areas, which appeared a little loose.  Following this, the wound was rendered hemostatic with Bovie electrocautery.  Closure was performed with staples for the skin in the midline wound and staples for the skin in the old colostomy site.  Following this, the wound was cleaned and dried.  A Radha was cut to fit and also positioned over  the colostomy site.  Good suction and negative pressure were applied.  The patient was taken to the recovery room in stable condition postop.          NATAN STORY MD      D:  03/22/2024 10:52:50     T:  03/22/2024 16:36:41     AMERICO/LATOYA  Job #:  [id number]     Doc#:  [id number]

## 2024-03-22 NOTE — PROGRESS NOTES
Pt awake and oriented, weaned from simple mask to 2L NC, abd incision CD&I- CLYDE in place, no new drainage. Vss, denies pain or need for pain meds, phase 1 discharge criteria met

## 2024-03-22 NOTE — PROGRESS NOTES
Pt admitted to PACU, awakening and following commands, abd incision CD&I, CLYDE drsng in place, vss, NSR on tele, blood sugar 136, will monitor

## 2024-03-22 NOTE — PROGRESS NOTES
21:00 Assessment complete. VSS. The care plan and education has been reviewed and mutually agreed upon with the patient.   Francisca Bey RN

## 2024-03-22 NOTE — PROGRESS NOTES
Nutrition Note    RECOMMENDATIONS  PO Diet: Resume diet per MD  ONS: Reorder Ensure TID when diet is resumed (no chocolate)    NUTRITION ASSESSMENT   Pt triggered for follow-up. Started on full liquid diet 3/20, advanced to regular yesterday with documented intakes of 51-75%. Ensure was ordered TID. NPO again this morning for secondary wound closure. Upon visiting, pt reported appetite okay and tolerated diet. Accepted Ensure but dislikes chocolate. RD will continue to monitor for diet tolerance with adequate intake.     Nutrition Related Findings: LBM 3/20. No edema noted.  Wounds: Surgical Incision  Nutrition Education:  Education not indicated   Nutrition Goals: PO intake 50% or greater, by next RD assessment     MALNUTRITION ASSESSMENT   Acute Illness  Malnutrition Status: Mild malnutrition  Findings of the 6 clinical characteristics of malnutrition:  Energy Intake:   (as determined by initial RD assessment 3/20, pt now started on diet)  Weight Loss:  No significant weight loss     Body Fat Loss:  No significant body fat loss     Muscle Mass Loss:  No significant muscle mass loss    Fluid Accumulation:  No significant fluid accumulation     Strength:  Not Performed    NUTRITION DIAGNOSIS   Increased nutrient needs related to increase demand for energy/nutrients as evidenced by wounds    CURRENT NUTRITION THERAPIES  Diet NPO Exceptions are: Sips of Water with Meds     PO Intake: NPO   PO Supplement Intake:NPO    ANTHROPOMETRICS  Current Height: 182.9 cm (6')  Current Weight - Scale: (!) 150.5 kg (331 lb 12.7 oz)    Admission weight: 142.4 kg (314 lb)  Ideal Body Weight (IBW): 178 lbs  (81 kg)        BMI: 44.9    COMPARATIVE STANDARDS  Total Energy Requirements (kcals/day): 6803-9127     Protein (g):  162       Fluid (mL/day):  4157-2478    The patient will be monitored per nutrition standards of care. Consult dietitian if additional nutrition interventions are needed prior to RD reassessment.     Rebecca Conklin  MS, RD, LD    Contact: [phone]

## 2024-03-22 NOTE — PLAN OF CARE
Problem: Chronic Conditions and Co-morbidities  Goal: Patient's chronic conditions and co-morbidity symptoms are monitored and maintained or improved  Outcome: Progressing     Problem: Discharge Planning  Goal: Discharge to home or other facility with appropriate resources  Outcome: Progressing     Problem: Safety - Adult  Goal: Free from fall injury  Outcome: Progressing     Problem: Pain  Goal: Verbalizes/displays adequate comfort level or baseline comfort level  Outcome: Progressing     Problem: Skin/Tissue Integrity - Adult  Goal: Skin integrity remains intact  Outcome: Progressing  Goal: Incisions, wounds, or drain sites healing without S/S of infection  Outcome: Progressing     Problem: Gastrointestinal - Adult  Goal: Minimal or absence of nausea and vomiting  Outcome: Progressing  Goal: Maintains or returns to baseline bowel function  Outcome: Progressing  Goal: Maintains adequate nutritional intake  Outcome: Progressing     Problem: Nutrition Deficit:  Goal: Optimize nutritional status  Outcome: Progressing     Problem: ABCDS Injury Assessment  Goal: Absence of physical injury  Outcome: Progressing

## 2024-03-22 NOTE — PROGRESS NOTES
Shift assessment completed and charted. VSS. A/o x4. Standard safety measures in place. Bed locked and in lowest position, call light within reach. SpO2 > 90% on 2L O2 post op. Patient reports pain 4/10, declines pain medication at this time. SCD's in place/ CLYDE dressing c/d/I with small amount of old drainage - per PACU nurse drainage is iodine from surgery. Pt stable and denied needs when writer left room.      Pt weaned to room air. Pt ambulating independently in room and walked unit lap x2. Patient tolerating regular diet, denies nausea/vomiting. Pain controlled at this time. Patient is passing flatus.

## 2024-03-22 NOTE — ANESTHESIA POSTPROCEDURE EVALUATION
Department of Anesthesiology  Postprocedure Note    Patient: Jaylen Adhikari  MRN: [id number]  YOB: 1990  Date of evaluation: 3/22/2024    Procedure Summary       Date: 03/22/24 Room / Location: [address]    Anesthesia Start: 0958 Anesthesia Stop: 1101    Procedure: SECONDARY ABDOMINAL WOUND CLOSURE Diagnosis:       Open wound of abdomen, initial encounter      (Open wound of abdomen, initial encounter [S31.109A])    Surgeons: Jorge Gupta MD Responsible Provider: Ora Charlton MD    Anesthesia Type: general ASA Status: 3            Anesthesia Type: No value filed.    Florence Phase I: Florence Score: 10    Florence Phase II:      Anesthesia Post Evaluation    Patient location during evaluation: bedside  Patient participation: complete - patient participated  Level of consciousness: awake and alert  Pain score: 2  Airway patency: patent  Nausea & Vomiting: no vomiting  Cardiovascular status: hemodynamically stable  Respiratory status: nonlabored ventilation  Hydration status: stable  Multimodal analgesia pain management approach  Pain management: adequate    No notable events documented.

## 2024-03-23 VITALS
SYSTOLIC BLOOD PRESSURE: 131 MMHG | RESPIRATION RATE: 18 BRPM | HEART RATE: 70 BPM | DIASTOLIC BLOOD PRESSURE: 83 MMHG | HEIGHT: 72 IN | TEMPERATURE: 97.7 F | BODY MASS INDEX: 42.66 KG/M2 | WEIGHT: 315 LBS | OXYGEN SATURATION: 94 %

## 2024-03-23 PROCEDURE — 99024 POSTOP FOLLOW-UP VISIT: CPT | Performed by: SURGERY

## 2024-03-23 PROCEDURE — 6370000000 HC RX 637 (ALT 250 FOR IP): Performed by: SURGERY

## 2024-03-23 PROCEDURE — 2580000003 HC RX 258: Performed by: SURGERY

## 2024-03-23 PROCEDURE — APPNB30 APP NON BILLABLE TIME 0-30 MINS: Performed by: NURSE PRACTITIONER

## 2024-03-23 PROCEDURE — APPSS30 APP SPLIT SHARED TIME 16-30 MINUTES: Performed by: NURSE PRACTITIONER

## 2024-03-23 PROCEDURE — C9113 INJ PANTOPRAZOLE SODIUM, VIA: HCPCS | Performed by: SURGERY

## 2024-03-23 PROCEDURE — 6360000002 HC RX W HCPCS: Performed by: SURGERY

## 2024-03-23 RX ADMIN — CARVEDILOL 25 MG: 25 TABLET, FILM COATED ORAL at 10:03

## 2024-03-23 RX ADMIN — SODIUM CHLORIDE, PRESERVATIVE FREE 10 ML: 5 INJECTION INTRAVENOUS at 10:03

## 2024-03-23 RX ADMIN — LISINOPRIL 40 MG: 20 TABLET ORAL at 10:02

## 2024-03-23 RX ADMIN — SODIUM CHLORIDE: 9 INJECTION, SOLUTION INTRAVENOUS at 03:10

## 2024-03-23 RX ADMIN — CEFAZOLIN 2000 MG: 2 INJECTION, POWDER, FOR SOLUTION INTRAMUSCULAR; INTRAVENOUS at 10:09

## 2024-03-23 RX ADMIN — PANTOPRAZOLE SODIUM 40 MG: 40 INJECTION, POWDER, FOR SOLUTION INTRAVENOUS at 10:03

## 2024-03-23 RX ADMIN — CEFAZOLIN 2000 MG: 2 INJECTION, POWDER, FOR SOLUTION INTRAMUSCULAR; INTRAVENOUS at 03:11

## 2024-03-23 RX ADMIN — SPIRONOLACTONE 25 MG: 25 TABLET ORAL at 10:02

## 2024-03-23 RX ADMIN — HYDROCHLOROTHIAZIDE 25 MG: 25 TABLET ORAL at 10:02

## 2024-03-23 ASSESSMENT — PAIN SCALES - GENERAL
PAINLEVEL_OUTOF10: 0
PAINLEVEL_OUTOF10: 0

## 2024-03-23 NOTE — PROGRESS NOTES
Discharge instructions and medications gone over with patient. Patient verbalizes understanding discharge instructions and medications. All questions answered.

## 2024-03-23 NOTE — PROGRESS NOTES
20;00 Assessment complete. Vss. Abd incision Intact w a small old drainage, CLYDE drsng in place. Pt up ambulating Q hr in the hallway. Pt denies pain at the time. Pt verbalized passing flatus. No BM at the time. The care plan and education has been reviewed and mutually agreed upon with the patient. Will cont to monitor  Chavelly CLEMENTINE Bey

## 2024-03-23 NOTE — PLAN OF CARE
Problem: Chronic Conditions and Co-morbidities  Goal: Patient's chronic conditions and co-morbidity symptoms are monitored and maintained or improved  3/23/2024 1023 by Kat Wood RN  Outcome: Progressing     Problem: Discharge Planning  Goal: Discharge to home or other facility with appropriate resources  3/23/2024 1023 by Kat Wood RN  Outcome: Progressing     Problem: Safety - Adult  Goal: Free from fall injury  3/23/2024 1023 by Kat Wood RN  Outcome: Progressing     Problem: Pain  Goal: Verbalizes/displays adequate comfort level or baseline comfort level  3/23/2024 1023 by Kat Wood RN  Outcome: Progressing     Problem: Skin/Tissue Integrity - Adult  Goal: Skin integrity remains intact  3/23/2024 1023 by Kat Wood RN  Outcome: Progressing     Problem: Skin/Tissue Integrity - Adult  Goal: Incisions, wounds, or drain sites healing without S/S of infection  3/23/2024 1023 by Kat Wood RN  Outcome: Progressing     Problem: Gastrointestinal - Adult  Goal: Minimal or absence of nausea and vomiting  3/23/2024 1023 by Kat Wood RN  Outcome: Progressing     Problem: Gastrointestinal - Adult  Goal: Maintains or returns to baseline bowel function  3/23/2024 1023 by Kat Wood RN  Outcome: Progressing     Problem: Gastrointestinal - Adult  Goal: Maintains adequate nutritional intake  3/23/2024 1023 by Kat Wood RN  Outcome: Progressing     Problem: Nutrition Deficit:  Goal: Optimize nutritional status  3/23/2024 1023 by Kat Wood RN  Outcome: Progressing     Problem: ABCDS Injury Assessment  Goal: Absence of physical injury  3/23/2024 1023 by Kat Wood RN  Outcome: Progressing

## 2024-03-23 NOTE — PLAN OF CARE
Problem: Chronic Conditions and Co-morbidities  Goal: Patient's chronic conditions and co-morbidity symptoms are monitored and maintained or improved  Outcome: Progressing     Problem: Discharge Planning  Goal: Discharge to home or other facility with appropriate resources  Outcome: Progressing     Problem: Safety - Adult  Goal: Free from fall injury  Outcome: Progressing     Problem: Pain  Goal: Verbalizes/displays adequate comfort level or baseline comfort level  Outcome: Progressing     Problem: Skin/Tissue Integrity - Adult  Goal: Skin integrity remains intact  Outcome: Progressing  Goal: Incisions, wounds, or drain sites healing without S/S of infection  Outcome: Progressing     Problem: Gastrointestinal - Adult  Goal: Minimal or absence of nausea and vomiting  Outcome: Progressing  Goal: Maintains or returns to baseline bowel function  Outcome: Progressing  Goal: Maintains adequate nutritional intake  Outcome: Progressing     Problem: Nutrition Deficit:  Goal: Optimize nutritional status  Outcome: Progressing  Flowsheets (Taken 3/22/2024 1016 by Rebecca Conklin, MS, RD, LD)  Nutrient intake appropriate for improving, restoring, or maintaining nutritional needs:   Recommend appropriate diets, oral nutritional supplements, and vitamin/mineral supplements   Monitor oral intake, labs, and treatment plans     Problem: ABCDS Injury Assessment  Goal: Absence of physical injury  Outcome: Progressing

## 2024-03-23 NOTE — PLAN OF CARE
Problem: Chronic Conditions and Co-morbidities  Goal: Patient's chronic conditions and co-morbidity symptoms are monitored and maintained or improved  3/23/2024 1101 by Kat Wood RN  Outcome: Completed     Problem: Discharge Planning  Goal: Discharge to home or other facility with appropriate resources  3/23/2024 1101 by Kat Wood RN  Outcome: Completed     Problem: Safety - Adult  Goal: Free from fall injury  3/23/2024 1101 by Kat Wood RN  Outcome: Completed     Problem: Pain  Goal: Verbalizes/displays adequate comfort level or baseline comfort level  3/23/2024 1101 by Kat Wood RN  Outcome: Completed     Problem: Skin/Tissue Integrity - Adult  Goal: Skin integrity remains intact  3/23/2024 1101 by Kat Wood RN  Outcome: Completed     Problem: Skin/Tissue Integrity - Adult  Goal: Incisions, wounds, or drain sites healing without S/S of infection  3/23/2024 1101 by Kat Wood RN  Outcome: Completed     Problem: Gastrointestinal - Adult  Goal: Minimal or absence of nausea and vomiting  3/23/2024 1101 by Kat Wood RN  Outcome: Completed     Problem: Gastrointestinal - Adult  Goal: Maintains or returns to baseline bowel function  3/23/2024 1101 by Kat Wood RN  Outcome: Completed     Problem: Gastrointestinal - Adult  Goal: Maintains adequate nutritional intake  3/23/2024 1101 by Kat Wood RN  Outcome: Completed     Problem: Nutrition Deficit:  Goal: Optimize nutritional status  3/23/2024 1101 by Kat Wood RN  Outcome: Completed     Problem: ABCDS Injury Assessment  Goal: Absence of physical injury  3/23/2024 1101 by Kat Wood RN  Outcome: Completed

## 2024-03-23 NOTE — DISCHARGE SUMMARY
- Appendix with reactive lymphoid hyperplasia.      - Portion of colon and skin compatible with ostomy site.      - Anastomotic rings with reactive change.     At time of discharge, Jaylen was tolerating a regular diet, had active bowel sounds, ambulating on his own accord and had adequate analgesia on oral pain medications, and had no signs of symptoms of complications.    PHYSICAL EXAMINATION:  Discharge Vitals:  height is 1.829 m (6') and weight is 158.3 kg (348 lb 15.8 oz) (abnormal). His oral temperature is 97.7 °F (36.5 °C). His blood pressure is 131/83 and his pulse is 70. His respiration is 18 and oxygen saturation is 94%.   General appearance - alert, well appearing, and in no distress  Chest - clear to ausculation  Heart - normal rate and regular rhythm  Abdomen - soft, non-distended, incisional tenderness only, bowel sounds present  Neurological - motor and sensory grossly normal bilaterally  Musculoskeletal - full range of motion without pain  Extremities - peripheral pulses normal, no pedal edema, no clubbing or cyanosis  Incision: CLYDE dressing in place, seal/suction intact    LABS:  Recent Labs     03/21/24  0557   WBC 13.6*   HGB 12.6*   HCT 35.7*      *   K 4.3   CL 96*   CO2 24   BUN 20   CREATININE 0.9       DISCHARGE INSTRUCTIONS:  1. Discharge medications:      Medication List        CONTINUE taking these medications      carvedilol 25 MG tablet  Commonly known as: COREG  Take 1 tablet by mouth 2 times daily (with meals)     lisinopril-hydroCHLOROthiazide 20-25 MG per tablet  Commonly known as: PRINZIDE;ZESTORETIC  Take 1 tablet by mouth every morning     MULTI-VITAMIN PO     spironolactone 25 MG tablet  Commonly known as: ALDACTONE  TAKE ONE TABLET BY MOUTH EVERY MORNING, THEN TAKE ONE TABLET BY MOUTH EVERY EVENING     vitamin D 1.25 MG (25778 UT) Caps capsule  Commonly known as: ERGOCALCIFEROL  Take 1 capsule by mouth once a week            2. Diet as tolerated.  3. Activity:  activity as tolerated, no driving while on analgesics, and no heavy lifting for 6 weeks.  4. Wound care: keep incisions clean and dry, keep CLYDE dressing in place  5. Follow-up: recommend follow up with PCP in 2-4 weeks.  6. Okay to shower, don't submerge incisions under water.  7. No driving until off pain medication and able to move torso freely without any pain.  8. Return to hospital, or contact Dr. Gupta' office ([phone]) if any signs of infection are seen.  9. The patient is not currently smoking. Recommend maintaining a smoke-free lifestyle.  10. Return to Clinic: on Thursday 3/28/2024.  11. Reviewed discharge instructions, restrictions, and reasons to call the office.    EDUCATION:  Educated patient on plan of care and disease process--all questions answered.    Plans discussed with patient and nursing.  Reviewed and discussed with Dr. Gupta.    Time spent for discharge: 30 minutes, including plan of care, patient education, and care coordination.      Signed:  JESSICA Carreno - CNP  3/23/2024 11:43 AM    Surgery Staff:     I have personally performed a face to face diagnostic evaluation on this patient. I have interviewed and examined the patient and I agree with the assessment above. Time was spent reviewing patient chart (including but not limited to notes, labs, imaging and other testing), interviewing and counseling patient and present family members, performing physical exam, documentation of my findings and subsequent follow up of ordered medication and testing, placing referrals and communication with patient care providers, coordinating future care as well as documentation in the EHR. This encompassed more than 50% of the total encounter time. In summary, my findings and plan are the following:   Pt doing well, ready for discharge today  Will see in office on Thursday  Change CLYDE then  Diet and activity reviewed    Jorge Gupta MD

## 2024-03-25 ENCOUNTER — TELEPHONE (OUTPATIENT)
Dept: PRIMARY CARE CLINIC | Age: 34
End: 2024-03-25

## 2024-03-25 NOTE — TELEPHONE ENCOUNTER
Care Transitions Initial Follow Up Call    Outreach made within 2 business days of discharge: Yes    Patient: Jaylen Adhikari Patient : 1990   MRN: [id number]  Reason for Admission: There are no discharge diagnoses documented for the most recent discharge.  Discharge Date: 3/23/24       Spoke with: Jaylen     Discharge department/facility: [facility]    TCM Interactive Patient Contact:  Was patient able to fill all prescriptions: No ( none prescribed )  Was patient instructed to bring all medications to the follow-up visit: No: NO  Is patient taking all medications as directed in the discharge summary? None prescribed   Does patient understand their discharge instructions: Yes  Does patient have questions or concerns that need addressed prior to 7-14 day follow up office visit: no    Scheduled appointment with PCP within 7-14 days    Follow Up  Future Appointments   Date Time Provider Department Center   3/28/2024 12:20 PM Jorge Gupta MD [facility]   2024  8:30 AM Pooja Nuñez APRN - CNP [facility] [place] [facility]   2024  2:30 PM Pooja Nuñez APRN - CNP [facility] [place] [facility] Matiya Parrish, MA

## 2024-03-27 ENCOUNTER — TELEPHONE (OUTPATIENT)
Dept: PRIMARY CARE CLINIC | Age: 34
End: 2024-03-27

## 2024-03-27 NOTE — TELEPHONE ENCOUNTER
Care Transitions Initial Follow Up Call    Outreach made within 2 business days of discharge: Yes    Patient: Jaylen Adhikari Patient : 1990   MRN: [id number]  Reason for Admission: There are no discharge diagnoses documented for the most recent discharge.  Discharge Date: 3/23/24       Spoke with: Jaylen    Discharge department/facility: [facility]    TCM Interactive Patient Contact:  Was patient able to fill all prescriptions: No  Was patient instructed to bring all medications to the follow-up visit: Yes  Is patient taking all medications as directed in the discharge summary? No meds giving   Does patient understand their discharge instructions: Yes  Does patient have questions or concerns that need addressed prior to 7-14 day follow up office visit: no    Scheduled appointment with PCP within 7-14 days    Follow Up  Future Appointments   Date Time Provider Department Center   3/28/2024 12:20 PM Jorge Gupta MD [facility]   2024  8:30 AM Pooja Nuñez APRN - CNP [facility] [place] [facility]   2024  2:30 PM Pooja Nuñez APRN - CNP [facility] [place] [facility] Matiya Parrish, MA

## 2024-03-28 ENCOUNTER — OFFICE VISIT (OUTPATIENT)
Dept: SURGERY | Age: 34
End: 2024-03-28

## 2024-03-28 VITALS — SYSTOLIC BLOOD PRESSURE: 131 MMHG | WEIGHT: 315 LBS | BODY MASS INDEX: 47.2 KG/M2 | DIASTOLIC BLOOD PRESSURE: 83 MMHG

## 2024-03-28 DIAGNOSIS — Z93.3 COLOSTOMY IN PLACE (HCC): Primary | ICD-10-CM

## 2024-03-28 PROCEDURE — 99024 POSTOP FOLLOW-UP VISIT: CPT | Performed by: SURGERY

## 2024-03-28 NOTE — PROGRESS NOTES
[facility] GENERAL AND LAPAROSCOPIC SURGERY          PATIENT NAME: Jaylen Adhikari     TODAY'S DATE: 3/28/2024    SUBJECTIVE:    Pt had colostomy closure, doing well.     OBJECTIVE:  VITALS:  /83   Wt (!) 157.9 kg (348 lb)   BMI 47.20 kg/m²     CONSTITUTIONAL:  awake and alert  LUNGS:  clear to auscultation  ABDOMEN:  normal bowel sounds, soft, non-distended, non-tender, incision clean, healing     Data:  CBC: No results for input(s): \"WBC\", \"HGB\", \"HCT\", \"PLT\" in the last 72 hours.  BMP:  No results for input(s): \"NA\", \"K\", \"CL\", \"CO2\", \"BUN\", \"CREATININE\", \"GLUCOSE\" in the last 72 hours.  Hepatic: No results for input(s): \"AST\", \"ALT\", \"ALB\", \"BILITOT\", \"ALKPHOS\" in the last 72 hours.  Mag:    No results for input(s): \"MG\" in the last 72 hours.   Phos:   No results for input(s): \"PHOS\" in the last 72 hours.   INR: No results for input(s): \"INR\" in the last 72 hours.    Radiology Review:  None      ASSESSMENT AND PLAN:  S/P ex lap, colostomy closure  CLYDE removed  Do shower, MANUEL prn  See me in 12 days for suture removal    Jorge Gupta MD

## 2024-04-09 ENCOUNTER — OFFICE VISIT (OUTPATIENT)
Dept: SURGERY | Age: 34
End: 2024-04-09

## 2024-04-09 VITALS — DIASTOLIC BLOOD PRESSURE: 83 MMHG | WEIGHT: 315 LBS | BODY MASS INDEX: 47.2 KG/M2 | SYSTOLIC BLOOD PRESSURE: 131 MMHG

## 2024-04-09 DIAGNOSIS — Z93.3 COLOSTOMY IN PLACE (HCC): Primary | ICD-10-CM

## 2024-04-09 PROCEDURE — 99024 POSTOP FOLLOW-UP VISIT: CPT | Performed by: SURGERY

## 2024-04-09 NOTE — PROGRESS NOTES
[facility] GENERAL AND LAPAROSCOPIC SURGERY          PATIENT NAME: Jaylen Adhikari     TODAY'S DATE: 4/9/2024    SUBJECTIVE:    Pt here for staple removal.  Feeling well, no concerns.     OBJECTIVE:  VITALS:  /83   Wt (!) 157.9 kg (348 lb)   BMI 47.20 kg/m²     CONSTITUTIONAL:  awake and alert  LUNGS:  clear to auscultation  ABDOMEN:  normal bowel sounds, soft, non-distended, non-tender, incision healed, no infection       Radiology Review:  None      ASSESSMENT AND PLAN:  Staples removed  Had colostomy closure  All back to normal  RTW 4/29 - light duty X 2 weeks thereafter  See me prn    Jorge Gupta MD

## 2024-04-17 ENCOUNTER — OFFICE VISIT (OUTPATIENT)
Dept: PRIMARY CARE CLINIC | Age: 34
End: 2024-04-17
Payer: COMMERCIAL

## 2024-04-17 VITALS
OXYGEN SATURATION: 99 % | DIASTOLIC BLOOD PRESSURE: 80 MMHG | HEIGHT: 73 IN | SYSTOLIC BLOOD PRESSURE: 128 MMHG | BODY MASS INDEX: 41.75 KG/M2 | HEART RATE: 77 BPM | WEIGHT: 315 LBS | TEMPERATURE: 97.7 F

## 2024-04-17 DIAGNOSIS — I10 PRIMARY HYPERTENSION: ICD-10-CM

## 2024-04-17 DIAGNOSIS — Z13.220 LIPID SCREENING: ICD-10-CM

## 2024-04-17 DIAGNOSIS — Z87.898 HISTORY OF PREDIABETES: ICD-10-CM

## 2024-04-17 DIAGNOSIS — Z09 HOSPITAL DISCHARGE FOLLOW-UP: Primary | ICD-10-CM

## 2024-04-17 DIAGNOSIS — E55.9 VITAMIN D INSUFFICIENCY: ICD-10-CM

## 2024-04-17 PROBLEM — S31.109A OPEN ABDOMINAL WALL WOUND: Status: RESOLVED | Noted: 2024-03-18 | Resolved: 2024-04-17

## 2024-04-17 PROBLEM — Z43.3 COLOSTOMY CARE (HCC): Status: RESOLVED | Noted: 2023-06-12 | Resolved: 2024-04-17

## 2024-04-17 PROBLEM — Z93.3 COLOSTOMY IN PLACE (HCC): Status: RESOLVED | Noted: 2024-03-15 | Resolved: 2024-04-17

## 2024-04-17 PROBLEM — E44.1 MILD MALNUTRITION (HCC): Status: RESOLVED | Noted: 2024-03-20 | Resolved: 2024-04-17

## 2024-04-17 PROCEDURE — 99214 OFFICE O/P EST MOD 30 MIN: CPT | Performed by: NURSE PRACTITIONER

## 2024-04-17 PROCEDURE — 1111F DSCHRG MED/CURRENT MED MERGE: CPT | Performed by: NURSE PRACTITIONER

## 2024-04-17 PROCEDURE — 3079F DIAST BP 80-89 MM HG: CPT | Performed by: NURSE PRACTITIONER

## 2024-04-17 PROCEDURE — 3074F SYST BP LT 130 MM HG: CPT | Performed by: NURSE PRACTITIONER

## 2024-04-17 SDOH — ECONOMIC STABILITY: FOOD INSECURITY: WITHIN THE PAST 12 MONTHS, YOU WORRIED THAT YOUR FOOD WOULD RUN OUT BEFORE YOU GOT MONEY TO BUY MORE.: NEVER TRUE

## 2024-04-17 SDOH — ECONOMIC STABILITY: FOOD INSECURITY: WITHIN THE PAST 12 MONTHS, THE FOOD YOU BOUGHT JUST DIDN'T LAST AND YOU DIDN'T HAVE MONEY TO GET MORE.: NEVER TRUE

## 2024-04-17 SDOH — ECONOMIC STABILITY: INCOME INSECURITY: HOW HARD IS IT FOR YOU TO PAY FOR THE VERY BASICS LIKE FOOD, HOUSING, MEDICAL CARE, AND HEATING?: NOT VERY HARD

## 2024-04-17 NOTE — PROGRESS NOTES
Post-Discharge Transitional Care  Follow Up    4/17/24  Jaylen Adhikari   YOB: 1990    Chief Complaint   Patient presents with    Follow-Up from Hospital     OPEN COLOSTOMY CLOSURE WITH COLORECTAL ANASTOMOSIS (Abdomen)  FLEXIBLE SIGMOIDOSCOPY  APPENDECTOMY        Date of Office Visit:  4/17/2024  Date of Hospital Admission: 3/15/24  Date of Hospital Discharge: 3/23/24  Risk of hospital readmission (high >=14%. Medium >=10%) :Readmission Risk Score: 6.5      Care management risk score Rising risk (score 2-5) and Complex Care (Scores >=6): No Risk Score On File     Non face to face  following discharge, date last encounter closed (first attempt may have been earlier): *No documented post hospital discharge outreach found in the last 14 days    Call initiated 2 business days of discharge: *No response recorded in the last 14 days    ASSESSMENT/PLAN:   Hospital discharge follow-up  -     [place] DISCHARGE MEDS RECONCILED W/ CURRENT OUTPATIENT MED LIST  Primary hypertension  -     Comprehensive Metabolic Panel, Fasting; Future  History of prediabetes  -     Hemoglobin A1C; Future  Vitamin D insufficiency  -     Vitamin D 25 Hydroxy; Future  Lipid screening  -     Lipid, Fasting; Future      Medical Decision Making: low complexity  Return in about 6 weeks (around 5/28/2024), or if symptoms worsen or fail to improve, for follow up of hypertension, history of impaired glucose regulation and weight check, review labs.    On this date 4/17/2024 I have spent 45 minutes reviewing previous notes, test results and face to face with the patient discussing the diagnosis and importance of compliance with the treatment plan as well as documenting on the day of the visit.       Subjective:   HPI:    Follow up of Hospital problems/diagnosis(es):   Colostomy in place  Open abdominal wound  Mild malnutrition     Surgical procedures:   3/15/2024 --> colostomy closure with colorectal anastomosis,

## 2024-04-19 DIAGNOSIS — E55.9 VITAMIN D INSUFFICIENCY: ICD-10-CM

## 2024-04-19 RX ORDER — ERGOCALCIFEROL 1.25 MG/1
50000 CAPSULE ORAL WEEKLY
Qty: 12 CAPSULE | Refills: 1 | Status: SHIPPED | OUTPATIENT
Start: 2024-04-19

## 2024-04-19 NOTE — TELEPHONE ENCOUNTER
Medication:   Requested Prescriptions     Pending Prescriptions Disp Refills    vitamin D (ERGOCALCIFEROL) 1.25 MG (05037 UT) CAPS capsule [Pharmacy Med Name: VITAMIN D2 1.25MG(50,000 UNIT)] 12 capsule 1     Sig: TAKE 1 CAPSULE BY MOUTH ONCE WEEKLY      Last Filled:  10/29/23    Patient Phone Number: [phone] (home)     Last appt: 4/17/2024   Next appt: 5/30/2024    Last OARRS:        No data to display              PDMP Monitoring:    Last PDMP [place] as Reviewed ([place]):  Review User Review Instant Review Result          Preferred Pharmacy:   [facility] [id number] - [facility], [place] - [address] [phone] - F [phone]  [address] [facility] [place] [address]  Phone: [phone] Fax: [phone]    Recent Visits  Date Type Provider Dept   04/17/24 Office Visit Pooja Nuñez APRN - CNP [facility]   02/29/24 Office Visit Pooja Nuñez APRN - CNP [facility]   01/30/24 Office Visit Thierry Gonzalez MD [facility]   10/16/23 Office Visit Pooja Nuñez APRN - CNP [facility]   06/29/23 Office Visit Pooja Nuñez APRN - CNP [facility]   05/19/23 Office Visit Pooja Nuñez APRN - [facility]NP [facility]   03/20/23 Office Visit Pooja Nuñez APRN - CNP [facility]   12/19/22 Office Visit Pooja Nuñez APRN - CNP [facility]   Showing recent visits within past 540 days with a meds authorizing provider and meeting all other requirements  Future Appointments  Date Type Provider Dept   05/30/24 Appointment Pooja Nuñez APRN - CNP [facility]   Showing future appointments within next 150 days with a meds authorizing provider and meeting all other requirements     4/17/2024

## 2024-05-30 ENCOUNTER — OFFICE VISIT (OUTPATIENT)
Dept: PRIMARY CARE CLINIC | Age: 34
End: 2024-05-30
Payer: COMMERCIAL

## 2024-05-30 VITALS
OXYGEN SATURATION: 96 % | DIASTOLIC BLOOD PRESSURE: 82 MMHG | HEART RATE: 79 BPM | HEIGHT: 73 IN | SYSTOLIC BLOOD PRESSURE: 122 MMHG | BODY MASS INDEX: 41.75 KG/M2 | TEMPERATURE: 97.7 F | WEIGHT: 315 LBS

## 2024-05-30 DIAGNOSIS — I10 PRIMARY HYPERTENSION: Primary | ICD-10-CM

## 2024-05-30 DIAGNOSIS — Z87.898 HISTORY OF PREDIABETES: ICD-10-CM

## 2024-05-30 DIAGNOSIS — Z13.220 LIPID SCREENING: ICD-10-CM

## 2024-05-30 DIAGNOSIS — E55.9 VITAMIN D INSUFFICIENCY: ICD-10-CM

## 2024-05-30 DIAGNOSIS — E66.01 CLASS 3 SEVERE OBESITY DUE TO EXCESS CALORIES WITH SERIOUS COMORBIDITY AND BODY MASS INDEX (BMI) OF 40.0 TO 44.9 IN ADULT (HCC): ICD-10-CM

## 2024-05-30 DIAGNOSIS — B35.1 TOENAIL FUNGUS: ICD-10-CM

## 2024-05-30 LAB — HBA1C MFR BLD: 5.6 %

## 2024-05-30 PROCEDURE — 3079F DIAST BP 80-89 MM HG: CPT | Performed by: NURSE PRACTITIONER

## 2024-05-30 PROCEDURE — 3074F SYST BP LT 130 MM HG: CPT | Performed by: NURSE PRACTITIONER

## 2024-05-30 PROCEDURE — 83036 HEMOGLOBIN GLYCOSYLATED A1C: CPT | Performed by: NURSE PRACTITIONER

## 2024-05-30 PROCEDURE — 99214 OFFICE O/P EST MOD 30 MIN: CPT | Performed by: NURSE PRACTITIONER

## 2024-05-30 RX ORDER — SPIRONOLACTONE 25 MG/1
TABLET ORAL
Qty: 180 TABLET | Refills: 1 | Status: SHIPPED | OUTPATIENT
Start: 2024-05-30

## 2024-05-30 RX ORDER — CARVEDILOL 25 MG/1
25 TABLET ORAL 2 TIMES DAILY WITH MEALS
Qty: 180 TABLET | Refills: 1 | Status: SHIPPED | OUTPATIENT
Start: 2024-05-30 | End: 2024-11-26

## 2024-05-30 RX ORDER — ERGOCALCIFEROL 1.25 MG/1
50000 CAPSULE ORAL WEEKLY
Qty: 12 CAPSULE | Refills: 1 | Status: SHIPPED | OUTPATIENT
Start: 2024-05-30

## 2024-05-30 RX ORDER — LISINOPRIL AND HYDROCHLOROTHIAZIDE 25; 20 MG/1; MG/1
1 TABLET ORAL EVERY MORNING
Qty: 90 TABLET | Refills: 1 | Status: SHIPPED | OUTPATIENT
Start: 2024-05-30

## 2024-05-30 ASSESSMENT — ENCOUNTER SYMPTOMS
ABDOMINAL PAIN: 0
NAUSEA: 0
WHEEZING: 0
VOMITING: 0
CONSTIPATION: 0
SHORTNESS OF BREATH: 0
CHEST TIGHTNESS: 0
DIARRHEA: 0
COUGH: 0
ORTHOPNEA: 0
BLURRED VISION: 0
ABDOMINAL DISTENTION: 0
APNEA: 0

## 2024-05-30 NOTE — PROGRESS NOTES
5/30/2024    Chief Complaint   Patient presents with    3 Month Follow-Up      follow up of hypertension, history of impaired glucose regulation and weight check.       Jaylen Adhikari is a 33 y.o. male, presents today for 3 month follow up of hypertension, history of impaired glucose regulation and obesity.      HPI  Hypertension  Episode onset: August 2022. The problem is controlled (running low 120s/80s). Pertinent negatives include no anxiety, blurred vision, chest pain, headaches, malaise/fatigue, orthopnea, palpitations, peripheral edema, PND, shortness of breath or sweats. Risk factors for coronary artery disease include obesity, male gender and family history. Treatments tried: Carvedilol 25 mg, lisinopril-hydroCHLOROthiazide  20-25 mg daily, Sprinolactone. The current treatment provides significant improvement. There are no compliance problems (Taking all medication as prescribed).  There is no history of angina, kidney disease, CAD/MI, CVA, heart failure, left ventricular hypertrophy, PVD or retinopathy.        History of Impaired glucose regulation  He limits potatoes, rice, pasta, bread and watches carb intake. Patient is exercising 150 minutes week.     Results for POC orders placed in visit on 05/30/24   POCT glycosylated hemoglobin (Hb A1C)   Result Value Ref Range    Hemoglobin A1C 5.6 %        Last Hemoglobin A1C   Component  Ref Range & Units 10/17/23 1019 3/14/23 0817 12/6/22 0941 8/19/22 0947   Hemoglobin A1C  See comment % 5.5 5.4 CM 5.3 CM 5.7 CM       Obesity  Patient reports he has completely change his diet, and has stopped drinking soda and stopped smoking in Aug 2022 after being diagnosed with HTN.     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 Eating smaller portion and not eating left overs.      Vitamin D Insufficiency  Taking Vitamin D 50,000 UT weekly- prescribed on 10/29/24     Vitamin D 25 Hydroxy   Component  Ref Range &

## 2024-11-18 ENCOUNTER — TELEPHONE (OUTPATIENT)
Dept: PRIMARY CARE CLINIC | Age: 34
End: 2024-11-18

## 2025-07-21 ENCOUNTER — OFFICE VISIT (OUTPATIENT)
Dept: PRIMARY CARE CLINIC | Age: 35
End: 2025-07-21
Payer: COMMERCIAL

## 2025-07-21 VITALS
TEMPERATURE: 98 F | SYSTOLIC BLOOD PRESSURE: 122 MMHG | HEIGHT: 74 IN | DIASTOLIC BLOOD PRESSURE: 80 MMHG | RESPIRATION RATE: 16 BRPM | WEIGHT: 315 LBS | HEART RATE: 62 BPM | BODY MASS INDEX: 40.43 KG/M2 | OXYGEN SATURATION: 98 %

## 2025-07-21 DIAGNOSIS — E66.813 CLASS 3 SEVERE OBESITY DUE TO EXCESS CALORIES WITH SERIOUS COMORBIDITY AND BODY MASS INDEX (BMI) OF 40.0 TO 44.9 IN ADULT (HCC): ICD-10-CM

## 2025-07-21 DIAGNOSIS — Z13.220 LIPID SCREENING: ICD-10-CM

## 2025-07-21 DIAGNOSIS — I10 PRIMARY HYPERTENSION: Primary | ICD-10-CM

## 2025-07-21 DIAGNOSIS — Z87.898 HISTORY OF PREDIABETES: ICD-10-CM

## 2025-07-21 DIAGNOSIS — E55.9 VITAMIN D INSUFFICIENCY: ICD-10-CM

## 2025-07-21 PROCEDURE — 99214 OFFICE O/P EST MOD 30 MIN: CPT | Performed by: NURSE PRACTITIONER

## 2025-07-21 PROCEDURE — 3079F DIAST BP 80-89 MM HG: CPT | Performed by: NURSE PRACTITIONER

## 2025-07-21 PROCEDURE — 3074F SYST BP LT 130 MM HG: CPT | Performed by: NURSE PRACTITIONER

## 2025-07-21 RX ORDER — CARVEDILOL 25 MG/1
25 TABLET ORAL 2 TIMES DAILY WITH MEALS
Qty: 180 TABLET | Refills: 1 | Status: CANCELLED | OUTPATIENT
Start: 2025-07-21 | End: 2026-01-17

## 2025-07-21 SDOH — ECONOMIC STABILITY: FOOD INSECURITY: WITHIN THE PAST 12 MONTHS, THE FOOD YOU BOUGHT JUST DIDN'T LAST AND YOU DIDN'T HAVE MONEY TO GET MORE.: NEVER TRUE

## 2025-07-21 SDOH — ECONOMIC STABILITY: FOOD INSECURITY: WITHIN THE PAST 12 MONTHS, YOU WORRIED THAT YOUR FOOD WOULD RUN OUT BEFORE YOU GOT MONEY TO BUY MORE.: NEVER TRUE

## 2025-07-21 ASSESSMENT — PATIENT HEALTH QUESTIONNAIRE - PHQ9
1. LITTLE INTEREST OR PLEASURE IN DOING THINGS: NOT AT ALL
2. FEELING DOWN, DEPRESSED OR HOPELESS: NOT AT ALL
SUM OF ALL RESPONSES TO PHQ QUESTIONS 1-9: 0

## 2025-07-21 ASSESSMENT — ENCOUNTER SYMPTOMS
SHORTNESS OF BREATH: 0
APNEA: 0
CHEST TIGHTNESS: 0
VOMITING: 0
DIARRHEA: 0
BLURRED VISION: 0
WHEEZING: 0
ABDOMINAL DISTENTION: 0
COUGH: 0
NAUSEA: 0
ORTHOPNEA: 0
CONSTIPATION: 0
ABDOMINAL PAIN: 0

## 2025-07-21 NOTE — PROGRESS NOTES
7/21/2025    Chief Complaint   Patient presents with    Follow-up     Hypertension, Obesity, Vitamin D Insufficiency       Jaylen Adhikari is a 34 y.o. male, presents today for 3 month follow up of hypertension, history of impaired glucose regulation and obesity, history of vitamin D insufficiency      HPI  Hypertension  Episode onset: August 2022. The problem is controlled. Pertinent negatives include no anxiety, blurred vision, chest pain, headaches, malaise/fatigue, orthopnea, palpitations, peripheral edema, PND, shortness of breath or sweats. Risk factors for coronary artery disease include obesity, male gender and family history. Treatments tried: Carvedilol 25 mg, lisinopril-hydroCHLOROthiazide  20-25 mg daily, Sprinolactone. The current treatment provides significant improvement. Compliance problems: Stopped all antihypertensive medications in November 2024.  There is no history of angina, kidney disease, CAD/MI, CVA, heart failure, left ventricular hypertrophy, PVD or retinopathy.      Admits the last time he took BP medications was in Nov 2024.   He doesn't check BP at home.      History of Impaired glucose regulation  He limits potatoes, rice, pasta, bread and watches carb intake. Patient is exercising 150 minutes week.     Last Hemoglobin A1C   Component  Ref Range & Units 10/17/23 1019 3/14/23 0817 12/6/22 0941 8/19/22 0947   Hemoglobin A1C  See comment % 5.5 5.4 CM 5.3 CM 5.7 CM       Obesity  Patient reports he has completely change his diet, and has stopped drinking soda and stopped smoking in Aug 2022 after being diagnosed with HTN. He drinks sugar-free soda.    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 Eating smaller portion and not eating left overs.      Vitamin D Insufficiency  He took Vitamin D 50,000 UT weekly (prescribed on 10/29/24) until Rx ran out.     Vitamin D 25 Hydroxy   Component  Ref Range & Units 10/17/23 1019 8/19/22 0947

## 2025-08-27 ENCOUNTER — OFFICE VISIT (OUTPATIENT)
Dept: PRIMARY CARE CLINIC | Age: 35
End: 2025-08-27
Payer: COMMERCIAL

## 2025-08-27 VITALS
DIASTOLIC BLOOD PRESSURE: 92 MMHG | HEART RATE: 70 BPM | HEIGHT: 74 IN | SYSTOLIC BLOOD PRESSURE: 138 MMHG | TEMPERATURE: 98 F | BODY MASS INDEX: 40.43 KG/M2 | RESPIRATION RATE: 16 BRPM | OXYGEN SATURATION: 98 % | WEIGHT: 315 LBS

## 2025-08-27 DIAGNOSIS — I10 PRIMARY HYPERTENSION: Primary | ICD-10-CM

## 2025-08-27 DIAGNOSIS — E66.813 CLASS 3 SEVERE OBESITY DUE TO EXCESS CALORIES WITH SERIOUS COMORBIDITY AND BODY MASS INDEX (BMI) OF 40.0 TO 44.9 IN ADULT (HCC): ICD-10-CM

## 2025-08-27 PROCEDURE — 99214 OFFICE O/P EST MOD 30 MIN: CPT | Performed by: NURSE PRACTITIONER

## 2025-08-27 PROCEDURE — 3075F SYST BP GE 130 - 139MM HG: CPT | Performed by: NURSE PRACTITIONER

## 2025-08-27 PROCEDURE — 3079F DIAST BP 80-89 MM HG: CPT | Performed by: NURSE PRACTITIONER

## 2025-08-27 RX ORDER — LISINOPRIL 20 MG/1
20 TABLET ORAL DAILY
Qty: 90 TABLET | Refills: 0 | Status: SHIPPED | OUTPATIENT
Start: 2025-08-27 | End: 2025-11-25

## 2025-08-27 SDOH — ECONOMIC STABILITY: FOOD INSECURITY: WITHIN THE PAST 12 MONTHS, YOU WORRIED THAT YOUR FOOD WOULD RUN OUT BEFORE YOU GOT MONEY TO BUY MORE.: NEVER TRUE

## 2025-08-27 SDOH — ECONOMIC STABILITY: FOOD INSECURITY: WITHIN THE PAST 12 MONTHS, THE FOOD YOU BOUGHT JUST DIDN'T LAST AND YOU DIDN'T HAVE MONEY TO GET MORE.: NEVER TRUE

## 2025-08-27 ASSESSMENT — ENCOUNTER SYMPTOMS
CHEST TIGHTNESS: 0
ABDOMINAL PAIN: 0
DIARRHEA: 0
ABDOMINAL DISTENTION: 0
WHEEZING: 0
APNEA: 0
CONSTIPATION: 0
NAUSEA: 0
COUGH: 0
VOMITING: 0

## 2025-08-27 ASSESSMENT — PATIENT HEALTH QUESTIONNAIRE - PHQ9
SUM OF ALL RESPONSES TO PHQ QUESTIONS 1-9: 0
1. LITTLE INTEREST OR PLEASURE IN DOING THINGS: NOT AT ALL
SUM OF ALL RESPONSES TO PHQ QUESTIONS 1-9: 0
2. FEELING DOWN, DEPRESSED OR HOPELESS: NOT AT ALL

## (undated) DEVICE — PROCEDURE KIT ENDOSCP CUST

## (undated) DEVICE — DRAPE,LAP,CHOLE,W/TROUGHS,STERILE: Brand: MEDLINE

## (undated) DEVICE — ELECTRODE PT RET AD L9FT HI MOIST COND ADH HYDRGEL CORDED

## (undated) DEVICE — CATHETER URET 5FR L70CM OPN END SGL LUMN INJ HUB FLEXIMA

## (undated) DEVICE — WET SKIN PREP TRAY: Brand: MEDLINE INDUSTRIES, INC.

## (undated) DEVICE — GAUZE,SPONGE,4"X4",12PLY,STERILE,LF,2'S: Brand: MEDLINE

## (undated) DEVICE — BANDAGE,GAUZE,BULKEE II,4.5"X4.1YD,STRL: Brand: MEDLINE

## (undated) DEVICE — 3M™ TEGADERM™ TRANSPARENT FILM DRESSING FRAME STYLE, 1626W, 4 IN X 4-3/4 IN (10 CM X 12 CM), 50/CT 4CT/CASE: Brand: 3M™ TEGADERM™

## (undated) DEVICE — SUTURE VCRL + SZ 3-0 L18IN ABSRB UD SH 1/2 CIR TAPERCUT NDL VCP864D

## (undated) DEVICE — PAD ABSRB W8XL10IN ABD HYDROPHOBIC NONWOVEN THCK LAYR CELOS

## (undated) DEVICE — STERILE POLYISOPRENE POWDER-FREE SURGICAL GLOVES: Brand: PROTEXIS

## (undated) DEVICE — SUTURE PERMA-HAND 0 L18IN NONABSORBABLE BLK SILK BRAID W/O SA66G

## (undated) DEVICE — TOWEL,OR,DSP,ST,BLUE,DLX,10/PK,8PK/CS: Brand: MEDLINE

## (undated) DEVICE — SUTURE PROL SZ 1 L30IN NONABSORBABLE BLU L36MM CT-1 1/2 CIR 8425H

## (undated) DEVICE — STRIP,CLOSURE,WOUND,MEDI-STRIP,1/2X4: Brand: MEDLINE

## (undated) DEVICE — SOLUTION IV IRRIG WATER 500ML POUR BRL ST 2F7113

## (undated) DEVICE — SUTURE PDS + SZ 1 L96IN ABSRB VLT L65MM TP-1 1/2 CIR PDP880G

## (undated) DEVICE — 30978 SEE SHARP - ENHANCED INTRAOPERATIVE LAPAROSCOPE CLEANING & DEFOGGING: Brand: 30978 SEE SHARP - ENHANCED INTRAOPERATIVE LAPAROSCOPE CLEANING & DEFOGGING

## (undated) DEVICE — 1LYRTR 16FR10ML 100%SILI SNAP: Brand: MEDLINE INDUSTRIES, INC.

## (undated) DEVICE — TROCAR SLEEVE: Brand: KII ® LOW PROFILE SLEEVE

## (undated) DEVICE — 3M™ IOBAN™ 2 ANTIMICROBIAL INCISE DRAPE 6650EZ: Brand: IOBAN™ 2

## (undated) DEVICE — PAD,ABDOMINAL,8"X10",ST,LF: Brand: MEDLINE

## (undated) DEVICE — TOWEL,OR,DSP,ST,BLUE,STD,4/PK,20PK/CS: Brand: MEDLINE

## (undated) DEVICE — LAPAROSCOPY PACK: Brand: MEDLINE INDUSTRIES, INC.

## (undated) DEVICE — GOWN SIRUS NONREIN LG W/TWL: Brand: MEDLINE INDUSTRIES, INC.

## (undated) DEVICE — SUTURE MCRYL + SZ 4-0 L27IN ABSRB UD L19MM PS-2 3/8 CIR MCP426H

## (undated) DEVICE — RELOAD STPL L60MM H1.5-3.6MM REG TISS BLU GRIPPING SURF B

## (undated) DEVICE — GAUZE,SPONGE,4"X4",8PLY,STRL,LF,10/TRAY: Brand: MEDLINE

## (undated) DEVICE — PROTECTOR EYE PT SELF ADH NS OPT GRD LF

## (undated) DEVICE — STAPLER INT L28CM 60MM 12 FIRING B FRM PWD + ECHELON FLX

## (undated) DEVICE — LIGHT HANDLE: Brand: DEVON

## (undated) DEVICE — SUTURE PERMAHAND SZ 3-0 L18IN NONABSORBABLE BLK L26MM SH C013D

## (undated) DEVICE — SEAL

## (undated) DEVICE — SCRUB DRY SURG EZ SCRUB BRUSH PREOPERATIVE GRN

## (undated) DEVICE — SEALER LAP L37CM MARYLAND JAW OPN NANO COAT MULTIFUNCTIONAL

## (undated) DEVICE — SWAB CULT SGL AMIES W/O CHAR FOR THRT VAG SKIN HRT CULTSWAB

## (undated) DEVICE — BLADE CLIPPER GEN PURP NS

## (undated) DEVICE — BLANKET WRM W29.9XL79.1IN UP BODY FORC AIR MISTRAL-AIR

## (undated) DEVICE — ADHESIVE SKIN CLSR 0.7ML TOP DERMBND ADV

## (undated) DEVICE — SUTURE PERMAHAND SZ 0 L30IN NONABSORBABLE BLK L26MM SH 1/2 K834H

## (undated) DEVICE — BAG RETRIEVAL SPECIMEN SUPERBAG 15 2XL NYLON ITRODUCER

## (undated) DEVICE — LEGGINGS, PAIR, 31X48, STERILE: Brand: MEDLINE

## (undated) DEVICE — SUTURE ETHLN SZ 3-0 L18IN NONABSORBABLE BLK L24MM PS-1 3/8 1663G

## (undated) DEVICE — DRESSING NEG PRSS L W15XH3.3XL26CM BLK POLYUR DRP PD

## (undated) DEVICE — MERCY FAIRFIELD TURNOVER KIT: Brand: MEDLINE INDUSTRIES, INC.

## (undated) DEVICE — Device: Brand: SENSURA MIO CONVEX

## (undated) DEVICE — STAPLER 60 RELOAD BLUE: Brand: SUREFORM

## (undated) DEVICE — SHEET,DRAPE,40X58,STERILE: Brand: MEDLINE

## (undated) DEVICE — SHEET,DRAPE,53X77,STERILE: Brand: MEDLINE

## (undated) DEVICE — Device: Brand: PRIME MEDICAL LLC

## (undated) DEVICE — BW-412T DISP COMBO CLEANING BRUSH: Brand: SINGLE USE COMBINATION CLEANING BRUSH

## (undated) DEVICE — SUTURE VCRL + SZ 0 L27IN ABSRB VLT L26MM UR-6 5/8 CIR VCP603H

## (undated) DEVICE — TUBING, SUCTION, 1/4" X 10', STRAIGHT: Brand: MEDLINE

## (undated) DEVICE — TRAY CATH CURITY ULTMR 16FR 2L FOLEY BAG

## (undated) DEVICE — CONNECTOR IV TB L28MM CLR VLV ACCS NDLLSS DISP MAXPLUS

## (undated) DEVICE — SYRINGE 60ML BULB IRRIG ST LF

## (undated) DEVICE — Device: Brand: MEDEX

## (undated) DEVICE — SYRINGE MED 10ML TRNSLUC BRL PLUNG BLK MRK POLYPR CTRL

## (undated) DEVICE — GLOVE SURG SZ 6.5 L11.2IN FNGR THK9.8MIL STRW LTX POLYMER

## (undated) DEVICE — GUIDEWIRE ENDOSCP L150CM DIA0.035IN TIP 3CM PTFE NIT

## (undated) DEVICE — SHEET, T, LAPAROTOMY, STERILE: Brand: MEDLINE

## (undated) DEVICE — 3M™ TEGADERM™ TRANSPARENT FILM DRESSING FRAME STYLE, 1628, 6 IN X 8 IN (15 CM X 20 CM), 10/CT 8CT/CASE: Brand: 3M™ TEGADERM™

## (undated) DEVICE — PICO 7 10CM X 40CM: Brand: PICO™ 7

## (undated) DEVICE — SUTURE PERMA-HAND SZ 3-0 L18IN 17 STRND NONABSORBABLE BLK SA64H

## (undated) DEVICE — COLLECTOR SPEC RAYON LIQ STUART DBL FOR THRT VAG SKIN WND

## (undated) DEVICE — DRAPE,REIN 53X77,STERILE: Brand: MEDLINE

## (undated) DEVICE — HYPODERMIC SAFETY NEEDLE: Brand: MAGELLAN

## (undated) DEVICE — Device

## (undated) DEVICE — CYSTO PACK: Brand: MEDLINE INDUSTRIES, INC.

## (undated) DEVICE — NEEDLE CLOSURE OMNICLOSE

## (undated) DEVICE — BLADE ES L6IN ELASTOMERIC COAT INSUL DURABLE BEND UPTO

## (undated) DEVICE — STAPLER INT DIA25MM CLS STPL H1.5-2.2MM OPN LEG L5.2MM 22

## (undated) DEVICE — TIP COVER ACCESSORY

## (undated) DEVICE — APPLICATOR MEDICATED 26 CC SOLUTION HI LT ORNG CHLORAPREP

## (undated) DEVICE — SYRINGE MED 10ML SLIP TIP BLNT FILL AND LUERLOCK DISP

## (undated) DEVICE — SUTURE PERMAHAND SZ 3-0 L30IN NONABSORBABLE BLK SILK BRAID A304H

## (undated) DEVICE — ULTRACLEAN ACCESSORY ELECTRODE, 6 INCH COATED BLADE WITH EXTENDED INSULATION: Brand: ULTRACLEAN

## (undated) DEVICE — SUTURE VCRL SZ 3-0 L27IN ABSRB UD L26MM SH 1/2 CIR J416H

## (undated) DEVICE — NEEDLE,22GX1.5",REG,BEVEL: Brand: MEDLINE

## (undated) DEVICE — STAPLER SKIN H3.9MM WIRE DIA0.58MM CRWN 6.9MM 35 STPL ROT

## (undated) DEVICE — GOWN,AURORA,NONREINF,RAGLAN,XXL,STERILE: Brand: MEDLINE

## (undated) DEVICE — STAPLER EXT 65MM S STL AUTO DISP PURSTRING

## (undated) DEVICE — INSUFFLATION NEEDLE TO ESTABLISH PNEUMOPERITONEUM.: Brand: INSUFFLATION NEEDLE

## (undated) DEVICE — STAPLER INT DIA29MM CLS STPL H1.5-2.2MM OPN LEG L5.2MM 26

## (undated) DEVICE — DRAPE THER FLUID WARMING 66X44 IN FLAT SLUSH DBL DISC ORS

## (undated) DEVICE — CANISTER NEG PRSS 500ML WND THER W/ TBNG NO PRSS RANG W/

## (undated) DEVICE — SYRINGE, LUER LOCK, 30ML: Brand: MEDLINE

## (undated) DEVICE — BLADE ES ELASTOMERIC COAT INSUL DURABLE BEND UPTO 90DEG

## (undated) DEVICE — RELOAD STPL H4.1X2MM DIA60MM THCK TISS GRN 6 ROW PWR GST B

## (undated) DEVICE — SUTURE MCRYL + SZ 3-0 L27IN ABSRB UD PS1 L24MM 3/8 CIR REV MCP936H

## (undated) DEVICE — DEVICE SEAL L23CM NANO COAT MARYLAND JAW OPN DIV LIGASURE

## (undated) DEVICE — SUTURE PERMAHAND SZ 2-0 L30IN NONABSORBABLE BLK SILK W/O A305H

## (undated) DEVICE — AIRSEAL 8 MM ACCESS PORT AND LOW PROFILE OBTURATOR WITH BLADELESS OPTICAL TIP, 120 MM LENGTH: Brand: AIRSEAL

## (undated) DEVICE — SUTURE PERMA-HAND SZ 2-0 L30IN NONABSORBABLE BLK L26MM SH K833H

## (undated) DEVICE — PENCIL SMK EVAC TELSCP 3 M TBNG

## (undated) DEVICE — WOUND RETRACTOR AND PROTECTOR: Brand: ALEXIS O WOUND PROTECTOR-RETRACTOR

## (undated) DEVICE — SPONGE LAP W18XL18IN WHT COT 4 PLY FLD STRUNG RADPQ DISP ST 2 PER PACK

## (undated) DEVICE — TRI-LUMEN FILTERED TUBE SET WITH ACTIVATED CHARCOAL FILTER: Brand: AIRSEAL

## (undated) DEVICE — ROBOTIC PK

## (undated) DEVICE — ARM DRAPE

## (undated) DEVICE — SUTURE VCRL + SZ 0 L18IN ABSRB UD L36MM CT-1 1/2 CIR VCP840D

## (undated) DEVICE — BLADELESS OBTURATOR: Brand: WECK VISTA

## (undated) DEVICE — SNAPSECURE FOLEY DEVICE: Brand: MEDLINE

## (undated) DEVICE — TOTAL TRAY, DB, 100% SILI FOLEY, 16FR 10: Brand: MEDLINE

## (undated) DEVICE — SUTURE VCRL + SZ 3-0 L27IN ABSRB UD L26MM SH 1/2 CIR VCP416H

## (undated) DEVICE — REDUCER: Brand: ENDOWRIST

## (undated) DEVICE — SUTURE PDS + SZ 0 L27IN ABSRB VLT L26MM CT 2 1 2 CIR PDP334H

## (undated) DEVICE — STAPLER INT L34CM 60MM LNG ENDOSCP ARTC PWR + ECHELON FLX

## (undated) DEVICE — COVER LT HNDL BLU PLAS

## (undated) DEVICE — SUTURE ABSORBABLE MONOFILAMENT 0 CTX 60 IN VIO PDS + PDP990G

## (undated) DEVICE — PAD THRM M SPL TORSO

## (undated) DEVICE — MAJOR SET UP PK

## (undated) DEVICE — LAPAROSCOPIC TROCAR SLEEVE/SINGLE USE: Brand: KII® LOW PROFILE OPTICAL ACCESS SYSTEM

## (undated) DEVICE — STAPLER 60: Brand: SUREFORM

## (undated) DEVICE — SOLUTION IRRIG 1000ML 0.9% SOD CHL USP POUR PLAS BTL

## (undated) DEVICE — BOWL MED L 32OZ PLAS W/ MOLD GRAD EZ OPN PEEL PCH

## (undated) DEVICE — SYRINGE, LUER LOCK, 10ML: Brand: MEDLINE

## (undated) DEVICE — CANNULA SEAL

## (undated) DEVICE — SUTURE VLOC 90 2/0 VL 6 GS-22 VLOCM2105

## (undated) DEVICE — TROCAR: Brand: KII FIOS FIRST ENTRY

## (undated) DEVICE — GLOVE ORANGE PI 7   MSG9070

## (undated) DEVICE — VESSEL SEALER EXTEND: Brand: ENDOWRIST